# Patient Record
Sex: FEMALE | Race: WHITE | NOT HISPANIC OR LATINO | URBAN - METROPOLITAN AREA
[De-identification: names, ages, dates, MRNs, and addresses within clinical notes are randomized per-mention and may not be internally consistent; named-entity substitution may affect disease eponyms.]

---

## 2017-01-18 ENCOUNTER — APPOINTMENT (OUTPATIENT)
Dept: URBAN - METROPOLITAN AREA CLINIC 200 | Age: 57
Setting detail: DERMATOLOGY
End: 2017-01-19

## 2017-01-18 DIAGNOSIS — D17 BENIGN LIPOMATOUS NEOPLASM: ICD-10-CM

## 2017-01-18 DIAGNOSIS — D22 MELANOCYTIC NEVI: ICD-10-CM

## 2017-01-18 DIAGNOSIS — L57.8 OTHER SKIN CHANGES DUE TO CHRONIC EXPOSURE TO NONIONIZING RADIATION: ICD-10-CM

## 2017-01-18 PROBLEM — D22.5 MELANOCYTIC NEVI OF TRUNK: Status: ACTIVE | Noted: 2017-01-18

## 2017-01-18 PROBLEM — D17.21 BENIGN LIPOMATOUS NEOPLASM OF SKIN AND SUBCUTANEOUS TISSUE OF RIGHT ARM: Status: ACTIVE | Noted: 2017-01-18

## 2017-01-18 PROBLEM — L20.84 INTRINSIC (ALLERGIC) ECZEMA: Status: ACTIVE | Noted: 2017-01-18

## 2017-01-18 PROCEDURE — OTHER BIOPSY BY SHAVE METHOD: OTHER

## 2017-01-18 PROCEDURE — OTHER COUNSELING: OTHER

## 2017-01-18 PROCEDURE — 11100: CPT

## 2017-01-18 PROCEDURE — 99213 OFFICE O/P EST LOW 20 MIN: CPT | Mod: 25

## 2017-01-18 ASSESSMENT — LOCATION DETAILED DESCRIPTION DERM
LOCATION DETAILED: LEFT LATERAL SUPERIOR CHEST
LOCATION DETAILED: RIGHT PROXIMAL POSTERIOR UPPER ARM
LOCATION DETAILED: RIGHT MEDIAL UPPER BACK

## 2017-01-18 ASSESSMENT — LOCATION SIMPLE DESCRIPTION DERM
LOCATION SIMPLE: CHEST
LOCATION SIMPLE: RIGHT UPPER BACK
LOCATION SIMPLE: RIGHT POSTERIOR UPPER ARM

## 2017-01-18 ASSESSMENT — LOCATION ZONE DERM
LOCATION ZONE: TRUNK
LOCATION ZONE: ARM

## 2017-01-18 NOTE — PROCEDURE: BIOPSY BY SHAVE METHOD
Electrodesiccation Text: The wound bed was treated with electrodesiccation after the biopsy was performed.
Additional Anesthesia Volume In Cc (Will Not Render If 0): 0
Anesthesia Type: 1% lidocaine with epinephrine
consent was obtained and risks were reviewed including but not limited to scarring, infection, bleeding, scabbing, incomplete removal, nerve damage and allergy to anesthesia.
Biopsy Type: H and E
Electrodesiccation And Curettage Text: The wound bed was treated with electrodesiccation and curettage after the biopsy was performed.
Cryotherapy Text: The wound bed was treated with cryotherapy after the biopsy was performed.
Bill [id number] For Specimen Handling/Conveyance To Laboratory?: no
Size Of Lesion In Cm: 0.4
Detail Level: Detailed
Silver Nitrate Text: The wound bed was treated with silver nitrate after the biopsy was performed.
Billing Type: Third-Party Bill
Biopsy Method: Personna blade
Post-Care Instructions: I reviewed with the patient in detail post-care instructions. Patient is to keep the biopsy site dry overnight, and then apply bacitracin twice daily until healed. Patient may apply hydrogen peroxide soaks to remove any crusting.
Type Of Destruction Used: Curettage
Hemostasis: Drysol
Notification Instructions: Patient will be notified of biopsy results. However, patient instructed to call the office if not contacted within 2 weeks.
Wound Care: Vaseline
Anesthesia Volume In Cc (Will Not Render If 0): 0.1
Dressing: bandage

## 2017-07-17 ENCOUNTER — APPOINTMENT (OUTPATIENT)
Dept: URBAN - METROPOLITAN AREA CLINIC 200 | Age: 57
Setting detail: DERMATOLOGY
End: 2017-07-18

## 2017-07-17 DIAGNOSIS — L82.0 INFLAMED SEBORRHEIC KERATOSIS: ICD-10-CM

## 2017-07-17 DIAGNOSIS — L57.8 OTHER SKIN CHANGES DUE TO CHRONIC EXPOSURE TO NONIONIZING RADIATION: ICD-10-CM

## 2017-07-17 PROCEDURE — 99213 OFFICE O/P EST LOW 20 MIN: CPT

## 2017-07-17 PROCEDURE — OTHER COUNSELING: OTHER

## 2017-07-17 ASSESSMENT — LOCATION SIMPLE DESCRIPTION DERM
LOCATION SIMPLE: RIGHT UPPER BACK
LOCATION SIMPLE: RIGHT PRETIBIAL REGION

## 2017-07-17 ASSESSMENT — LOCATION ZONE DERM
LOCATION ZONE: LEG
LOCATION ZONE: TRUNK

## 2017-07-17 ASSESSMENT — LOCATION DETAILED DESCRIPTION DERM
LOCATION DETAILED: RIGHT DISTAL PRETIBIAL REGION
LOCATION DETAILED: RIGHT SUPERIOR UPPER BACK

## 2018-01-19 ENCOUNTER — APPOINTMENT (OUTPATIENT)
Dept: URBAN - METROPOLITAN AREA CLINIC 200 | Age: 58
Setting detail: DERMATOLOGY
End: 2018-02-01

## 2018-01-19 DIAGNOSIS — L57.8 OTHER SKIN CHANGES DUE TO CHRONIC EXPOSURE TO NONIONIZING RADIATION: ICD-10-CM

## 2018-01-19 DIAGNOSIS — D22 MELANOCYTIC NEVI: ICD-10-CM

## 2018-01-19 PROBLEM — D22.5 MELANOCYTIC NEVI OF TRUNK: Status: ACTIVE | Noted: 2018-01-19

## 2018-01-19 PROCEDURE — 99213 OFFICE O/P EST LOW 20 MIN: CPT

## 2018-01-19 PROCEDURE — OTHER COUNSELING: OTHER

## 2018-01-19 ASSESSMENT — LOCATION ZONE DERM: LOCATION ZONE: TRUNK

## 2018-01-19 ASSESSMENT — LOCATION SIMPLE DESCRIPTION DERM: LOCATION SIMPLE: CHEST

## 2018-01-19 ASSESSMENT — LOCATION DETAILED DESCRIPTION DERM: LOCATION DETAILED: UPPER STERNUM

## 2018-06-18 ENCOUNTER — APPOINTMENT (OUTPATIENT)
Dept: URBAN - METROPOLITAN AREA CLINIC 200 | Age: 58
Setting detail: DERMATOLOGY
End: 2018-06-19

## 2018-06-18 DIAGNOSIS — B07.8 OTHER VIRAL WARTS: ICD-10-CM

## 2018-06-18 PROCEDURE — 17110 DESTRUCT B9 LESION 1-14: CPT

## 2018-06-18 PROCEDURE — OTHER LIQUID NITROGEN: OTHER

## 2018-06-18 ASSESSMENT — LOCATION SIMPLE DESCRIPTION DERM: LOCATION SIMPLE: RIGHT THUMB

## 2018-06-18 ASSESSMENT — LOCATION DETAILED DESCRIPTION DERM: LOCATION DETAILED: RIGHT DISTAL VENTRAL THUMB

## 2018-06-18 ASSESSMENT — LOCATION ZONE DERM: LOCATION ZONE: FINGER

## 2018-07-16 ENCOUNTER — APPOINTMENT (OUTPATIENT)
Dept: URBAN - METROPOLITAN AREA CLINIC 200 | Age: 58
Setting detail: DERMATOLOGY
End: 2018-07-24

## 2018-07-16 DIAGNOSIS — B07.8 OTHER VIRAL WARTS: ICD-10-CM

## 2018-07-16 DIAGNOSIS — D17 BENIGN LIPOMATOUS NEOPLASM: ICD-10-CM

## 2018-07-16 DIAGNOSIS — L57.8 OTHER SKIN CHANGES DUE TO CHRONIC EXPOSURE TO NONIONIZING RADIATION: ICD-10-CM

## 2018-07-16 DIAGNOSIS — D485 NEOPLASM OF UNCERTAIN BEHAVIOR OF SKIN: ICD-10-CM

## 2018-07-16 PROBLEM — D17.21 BENIGN LIPOMATOUS NEOPLASM OF SKIN AND SUBCUTANEOUS TISSUE OF RIGHT ARM: Status: ACTIVE | Noted: 2018-07-16

## 2018-07-16 PROBLEM — D48.5 NEOPLASM OF UNCERTAIN BEHAVIOR OF SKIN: Status: ACTIVE | Noted: 2018-07-16

## 2018-07-16 PROBLEM — L55.1 SUNBURN OF SECOND DEGREE: Status: ACTIVE | Noted: 2018-07-16

## 2018-07-16 PROBLEM — J30.1 ALLERGIC RHINITIS DUE TO POLLEN: Status: ACTIVE | Noted: 2018-07-16

## 2018-07-16 PROBLEM — L20.84 INTRINSIC (ALLERGIC) ECZEMA: Status: ACTIVE | Noted: 2018-07-16

## 2018-07-16 PROCEDURE — 11100: CPT | Mod: 59

## 2018-07-16 PROCEDURE — OTHER BIOPSY BY SHAVE METHOD: OTHER

## 2018-07-16 PROCEDURE — OTHER REASSURANCE: OTHER

## 2018-07-16 PROCEDURE — OTHER COUNSELING: OTHER

## 2018-07-16 PROCEDURE — OTHER LIQUID NITROGEN: OTHER

## 2018-07-16 PROCEDURE — 17110 DESTRUCT B9 LESION 1-14: CPT

## 2018-07-16 PROCEDURE — 99213 OFFICE O/P EST LOW 20 MIN: CPT | Mod: 25

## 2018-07-16 ASSESSMENT — LOCATION SIMPLE DESCRIPTION DERM
LOCATION SIMPLE: RIGHT POSTERIOR UPPER ARM
LOCATION SIMPLE: RIGHT MIDDLE FINGER
LOCATION SIMPLE: LEFT 3RD TOE
LOCATION SIMPLE: CHEST

## 2018-07-16 ASSESSMENT — LOCATION ZONE DERM
LOCATION ZONE: ARM
LOCATION ZONE: FINGER
LOCATION ZONE: TRUNK
LOCATION ZONE: TOE

## 2018-07-16 ASSESSMENT — LOCATION DETAILED DESCRIPTION DERM
LOCATION DETAILED: LEFT MEDIAL SUPERIOR CHEST
LOCATION DETAILED: LEFT 3RD TOE
LOCATION DETAILED: RIGHT MIDDLE FINGER DISTAL INTERPHALANGEAL JOINT
LOCATION DETAILED: RIGHT DISTAL POSTERIOR UPPER ARM
LOCATION DETAILED: RIGHT DISTAL DORSAL MIDDLE FINGER

## 2018-07-16 NOTE — PROCEDURE: BIOPSY BY SHAVE METHOD
Destruction After The Procedure: No
Notification Instructions: Patient will be notified of biopsy results. However, patient instructed to call the office if not contacted within 2 weeks.
Hemostasis: Drysol
Electrodesiccation And Curettage Text: The wound bed was treated with electrodesiccation and curettage after the biopsy was performed.
Post-Care Instructions: I reviewed with the patient in detail post-care instructions. Patient is to keep the biopsy site dry overnight, and then apply bacitracin twice daily until healed. Patient may apply hydrogen peroxide soaks to remove any crusting.
Biopsy Type: H and E
Was A Bandage Applied: Yes
Size Of Lesion In Cm: 0
Type Of Destruction Used: Curettage
Detail Level: Detailed
Electrodesiccation Text: The wound bed was treated with electrodesiccation after the biopsy was performed.
Biopsy Method: Dermablade
Anesthesia Type: 1% lidocaine with epinephrine
Curettage Text: The wound bed was treated with curettage after the biopsy was performed.
Silver Nitrate Text: The wound bed was treated with silver nitrate after the biopsy was performed.
Depth Of Biopsy: dermis
Billing Type: Third-Party Bill
Cryotherapy Text: The wound bed was treated with cryotherapy after the biopsy was performed.
Anesthesia Volume In Cc (Will Not Render If 0): 0.5
Consent: Written consent was obtained and risks were reviewed including but not limited to scarring, infection, bleeding, scabbing, incomplete removal, nerve damage and allergy to anesthesia.
Dressing: bandage
Wound Care: Petrolatum

## 2018-07-16 NOTE — PROCEDURE: LIQUID NITROGEN
Medical Necessity Information: It is in your best interest to select a reason for this procedure from the list below. All of these items fulfill various CMS LCD requirements except the new and changing color options.
Post-Care Instructions: I reviewed with the patient in detail post-care instructions. Patient is to wear sunprotection, and avoid picking at any of the treated lesions. Pt may apply Vaseline to crusted or scabbing areas.
Medical Necessity Clause: This procedure was medically necessary because the lesions that were treated were: causing pain
Pared With?: Dermablade
Detail Level: Detailed
Number Of Freeze-Thaw Cycles: 2 freeze-thaw cycles
Include Z78.9 (Other Specified Conditions Influencing Health Status) As An Associated Diagnosis?: Yes
Add 52 Modifier (Optional): no
Consent: The patient's consent was obtained including but not limited to risks of crusting, scabbing, blistering, scarring, darker or lighter pigmentary change, recurrence, incomplete removal and infection.

## 2018-09-10 ENCOUNTER — APPOINTMENT (OUTPATIENT)
Dept: URBAN - METROPOLITAN AREA CLINIC 200 | Age: 58
Setting detail: DERMATOLOGY
End: 2018-09-21

## 2018-09-10 DIAGNOSIS — B07.8 OTHER VIRAL WARTS: ICD-10-CM

## 2018-09-10 DIAGNOSIS — L21.8 OTHER SEBORRHEIC DERMATITIS: ICD-10-CM

## 2018-09-10 PROBLEM — L30.9 DERMATITIS, UNSPECIFIED: Status: ACTIVE | Noted: 2018-09-10

## 2018-09-10 PROBLEM — J30.1 ALLERGIC RHINITIS DUE TO POLLEN: Status: ACTIVE | Noted: 2018-09-10

## 2018-09-10 PROCEDURE — OTHER PRESCRIPTION: OTHER

## 2018-09-10 PROCEDURE — OTHER LIQUID NITROGEN: OTHER

## 2018-09-10 PROCEDURE — 17110 DESTRUCT B9 LESION 1-14: CPT

## 2018-09-10 PROCEDURE — 99212 OFFICE O/P EST SF 10 MIN: CPT | Mod: 25

## 2018-09-10 RX ORDER — CLOBETASOL PROPIONATE 0.5 MG/G
AEROSOL, FOAM TOPICAL
Qty: 1 | Refills: 4 | Status: ERX

## 2018-09-10 ASSESSMENT — LOCATION DETAILED DESCRIPTION DERM
LOCATION DETAILED: RIGHT DISTAL DORSAL THUMB
LOCATION DETAILED: HAIR

## 2018-09-10 ASSESSMENT — LOCATION ZONE DERM
LOCATION ZONE: FINGER
LOCATION ZONE: SCALP

## 2018-09-10 ASSESSMENT — LOCATION SIMPLE DESCRIPTION DERM
LOCATION SIMPLE: HAIR
LOCATION SIMPLE: RIGHT THUMB

## 2018-09-10 ASSESSMENT — SEVERITY ASSESSMENT: HOW SEVERE IS THIS PATIENT'S CONDITION?: MODERATE

## 2018-09-10 NOTE — HPI: HAIR LOSS
Previous Labs: Yes
How Did The Hair Loss Occur?: sudden in onset
How Severe Is Your Hair Loss?: severe

## 2018-09-10 NOTE — PROCEDURE: LIQUID NITROGEN
Render Post-Care Instructions In Note?: no
Medical Necessity Information: It is in your best interest to select a reason for this procedure from the list below. All of these items fulfill various CMS LCD requirements except the new and changing color options.
Include Z78.9 (Other Specified Conditions Influencing Health Status) As An Associated Diagnosis?: Yes
Number Of Freeze-Thaw Cycles: 2 freeze-thaw cycles
Pared With?: Dermablade
Post-Care Instructions: I reviewed with the patient in detail post-care instructions. Patient is to wear sunprotection, and avoid picking at any of the treated lesions. Pt may apply Vaseline to crusted or scabbing areas.
Detail Level: Detailed
Consent: The patient's consent was obtained including but not limited to risks of crusting, scabbing, blistering, scarring, darker or lighter pigmentary change, recurrence, incomplete removal and infection.
Medical Necessity Clause: This procedure was medically necessary because the lesions that were treated were: causing pain

## 2018-09-24 ENCOUNTER — APPOINTMENT (OUTPATIENT)
Dept: URBAN - METROPOLITAN AREA CLINIC 200 | Age: 58
Setting detail: DERMATOLOGY
End: 2018-09-26

## 2018-09-24 DIAGNOSIS — L30.9 DERMATITIS, UNSPECIFIED: ICD-10-CM

## 2018-09-24 PROCEDURE — OTHER PRESCRIPTION: OTHER

## 2018-09-24 PROCEDURE — 11100: CPT

## 2018-09-24 PROCEDURE — OTHER BIOPSY BY PUNCH METHOD: OTHER

## 2018-09-24 RX ORDER — CICLOPIROX 1 %
SHAMPOO TOPICAL
Qty: 1 | Refills: 3 | Status: ERX

## 2018-09-24 RX ORDER — CLOBETASOL PROPIONATE 0.5 MG/G
AEROSOL, FOAM TOPICAL
Qty: 1 | Refills: 4 | Status: ERX

## 2018-09-24 ASSESSMENT — LOCATION SIMPLE DESCRIPTION DERM
LOCATION SIMPLE: POSTERIOR SCALP
LOCATION SIMPLE: POSTERIOR SCALP

## 2018-09-24 ASSESSMENT — LOCATION DETAILED DESCRIPTION DERM
LOCATION DETAILED: RIGHT OCCIPITAL SCALP
LOCATION DETAILED: RIGHT OCCIPITAL SCALP

## 2018-09-24 ASSESSMENT — LOCATION ZONE DERM
LOCATION ZONE: SCALP
LOCATION ZONE: SCALP

## 2018-09-24 NOTE — PROCEDURE: BIOPSY BY PUNCH METHOD
Anesthesia Type: 1% lidocaine without epinephrine
X Size Of Lesion In Cm (Optional): 0
Home Suture Removal Text: Patient was provided a home suture removal kit and will remove their sutures at home.  If they have any questions or difficulties they will call the office.
Wound Care: Vaseline
Punch Size In Mm: 6
Suture Removal: 14 days
Was A Bandage Applied: Yes
Post-Care Instructions: I reviewed with the patient in detail post-care instructions. Patient is to keep the biopsy site dry overnight, and then apply bacitracin twice daily until healed. Patient may apply hydrogen peroxide soaks to remove any crusting.
Epidermal Sutures: 4-0 Nylon
Notification Instructions: Patient will be notified of biopsy results. However, patient instructed to call the office if not contacted within 2 weeks.
Consent: Written consent was obtained and risks were reviewed including but not limited to scarring, infection, bleeding, scabbing, incomplete removal, nerve damage and allergy to anesthesia.
Render Post-Care Instructions In Note?: no
Dressing: bandage
Hemostasis: None
Detail Level: Detailed
Biopsy Type: DIF
Anesthesia Volume In Cc (Will Not Render If 0): 1
Billing Type: Third-Party Bill

## 2018-10-02 ENCOUNTER — APPOINTMENT (OUTPATIENT)
Dept: URBAN - METROPOLITAN AREA CLINIC 200 | Age: 58
Setting detail: DERMATOLOGY
End: 2018-10-05

## 2018-10-02 ENCOUNTER — RX ONLY (RX ONLY)
Age: 58
End: 2018-10-02

## 2018-10-02 DIAGNOSIS — Z48.02 ENCOUNTER FOR REMOVAL OF SUTURES: ICD-10-CM

## 2018-10-02 PROCEDURE — OTHER MIPS QUALITY: OTHER

## 2018-10-02 PROCEDURE — OTHER SUTURE REMOVAL (GLOBAL PERIOD): OTHER

## 2018-10-02 RX ORDER — HYDROCORTISONE ACETATE 20 MG/ML
LOTION TOPICAL
Qty: 1 | Refills: 6 | Status: ERX

## 2018-10-02 ASSESSMENT — LOCATION DETAILED DESCRIPTION DERM: LOCATION DETAILED: RIGHT SUPERIOR OCCIPITAL SCALP

## 2018-10-02 ASSESSMENT — LOCATION ZONE DERM: LOCATION ZONE: SCALP

## 2018-10-02 ASSESSMENT — LOCATION SIMPLE DESCRIPTION DERM: LOCATION SIMPLE: POSTERIOR SCALP

## 2018-10-02 NOTE — PROCEDURE: SUTURE REMOVAL (GLOBAL PERIOD)
Add 85963 Cpt? (Important Note: In 2017 The Use Of 82946 Is Being Tracked By Cms To Determine Future Global Period Reimbursement For Global Periods): no
Detail Level: Detailed

## 2018-11-06 ENCOUNTER — APPOINTMENT (OUTPATIENT)
Dept: URBAN - METROPOLITAN AREA CLINIC 200 | Age: 58
Setting detail: DERMATOLOGY
End: 2018-11-12

## 2018-11-06 DIAGNOSIS — L64.8 OTHER ANDROGENIC ALOPECIA: ICD-10-CM

## 2018-11-06 DIAGNOSIS — B07.8 OTHER VIRAL WARTS: ICD-10-CM

## 2018-11-06 PROCEDURE — 17110 DESTRUCT B9 LESION 1-14: CPT

## 2018-11-06 PROCEDURE — OTHER LIQUID NITROGEN: OTHER

## 2018-11-06 PROCEDURE — 99213 OFFICE O/P EST LOW 20 MIN: CPT | Mod: 25

## 2018-11-06 PROCEDURE — OTHER PRESCRIPTION MEDICATION MANAGEMENT: OTHER

## 2018-11-06 ASSESSMENT — LOCATION ZONE DERM
LOCATION ZONE: SCALP
LOCATION ZONE: FINGER

## 2018-11-06 ASSESSMENT — LOCATION DETAILED DESCRIPTION DERM
LOCATION DETAILED: RIGHT MEDIAL FRONTAL SCALP
LOCATION DETAILED: DORSAL INTERPHALANGEAL JOINT RIGHT THUMB
LOCATION DETAILED: RIGHT MIDDLE FINGER PROXIMAL INTERPHALANGEAL JOINT

## 2018-11-06 ASSESSMENT — LOCATION SIMPLE DESCRIPTION DERM
LOCATION SIMPLE: RIGHT THUMB
LOCATION SIMPLE: RIGHT SCALP
LOCATION SIMPLE: RIGHT MIDDLE FINGER

## 2018-11-06 NOTE — PROCEDURE: LIQUID NITROGEN
Detail Level: Detailed
Medical Necessity Clause: This procedure was medically necessary because the lesions that were treated were: causing pain
Number Of Freeze-Thaw Cycles: 2 freeze-thaw cycles
Render Post-Care Instructions In Note?: no
Post-Care Instructions: I reviewed with the patient in detail post-care instructions. Patient is to wear sunprotection, and avoid picking at any of the treated lesions. Pt may apply Vaseline to crusted or scabbing areas.
Pared With?: Dermablade
Consent: The patient's consent was obtained including but not limited to risks of crusting, scabbing, blistering, scarring, darker or lighter pigmentary change, recurrence, incomplete removal and infection.
Include Z78.9 (Other Specified Conditions Influencing Health Status) As An Associated Diagnosis?: Yes
Medical Necessity Information: It is in your best interest to select a reason for this procedure from the list below. All of these items fulfill various CMS LCD requirements except the new and changing color options.

## 2019-01-14 ENCOUNTER — RX ONLY (RX ONLY)
Age: 59
End: 2019-01-14

## 2019-01-14 ENCOUNTER — APPOINTMENT (OUTPATIENT)
Dept: URBAN - METROPOLITAN AREA CLINIC 200 | Age: 59
Setting detail: DERMATOLOGY
End: 2019-01-15

## 2019-01-14 DIAGNOSIS — L57.8 OTHER SKIN CHANGES DUE TO CHRONIC EXPOSURE TO NONIONIZING RADIATION: ICD-10-CM

## 2019-01-14 DIAGNOSIS — L20.84 INTRINSIC (ALLERGIC) ECZEMA: ICD-10-CM

## 2019-01-14 DIAGNOSIS — D17 BENIGN LIPOMATOUS NEOPLASM: ICD-10-CM

## 2019-01-14 PROBLEM — D17.21 BENIGN LIPOMATOUS NEOPLASM OF SKIN AND SUBCUTANEOUS TISSUE OF RIGHT ARM: Status: ACTIVE | Noted: 2019-01-14

## 2019-01-14 PROCEDURE — OTHER EXCISION: OTHER

## 2019-01-14 PROCEDURE — 11401 EXC TR-EXT B9+MARG 0.6-1 CM: CPT

## 2019-01-14 PROCEDURE — OTHER COUNSELING: OTHER

## 2019-01-14 PROCEDURE — OTHER PRESCRIPTION: OTHER

## 2019-01-14 PROCEDURE — OTHER MIPS QUALITY: OTHER

## 2019-01-14 PROCEDURE — 12031 INTMD RPR S/A/T/EXT 2.5 CM/<: CPT

## 2019-01-14 PROCEDURE — 99213 OFFICE O/P EST LOW 20 MIN: CPT | Mod: 25

## 2019-01-14 RX ORDER — TRIAMCINOLONE ACETONIDE 1 MG/G
CREAM TOPICAL BID
Qty: 1 | Refills: 6 | Status: ERX

## 2019-01-14 RX ORDER — HYDROCORTISONE ACETATE 20 MG/ML
LOTION TOPICAL
Qty: 1 | Refills: 6 | Status: ERX

## 2019-01-14 RX ORDER — CICLOPIROX 1 %
SHAMPOO TOPICAL
Qty: 1 | Refills: 3 | Status: ERX

## 2019-01-14 ASSESSMENT — LOCATION SIMPLE DESCRIPTION DERM
LOCATION SIMPLE: RIGHT PRETIBIAL REGION
LOCATION SIMPLE: RIGHT POSTERIOR UPPER ARM
LOCATION SIMPLE: CHEST

## 2019-01-14 ASSESSMENT — LOCATION DETAILED DESCRIPTION DERM
LOCATION DETAILED: LEFT MEDIAL SUPERIOR CHEST
LOCATION DETAILED: RIGHT DISTAL PRETIBIAL REGION
LOCATION DETAILED: RIGHT DISTAL POSTERIOR UPPER ARM

## 2019-01-14 ASSESSMENT — LOCATION ZONE DERM
LOCATION ZONE: TRUNK
LOCATION ZONE: ARM
LOCATION ZONE: LEG

## 2019-01-14 NOTE — PROCEDURE: EXCISION
Validate That Anesthesia Volume Is Not Zero (If You Leave At 0 It Will Not Render In Note): No
Star Wedge Flap Text: The defect edges were debeveled with a #15 scalpel blade.  Given the location of the defect, shape of the defect and the proximity to free margins a star wedge flap was deemed most appropriate.  Using a sterile surgical marker, an appropriate rotation flap was drawn incorporating the defect and placing the expected incisions within the relaxed skin tension lines where possible. The area thus outlined was incised deep to adipose tissue with a #15 scalpel blade.  The skin margins were undermined to an appropriate distance in all directions utilizing iris scissors.
Double M-Plasty Intermediate Repair Preamble Text (Leave Blank If You Do Not Want): Undermining was performed with blunt dissection.
Perilesional Excision Additional Text (Leave Blank If You Do Not Want): The margin was drawn around the clinically apparent lesion. Incisions were then made along these lines to the appropriate tissue plane and the lesion was extirpated.
Complex Repair And Transposition Flap Text: The defect edges were debeveled with a #15 scalpel blade.  The primary defect was closed partially with a complex linear closure.  Given the location of the remaining defect, shape of the defect and the proximity to free margins a transposition flap was deemed most appropriate for complete closure of the defect.  Using a sterile surgical marker, an appropriate advancement flap was drawn incorporating the defect and placing the expected incisions within the relaxed skin tension lines where possible.    The area thus outlined was incised deep to adipose tissue with a #15 scalpel blade.  The skin margins were undermined to an appropriate distance in all directions utilizing iris scissors.
Show Anatomic Location From Referring Provider Variable: Yes
Number Of Deep Sutures (Optional): 1
Complex Repair And O-L Flap Text: The defect edges were debeveled with a #15 scalpel blade.  The primary defect was closed partially with a complex linear closure.  Given the location of the remaining defect, shape of the defect and the proximity to free margins an O-L flap was deemed most appropriate for complete closure of the defect.  Using a sterile surgical marker, an appropriate flap was drawn incorporating the defect and placing the expected incisions within the relaxed skin tension lines where possible.    The area thus outlined was incised deep to adipose tissue with a #15 scalpel blade.  The skin margins were undermined to an appropriate distance in all directions utilizing iris scissors.
Purse String (Simple) Text: Given the location of the defect and the characteristics of the surrounding skin a purse string simple closure was deemed most appropriate.  Undermining was performed circumferentially around the surgical defect.  A purse string suture was then placed and tightened.
Complex Repair And Bilobe Flap Text: The defect edges were debeveled with a #15 scalpel blade.  The primary defect was closed partially with a complex linear closure.  Given the location of the remaining defect, shape of the defect and the proximity to free margins a bilobe flap was deemed most appropriate for complete closure of the defect.  Using a sterile surgical marker, an appropriate advancement flap was drawn incorporating the defect and placing the expected incisions within the relaxed skin tension lines where possible.    The area thus outlined was incised deep to adipose tissue with a #15 scalpel blade.  The skin margins were undermined to an appropriate distance in all directions utilizing iris scissors.
Medical Necessity Clause: This procedure was medically necessary because the lesion that was treated was:
Keystone Flap Text: The defect edges were debeveled with a #15 scalpel blade.  Given the location of the defect, shape of the defect a keystone flap was deemed most appropriate.  Using a sterile surgical marker, an appropriate keystone flap was drawn incorporating the defect, outlining the appropriate donor tissue and placing the expected incisions within the relaxed skin tension lines where possible. The area thus outlined was incised deep to adipose tissue with a #15 scalpel blade.  The skin margins were undermined to an appropriate distance in all directions around the primary defect and laterally outward around the flap utilizing iris scissors.
Cartilage Graft Text: The defect edges were debeveled with a #15 scalpel blade.  Given the location of the defect, shape of the defect, the fact the defect involved a full thickness cartilage defect a cartilage graft was deemed most appropriate.  An appropriate donor site was identified, cleansed, and anesthetized. The cartilage graft was then harvested and transferred to the recipient site, oriented appropriately and then sutured into place.  The secondary defect was then repaired using a primary closure.
Medical Necessity Information: It is in your best interest to select a reason for this procedure from the list below. All of these items fulfill various CMS LCD requirements except lesion extends to a margin.
Dorsal Nasal Flap Text: The defect edges were debeveled with a #15 scalpel blade.  Given the location of the defect and the proximity to free margins a dorsal nasal flap was deemed most appropriate.  Using a sterile surgical marker, an appropriate dorsal nasal flap was drawn around the defect.    The area thus outlined was incised deep to adipose tissue with a #15 scalpel blade.  The skin margins were undermined to an appropriate distance in all directions utilizing iris scissors.
Trilobed Flap Text: The defect edges were debeveled with a #15 scalpel blade.  Given the location of the defect and the proximity to free margins a trilobed flap was deemed most appropriate.  Using a sterile surgical marker, an appropriate trilobed flap drawn around the defect.    The area thus outlined was incised deep to adipose tissue with a #15 scalpel blade.  The skin margins were undermined to an appropriate distance in all directions utilizing iris scissors.
Ftsg Text: The defect edges were debeveled with a #15 scalpel blade.  Given the location of the defect, shape of the defect and the proximity to free margins a full thickness skin graft was deemed most appropriate.  Using a sterile surgical marker, the primary defect shape was transferred to the donor site. The area thus outlined was incised deep to adipose tissue with a #15 scalpel blade.  The harvested graft was then trimmed of adipose tissue until only dermis and epidermis was left.  The skin margins of the secondary defect were undermined to an appropriate distance in all directions utilizing iris scissors.  The secondary defect was closed with interrupted buried subcutaneous sutures.  The skin edges were then re-apposed with running  sutures.  The skin graft was then placed in the primary defect and oriented appropriately.
A-T Advancement Flap Text: The defect edges were debeveled with a #15 scalpel blade.  Given the location of the defect, shape of the defect and the proximity to free margins an A-T advancement flap was deemed most appropriate.  Using a sterile surgical marker, an appropriate advancement flap was drawn incorporating the defect and placing the expected incisions within the relaxed skin tension lines where possible.    The area thus outlined was incised deep to adipose tissue with a #15 scalpel blade.  The skin margins were undermined to an appropriate distance in all directions utilizing iris scissors.
Excisional Biopsy Additional Text (Leave Blank If You Do Not Want): The margin was drawn around the clinically apparent lesion. An elliptical shape was then drawn on the skin incorporating the lesion and margins.  Incisions were then made along these lines to the appropriate tissue plane and the lesion was extirpated.
Dermal Autograft Text: The defect edges were debeveled with a #15 scalpel blade.  Given the location of the defect, shape of the defect and the proximity to free margins a dermal autograft was deemed most appropriate.  Using a sterile surgical marker, the primary defect shape was transferred to the donor site. The area thus outlined was incised deep to adipose tissue with a #15 scalpel blade.  The harvested graft was then trimmed of adipose and epidermal tissue until only dermis was left.  The skin graft was then placed in the primary defect and oriented appropriately.
Detail Level: Detailed
Skin Substitute Text: The defect edges were debeveled with a #15 scalpel blade.  Given the location of the defect, shape of the defect and the proximity to free margins a skin substitute graft was deemed most appropriate.  The graft material was trimmed to fit the size of the defect. The graft was then placed in the primary defect and oriented appropriately.
Tissue Cultured Epidermal Autograft Text: The defect edges were debeveled with a #15 scalpel blade.  Given the location of the defect, shape of the defect and the proximity to free margins a tissue cultured epidermal autograft was deemed most appropriate.  The graft was then trimmed to fit the size of the defect.  The graft was then placed in the primary defect and oriented appropriately.
Primary Defect Length (In Cm): 0
Paramedian Forehead Flap Text: A decision was made to reconstruct the defect utilizing an interpolation axial flap and a staged reconstruction.  A telfa template was made of the defect.  This telfa template was then used to outline the paramedian forehead pedicle flap.  The donor area for the pedicle flap was then injected with anesthesia.  The flap was excised through the skin and subcutaneous tissue down to the layer of the underlying musculature.  The pedicle flap was carefully excised within this deep plane to maintain its blood supply.  The edges of the donor site were undermined.   The donor site was closed in a primary fashion.  The pedicle was then rotated into position and sutured.  Once the tube was sutured into place, adequate blood supply was confirmed with blanching and refill.  The pedicle was then wrapped with xeroform gauze and dressed appropriately with a telfa and gauze bandage to ensure continued blood supply and protect the attached pedicle.
Advancement Flap (Single) Text: The defect edges were debeveled with a #15 scalpel blade.  Given the location of the defect and the proximity to free margins a single advancement flap was deemed most appropriate.  Using a sterile surgical marker, an appropriate advancement flap was drawn incorporating the defect and placing the expected incisions within the relaxed skin tension lines where possible.    The area thus outlined was incised deep to adipose tissue with a #15 scalpel blade.  The skin margins were undermined to an appropriate distance in all directions utilizing iris scissors.
Complex Repair And Single Advancement Flap Text: The defect edges were debeveled with a #15 scalpel blade.  The primary defect was closed partially with a complex linear closure.  Given the location of the remaining defect, shape of the defect and the proximity to free margins a single advancement flap was deemed most appropriate for complete closure of the defect.  Using a sterile surgical marker, an appropriate advancement flap was drawn incorporating the defect and placing the expected incisions within the relaxed skin tension lines where possible.    The area thus outlined was incised deep to adipose tissue with a #15 scalpel blade.  The skin margins were undermined to an appropriate distance in all directions utilizing iris scissors.
Helical Rim Advancement Flap Text: The defect edges were debeveled with a #15 blade scalpel.  Given the location of the defect and the proximity to free margins (helical rim) a double helical rim advancement flap was deemed most appropriate.  Using a sterile surgical marker, the appropriate advancement flaps were drawn incorporating the defect and placing the expected incisions between the helical rim and antihelix where possible.  The area thus outlined was incised through and through with a #15 scalpel blade.  With a skin hook and iris scissors, the flaps were gently and sharply undermined and freed up.
Epidermal Autograft Text: The defect edges were debeveled with a #15 scalpel blade.  Given the location of the defect, shape of the defect and the proximity to free margins an epidermal autograft was deemed most appropriate.  Using a sterile surgical marker, the primary defect shape was transferred to the donor site. The epidermal graft was then harvested.  The skin graft was then placed in the primary defect and oriented appropriately.
Excision Method: Slit
Bilobed Flap Text: The defect edges were debeveled with a #15 scalpel blade.  Given the location of the defect and the proximity to free margins a bilobe flap was deemed most appropriate.  Using a sterile surgical marker, an appropriate bilobe flap drawn around the defect.    The area thus outlined was incised deep to adipose tissue with a #15 scalpel blade.  The skin margins were undermined to an appropriate distance in all directions utilizing iris scissors.
Epidermal Closure Graft Donor Site (Optional): simple interrupted
V-Y Flap Text: The defect edges were debeveled with a #15 scalpel blade.  Given the location of the defect, shape of the defect and the proximity to free margins a V-Y flap was deemed most appropriate.  Using a sterile surgical marker, an appropriate advancement flap was drawn incorporating the defect and placing the expected incisions within the relaxed skin tension lines where possible.    The area thus outlined was incised deep to adipose tissue with a #15 scalpel blade.  The skin margins were undermined to an appropriate distance in all directions utilizing iris scissors.
V-Y Plasty Text: The defect edges were debeveled with a #15 scalpel blade.  Given the location of the defect, shape of the defect and the proximity to free margins an V-Y advancement flap was deemed most appropriate.  Using a sterile surgical marker, an appropriate advancement flap was drawn incorporating the defect and placing the expected incisions within the relaxed skin tension lines where possible.    The area thus outlined was incised deep to adipose tissue with a #15 scalpel blade.  The skin margins were undermined to an appropriate distance in all directions utilizing iris scissors.
Repair Performed By Another Provider Text (Leave Blank If You Do Not Want): After the tissue was excised the defect was repaired by another provider.
Consent was obtained from the patient. The risks and benefits to therapy were discussed in detail. Specifically, the risks of infection, scarring, bleeding, prolonged wound healing, incomplete removal, allergy to anesthesia, nerve injury and recurrence were addressed. Prior to the procedure, the treatment site was clearly identified and confirmed by the patient. All components of Universal Protocol/PAUSE Rule completed.
Billing Type: Third-Party Bill
O-L Flap Text: The defect edges were debeveled with a #15 scalpel blade.  Given the location of the defect, shape of the defect and the proximity to free margins an O-L flap was deemed most appropriate.  Using a sterile surgical marker, an appropriate advancement flap was drawn incorporating the defect and placing the expected incisions within the relaxed skin tension lines where possible.    The area thus outlined was incised deep to adipose tissue with a #15 scalpel blade.  The skin margins were undermined to an appropriate distance in all directions utilizing iris scissors.
Path Notes (To The Dermatopathologist): Please check margins.
Double M-Plasty Complex Repair Preamble Text (Leave Blank If You Do Not Want): Extensive wide undermining was performed.
S Plasty Text: Given the location and shape of the defect, and the orientation of relaxed skin tension lines, an S-plasty was deemed most appropriate for repair.  Using a sterile surgical marker, the appropriate outline of the S-plasty was drawn, incorporating the defect and placing the expected incisions within the relaxed skin tension lines where possible.  The area thus outlined was incised deep to adipose tissue with a #15 scalpel blade.  The skin margins were undermined to an appropriate distance in all directions utilizing iris scissors. The skin flaps were advanced over the defect.  The opposing margins were then approximated with interrupted buried subcutaneous sutures.
Island Pedicle Flap With Canthal Suspension Text: The defect edges were debeveled with a #15 scalpel blade.  Given the location of the defect, shape of the defect and the proximity to free margins an island pedicle advancement flap was deemed most appropriate.  Using a sterile surgical marker, an appropriate advancement flap was drawn incorporating the defect, outlining the appropriate donor tissue and placing the expected incisions within the relaxed skin tension lines where possible. The area thus outlined was incised deep to adipose tissue with a #15 scalpel blade.  The skin margins were undermined to an appropriate distance in all directions around the primary defect and laterally outward around the island pedicle utilizing iris scissors.  There was minimal undermining beneath the pedicle flap. A suspension suture was placed in the canthal tendon to prevent tension and prevent ectropion.
O-T Advancement Flap Text: The defect edges were debeveled with a #15 scalpel blade.  Given the location of the defect, shape of the defect and the proximity to free margins an O-T advancement flap was deemed most appropriate.  Using a sterile surgical marker, an appropriate advancement flap was drawn incorporating the defect and placing the expected incisions within the relaxed skin tension lines where possible.    The area thus outlined was incised deep to adipose tissue with a #15 scalpel blade.  The skin margins were undermined to an appropriate distance in all directions utilizing iris scissors.
Saucerization Excision Additional Text (Leave Blank If You Do Not Want): The margin was drawn around the clinically apparent lesion.  Incisions were then made along these lines, in a tangential fashion, to the appropriate tissue plane and the lesion was extirpated.
Graft Donor Site Bandage (Optional-Leave Blank If You Don't Want In Note): Steri-strips and a pressure bandage were applied to the donor site.
Complex Repair And O-T Advancement Flap Text: The defect edges were debeveled with a #15 scalpel blade.  The primary defect was closed partially with a complex linear closure.  Given the location of the remaining defect, shape of the defect and the proximity to free margins an O-T advancement flap was deemed most appropriate for complete closure of the defect.  Using a sterile surgical marker, an appropriate advancement flap was drawn incorporating the defect and placing the expected incisions within the relaxed skin tension lines where possible.    The area thus outlined was incised deep to adipose tissue with a #15 scalpel blade.  The skin margins were undermined to an appropriate distance in all directions utilizing iris scissors.
Excision Depth: adipose tissue
Post-Care Instructions: I reviewed with the patient in detail post-care instructions. Patient is not to engage in any heavy lifting, exercise, or swimming for the next 14 days. Should the patient develop any fevers, chills, bleeding, severe pain patient will contact the office immediately.
Intermediate / Complex Repair - Final Wound Length In Cm: 1.5
Purse String (Intermediate) Text: Given the location of the defect and the characteristics of the surrounding skin a pursestring intermediate closure was deemed most appropriate.  Undermining was performed circumfirentially around the surgical defect.  A purstring suture was then placed and tightened.
Mucosal Advancement Flap Text: Given the location of the defect, shape of the defect and the proximity to free margins a mucosal advancement flap was deemed most appropriate. Incisions were made with a 15 blade scalpel in the appropriate fashion along the cutaneous vermillion border and the mucosal lip. The remaining actinically damaged mucosal tissue was excised.  The mucosal advancement flap was then elevated to the gingival sulcus with care taken to preserve the neurovascular structures and advanced into the primary defect. Care was taken to ensure that precise realignment of the vermillion border was achieved.
Complex Repair And Tissue Cultured Epidermal Autograft Text: The defect edges were debeveled with a #15 scalpel blade.  The primary defect was closed partially with a complex linear closure.  Given the location of the defect, shape of the defect and the proximity to free margins an tissue cultured epidermal autograft was deemed most appropriate to repair the remaining defect.  The graft was trimmed to fit the size of the remaining defect.  The graft was then placed in the primary defect, oriented appropriately, and sutured into place.
Scalpel Size: 15 blade
Epidermal Sutures: 4-0 Nylon
Melolabial Interpolation Flap Text: A decision was made to reconstruct the defect utilizing an interpolation axial flap and a staged reconstruction.  A telfa template was made of the defect.  This telfa template was then used to outline the melolabial interpolation flap.  The donor area for the pedicle flap was then injected with anesthesia.  The flap was excised through the skin and subcutaneous tissue down to the layer of the underlying musculature.  The pedicle flap was carefully excised within this deep plane to maintain its blood supply.  The edges of the donor site were undermined.   The donor site was closed in a primary fashion.  The pedicle was then rotated into position and sutured.  Once the tube was sutured into place, adequate blood supply was confirmed with blanching and refill.  The pedicle was then wrapped with xeroform gauze and dressed appropriately with a telfa and gauze bandage to ensure continued blood supply and protect the attached pedicle.
Rhomboid Transposition Flap Text: The defect edges were debeveled with a #15 scalpel blade.  Given the location of the defect and the proximity to free margins a rhomboid transposition flap was deemed most appropriate.  Using a sterile surgical marker, an appropriate rhomboid flap was drawn incorporating the defect.    The area thus outlined was incised deep to adipose tissue with a #15 scalpel blade.  The skin margins were undermined to an appropriate distance in all directions utilizing iris scissors.
Slit Excision Additional Text (Leave Blank If You Do Not Want): A linear line was drawn on the skin overlying the lesion. An incision was made slowly until the lesion was visualized.  Once visualized, the lesion was removed with blunt dissection.
Dressing: pressure dressing
Complex Repair And Melolabial Flap Text: The defect edges were debeveled with a #15 scalpel blade.  The primary defect was closed partially with a complex linear closure.  Given the location of the remaining defect, shape of the defect and the proximity to free margins a melolabial flap was deemed most appropriate for complete closure of the defect.  Using a sterile surgical marker, an appropriate advancement flap was drawn incorporating the defect and placing the expected incisions within the relaxed skin tension lines where possible.    The area thus outlined was incised deep to adipose tissue with a #15 scalpel blade.  The skin margins were undermined to an appropriate distance in all directions utilizing iris scissors.
Bilateral Helical Rim Advancement Flap Text: The defect edges were debeveled with a #15 blade scalpel.  Given the location of the defect and the proximity to free margins (helical rim) a bilateral helical rim advancement flap was deemed most appropriate.  Using a sterile surgical marker, the appropriate advancement flaps were drawn incorporating the defect and placing the expected incisions between the helical rim and antihelix where possible.  The area thus outlined was incised through and through with a #15 scalpel blade.  With a skin hook and iris scissors, the flaps were gently and sharply undermined and freed up.
Complex Repair And V-Y Plasty Text: The defect edges were debeveled with a #15 scalpel blade.  The primary defect was closed partially with a complex linear closure.  Given the location of the remaining defect, shape of the defect and the proximity to free margins a V-Y plasty was deemed most appropriate for complete closure of the defect.  Using a sterile surgical marker, an appropriate advancement flap was drawn incorporating the defect and placing the expected incisions within the relaxed skin tension lines where possible.    The area thus outlined was incised deep to adipose tissue with a #15 scalpel blade.  The skin margins were undermined to an appropriate distance in all directions utilizing iris scissors.
Hemostasis: Electrocautery
Repair Type: Intermediate
Anesthesia Volume In Cc: 5
O-Z Plasty Text: The defect edges were debeveled with a #15 scalpel blade.  Given the location of the defect, shape of the defect and the proximity to free margins an O-Z plasty (double transposition flap) was deemed most appropriate.  Using a sterile surgical marker, the appropriate transposition flaps were drawn incorporating the defect and placing the expected incisions within the relaxed skin tension lines where possible.    The area thus outlined was incised deep to adipose tissue with a #15 scalpel blade.  The skin margins were undermined to an appropriate distance in all directions utilizing iris scissors.  Hemostasis was achieved with electrocautery.  The flaps were then transposed into place, one clockwise and the other counterclockwise, and anchored with interrupted buried subcutaneous sutures.
H Plasty Text: Given the location of the defect, shape of the defect and the proximity to free margins a H-plasty was deemed most appropriate for repair.  Using a sterile surgical marker, the appropriate advancement arms of the H-plasty were drawn incorporating the defect and placing the expected incisions within the relaxed skin tension lines where possible. The area thus outlined was incised deep to adipose tissue with a #15 scalpel blade. The skin margins were undermined to an appropriate distance in all directions utilizing iris scissors.  The opposing advancement arms were then advanced into place in opposite direction and anchored with interrupted buried subcutaneous sutures.
Spiral Flap Text: The defect edges were debeveled with a #15 scalpel blade.  Given the location of the defect, shape of the defect and the proximity to free margins a spiral flap was deemed most appropriate.  Using a sterile surgical marker, an appropriate rotation flap was drawn incorporating the defect and placing the expected incisions within the relaxed skin tension lines where possible. The area thus outlined was incised deep to adipose tissue with a #15 scalpel blade.  The skin margins were undermined to an appropriate distance in all directions utilizing iris scissors.
Biopsy Photograph Reviewed: No (no photograph available)
Mercedes Flap Text: The defect edges were debeveled with a #15 scalpel blade.  Given the location of the defect, shape of the defect and the proximity to free margins a Mercedes flap was deemed most appropriate.  Using a sterile surgical marker, an appropriate advancement flap was drawn incorporating the defect and placing the expected incisions within the relaxed skin tension lines where possible. The area thus outlined was incised deep to adipose tissue with a #15 scalpel blade.  The skin margins were undermined to an appropriate distance in all directions utilizing iris scissors.
Transposition Flap Text: The defect edges were debeveled with a #15 scalpel blade.  Given the location of the defect and the proximity to free margins a transposition flap was deemed most appropriate.  Using a sterile surgical marker, an appropriate transposition flap was drawn incorporating the defect.    The area thus outlined was incised deep to adipose tissue with a #15 scalpel blade.  The skin margins were undermined to an appropriate distance in all directions utilizing iris scissors.
Epidermal Closure: running
Xenograft Text: The defect edges were debeveled with a #15 scalpel blade.  Given the location of the defect, shape of the defect and the proximity to free margins a xenograft was deemed most appropriate.  The graft was then trimmed to fit the size of the defect.  The graft was then placed in the primary defect and oriented appropriately.
Suture Removal: 14 days
Complex Repair And Double Advancement Flap Text: The defect edges were debeveled with a #15 scalpel blade.  The primary defect was closed partially with a complex linear closure.  Given the location of the remaining defect, shape of the defect and the proximity to free margins a double advancement flap was deemed most appropriate for complete closure of the defect.  Using a sterile surgical marker, an appropriate advancement flap was drawn incorporating the defect and placing the expected incisions within the relaxed skin tension lines where possible.    The area thus outlined was incised deep to adipose tissue with a #15 scalpel blade.  The skin margins were undermined to an appropriate distance in all directions utilizing iris scissors.
Composite Graft Text: The defect edges were debeveled with a #15 scalpel blade.  Given the location of the defect, shape of the defect, the proximity to free margins and the fact the defect was full thickness a composite graft was deemed most appropriate.  The defect was outline and then transferred to the donor site.  A full thickness graft was then excised from the donor site. The graft was then placed in the primary defect, oriented appropriately and then sutured into place.  The secondary defect was then repaired using a primary closure.
Anesthesia Type: 1% lidocaine with epinephrine
Estimated Blood Loss (Cc): minimal
Alar Island Pedicle Flap Text: The defect edges were debeveled with a #15 scalpel blade.  Given the location of the defect, shape of the defect and the proximity to the alar rim an island pedicle advancement flap was deemed most appropriate.  Using a sterile surgical marker, an appropriate advancement flap was drawn incorporating the defect, outlining the appropriate donor tissue and placing the expected incisions within the nasal ala running parallel to the alar rim. The area thus outlined was incised with a #15 scalpel blade.  The skin margins were undermined minimally to an appropriate distance in all directions around the primary defect and laterally outward around the island pedicle utilizing iris scissors.  There was minimal undermining beneath the pedicle flap.
Burow's Advancement Flap Text: The defect edges were debeveled with a #15 scalpel blade.  Given the location of the defect and the proximity to free margins a Burow's advancement flap was deemed most appropriate.  Using a sterile surgical marker, the appropriate advancement flap was drawn incorporating the defect and placing the expected incisions within the relaxed skin tension lines where possible.    The area thus outlined was incised deep to adipose tissue with a #15 scalpel blade.  The skin margins were undermined to an appropriate distance in all directions utilizing iris scissors.
Complex Repair And A-T Advancement Flap Text: The defect edges were debeveled with a #15 scalpel blade.  The primary defect was closed partially with a complex linear closure.  Given the location of the remaining defect, shape of the defect and the proximity to free margins an A-T advancement flap was deemed most appropriate for complete closure of the defect.  Using a sterile surgical marker, an appropriate advancement flap was drawn incorporating the defect and placing the expected incisions within the relaxed skin tension lines where possible.    The area thus outlined was incised deep to adipose tissue with a #15 scalpel blade.  The skin margins were undermined to an appropriate distance in all directions utilizing iris scissors.
Complex Repair And Epidermal Autograft Text: The defect edges were debeveled with a #15 scalpel blade.  The primary defect was closed partially with a complex linear closure.  Given the location of the defect, shape of the defect and the proximity to free margins an epidermal autograft was deemed most appropriate to repair the remaining defect.  The graft was trimmed to fit the size of the remaining defect.  The graft was then placed in the primary defect, oriented appropriately, and sutured into place.
Cheek-To-Nose Interpolation Flap Text: A decision was made to reconstruct the defect utilizing an interpolation axial flap and a staged reconstruction.  A telfa template was made of the defect.  This telfa template was then used to outline the Cheek-To-Nose Interpolation flap.  The donor area for the pedicle flap was then injected with anesthesia.  The flap was excised through the skin and subcutaneous tissue down to the layer of the underlying musculature.  The interpolation flap was carefully excised within this deep plane to maintain its blood supply.  The edges of the donor site were undermined.   The donor site was closed in a primary fashion.  The pedicle was then rotated into position and sutured.  Once the tube was sutured into place, adequate blood supply was confirmed with blanching and refill.  The pedicle was then wrapped with xeroform gauze and dressed appropriately with a telfa and gauze bandage to ensure continued blood supply and protect the attached pedicle.
Island Pedicle Flap Text: The defect edges were debeveled with a #15 scalpel blade.  Given the location of the defect, shape of the defect and the proximity to free margins an island pedicle advancement flap was deemed most appropriate.  Using a sterile surgical marker, an appropriate advancement flap was drawn incorporating the defect, outlining the appropriate donor tissue and placing the expected incisions within the relaxed skin tension lines where possible.    The area thus outlined was incised deep to adipose tissue with a #15 scalpel blade.  The skin margins were undermined to an appropriate distance in all directions around the primary defect and laterally outward around the island pedicle utilizing iris scissors.  There was minimal undermining beneath the pedicle flap.
Modified Advancement Flap Text: The defect edges were debeveled with a #15 scalpel blade.  Given the location of the defect, shape of the defect and the proximity to free margins a modified advancement flap was deemed most appropriate.  Using a sterile surgical marker, an appropriate advancement flap was drawn incorporating the defect and placing the expected incisions within the relaxed skin tension lines where possible.    The area thus outlined was incised deep to adipose tissue with a #15 scalpel blade.  The skin margins were undermined to an appropriate distance in all directions utilizing iris scissors.
No Repair - Repaired With Adjacent Surgical Defect Text (Leave Blank If You Do Not Want): After the excision the defect was repaired concurrently with another surgical defect which was in close approximation.
Information: Selecting Yes will display possible errors in your note based on the variables you have selected. This validation is only offered as a suggestion for you. PLEASE NOTE THAT THE VALIDATION TEXT WILL BE REMOVED WHEN YOU FINALIZE YOUR NOTE. IF YOU WANT TO FAX A PRELIMINARY NOTE YOU WILL NEED TO TOGGLE THIS TO 'NO' IF YOU DO NOT WANT IT IN YOUR FAXED NOTE.
Split-Thickness Skin Graft Text: The defect edges were debeveled with a #15 scalpel blade.  Given the location of the defect, shape of the defect and the proximity to free margins a split thickness skin graft was deemed most appropriate.  Using a sterile surgical marker, the primary defect shape was transferred to the donor site. The split thickness graft was then harvested.  The skin graft was then placed in the primary defect and oriented appropriately.
Complex Repair And Split-Thickness Skin Graft Text: The defect edges were debeveled with a #15 scalpel blade.  The primary defect was closed partially with a complex linear closure.  Given the location of the defect, shape of the defect and the proximity to free margins a split thickness skin graft was deemed most appropriate to repair the remaining defect.  The graft was trimmed to fit the size of the remaining defect.  The graft was then placed in the primary defect, oriented appropriately, and sutured into place.
Complex Repair And Modified Advancement Flap Text: The defect edges were debeveled with a #15 scalpel blade.  The primary defect was closed partially with a complex linear closure.  Given the location of the remaining defect, shape of the defect and the proximity to free margins a modified advancement flap was deemed most appropriate for complete closure of the defect.  Using a sterile surgical marker, an appropriate advancement flap was drawn incorporating the defect and placing the expected incisions within the relaxed skin tension lines where possible.    The area thus outlined was incised deep to adipose tissue with a #15 scalpel blade.  The skin margins were undermined to an appropriate distance in all directions utilizing iris scissors.
O-T Plasty Text: The defect edges were debeveled with a #15 scalpel blade.  Given the location of the defect, shape of the defect and the proximity to free margins an O-T plasty was deemed most appropriate.  Using a sterile surgical marker, an appropriate O-T plasty was drawn incorporating the defect and placing the expected incisions within the relaxed skin tension lines where possible.    The area thus outlined was incised deep to adipose tissue with a #15 scalpel blade.  The skin margins were undermined to an appropriate distance in all directions utilizing iris scissors.
Complex Repair And M Plasty Text: The defect edges were debeveled with a #15 scalpel blade.  The primary defect was closed partially with a complex linear closure.  Given the location of the remaining defect, shape of the defect and the proximity to free margins an M plasty was deemed most appropriate for complete closure of the defect.  Using a sterile surgical marker, an appropriate advancement flap was drawn incorporating the defect and placing the expected incisions within the relaxed skin tension lines where possible.    The area thus outlined was incised deep to adipose tissue with a #15 scalpel blade.  The skin margins were undermined to an appropriate distance in all directions utilizing iris scissors.
Bi-Rhombic Flap Text: The defect edges were debeveled with a #15 scalpel blade.  Given the location of the defect and the proximity to free margins a bi-rhombic flap was deemed most appropriate.  Using a sterile surgical marker, an appropriate rhombic flap was drawn incorporating the defect. The area thus outlined was incised deep to adipose tissue with a #15 scalpel blade.  The skin margins were undermined to an appropriate distance in all directions utilizing iris scissors.
Banner Transposition Flap Text: The defect edges were debeveled with a #15 scalpel blade.  Given the location of the defect and the proximity to free margins a Banner transposition flap was deemed most appropriate.  Using a sterile surgical marker, an appropriate flap drawn around the defect. The area thus outlined was incised deep to adipose tissue with a #15 scalpel blade.  The skin margins were undermined to an appropriate distance in all directions utilizing iris scissors.
Interpolation Flap Text: A decision was made to reconstruct the defect utilizing an interpolation axial flap and a staged reconstruction.  A telfa template was made of the defect.  This telfa template was then used to outline the interpolation flap.  The donor area for the pedicle flap was then injected with anesthesia.  The flap was excised through the skin and subcutaneous tissue down to the layer of the underlying musculature.  The interpolation flap was carefully excised within this deep plane to maintain its blood supply.  The edges of the donor site were undermined.   The donor site was closed in a primary fashion.  The pedicle was then rotated into position and sutured.  Once the tube was sutured into place, adequate blood supply was confirmed with blanching and refill.  The pedicle was then wrapped with xeroform gauze and dressed appropriately with a telfa and gauze bandage to ensure continued blood supply and protect the attached pedicle.
Posterior Auricular Interpolation Flap Text: A decision was made to reconstruct the defect utilizing an interpolation axial flap and a staged reconstruction.  A telfa template was made of the defect.  This telfa template was then used to outline the posterior auricular interpolation flap.  The donor area for the pedicle flap was then injected with anesthesia.  The flap was excised through the skin and subcutaneous tissue down to the layer of the underlying musculature.  The pedicle flap was carefully excised within this deep plane to maintain its blood supply.  The edges of the donor site were undermined.   The donor site was closed in a primary fashion.  The pedicle was then rotated into position and sutured.  Once the tube was sutured into place, adequate blood supply was confirmed with blanching and refill.  The pedicle was then wrapped with xeroform gauze and dressed appropriately with a telfa and gauze bandage to ensure continued blood supply and protect the attached pedicle.
Rhombic Flap Text: The defect edges were debeveled with a #15 scalpel blade.  Given the location of the defect and the proximity to free margins a rhombic flap was deemed most appropriate.  Using a sterile surgical marker, an appropriate rhombic flap was drawn incorporating the defect.    The area thus outlined was incised deep to adipose tissue with a #15 scalpel blade.  The skin margins were undermined to an appropriate distance in all directions utilizing iris scissors.
Complex Repair And Rhombic Flap Text: The defect edges were debeveled with a #15 scalpel blade.  The primary defect was closed partially with a complex linear closure.  Given the location of the remaining defect, shape of the defect and the proximity to free margins a rhombic flap was deemed most appropriate for complete closure of the defect.  Using a sterile surgical marker, an appropriate advancement flap was drawn incorporating the defect and placing the expected incisions within the relaxed skin tension lines where possible.    The area thus outlined was incised deep to adipose tissue with a #15 scalpel blade.  The skin margins were undermined to an appropriate distance in all directions utilizing iris scissors.
Home Suture Removal Text: Patient was provided a home suture removal kit and will remove their sutures at home.  If they have any questions or difficulties they will call the office.
Complex Repair And Dorsal Nasal Flap Text: The defect edges were debeveled with a #15 scalpel blade.  The primary defect was closed partially with a complex linear closure.  Given the location of the remaining defect, shape of the defect and the proximity to free margins a dorsal nasal flap was deemed most appropriate for complete closure of the defect.  Using a sterile surgical marker, an appropriate flap was drawn incorporating the defect and placing the expected incisions within the relaxed skin tension lines where possible.    The area thus outlined was incised deep to adipose tissue with a #15 scalpel blade.  The skin margins were undermined to an appropriate distance in all directions utilizing iris scissors.
Complex Repair And Z Plasty Text: The defect edges were debeveled with a #15 scalpel blade.  The primary defect was closed partially with a complex linear closure.  Given the location of the remaining defect, shape of the defect and the proximity to free margins a Z plasty was deemed most appropriate for complete closure of the defect.  Using a sterile surgical marker, an appropriate advancement flap was drawn incorporating the defect and placing the expected incisions within the relaxed skin tension lines where possible.    The area thus outlined was incised deep to adipose tissue with a #15 scalpel blade.  The skin margins were undermined to an appropriate distance in all directions utilizing iris scissors.
Double Island Pedicle Flap Text: The defect edges were debeveled with a #15 scalpel blade.  Given the location of the defect, shape of the defect and the proximity to free margins a double island pedicle advancement flap was deemed most appropriate.  Using a sterile surgical marker, an appropriate advancement flap was drawn incorporating the defect, outlining the appropriate donor tissue and placing the expected incisions within the relaxed skin tension lines where possible.    The area thus outlined was incised deep to adipose tissue with a #15 scalpel blade.  The skin margins were undermined to an appropriate distance in all directions around the primary defect and laterally outward around the island pedicle utilizing iris scissors.  There was minimal undermining beneath the pedicle flap.
Complex Repair And Dermal Autograft Text: The defect edges were debeveled with a #15 scalpel blade.  The primary defect was closed partially with a complex linear closure.  Given the location of the defect, shape of the defect and the proximity to free margins an dermal autograft was deemed most appropriate to repair the remaining defect.  The graft was trimmed to fit the size of the remaining defect.  The graft was then placed in the primary defect, oriented appropriately, and sutured into place.
Complex Repair And Double M Plasty Text: The defect edges were debeveled with a #15 scalpel blade.  The primary defect was closed partially with a complex linear closure.  Given the location of the remaining defect, shape of the defect and the proximity to free margins a double M plasty was deemed most appropriate for complete closure of the defect.  Using a sterile surgical marker, an appropriate advancement flap was drawn incorporating the defect and placing the expected incisions within the relaxed skin tension lines where possible.    The area thus outlined was incised deep to adipose tissue with a #15 scalpel blade.  The skin margins were undermined to an appropriate distance in all directions utilizing iris scissors.
Complex Repair And Rotation Flap Text: The defect edges were debeveled with a #15 scalpel blade.  The primary defect was closed partially with a complex linear closure.  Given the location of the remaining defect, shape of the defect and the proximity to free margins a rotation flap was deemed most appropriate for complete closure of the defect.  Using a sterile surgical marker, an appropriate advancement flap was drawn incorporating the defect and placing the expected incisions within the relaxed skin tension lines where possible.    The area thus outlined was incised deep to adipose tissue with a #15 scalpel blade.  The skin margins were undermined to an appropriate distance in all directions utilizing iris scissors.
Eliptical Excision Additional Text (Leave Blank If You Do Not Want): The margin was drawn around the clinically apparent lesion.  An elliptical shape was then drawn on the skin incorporating the lesion and margins.  Incisions were then made along these lines to the appropriate tissue plane and the lesion was extirpated.
Muscle Hinge Flap Text: The defect edges were debeveled with a #15 scalpel blade.  Given the size, depth and location of the defect and the proximity to free margins a muscle hinge flap was deemed most appropriate.  Using a sterile surgical marker, an appropriate hinge flap was drawn incorporating the defect. The area thus outlined was incised with a #15 scalpel blade.  The skin margins were undermined to an appropriate distance in all directions utilizing iris scissors.
W Plasty Text: The lesion was extirpated to the level of the fat with a #15 scalpel blade.  Given the location of the defect, shape of the defect and the proximity to free margins a W-plasty was deemed most appropriate for repair.  Using a sterile surgical marker, the appropriate transposition arms of the W-plasty were drawn incorporating the defect and placing the expected incisions within the relaxed skin tension lines where possible.    The area thus outlined was incised deep to adipose tissue with a #15 scalpel blade.  The skin margins were undermined to an appropriate distance in all directions utilizing iris scissors.  The opposing transposition arms were then transposed into place in opposite direction and anchored with interrupted buried subcutaneous sutures.
Lip Wedge Excision Repair Text: Given the location of the defect and the proximity to free margins a full thickness wedge repair was deemed most appropriate.  Using a sterile surgical marker, the appropriate repair was drawn incorporating the defect and placing the expected incisions perpendicular to the vermillion border.  The vermillion border was also meticulously outlined to ensure appropriate reapproximation during the repair.  The area thus outlined was incised through and through with a #15 scalpel blade.  The muscularis and dermis were reaproximated with deep sutures following hemostasis. Care was taken to realign the vermillion border before proceeding with the superficial closure.  Once the vermillion was realigned the superfical and mucosal closure was finished.
Melolabial Transposition Flap Text: The defect edges were debeveled with a #15 scalpel blade.  Given the location of the defect and the proximity to free margins a melolabial flap was deemed most appropriate.  Using a sterile surgical marker, an appropriate melolabial transposition flap was drawn incorporating the defect.    The area thus outlined was incised deep to adipose tissue with a #15 scalpel blade.  The skin margins were undermined to an appropriate distance in all directions utilizing iris scissors.
Island Pedicle Flap-Requiring Vessel Identification Text: The defect edges were debeveled with a #15 scalpel blade.  Given the location of the defect, shape of the defect and the proximity to free margins an island pedicle advancement flap was deemed most appropriate.  Using a sterile surgical marker, an appropriate advancement flap was drawn, based on the axial vessel mentioned above, incorporating the defect, outlining the appropriate donor tissue and placing the expected incisions within the relaxed skin tension lines where possible.    The area thus outlined was incised deep to adipose tissue with a #15 scalpel blade.  The skin margins were undermined to an appropriate distance in all directions around the primary defect and laterally outward around the island pedicle utilizing iris scissors.  There was minimal undermining beneath the pedicle flap.
Complex Repair And Ftsg Text: The defect edges were debeveled with a #15 scalpel blade.  The primary defect was closed partially with a complex linear closure.  Given the location of the defect, shape of the defect and the proximity to free margins a full thickness skin graft was deemed most appropriate to repair the remaining defect.  The graft was trimmed to fit the size of the remaining defect.  The graft was then placed in the primary defect, oriented appropriately, and sutured into place.
Ear Star Wedge Flap Text: The defect edges were debeveled with a #15 blade scalpel.  Given the location of the defect and the proximity to free margins (helical rim) an ear star wedge flap was deemed most appropriate.  Using a sterile surgical marker, the appropriate flap was drawn incorporating the defect and placing the expected incisions between the helical rim and antihelix where possible.  The area thus outlined was incised through and through with a #15 scalpel blade.
Double O-Z Plasty Text: The defect edges were debeveled with a #15 scalpel blade.  Given the location of the defect, shape of the defect and the proximity to free margins a Double O-Z plasty (double transposition flap) was deemed most appropriate.  Using a sterile surgical marker, the appropriate transposition flaps were drawn incorporating the defect and placing the expected incisions within the relaxed skin tension lines where possible. The area thus outlined was incised deep to adipose tissue with a #15 scalpel blade.  The skin margins were undermined to an appropriate distance in all directions utilizing iris scissors.  Hemostasis was achieved with electrocautery.  The flaps were then transposed into place, one clockwise and the other counterclockwise, and anchored with interrupted buried subcutaneous sutures.
Complex Repair And Skin Substitute Graft Text: The defect edges were debeveled with a #15 scalpel blade.  The primary defect was closed partially with a complex linear closure.  Given the location of the remaining defect, shape of the defect and the proximity to free margins a skin substitute graft was deemed most appropriate to repair the remaining defect.  The graft was trimmed to fit the size of the remaining defect.  The graft was then placed in the primary defect, oriented appropriately, and sutured into place.
Deep Sutures: 4-0 Biosyn
Wound Care: Vaseline
Advancement Flap (Double) Text: The defect edges were debeveled with a #15 scalpel blade.  Given the location of the defect and the proximity to free margins a double advancement flap was deemed most appropriate.  Using a sterile surgical marker, the appropriate advancement flaps were drawn incorporating the defect and placing the expected incisions within the relaxed skin tension lines where possible.    The area thus outlined was incised deep to adipose tissue with a #15 scalpel blade.  The skin margins were undermined to an appropriate distance in all directions utilizing iris scissors.
Mastoid Interpolation Flap Text: A decision was made to reconstruct the defect utilizing an interpolation axial flap and a staged reconstruction.  A telfa template was made of the defect.  This telfa template was then used to outline the mastoid interpolation flap.  The donor area for the pedicle flap was then injected with anesthesia.  The flap was excised through the skin and subcutaneous tissue down to the layer of the underlying musculature.  The pedicle flap was carefully excised within this deep plane to maintain its blood supply.  The edges of the donor site were undermined.   The donor site was closed in a primary fashion.  The pedicle was then rotated into position and sutured.  Once the tube was sutured into place, adequate blood supply was confirmed with blanching and refill.  The pedicle was then wrapped with xeroform gauze and dressed appropriately with a telfa and gauze bandage to ensure continued blood supply and protect the attached pedicle.
Cheek Interpolation Flap Text: A decision was made to reconstruct the defect utilizing an interpolation axial flap and a staged reconstruction.  A telfa template was made of the defect.  This telfa template was then used to outline the Cheek Interpolation flap.  The donor area for the pedicle flap was then injected with anesthesia.  The flap was excised through the skin and subcutaneous tissue down to the layer of the underlying musculature.  The interpolation flap was carefully excised within this deep plane to maintain its blood supply.  The edges of the donor site were undermined.   The donor site was closed in a primary fashion.  The pedicle was then rotated into position and sutured.  Once the tube was sutured into place, adequate blood supply was confirmed with blanching and refill.  The pedicle was then wrapped with xeroform gauze and dressed appropriately with a telfa and gauze bandage to ensure continued blood supply and protect the attached pedicle.
Rotation Flap Text: The defect edges were debeveled with a #15 scalpel blade.  Given the location of the defect, shape of the defect and the proximity to free margins a rotation flap was deemed most appropriate.  Using a sterile surgical marker, an appropriate rotation flap was drawn incorporating the defect and placing the expected incisions within the relaxed skin tension lines where possible.    The area thus outlined was incised deep to adipose tissue with a #15 scalpel blade.  The skin margins were undermined to an appropriate distance in all directions utilizing iris scissors.
Number Of Epidermal Sutures (Optional): 4
Complex Repair And W Plasty Text: The defect edges were debeveled with a #15 scalpel blade.  The primary defect was closed partially with a complex linear closure.  Given the location of the remaining defect, shape of the defect and the proximity to free margins a W plasty was deemed most appropriate for complete closure of the defect.  Using a sterile surgical marker, an appropriate advancement flap was drawn incorporating the defect and placing the expected incisions within the relaxed skin tension lines where possible.    The area thus outlined was incised deep to adipose tissue with a #15 scalpel blade.  The skin margins were undermined to an appropriate distance in all directions utilizing iris scissors.
Crescentic Advancement Flap Text: The defect edges were debeveled with a #15 scalpel blade.  Given the location of the defect and the proximity to free margins a crescentic advancement flap was deemed most appropriate.  Using a sterile surgical marker, the appropriate advancement flap was drawn incorporating the defect and placing the expected incisions within the relaxed skin tension lines where possible.    The area thus outlined was incised deep to adipose tissue with a #15 scalpel blade.  The skin margins were undermined to an appropriate distance in all directions utilizing iris scissors.
Fusiform Excision Additional Text (Leave Blank If You Do Not Want): The margin was drawn around the clinically apparent lesion.  A fusiform shape was then drawn on the skin incorporating the lesion and margins.  Incisions were then made along these lines to the appropriate tissue plane and the lesion was extirpated.
Hatchet Flap Text: The defect edges were debeveled with a #15 scalpel blade.  Given the location of the defect, shape of the defect and the proximity to free margins a hatchet flap was deemed most appropriate.  Using a sterile surgical marker, an appropriate hatchet flap was drawn incorporating the defect and placing the expected incisions within the relaxed skin tension lines where possible.    The area thus outlined was incised deep to adipose tissue with a #15 scalpel blade.  The skin margins were undermined to an appropriate distance in all directions utilizing iris scissors.
Bilobed Transposition Flap Text: The defect edges were debeveled with a #15 scalpel blade.  Given the location of the defect and the proximity to free margins a bilobed transposition flap was deemed most appropriate.  Using a sterile surgical marker, an appropriate bilobe flap drawn around the defect.    The area thus outlined was incised deep to adipose tissue with a #15 scalpel blade.  The skin margins were undermined to an appropriate distance in all directions utilizing iris scissors.
Z Plasty Text: The lesion was extirpated to the level of the fat with a #15 scalpel blade.  Given the location of the defect, shape of the defect and the proximity to free margins a Z-plasty was deemed most appropriate for repair.  Using a sterile surgical marker, the appropriate transposition arms of the Z-plasty were drawn incorporating the defect and placing the expected incisions within the relaxed skin tension lines where possible.    The area thus outlined was incised deep to adipose tissue with a #15 scalpel blade.  The skin margins were undermined to an appropriate distance in all directions utilizing iris scissors.  The opposing transposition arms were then transposed into place in opposite direction and anchored with interrupted buried subcutaneous sutures.

## 2019-01-28 ENCOUNTER — APPOINTMENT (OUTPATIENT)
Dept: URBAN - METROPOLITAN AREA CLINIC 200 | Age: 59
Setting detail: DERMATOLOGY
End: 2019-01-28

## 2019-01-28 DIAGNOSIS — B07.8 OTHER VIRAL WARTS: ICD-10-CM

## 2019-01-28 PROCEDURE — 17110 DESTRUCT B9 LESION 1-14: CPT

## 2019-01-28 PROCEDURE — OTHER LIQUID NITROGEN: OTHER

## 2019-01-28 ASSESSMENT — LOCATION DETAILED DESCRIPTION DERM
LOCATION DETAILED: DORSAL INTERPHALANGEAL JOINT RIGHT THUMB
LOCATION DETAILED: RIGHT MIDDLE FINGER PROXIMAL INTERPHALANGEAL JOINT

## 2019-01-28 ASSESSMENT — LOCATION ZONE DERM: LOCATION ZONE: FINGER

## 2019-01-28 ASSESSMENT — LOCATION SIMPLE DESCRIPTION DERM
LOCATION SIMPLE: RIGHT MIDDLE FINGER
LOCATION SIMPLE: RIGHT THUMB

## 2019-01-28 NOTE — PROCEDURE: LIQUID NITROGEN
Medical Necessity Clause: This procedure was medically necessary because the lesions that were treated were: causing pain
Consent: The patient's consent was obtained including but not limited to risks of crusting, scabbing, blistering, scarring, darker or lighter pigmentary change, recurrence, incomplete removal and infection.
Add 52 Modifier (Optional): no
Medical Necessity Information: It is in your best interest to select a reason for this procedure from the list below. All of these items fulfill various CMS LCD requirements except the new and changing color options.
Pared With?: Dermablade
Include Z78.9 (Other Specified Conditions Influencing Health Status) As An Associated Diagnosis?: Yes
Post-Care Instructions: I reviewed with the patient in detail post-care instructions. Patient is to wear sunprotection, and avoid picking at any of the treated lesions. Pt may apply Vaseline to crusted or scabbing areas.
Detail Level: Detailed
Number Of Freeze-Thaw Cycles: 2 freeze-thaw cycles

## 2019-02-07 ENCOUNTER — TELEPHONE (OUTPATIENT)
Dept: NEUROSURGERY | Facility: CLINIC | Age: 59
End: 2019-02-07

## 2019-02-07 NOTE — TELEPHONE ENCOUNTER
Consult    Referring Doctor:  Dr. Lea Morales    PCP:  Dr. Loco Moore    Most Recent Studies:  [facility]  MRI C-Spine (1/29/19)    No EMG, Dexa scan or recent xrays        Onset of Symptoms & Reason for Visit:  Patient states for the last year she has been complaining of neck and head pain.  The pain is mostly on her R side.  It starts at the base of her neck and goes up into her head.  She complains of severe headaches that have become more frequent and painful.  She does have numbness and tingling sensations in her R arm that goes into her fingers.        Physical Therapy:  Nothing recent    Injections:  Nothing recent     Previous Surgeries:  No         Insurance Info:  BC/BS         See Patient's  for outside report.

## 2019-02-11 DIAGNOSIS — M47.22 CERVICAL SPONDYLOSIS WITH RADICULOPATHY: Primary | ICD-10-CM

## 2019-02-11 NOTE — TELEPHONE ENCOUNTER
I've entered a response to the patient's message below.    Response to the message:       Cervical Xrays (complete w/ flexion-extension).

## 2019-02-14 NOTE — TELEPHONE ENCOUNTER
LM TO SCHEDULE APPT-HOME PHONE, CELL PHONE # WAS INCORRECT      2/15/2019- left 2nd message on home phone 11:05am

## 2019-02-19 NOTE — TELEPHONE ENCOUNTER
Spoke with patient yesterday (2/18/19).  She is unable to do the appt on 3/8/19 b/c she will be away.    L/M @ (2/19/19) to give her 2 new dates/times)    (3/12 at 3 or 3/15 at 10am 11am.)

## 2019-02-20 NOTE — TELEPHONE ENCOUNTER
Spoke to patient, made appt for Tuesday 3/12/2019 at 3 pm in [place] office with Dr Allen. Advised patient to have xray done prior to appt and to bring all radiology discs with her to appt. Mailed xray script and np paperwork to home address.

## 2019-03-12 ENCOUNTER — CONSULT (OUTPATIENT)
Dept: NEUROSURGERY | Facility: CLINIC | Age: 59
End: 2019-03-12
Payer: COMMERCIAL

## 2019-03-12 VITALS
WEIGHT: 176.6 LBS | RESPIRATION RATE: 17 BRPM | OXYGEN SATURATION: 96 % | HEART RATE: 71 BPM | HEIGHT: 71 IN | BODY MASS INDEX: 24.72 KG/M2 | SYSTOLIC BLOOD PRESSURE: 112 MMHG | DIASTOLIC BLOOD PRESSURE: 69 MMHG

## 2019-03-12 DIAGNOSIS — H91.91 HEARING LOSS OF RIGHT EAR, UNSPECIFIED HEARING LOSS TYPE: ICD-10-CM

## 2019-03-12 DIAGNOSIS — G44.86 HEADACHE, CERVICOGENIC: Primary | ICD-10-CM

## 2019-03-12 DIAGNOSIS — M54.81 OCCIPITAL NEURALGIA OF RIGHT SIDE: ICD-10-CM

## 2019-03-12 PROCEDURE — 99204 OFFICE O/P NEW MOD 45 MIN: CPT | Performed by: NEUROLOGICAL SURGERY

## 2019-03-12 RX ORDER — TIZANIDINE 2 MG/1
2 TABLET ORAL EVERY 6 HOURS PRN
Qty: 120 TABLET | Refills: 3 | Status: SHIPPED | OUTPATIENT
Start: 2019-03-12 | End: 2019-07-11 | Stop reason: ALTCHOICE

## 2019-03-12 RX ORDER — CYCLOBENZAPRINE HCL 5 MG
TABLET ORAL
COMMUNITY
Start: 2019-02-26 | End: 2019-07-11 | Stop reason: SDUPTHER

## 2019-03-12 NOTE — LETTER
March 12, 2019     Lea Morales MD  [address]  [address]  [facility] [address]    Patient: Sowmya Clark   YOB: 1960   Date of Visit: 3/12/2019       Dear Dr. Morales:    Thank you for referring Sowmya Clark to me for evaluation. Below are my notes for this consultation.    If you have questions, please do not hesitate to call me. I look forward to following your patient along with you.         Sincerely,        Yoshi Allen MD        CC: DO Alejandro Junior Robert E, MD  3/12/2019  4:10 PM  Signed  Dear Dr. Morales,    I had the pleasure of meeting a patient of yours in the office today.  Thank you for your kind referral.  As you may recall, Ms. Clark is a very pleasant 58-year-old woman with a long history of cervical issues.  She has a history of essential tremor and she is also been diagnosed with what sounds like idiopathic dystonia with spasticity in the sternocleidomastoid muscles and posterior upper neck musculature.  She had Botox injections in the past which she did not believe provided significant relief.  She reports difficulty rotating her head to the left more so than right.  She has pain in the right side of the upper cervical area but it is most severe in the occipital/posterior auricular area.  She does not have classic mechanical neck pain in the lower cervical region.  She occasionally will have radiation in the arm but the pain tends to radiate down the posterior aspect of the forearm into digits 3 4 and 5, not digits 1 and 2 as would be expected from her C5-6 disc degeneration and stenosis.  The patient denies any other symptoms of myelopathy such as arm, hand numbness or weakness, difficulties with fine motor movements, buttoning buttons, changes in and writing, dropping things, heaviness in the legs with ambulation, gait difficulty or difficulty controlling bowel or bladder function.    She reports that she undergoes frequent manipulations by  herself and they tend to provide temporary relief.  However, she reports that she does not maintain the adjustment/realignment and her symptoms lately have been recurring much more quickly.  Her pain is become quite debilitating and limits her quality of life.  She has not tried nerve pain medications or injections.  In the fall 2018, she had a fall and her symptoms have worsened over the past 6 months.  She was referred for a neurosurgical opinion.    She also reports right-sided hearing and headaches.  She also have blurry vision at times.  She has not had a brain MRI.    Imaging: I have personally reviewed her cervical MRI and I ordered her dynamic x-rays.  Overall she has loss of cervical lordosis and a slight cervical kyphosis.  She has degenerative disc disease at C4-5, C5-6 and C6-7 where she has approximately 30% loss of height at C4-5 and C6-7 and 80-90% loss of height at C5-6.  She has anterior and posterior osteophyte formation at C5-6, which is the apex of her kyphosis.  The uncovertebral joint and facet hypertrophy do cause some foraminal stenosis compressing the exiting C6 nerve roots.  There is some indentation of the cord but there is no signal change and she still has some preserved spinal fluid in the thecal sac around the spinal cord.  There is no instability on dynamic views.  Her C1-2 joints and upper cervical levels appear normal.  I do not see any obvious C1-2 instability in the C2 nerve roots are well seen on the sagittal T2 MRI and there is no evidence of compression..    Review of Systems: 14 point review of systems are negative except for the following pertinent positives: Concussion, fatigue, weight gain, right-sided hearing loss, heart palpitations, right-sided abdominal pain,: Heat intolerance and anxiety.    Medical History:   Past Medical History:   Diagnosis Date   • Breast cancer (CMS/HCC) ([facility]) 2007   • Crohn's disease (CMS/HCC) ([facility])    • Dystonia    • Osteomyelitis (CMS/HCC)  ([facility])    • Thyroid nodule    • Tremor        Surgical History:   Past Surgical History:   Procedure Laterality Date   • BREAST LUMPECTOMY     • BREAST SURGERY     • CATARACT EXTRACTION     • CYST REMOVAL      throat   • KNEE SURGERY     • TONSILLECTOMY         Social History:   Social History     Social History   • Marital status:      Spouse name: N/A   • Number of children: N/A   • Years of education: N/A     Social History Main Topics   • Smoking status: Never Smoker   • Smokeless tobacco: Never Used   • Alcohol use Yes   • Drug use: Unknown   • Sexual activity: Not Asked     Other Topics Concern   • None     Social History Narrative   • None       Family History:   Family History   Problem Relation Age of Onset   • COPD Mother    • Alzheimer's disease Father    • Spina bifida Brother        Allergies: Prednisone    Home Medications:  Not in a hospital admission.    Current Medications:  •  cyclobenzaprine  •  tiZANidine      Physicial Exam    Vital Signs   Vitals:    03/12/19 1458   BP: 112/69   Pulse: 71   Resp: 17   SpO2: 96%   BMI:  24.6    Awake, alert, oriented to person, place, time and situation; speech and fund of knowledge normal.  Neck shows decreased range of motion particularly in head rotation and head tilt to the left more so than the right. Chest CTA without rales. Heart has a regular rate and rhythm without murmur. Abdomen soft, NT, ND, + BS.     Neurological examination:    PERRL, extra-ocular movements intact, face symmetric, tongue protrudes to midline, no nystagmus or diplopia.  She does have a tremor that is worsened with movement consistent with essential tremor.  Motor:     RUE:  D  5/5, B  5/5, T 5/5, WE 5/5, HG 5/5, IH 5/5   LUE:  D  5/5, B  5/5, T 5/5, WE 5/5, HG 5/5, IH 5/5   RLE:  IP  5/5, Q  5/5, DF 5/5, EHL 5/5, PF 5/5   LLE:  IP  5/5, Q  5/5, DF 5/5, EHL 5/5, PF 5/5  Reflexes:  Normoreflexive, no Thomas's or Babinski signs, no clonus  Sensory exam:  Intact to light touch,  pain, temperature and JPS testing  Gait and posture normal.  No dysmetria.      Assessment/Plan     This is a 58-year-old woman who appears to have greater occipital/C2 neuralgia of unknown.  I do not see any significant C1 to osteoarthritis on imaging, instability on dynamic views or compression of the C2 nerve roots.  She does have idiopathic dystonia and this could be contributing to some of her symptoms.  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  She does have some stenosis at that level but she is not having classic radicular or myelopathic symptoms.  She does have some pain and numbness in the right arm and hand but does not involve the C6 distribution, where she has her stenosis.  I do not recommend any surgical intervention at this time.  She does get some relief with Flexeril but finds it sedating.  I will give her a prescription for tizanidine I recommend she see a pain management doctor to consider cervical or C2 or greater occipital nerve injections.  I have given her the name of Dr. Олег Saravia and Dr. Juan Rivera of pain management.  We also discussed trying gabapentin.  Given her headaches, hearing loss and occasional blurry vision, I will order her a brain MRI without contrast.  Thank you very much for the opportunity to be involved in the care of your patient.  Please call the office should any questions or problems arise.    Sincerely,       Yoshi Allen MD

## 2019-03-12 NOTE — PROGRESS NOTES
Dear Dr. Morales,    I had the pleasure of meeting a patient of yours in the office today.  Thank you for your kind referral.  As you may recall, Ms. Clark is a very pleasant 58-year-old woman with a long history of cervical issues.  She has a history of essential tremor and she is also been diagnosed with what sounds like idiopathic dystonia with spasticity in the sternocleidomastoid muscles and posterior upper neck musculature.  She had Botox injections in the past which she did not believe provided significant relief.  She reports difficulty rotating her head to the left more so than right.  She has pain in the right side of the upper cervical area but it is most severe in the occipital/posterior auricular area.  She does not have classic mechanical neck pain in the lower cervical region.  She occasionally will have radiation in the arm but the pain tends to radiate down the posterior aspect of the forearm into digits 3 4 and 5, not digits 1 and 2 as would be expected from her C5-6 disc degeneration and stenosis.  The patient denies any other symptoms of myelopathy such as arm, hand numbness or weakness, difficulties with fine motor movements, buttoning buttons, changes in and writing, dropping things, heaviness in the legs with ambulation, gait difficulty or difficulty controlling bowel or bladder function.    She reports that she undergoes frequent manipulations by herself and they tend to provide temporary relief.  However, she reports that she does not maintain the adjustment/realignment and her symptoms lately have been recurring much more quickly.  Her pain is become quite debilitating and limits her quality of life.  She has not tried nerve pain medications or injections.  In the fall 2018, she had a fall and her symptoms have worsened over the past 6 months.  She was referred for a neurosurgical opinion.    She also reports right-sided hearing and headaches.  She also have blurry vision at times.  She has  not had a brain MRI.    Imaging: I have personally reviewed her cervical MRI and I ordered her dynamic x-rays.  Overall she has loss of cervical lordosis and a slight cervical kyphosis.  She has degenerative disc disease at C4-5, C5-6 and C6-7 where she has approximately 30% loss of height at C4-5 and C6-7 and 80-90% loss of height at C5-6.  She has anterior and posterior osteophyte formation at C5-6, which is the apex of her kyphosis.  The uncovertebral joint and facet hypertrophy do cause some foraminal stenosis compressing the exiting C6 nerve roots.  There is some indentation of the cord but there is no signal change and she still has some preserved spinal fluid in the thecal sac around the spinal cord.  There is no instability on dynamic views.  Her C1-2 joints and upper cervical levels appear normal.  I do not see any obvious C1-2 instability in the C2 nerve roots are well seen on the sagittal T2 MRI and there is no evidence of compression..    Review of Systems: 14 point review of systems are negative except for the following pertinent positives: Concussion, fatigue, weight gain, right-sided hearing loss, heart palpitations, right-sided abdominal pain,: Heat intolerance and anxiety.    Medical History:   Past Medical History:   Diagnosis Date   • Breast cancer (CMS/HCC) (HCC) 2007   • Crohn's disease (CMS/HCC) (HCC)    • Dystonia    • Osteomyelitis (CMS/HCC) (HCC)    • Thyroid nodule    • Tremor        Surgical History:   Past Surgical History:   Procedure Laterality Date   • BREAST LUMPECTOMY     • BREAST SURGERY     • CATARACT EXTRACTION     • CYST REMOVAL      throat   • KNEE SURGERY     • TONSILLECTOMY         Social History:   Social History     Social History   • Marital status:      Spouse name: N/A   • Number of children: N/A   • Years of education: N/A     Social History Main Topics   • Smoking status: Never Smoker   • Smokeless tobacco: Never Used   • Alcohol use Yes   • Drug use: Unknown   •  Sexual activity: Not Asked     Other Topics Concern   • None     Social History Narrative   • None       Family History:   Family History   Problem Relation Age of Onset   • COPD Mother    • Alzheimer's disease Father    • Spina bifida Brother        Allergies: Prednisone    Home Medications:  Not in a hospital admission.    Current Medications:  •  cyclobenzaprine  •  tiZANidine      Physicial Exam    Vital Signs   Vitals:    03/12/19 1458   BP: 112/69   Pulse: 71   Resp: 17   SpO2: 96%   BMI:  24.6    Awake, alert, oriented to person, place, time and situation; speech and fund of knowledge normal.  Neck shows decreased range of motion particularly in head rotation and head tilt to the left more so than the right. Chest CTA without rales. Heart has a regular rate and rhythm without murmur. Abdomen soft, NT, ND, + BS.     Neurological examination:    PERRL, extra-ocular movements intact, face symmetric, tongue protrudes to midline, no nystagmus or diplopia.  She does have a tremor that is worsened with movement consistent with essential tremor.  Motor:     RUE:  D  5/5, B  5/5, T 5/5, WE 5/5, HG 5/5, IH 5/5   LUE:  D  5/5, B  5/5, T 5/5, WE 5/5, HG 5/5, IH 5/5   RLE:  IP  5/5, Q  5/5, DF 5/5, EHL 5/5, PF 5/5   LLE:  IP  5/5, Q  5/5, DF 5/5, EHL 5/5, PF 5/5  Reflexes:  Normoreflexive, no Thomas's or Babinski signs, no clonus  Sensory exam:  Intact to light touch, pain, temperature and JPS testing  Gait and posture normal.  No dysmetria.      Assessment/Plan     This is a 58-year-old woman who appears to have greater occipital/C2 neuralgia of unknown.  I do not see any significant C1 to osteoarthritis on imaging, instability on dynamic views or compression of the C2 nerve roots.  She does have idiopathic dystonia and this could be contributing to some of her symptoms.  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  She does have some stenosis at that level but she is not having  classic radicular or myelopathic symptoms.  She does have some pain and numbness in the right arm and hand but does not involve the C6 distribution, where she has her stenosis.  I do not recommend any surgical intervention at this time.  She does get some relief with Flexeril but finds it sedating.  I will give her a prescription for tizanidine I recommend she see a pain management doctor to consider cervical or C2 or greater occipital nerve injections.  I have given her the name of Dr. Олег Saravia and Dr. Juan Rivera of pain management.  We also discussed trying gabapentin.  Given her headaches, hearing loss and occasional blurry vision, I will order her a brain MRI without contrast.  Thank you very much for the opportunity to be involved in the care of your patient.  Please call the office should any questions or problems arise.    Sincerely,       Yoshi Allen MD

## 2019-05-14 ENCOUNTER — RX ONLY (RX ONLY)
Age: 59
End: 2019-05-14

## 2019-05-14 RX ORDER — CLOBETASOL PROPIONATE 0.5 MG/G
CREAM TOPICAL
Qty: 1 | Refills: 0 | Status: ERX

## 2019-06-21 ENCOUNTER — TELEPHONE (OUTPATIENT)
Dept: OPERATING ROOM | Facility: HOSPITAL | Age: 59
End: 2019-06-21

## 2019-06-21 NOTE — TELEPHONE ENCOUNTER
Called the patient to advise her of the pre-cert number for her MRI scan.  It was precertified through [employer] at number: [phone].    The pre-cert for the brain MRI is:  [id number] and the facility is [facility].      The effective dates are:  6/21/2019 and 7/20/2019.    The patient will have this scan done and advise our office when it is completed.

## 2019-06-25 ENCOUNTER — HOSPITAL ENCOUNTER (OUTPATIENT)
Dept: RADIOLOGY | Facility: HOSPITAL | Age: 59
Discharge: HOME | End: 2019-06-25
Attending: NEUROLOGICAL SURGERY
Payer: COMMERCIAL

## 2019-06-25 VITALS — WEIGHT: 175 LBS | BODY MASS INDEX: 24.41 KG/M2

## 2019-06-25 DIAGNOSIS — G44.86 HEADACHE, CERVICOGENIC: ICD-10-CM

## 2019-06-25 DIAGNOSIS — H91.91 HEARING LOSS OF RIGHT EAR, UNSPECIFIED HEARING LOSS TYPE: ICD-10-CM

## 2019-07-11 ENCOUNTER — TELEPHONE (OUTPATIENT)
Dept: NEUROSURGERY | Facility: CLINIC | Age: 59
End: 2019-07-11

## 2019-07-11 DIAGNOSIS — M47.812 SPONDYLOSIS OF CERVICAL REGION WITHOUT MYELOPATHY OR RADICULOPATHY: Primary | ICD-10-CM

## 2019-07-11 RX ORDER — CYCLOBENZAPRINE HCL 5 MG
5 TABLET ORAL 3 TIMES DAILY PRN
Qty: 90 TABLET | Refills: 3 | Status: SHIPPED | OUTPATIENT
Start: 2019-07-11 | End: 2022-09-09

## 2019-07-11 NOTE — TELEPHONE ENCOUNTER
----- Message from Nichole Hernandez sent at 7/10/2019  4:18 PM EDT -----  Regarding: change in prescription  Contact: [phone]  Patient left 2 messages. She is requesting that we change the muscle relaxers that Dr Allen prescribed. She said that he changed them from cyclobenzaprine to tizanadine and wants to change it back to cyclobenzaprine. Patient states that the cyclobenzaprine worked better for her neck discomfort and that the tizanadine does not seem to agree with her. She said it's still the [facility] on [address].

## 2019-08-29 ENCOUNTER — APPOINTMENT (OUTPATIENT)
Dept: URBAN - METROPOLITAN AREA CLINIC 200 | Age: 59
Setting detail: DERMATOLOGY
End: 2019-09-10

## 2019-08-29 DIAGNOSIS — D485 NEOPLASM OF UNCERTAIN BEHAVIOR OF SKIN: ICD-10-CM

## 2019-08-29 DIAGNOSIS — D18.0 HEMANGIOMA: ICD-10-CM

## 2019-08-29 PROBLEM — D48.5 NEOPLASM OF UNCERTAIN BEHAVIOR OF SKIN: Status: ACTIVE | Noted: 2019-08-29

## 2019-08-29 PROBLEM — D18.01 HEMANGIOMA OF SKIN AND SUBCUTANEOUS TISSUE: Status: ACTIVE | Noted: 2019-08-29

## 2019-08-29 PROCEDURE — OTHER REASSURANCE: OTHER

## 2019-08-29 PROCEDURE — OTHER BIOPSY BY SHAVE METHOD: OTHER

## 2019-08-29 PROCEDURE — 11102 TANGNTL BX SKIN SINGLE LES: CPT

## 2019-08-29 PROCEDURE — OTHER MIPS QUALITY: OTHER

## 2019-08-29 PROCEDURE — 99213 OFFICE O/P EST LOW 20 MIN: CPT | Mod: 25

## 2019-08-29 ASSESSMENT — LOCATION DETAILED DESCRIPTION DERM
LOCATION DETAILED: RIGHT SUPERIOR ANTERIOR NECK
LOCATION DETAILED: RIGHT LATERAL ABDOMEN

## 2019-08-29 ASSESSMENT — LOCATION ZONE DERM
LOCATION ZONE: NECK
LOCATION ZONE: TRUNK

## 2019-08-29 ASSESSMENT — LOCATION SIMPLE DESCRIPTION DERM
LOCATION SIMPLE: RIGHT ANTERIOR NECK
LOCATION SIMPLE: ABDOMEN

## 2019-08-29 NOTE — PROCEDURE: BIOPSY BY SHAVE METHOD
Render Path Notes In Note?: No
X Size Of Lesion In Cm: 0
Notification Instructions: Patient will be notified of biopsy results. However, patient instructed to call the office if not contacted within 2 weeks.
Post-Care Instructions: I reviewed with the patient in detail post-care instructions. Patient is to keep the biopsy site dry overnight, and then apply bacitracin twice daily until healed. Patient may apply hydrogen peroxide soaks to remove any crusting.
Billing Type: Third-Party Bill
Biopsy Method: Dermablade
Curettage Text: The wound bed was treated with curettage after the biopsy was performed.
Depth Of Biopsy: dermis
Anesthesia Volume In Cc (Will Not Render If 0): 0.5
Biopsy Type: H and E
Wound Care: Aquaphor
Was A Bandage Applied: Yes
Hemostasis: Drysol
Silver Nitrate Text: The wound bed was treated with silver nitrate after the biopsy was performed.
Cryotherapy Text: The wound bed was treated with cryotherapy after the biopsy was performed.
Dressing: bandage
Type Of Destruction Used: Curettage
Electrodesiccation Text: The wound bed was treated with electrodesiccation after the biopsy was performed.
Consent: Written consent was obtained and risks were reviewed including but not limited to scarring, infection, bleeding, scabbing, incomplete removal, nerve damage and allergy to anesthesia.
Detail Level: Detailed
Electrodesiccation And Curettage Text: The wound bed was treated with electrodesiccation and curettage after the biopsy was performed.

## 2019-09-30 ENCOUNTER — APPOINTMENT (OUTPATIENT)
Dept: URBAN - METROPOLITAN AREA CLINIC 200 | Age: 59
Setting detail: DERMATOLOGY
End: 2019-09-30

## 2019-09-30 DIAGNOSIS — D22 MELANOCYTIC NEVI: ICD-10-CM

## 2019-09-30 DIAGNOSIS — B07.8 OTHER VIRAL WARTS: ICD-10-CM

## 2019-09-30 PROBLEM — D22.4 MELANOCYTIC NEVI OF SCALP AND NECK: Status: ACTIVE | Noted: 2019-09-30

## 2019-09-30 PROCEDURE — OTHER BIOPSY BY SHAVE METHOD: OTHER

## 2019-09-30 PROCEDURE — 11102 TANGNTL BX SKIN SINGLE LES: CPT | Mod: 59

## 2019-09-30 PROCEDURE — 17110 DESTRUCT B9 LESION 1-14: CPT

## 2019-09-30 PROCEDURE — OTHER LIQUID NITROGEN: OTHER

## 2019-09-30 ASSESSMENT — LOCATION SIMPLE DESCRIPTION DERM
LOCATION SIMPLE: RIGHT ANTERIOR NECK
LOCATION SIMPLE: RIGHT MIDDLE FINGER
LOCATION SIMPLE: RIGHT THUMB

## 2019-09-30 ASSESSMENT — LOCATION ZONE DERM
LOCATION ZONE: NECK
LOCATION ZONE: FINGER

## 2019-09-30 ASSESSMENT — LOCATION DETAILED DESCRIPTION DERM
LOCATION DETAILED: RIGHT SUPERIOR ANTERIOR NECK
LOCATION DETAILED: DORSAL INTERPHALANGEAL JOINT RIGHT THUMB
LOCATION DETAILED: RIGHT MIDDLE FINGER PROXIMAL INTERPHALANGEAL JOINT

## 2019-09-30 NOTE — PROCEDURE: LIQUID NITROGEN
Include Z78.9 (Other Specified Conditions Influencing Health Status) As An Associated Diagnosis?: Yes
Number Of Freeze-Thaw Cycles: 2 freeze-thaw cycles
Pared With?: Dermablade
Post-Care Instructions: I reviewed with the patient in detail post-care instructions. Patient is to wear sunprotection, and avoid picking at any of the treated lesions. Pt may apply Vaseline to crusted or scabbing areas.
Render Post-Care Instructions In Note?: no
Detail Level: Detailed
Consent: The patient's consent was obtained including but not limited to risks of crusting, scabbing, blistering, scarring, darker or lighter pigmentary change, recurrence, incomplete removal and infection.
Medical Necessity Information: It is in your best interest to select a reason for this procedure from the list below. All of these items fulfill various CMS LCD requirements except the new and changing color options.
Medical Necessity Clause: This procedure was medically necessary because the lesions that were treated were: causing pain

## 2019-12-20 ENCOUNTER — APPOINTMENT (OUTPATIENT)
Dept: URBAN - METROPOLITAN AREA CLINIC 200 | Age: 59
Setting detail: DERMATOLOGY
End: 2019-12-29

## 2019-12-20 DIAGNOSIS — B07.8 OTHER VIRAL WARTS: ICD-10-CM

## 2019-12-20 PROCEDURE — 17110 DESTRUCT B9 LESION 1-14: CPT

## 2019-12-20 PROCEDURE — OTHER LIQUID NITROGEN: OTHER

## 2019-12-20 ASSESSMENT — LOCATION SIMPLE DESCRIPTION DERM
LOCATION SIMPLE: RIGHT MIDDLE FINGER
LOCATION SIMPLE: RIGHT THUMB

## 2019-12-20 ASSESSMENT — LOCATION ZONE DERM: LOCATION ZONE: FINGER

## 2019-12-20 NOTE — PROCEDURE: LIQUID NITROGEN
Render Post-Care Instructions In Note?: no
Medical Necessity Clause: This procedure was medically necessary because the lesions that were treated were: causing pain
Medical Necessity Information: It is in your best interest to select a reason for this procedure from the list below. All of these items fulfill various CMS LCD requirements except the new and changing color options.
Detail Level: Detailed
Number Of Freeze-Thaw Cycles: 2 freeze-thaw cycles
Post-Care Instructions: I reviewed with the patient in detail post-care instructions. Patient is to wear sunprotection, and avoid picking at any of the treated lesions. Pt may apply Vaseline to crusted or scabbing areas.
Include Z78.9 (Other Specified Conditions Influencing Health Status) As An Associated Diagnosis?: Yes
Pared With?: Dermablade
Consent: The patient's consent was obtained including but not limited to risks of crusting, scabbing, blistering, scarring, darker or lighter pigmentary change, recurrence, incomplete removal and infection.

## 2020-02-25 ENCOUNTER — APPOINTMENT (OUTPATIENT)
Dept: URBAN - METROPOLITAN AREA CLINIC 200 | Age: 60
Setting detail: DERMATOLOGY
End: 2020-02-26

## 2020-02-25 DIAGNOSIS — D485 NEOPLASM OF UNCERTAIN BEHAVIOR OF SKIN: ICD-10-CM

## 2020-02-25 DIAGNOSIS — L57.8 OTHER SKIN CHANGES DUE TO CHRONIC EXPOSURE TO NONIONIZING RADIATION: ICD-10-CM

## 2020-02-25 DIAGNOSIS — L20.84 INTRINSIC (ALLERGIC) ECZEMA: ICD-10-CM

## 2020-02-25 DIAGNOSIS — B07.8 OTHER VIRAL WARTS: ICD-10-CM

## 2020-02-25 PROBLEM — D48.5 NEOPLASM OF UNCERTAIN BEHAVIOR OF SKIN: Status: ACTIVE | Noted: 2020-02-25

## 2020-02-25 PROCEDURE — OTHER MIPS QUALITY: OTHER

## 2020-02-25 PROCEDURE — OTHER LIQUID NITROGEN: OTHER

## 2020-02-25 PROCEDURE — 99213 OFFICE O/P EST LOW 20 MIN: CPT | Mod: 25

## 2020-02-25 PROCEDURE — OTHER OBSERVATION: OTHER

## 2020-02-25 PROCEDURE — OTHER OBSERVATION AND MEASURE: OTHER

## 2020-02-25 PROCEDURE — OTHER PRESCRIPTION: OTHER

## 2020-02-25 PROCEDURE — OTHER PRESCRIPTION MEDICATION MANAGEMENT: OTHER

## 2020-02-25 PROCEDURE — 17110 DESTRUCT B9 LESION 1-14: CPT

## 2020-02-25 PROCEDURE — OTHER COUNSELING: OTHER

## 2020-02-25 PROCEDURE — OTHER PHOTO-DOCUMENTATION: OTHER

## 2020-02-25 RX ORDER — TRIAMCINOLONE ACETONIDE 1 MG/G
CREAM TOPICAL
Qty: 1 | Refills: 3 | Status: ERX | COMMUNITY
Start: 2020-02-25

## 2020-02-25 ASSESSMENT — LOCATION DETAILED DESCRIPTION DERM
LOCATION DETAILED: LEFT MEDIAL SUPERIOR CHEST
LOCATION DETAILED: RIGHT POSTERIOR ANKLE
LOCATION DETAILED: RIGHT DISTAL PRETIBIAL REGION
LOCATION DETAILED: RIGHT LATERAL MALLEOLUS
LOCATION DETAILED: SUBMENTAL CHIN
LOCATION DETAILED: DORSAL INTERPHALANGEAL JOINT RIGHT THUMB

## 2020-02-25 ASSESSMENT — LOCATION ZONE DERM
LOCATION ZONE: FEET
LOCATION ZONE: FACE
LOCATION ZONE: FINGER
LOCATION ZONE: LEG
LOCATION ZONE: TRUNK

## 2020-02-25 ASSESSMENT — LOCATION SIMPLE DESCRIPTION DERM
LOCATION SIMPLE: RIGHT PRETIBIAL REGION
LOCATION SIMPLE: SUBMENTAL CHIN
LOCATION SIMPLE: CHEST
LOCATION SIMPLE: RIGHT FOOT
LOCATION SIMPLE: RIGHT ANKLE
LOCATION SIMPLE: RIGHT THUMB

## 2020-02-25 NOTE — PROCEDURE: PRESCRIPTION MEDICATION MANAGEMENT
Initiate Treatment: triamcinolone acetonide 0.1 % topical cream
Render In Strict Bullet Format?: No
Detail Level: Detailed
Samples Given: Eucerin roughness relief moisturizer

## 2020-02-25 NOTE — PROCEDURE: LIQUID NITROGEN
Render Post-Care Instructions In Note?: no
Medical Necessity Clause: This procedure was medically necessary because the lesions that were treated were: causing pain
Include Z78.9 (Other Specified Conditions Influencing Health Status) As An Associated Diagnosis?: Yes
Consent: The patient's consent was obtained including but not limited to risks of crusting, scabbing, blistering, scarring, darker or lighter pigmentary change, recurrence, incomplete removal and infection.
Detail Level: Detailed
Post-Care Instructions: I reviewed with the patient in detail post-care instructions. Patient is to wear sunprotection, and avoid picking at any of the treated lesions. Pt may apply Vaseline to crusted or scabbing areas.
Number Of Freeze-Thaw Cycles: 2 freeze-thaw cycles
Medical Necessity Information: It is in your best interest to select a reason for this procedure from the list below. All of these items fulfill various CMS LCD requirements except the new and changing color options.
Pared With?: Dermablade

## 2020-09-02 ENCOUNTER — APPOINTMENT (OUTPATIENT)
Dept: URBAN - METROPOLITAN AREA CLINIC 200 | Age: 60
Setting detail: DERMATOLOGY
End: 2020-09-17

## 2020-09-02 DIAGNOSIS — L20.84 INTRINSIC (ALLERGIC) ECZEMA: ICD-10-CM

## 2020-09-02 DIAGNOSIS — L82.1 OTHER SEBORRHEIC KERATOSIS: ICD-10-CM

## 2020-09-02 DIAGNOSIS — L57.8 OTHER SKIN CHANGES DUE TO CHRONIC EXPOSURE TO NONIONIZING RADIATION: ICD-10-CM

## 2020-09-02 PROCEDURE — 99213 OFFICE O/P EST LOW 20 MIN: CPT

## 2020-09-02 PROCEDURE — OTHER PRESCRIPTION: OTHER

## 2020-09-02 PROCEDURE — OTHER REASSURANCE: OTHER

## 2020-09-02 PROCEDURE — OTHER COUNSELING: OTHER

## 2020-09-02 PROCEDURE — OTHER PRESCRIPTION MEDICATION MANAGEMENT: OTHER

## 2020-09-02 RX ORDER — PIMECROLIMUS 10 MG/G
CREAM TOPICAL
Qty: 1 | Refills: 3 | Status: ERX | COMMUNITY
Start: 2020-09-02

## 2020-09-02 ASSESSMENT — LOCATION DETAILED DESCRIPTION DERM
LOCATION DETAILED: LEFT CENTRAL BUCCAL CHEEK
LOCATION DETAILED: RIGHT LATERAL ABDOMEN
LOCATION DETAILED: LEFT ANTECUBITAL SKIN
LOCATION DETAILED: RIGHT RIB CAGE
LOCATION DETAILED: RIGHT VENTRAL DISTAL FOREARM
LOCATION DETAILED: RIGHT ANTERIOR DISTAL UPPER ARM
LOCATION DETAILED: LEFT MEDIAL SUPERIOR CHEST
LOCATION DETAILED: LEFT CHIN
LOCATION DETAILED: LEFT ANTERIOR DISTAL UPPER ARM
LOCATION DETAILED: LEFT INFERIOR ANTERIOR NECK

## 2020-09-02 ASSESSMENT — LOCATION SIMPLE DESCRIPTION DERM
LOCATION SIMPLE: RIGHT FOREARM
LOCATION SIMPLE: CHIN
LOCATION SIMPLE: LEFT ANTERIOR NECK
LOCATION SIMPLE: ABDOMEN
LOCATION SIMPLE: LEFT CHEEK
LOCATION SIMPLE: CHEST
LOCATION SIMPLE: LEFT UPPER ARM
LOCATION SIMPLE: RIGHT UPPER ARM

## 2020-09-02 ASSESSMENT — LOCATION ZONE DERM
LOCATION ZONE: ARM
LOCATION ZONE: TRUNK
LOCATION ZONE: NECK
LOCATION ZONE: FACE

## 2020-09-02 NOTE — PROCEDURE: PRESCRIPTION MEDICATION MANAGEMENT
Render In Strict Bullet Format?: No
Initiate Treatment: pimecrolimus 1 % topical cream
Detail Level: Detailed

## 2021-03-03 ENCOUNTER — APPOINTMENT (OUTPATIENT)
Dept: URBAN - METROPOLITAN AREA CLINIC 200 | Age: 61
Setting detail: DERMATOLOGY
End: 2021-03-05

## 2021-03-03 ENCOUNTER — RX ONLY (RX ONLY)
Age: 61
End: 2021-03-03

## 2021-03-03 DIAGNOSIS — B07.8 OTHER VIRAL WARTS: ICD-10-CM

## 2021-03-03 DIAGNOSIS — L57.8 OTHER SKIN CHANGES DUE TO CHRONIC EXPOSURE TO NONIONIZING RADIATION: ICD-10-CM

## 2021-03-03 DIAGNOSIS — L21.8 OTHER SEBORRHEIC DERMATITIS: ICD-10-CM

## 2021-03-03 PROBLEM — S00.00XA UNSPECIFIED SUPERFICIAL INJURY OF SCALP, INITIAL ENCOUNTER: Status: ACTIVE | Noted: 2021-03-03

## 2021-03-03 PROCEDURE — OTHER REASSURANCE: OTHER

## 2021-03-03 PROCEDURE — OTHER PRESCRIPTION MEDICATION MANAGEMENT: OTHER

## 2021-03-03 PROCEDURE — OTHER MIPS QUALITY: OTHER

## 2021-03-03 PROCEDURE — 99213 OFFICE O/P EST LOW 20 MIN: CPT | Mod: 25

## 2021-03-03 PROCEDURE — OTHER LIQUID NITROGEN: OTHER

## 2021-03-03 PROCEDURE — 17110 DESTRUCT B9 LESION 1-14: CPT

## 2021-03-03 PROCEDURE — OTHER COUNSELING: OTHER

## 2021-03-03 RX ORDER — CICLOPIROX 1 %
SHAMPOO TOPICAL
Qty: 1 | Refills: 6 | Status: ERX | COMMUNITY
Start: 2021-03-03

## 2021-03-03 ASSESSMENT — LOCATION SIMPLE DESCRIPTION DERM
LOCATION SIMPLE: RIGHT HAND
LOCATION SIMPLE: POSTERIOR SCALP
LOCATION SIMPLE: RIGHT THUMB
LOCATION SIMPLE: CHEST
LOCATION SIMPLE: RIGHT MIDDLE FINGER

## 2021-03-03 ASSESSMENT — LOCATION ZONE DERM
LOCATION ZONE: TRUNK
LOCATION ZONE: SCALP
LOCATION ZONE: FINGER
LOCATION ZONE: HAND

## 2021-03-03 ASSESSMENT — LOCATION DETAILED DESCRIPTION DERM
LOCATION DETAILED: RIGHT PROXIMAL PALMAR MIDDLE FINGER
LOCATION DETAILED: LEFT MEDIAL SUPERIOR CHEST
LOCATION DETAILED: RIGHT ULNAR DORSAL HAND
LOCATION DETAILED: DORSAL INTERPHALANGEAL JOINT RIGHT THUMB
LOCATION DETAILED: POSTERIOR MID-PARIETAL SCALP

## 2021-03-03 NOTE — PROCEDURE: LIQUID NITROGEN
Medical Necessity Information: It is in your best interest to select a reason for this procedure from the list below. All of these items fulfill various CMS LCD requirements except the new and changing color options.
Render Post-Care Instructions In Note?: no
Include Z78.9 (Other Specified Conditions Influencing Health Status) As An Associated Diagnosis?: Yes
Medical Necessity Clause: This procedure was medically necessary because the lesions that were treated were: causing pain
Post-Care Instructions: I reviewed with the patient in detail post-care instructions. Patient is to wear sunprotection, and avoid picking at any of the treated lesions. Pt may apply Vaseline to crusted or scabbing areas.
Detail Level: Detailed
Number Of Freeze-Thaw Cycles: 2 freeze-thaw cycles
Consent: The patient's consent was obtained including but not limited to risks of crusting, scabbing, blistering, scarring, darker or lighter pigmentary change, recurrence, incomplete removal and infection.

## 2021-09-08 ENCOUNTER — APPOINTMENT (OUTPATIENT)
Dept: URBAN - METROPOLITAN AREA CLINIC 200 | Age: 61
Setting detail: DERMATOLOGY
End: 2021-09-09

## 2021-09-08 DIAGNOSIS — Z85.828 PERSONAL HISTORY OF OTHER MALIGNANT NEOPLASM OF SKIN: ICD-10-CM

## 2021-09-08 DIAGNOSIS — L65.9 NONSCARRING HAIR LOSS, UNSPECIFIED: ICD-10-CM

## 2021-09-08 DIAGNOSIS — Z11.52 ENCOUNTER FOR SCREENING FOR COVID-19: ICD-10-CM

## 2021-09-08 DIAGNOSIS — L57.8 OTHER SKIN CHANGES DUE TO CHRONIC EXPOSURE TO NONIONIZING RADIATION: ICD-10-CM

## 2021-09-08 PROBLEM — D48.5 NEOPLASM OF UNCERTAIN BEHAVIOR OF SKIN: Status: ACTIVE | Noted: 2021-09-08

## 2021-09-08 PROCEDURE — OTHER COUNSELING: OTHER

## 2021-09-08 PROCEDURE — OTHER ORDER TESTS: OTHER

## 2021-09-08 PROCEDURE — OTHER PRESCRIPTION MEDICATION MANAGEMENT: OTHER

## 2021-09-08 PROCEDURE — OTHER SUNSCREEN RECOMMENDATIONS: OTHER

## 2021-09-08 PROCEDURE — 99214 OFFICE O/P EST MOD 30 MIN: CPT

## 2021-09-08 PROCEDURE — OTHER SCREENING FOR COVID-19: OTHER

## 2021-09-08 ASSESSMENT — LOCATION SIMPLE DESCRIPTION DERM
LOCATION SIMPLE: LEFT SCALP
LOCATION SIMPLE: ABDOMEN
LOCATION SIMPLE: RIGHT SCALP
LOCATION SIMPLE: LEFT ANTERIOR NECK

## 2021-09-08 ASSESSMENT — LOCATION DETAILED DESCRIPTION DERM
LOCATION DETAILED: LEFT MEDIAL FRONTAL SCALP
LOCATION DETAILED: RIGHT MEDIAL FRONTAL SCALP
LOCATION DETAILED: LEFT INFERIOR ANTERIOR NECK
LOCATION DETAILED: PERIUMBILICAL SKIN

## 2021-09-08 ASSESSMENT — LOCATION ZONE DERM
LOCATION ZONE: NECK
LOCATION ZONE: TRUNK
LOCATION ZONE: SCALP

## 2022-03-09 ENCOUNTER — APPOINTMENT (OUTPATIENT)
Dept: URBAN - METROPOLITAN AREA CLINIC 200 | Age: 62
Setting detail: DERMATOLOGY
End: 2022-03-11

## 2022-03-09 DIAGNOSIS — L57.8 OTHER SKIN CHANGES DUE TO CHRONIC EXPOSURE TO NONIONIZING RADIATION: ICD-10-CM

## 2022-03-09 DIAGNOSIS — Z11.52 ENCOUNTER FOR SCREENING FOR COVID-19: ICD-10-CM

## 2022-03-09 DIAGNOSIS — L71.0 PERIORAL DERMATITIS: ICD-10-CM

## 2022-03-09 DIAGNOSIS — D18.0 HEMANGIOMA: ICD-10-CM

## 2022-03-09 PROBLEM — L55.1 SUNBURN OF SECOND DEGREE: Status: ACTIVE | Noted: 2022-03-09

## 2022-03-09 PROBLEM — D18.01 HEMANGIOMA OF SKIN AND SUBCUTANEOUS TISSUE: Status: ACTIVE | Noted: 2022-03-09

## 2022-03-09 PROCEDURE — 99213 OFFICE O/P EST LOW 20 MIN: CPT

## 2022-03-09 PROCEDURE — OTHER SCREENING FOR COVID-19: OTHER

## 2022-03-09 PROCEDURE — OTHER PRESCRIPTION MEDICATION MANAGEMENT: OTHER

## 2022-03-09 PROCEDURE — OTHER COUNSELING: OTHER

## 2022-03-09 PROCEDURE — OTHER MIPS QUALITY: OTHER

## 2022-03-09 PROCEDURE — OTHER SUNSCREEN RECOMMENDATIONS: OTHER

## 2022-03-09 PROCEDURE — OTHER REASSURANCE: OTHER

## 2022-03-09 PROCEDURE — OTHER PRESCRIPTION: OTHER

## 2022-03-09 RX ORDER — METRONIDAZOLE 10 MG/G
GEL TOPICAL
Qty: 60 | Refills: 3 | Status: ERX | COMMUNITY
Start: 2022-03-09

## 2022-03-09 ASSESSMENT — LOCATION ZONE DERM
LOCATION ZONE: FACE
LOCATION ZONE: TRUNK

## 2022-03-09 ASSESSMENT — LOCATION SIMPLE DESCRIPTION DERM
LOCATION SIMPLE: RIGHT EYEBROW
LOCATION SIMPLE: RIGHT CHEEK
LOCATION SIMPLE: ABDOMEN

## 2022-03-09 ASSESSMENT — LOCATION DETAILED DESCRIPTION DERM
LOCATION DETAILED: RIGHT CENTRAL EYEBROW
LOCATION DETAILED: RIGHT CENTRAL MALAR CHEEK
LOCATION DETAILED: PERIUMBILICAL SKIN
LOCATION DETAILED: RIGHT RIB CAGE

## 2022-08-08 ENCOUNTER — TELEPHONE (OUTPATIENT)
Dept: GASTROENTEROLOGY | Facility: CLINIC | Age: 62
End: 2022-08-08
Payer: COMMERCIAL

## 2022-08-08 NOTE — TELEPHONE ENCOUNTER
Direct Access Pre-procedural Note     Patient: Sowmya Clark  : 1960    Referring provider: Dr. Riojas  Date of last visit: 22 office; 22 MRCP    Diagnosis: Abnormal abdominal imaging   22 MRI ([facility])  BILE DUCTS: No intrahepatic bile duct dilatation. There is dilation of the common bile duct which measures up to 13 mm in maximal diameter. The common bile duct measured 10 mm on the most recent CT examination. There is abrupt tapering at the level of the ampulla. No filling defect is identified to suggest choledocholithiasis.       Indication for procedure: intermittent RUQ pain, fluctuating LFTs with abnormal MRCP    Procedure: EUS, ERCP    Is the patient on anticoagulation?  no    Does the patient require cardiac clearance?  no    Is the patient diabetic? no    Timin-2 months

## 2022-09-06 ENCOUNTER — APPOINTMENT (OUTPATIENT)
Dept: LAB | Age: 62
End: 2022-09-06
Attending: PHYSICIAN ASSISTANT
Payer: COMMERCIAL

## 2022-09-06 DIAGNOSIS — Z01.812 ENCOUNTER FOR PREPROCEDURE SCREENING LABORATORY TESTING FOR COVID-19: ICD-10-CM

## 2022-09-06 DIAGNOSIS — Z11.52 ENCOUNTER FOR PREPROCEDURE SCREENING LABORATORY TESTING FOR COVID-19: ICD-10-CM

## 2022-09-06 LAB — SARS-COV-2 RNA RESP QL NAA+PROBE: NEGATIVE

## 2022-09-06 PROCEDURE — C9803 HOPD COVID-19 SPEC COLLECT: HCPCS

## 2022-09-06 PROCEDURE — U0003 INFECTIOUS AGENT DETECTION BY NUCLEIC ACID (DNA OR RNA); SEVERE ACUTE RESPIRATORY SYNDROME CORONAVIRUS 2 (SARS-COV-2) (CORONAVIRUS DISEASE [COVID-19]), AMPLIFIED PROBE TECHNIQUE, MAKING USE OF HIGH THROUGHPUT TECHNOLOGIES AS DESCRIBED BY CMS-2020-01-R: HCPCS

## 2022-09-09 ENCOUNTER — APPOINTMENT (EMERGENCY)
Dept: RADIOLOGY | Facility: HOSPITAL | Age: 62
DRG: 439 | End: 2022-09-09
Payer: COMMERCIAL

## 2022-09-09 ENCOUNTER — ANESTHESIA EVENT (OUTPATIENT)
Dept: ENDOSCOPY | Facility: HOSPITAL | Age: 62
End: 2022-09-09
Payer: COMMERCIAL

## 2022-09-09 ENCOUNTER — HOSPITAL ENCOUNTER (OUTPATIENT)
Facility: HOSPITAL | Age: 62
Discharge: HOME | End: 2022-09-09
Attending: INTERNAL MEDICINE | Admitting: INTERNAL MEDICINE
Payer: COMMERCIAL

## 2022-09-09 ENCOUNTER — HOSPITAL ENCOUNTER (INPATIENT)
Facility: HOSPITAL | Age: 62
LOS: 6 days | Discharge: HOME | DRG: 439 | End: 2022-09-16
Attending: EMERGENCY MEDICINE | Admitting: INTERNAL MEDICINE
Payer: COMMERCIAL

## 2022-09-09 ENCOUNTER — ANESTHESIA (OUTPATIENT)
Dept: ENDOSCOPY | Facility: HOSPITAL | Age: 62
End: 2022-09-09
Payer: COMMERCIAL

## 2022-09-09 VITALS
DIASTOLIC BLOOD PRESSURE: 75 MMHG | RESPIRATION RATE: 18 BRPM | SYSTOLIC BLOOD PRESSURE: 125 MMHG | OXYGEN SATURATION: 96 % | TEMPERATURE: 97.5 F | HEART RATE: 61 BPM

## 2022-09-09 DIAGNOSIS — R74.8 ELEVATED LIPASE: ICD-10-CM

## 2022-09-09 DIAGNOSIS — R10.13 ABDOMINAL PAIN, EPIGASTRIC: Primary | ICD-10-CM

## 2022-09-09 DIAGNOSIS — R52 INTRACTABLE PAIN: ICD-10-CM

## 2022-09-09 PROBLEM — K85.90 POST-ERCP ACUTE PANCREATITIS: Status: ACTIVE | Noted: 2022-09-09

## 2022-09-09 PROBLEM — K91.89 POST-ERCP ACUTE PANCREATITIS: Status: ACTIVE | Noted: 2022-09-09

## 2022-09-09 LAB
ALBUMIN SERPL-MCNC: 3.8 G/DL (ref 3.4–5)
ALP SERPL-CCNC: 149 IU/L (ref 35–126)
ALT SERPL-CCNC: 79 IU/L (ref 11–54)
ANION GAP SERPL CALC-SCNC: 6 MEQ/L (ref 3–15)
AST SERPL-CCNC: 167 IU/L (ref 15–41)
BASOPHILS # BLD: 0.04 K/UL (ref 0.01–0.1)
BASOPHILS NFR BLD: 0.5 %
BILIRUB SERPL-MCNC: 2.1 MG/DL (ref 0.3–1.2)
BUN SERPL-MCNC: 12 MG/DL (ref 8–20)
CALCIUM SERPL-MCNC: 8.8 MG/DL (ref 8.9–10.3)
CHLORIDE SERPL-SCNC: 105 MEQ/L (ref 98–109)
CO2 SERPL-SCNC: 23 MEQ/L (ref 22–32)
CREAT SERPL-MCNC: 0.6 MG/DL (ref 0.6–1.1)
DIFFERENTIAL METHOD BLD: ABNORMAL
EOSINOPHIL # BLD: 0 K/UL (ref 0.04–0.36)
EOSINOPHIL NFR BLD: 0 %
ERYTHROCYTE [DISTWIDTH] IN BLOOD BY AUTOMATED COUNT: 13.8 % (ref 11.7–14.4)
GFR SERPL CREATININE-BSD FRML MDRD: >60 ML/MIN/1.73M*2
GLUCOSE SERPL-MCNC: 152 MG/DL (ref 70–99)
HCT VFR BLDCO AUTO: 46.1 % (ref 35–45)
HGB BLD-MCNC: 15.7 G/DL (ref 11.8–15.7)
IMM GRANULOCYTES # BLD AUTO: 0.02 K/UL (ref 0–0.08)
IMM GRANULOCYTES NFR BLD AUTO: 0.2 %
LIPASE SERPL-CCNC: 106 U/L (ref 20–51)
LYMPHOCYTES # BLD: 0.72 K/UL (ref 1.2–3.5)
LYMPHOCYTES NFR BLD: 8.9 %
MCH RBC QN AUTO: 31.7 PG (ref 28–33.2)
MCHC RBC AUTO-ENTMCNC: 34.1 G/DL (ref 32.2–35.5)
MCV RBC AUTO: 93.1 FL (ref 83–98)
MONOCYTES # BLD: 0.06 K/UL (ref 0.28–0.8)
MONOCYTES NFR BLD: 0.7 %
NEUTROPHILS # BLD: 7.29 K/UL (ref 1.7–7)
NEUTS SEG NFR BLD: 89.7 %
NRBC BLD-RTO: 0 %
PDW BLD AUTO: 10.3 FL (ref 9.4–12.3)
PLATELET # BLD AUTO: 258 K/UL (ref 150–369)
POTASSIUM SERPL-SCNC: 3.6 MEQ/L (ref 3.6–5.1)
PROT SERPL-MCNC: 6.4 G/DL (ref 6–8.2)
RBC # BLD AUTO: 4.95 M/UL (ref 3.93–5.22)
SODIUM SERPL-SCNC: 134 MEQ/L (ref 136–144)
WBC # BLD AUTO: 8.13 K/UL (ref 3.8–10.5)

## 2022-09-09 PROCEDURE — 83690 ASSAY OF LIPASE: CPT | Performed by: PHYSICIAN ASSISTANT

## 2022-09-09 PROCEDURE — 43262 ENDO CHOLANGIOPANCREATOGRAPH: CPT | Performed by: INTERNAL MEDICINE

## 2022-09-09 PROCEDURE — 63600105 HC IODINE BASED CONTRAST: Performed by: PHYSICIAN ASSISTANT

## 2022-09-09 PROCEDURE — 96375 TX/PRO/DX INJ NEW DRUG ADDON: CPT | Mod: 59

## 2022-09-09 PROCEDURE — 96374 THER/PROPH/DIAG INJ IV PUSH: CPT | Mod: 59

## 2022-09-09 PROCEDURE — 25800000 HC PHARMACY IV SOLUTIONS: Performed by: INTERNAL MEDICINE

## 2022-09-09 PROCEDURE — C1769 GUIDE WIRE: HCPCS | Performed by: INTERNAL MEDICINE

## 2022-09-09 PROCEDURE — G1004 CDSM NDSC: HCPCS

## 2022-09-09 PROCEDURE — U0003 INFECTIOUS AGENT DETECTION BY NUCLEIC ACID (DNA OR RNA); SEVERE ACUTE RESPIRATORY SYNDROME CORONAVIRUS 2 (SARS-COV-2) (CORONAVIRUS DISEASE [COVID-19]), AMPLIFIED PROBE TECHNIQUE, MAKING USE OF HIGH THROUGHPUT TECHNOLOGIES AS DESCRIBED BY CMS-2020-01-R: HCPCS | Performed by: PHYSICIAN ASSISTANT

## 2022-09-09 PROCEDURE — 75000046 HC EG FLEX ENDO US: Performed by: INTERNAL MEDICINE

## 2022-09-09 PROCEDURE — 75000094 HC ERCP SPHINCTOMY PAPILTOMY: Performed by: INTERNAL MEDICINE

## 2022-09-09 PROCEDURE — 99285 EMERGENCY DEPT VISIT HI MDM: CPT | Mod: 25

## 2022-09-09 PROCEDURE — 27200000 HC STERILE SUPPLY: Performed by: INTERNAL MEDICINE

## 2022-09-09 PROCEDURE — 37000001 HC ANESTHESIA GENERAL: Performed by: INTERNAL MEDICINE

## 2022-09-09 PROCEDURE — 96361 HYDRATE IV INFUSION ADD-ON: CPT

## 2022-09-09 PROCEDURE — 36415 COLL VENOUS BLD VENIPUNCTURE: CPT | Performed by: PHYSICIAN ASSISTANT

## 2022-09-09 PROCEDURE — 25000000 HC PHARMACY GENERAL: Performed by: NURSE ANESTHETIST, CERTIFIED REGISTERED

## 2022-09-09 PROCEDURE — 85025 COMPLETE CBC W/AUTO DIFF WBC: CPT | Performed by: PHYSICIAN ASSISTANT

## 2022-09-09 PROCEDURE — 25800000 HC PHARMACY IV SOLUTIONS: Performed by: PHYSICIAN ASSISTANT

## 2022-09-09 PROCEDURE — 63600000 HC DRUGS/DETAIL CODE: Performed by: PHYSICIAN ASSISTANT

## 2022-09-09 PROCEDURE — 0DJ08ZZ INSPECTION OF UPPER INTESTINAL TRACT, VIA NATURAL OR ARTIFICIAL OPENING ENDOSCOPIC: ICD-10-PCS | Performed by: INTERNAL MEDICINE

## 2022-09-09 PROCEDURE — 200200 PR NO CHARGE: Performed by: INTERNAL MEDICINE

## 2022-09-09 PROCEDURE — 0F798ZZ DILATION OF COMMON BILE DUCT, VIA NATURAL OR ARTIFICIAL OPENING ENDOSCOPIC: ICD-10-PCS | Performed by: INTERNAL MEDICINE

## 2022-09-09 PROCEDURE — 71000011 HC PACU PHASE 1 EA ADDL MIN: Performed by: INTERNAL MEDICINE

## 2022-09-09 PROCEDURE — 96376 TX/PRO/DX INJ SAME DRUG ADON: CPT

## 2022-09-09 PROCEDURE — 63700000 HC SELF-ADMINISTRABLE DRUG: Performed by: INTERNAL MEDICINE

## 2022-09-09 PROCEDURE — 63600000 HC DRUGS/DETAIL CODE: Performed by: NURSE ANESTHETIST, CERTIFIED REGISTERED

## 2022-09-09 PROCEDURE — 71000001 HC PACU PHASE 1 INITIAL 30MIN: Performed by: INTERNAL MEDICINE

## 2022-09-09 PROCEDURE — C1727 CATH, BAL TIS DIS, NON-VAS: HCPCS | Performed by: INTERNAL MEDICINE

## 2022-09-09 PROCEDURE — 43259 EGD US EXAM DUODENUM/JEJUNUM: CPT | Mod: XU | Performed by: INTERNAL MEDICINE

## 2022-09-09 PROCEDURE — 80053 COMPREHEN METABOLIC PANEL: CPT | Performed by: PHYSICIAN ASSISTANT

## 2022-09-09 RX ORDER — MORPHINE SULFATE 2 MG/ML
2 INJECTION, SOLUTION INTRAMUSCULAR; INTRAVENOUS ONCE
Status: COMPLETED | OUTPATIENT
Start: 2022-09-09 | End: 2022-09-09

## 2022-09-09 RX ORDER — SODIUM CHLORIDE 9 MG/ML
INJECTION, SOLUTION INTRAVENOUS CONTINUOUS
Status: DISCONTINUED | OUTPATIENT
Start: 2022-09-09 | End: 2022-09-09 | Stop reason: HOSPADM

## 2022-09-09 RX ORDER — FENTANYL CITRATE 50 UG/ML
INJECTION, SOLUTION INTRAMUSCULAR; INTRAVENOUS AS NEEDED
Status: DISCONTINUED | OUTPATIENT
Start: 2022-09-09 | End: 2022-09-09 | Stop reason: SURG

## 2022-09-09 RX ORDER — INDOMETHACIN 50 MG/1
100 SUPPOSITORY RECTAL ONCE
Status: COMPLETED | OUTPATIENT
Start: 2022-09-09 | End: 2022-09-09

## 2022-09-09 RX ORDER — MORPHINE SULFATE 4 MG/ML
4 INJECTION, SOLUTION INTRAMUSCULAR; INTRAVENOUS ONCE
Status: COMPLETED | OUTPATIENT
Start: 2022-09-09 | End: 2022-09-09

## 2022-09-09 RX ORDER — PROPOFOL 10 MG/ML
INJECTION, EMULSION INTRAVENOUS AS NEEDED
Status: DISCONTINUED | OUTPATIENT
Start: 2022-09-09 | End: 2022-09-09 | Stop reason: SURG

## 2022-09-09 RX ORDER — LIDOCAINE HYDROCHLORIDE 10 MG/ML
INJECTION, SOLUTION INFILTRATION; PERINEURAL AS NEEDED
Status: DISCONTINUED | OUTPATIENT
Start: 2022-09-09 | End: 2022-09-09 | Stop reason: SURG

## 2022-09-09 RX ORDER — ONDANSETRON HYDROCHLORIDE 2 MG/ML
4 INJECTION, SOLUTION INTRAVENOUS ONCE
Status: COMPLETED | OUTPATIENT
Start: 2022-09-09 | End: 2022-09-09

## 2022-09-09 RX ORDER — ONDANSETRON HYDROCHLORIDE 2 MG/ML
INJECTION, SOLUTION INTRAVENOUS AS NEEDED
Status: DISCONTINUED | OUTPATIENT
Start: 2022-09-09 | End: 2022-09-09 | Stop reason: SURG

## 2022-09-09 RX ORDER — DEXAMETHASONE SODIUM PHOSPHATE 4 MG/ML
INJECTION, SOLUTION INTRA-ARTICULAR; INTRALESIONAL; INTRAMUSCULAR; INTRAVENOUS; SOFT TISSUE AS NEEDED
Status: DISCONTINUED | OUTPATIENT
Start: 2022-09-09 | End: 2022-09-09 | Stop reason: SURG

## 2022-09-09 RX ADMIN — MORPHINE SULFATE 4 MG: 4 INJECTION, SOLUTION INTRAMUSCULAR; INTRAVENOUS at 19:54

## 2022-09-09 RX ADMIN — PROPOFOL 150 MG: 10 INJECTION, EMULSION INTRAVENOUS at 14:40

## 2022-09-09 RX ADMIN — IOHEXOL 100 ML: 350 INJECTION, SOLUTION INTRAVENOUS at 21:48

## 2022-09-09 RX ADMIN — DEXAMETHASONE SODIUM PHOSPHATE 4 MG: 4 INJECTION, SOLUTION INTRAMUSCULAR; INTRAVENOUS at 14:40

## 2022-09-09 RX ADMIN — ONDANSETRON HYDROCHLORIDE 4 MG: 2 SOLUTION INTRAMUSCULAR; INTRAVENOUS at 15:10

## 2022-09-09 RX ADMIN — SODIUM CHLORIDE: 9 INJECTION, SOLUTION INTRAVENOUS at 14:37

## 2022-09-09 RX ADMIN — FENTANYL CITRATE 100 MCG: 50 INJECTION, SOLUTION INTRAMUSCULAR; INTRAVENOUS at 14:40

## 2022-09-09 RX ADMIN — MORPHINE SULFATE 2 MG: 2 INJECTION, SOLUTION INTRAMUSCULAR; INTRAVENOUS at 21:20

## 2022-09-09 RX ADMIN — SODIUM CHLORIDE 1000 ML: 9 INJECTION, SOLUTION INTRAVENOUS at 19:52

## 2022-09-09 RX ADMIN — INDOMETHACIN 100 MG: 50 SUPPOSITORY RECTAL at 14:55

## 2022-09-09 RX ADMIN — SUGAMMADEX 200 MG: 100 INJECTION, SOLUTION INTRAVENOUS at 15:24

## 2022-09-09 RX ADMIN — LIDOCAINE HYDROCHLORIDE 5 ML: 10 INJECTION, SOLUTION INFILTRATION; PERINEURAL at 14:40

## 2022-09-09 RX ADMIN — MORPHINE SULFATE 2 MG: 2 INJECTION, SOLUTION INTRAMUSCULAR; INTRAVENOUS at 22:51

## 2022-09-09 RX ADMIN — ONDANSETRON 4 MG: 2 INJECTION INTRAMUSCULAR; INTRAVENOUS at 19:52

## 2022-09-09 ASSESSMENT — PAIN SCALES - GENERAL: PAIN_LEVEL: 0

## 2022-09-09 NOTE — OP NOTE
_______________________________________________________________________________  Patient Name: Sowmya Clark         Procedure Date: 9/9/2022 2:11 PM  MRN: [id number]                     Account Number: [id number]  YOB: 1960              Age: 62  Gender: Female                        Note Status: Finalized  Attending MD: NATANAEL VILLA MD  _______________________________________________________________________________  Procedure:             ERCP  Indications:           Abdominal pain in the right upper quadrant  Providers:             NATANAEL VILLA MD (Doctor)  Referring MD:          KASSY CARTER MD~ANGELA CARTER DO~URBAS  Requesting Provider:  Medicines:             See the Anesthesia note for documentation of the  administered medications  Complications:         No immediate complications.  _______________________________________________________________________________  Procedure:             After obtaining informed consent, the scope was passed  under direct vision. Throughout the procedure, the  patient's blood pressure, pulse, and oxygen  saturations were monitored continuously. The was  introduced through the mouth, and advanced to the  duodenum and used to inject contrast into the bile  duct.  Findings:  A  film is normal. The scope was advanced to the second portion of  the duodenum without a detailed evaluation of the upper gastrointestinal  tract. The duodenum is normal. The papilla is normal. Using a traction  cannulatome and a wire the bile duct is then cannulated. Contrast is  injected. The bile duct is prominent to approximately 11 mm in the  region of the hilum. Patient is status post previous cholecystectomy.  The cystic duct stump is normal. The bile duct is otherwise normal in a  fusiform fashion without evidence for filling defect, stricture,  irregularity. The bifurcation is normal. The intrahepatics are very  mildly prominent without evidence for sclerosing  cholangitis or other  strictures or filling defects. A 10 mm biliary sphincterotomy is  performed. There is oozing. This is treated with bipolar cautery. There  is spontaneous drainage of all contrast from the common duct. There is  no evidence for debris in the bile duct.  Impression:            Prominent common bile duct in a patient with right  upper quadrant discomfort, intermittently elevated  abnormal liver chemistries, right upper quadrant  discomfort. The presumptive diagnosis is either  papillary stenosis or sphincter of Oddi dysfunction.  This was treated with biliary sphincterotomy.  Recommendation:        - Observe patient's clinical course.  Procedure Code(s):     --- Professional ---  35343, Endoscopic retrograde cholangiopancreatography  (ERCP); with sphincterotomy/papillotomy  Diagnosis Code(s):     --- Professional ---  R10.11, Right upper quadrant pain  CPT copyright 2020 American Medical Association. All rights reserved.  The codes documented in this report are preliminary and upon  review may  be revised to meet current compliance requirements.  Brennen Villa MD  ________________  NATANAEL VILLA MD  9/9/2022 4:25:56 PM  This report has been signed electronically.  Number of Addenda: 0  Note Initiated On: 9/9/2022 2:11 PM

## 2022-09-09 NOTE — ANESTHESIA PROCEDURE NOTES
Airway  Urgency: elective    Start Time: 9/9/2022 2:41 PM    General Information and Staff    Patient location during procedure: OR  Anesthesiologist: Ayush Barraza MD  Resident/CRNA: Neema Caraballo CRNA    Indications and Patient Condition  Indications for airway management: anesthesia  Sedation level: deep  Preoxygenated: yes  Patient position: sniffing  Mask difficulty assessment: 1 - vent by mask    Final Airway Details  Final airway type: endotracheal airway      Successful airway: ETT  Cuffed: yes   Successful intubation technique: video laryngoscopy  Facilitating devices/methods: intubating stylet  Endotracheal tube insertion site: oral  Blade: Primitivo  Blade size: #3  ETT size (mm): 7.0  Cormack-Lehane Classification: grade I - full view of glottis  Placement verified by: chest auscultation and capnometry   Measured from: lips  ETT to lips (cm): 24  Number of attempts at approach: 1  Number of other approaches attempted: 0  Atraumatic airway insertion

## 2022-09-09 NOTE — OP NOTE
Immediate Brief Procedure Note  12/7/2021    Patient:  John Dominguez    Preoperative Diagnosis:   Abscess R forearm  Non-healing wound with tissue necrosis    Postoperative Diagnosis:   Abscess R forearm  Non-healing wound with tissue necrosis    Procedure:    Irrigation/debridement (excisonal) R forearm abscess (3.5cm x 2.7cm)  Wound vac application R forearm    Surgeon:  Talib Martinez MD    Assistants: ST Rick    Anesthesia Staff: Anesthesiologist: Quan Morales MD    Anesthesia Type: General    Findings: Deep tissue abscess R forearm, down to fascia/muscle. Open wound L hand with full thickness skin necrosis down to fascia and ext tendons    Estimated Blood Loss: None    Complications: None    Specimens Removed: None     _______________________________________________________________________________  Patient Name: Sowmya Clark         Procedure Date: 9/9/2022 2:09 PM  MRN: [id number]                     Account Number: [id number]  YOB: 1960              Age: 62  Gender: Female                        Note Status: Finalized  Attending MD: NATANAEL VILLA MD  _______________________________________________________________________________  Procedure:             Upper EUS  Indications:           RUQ pain, dilated bile duct on MRI.  Providers:             NATANAEL VILLA MD (Doctor)  Referring MD:          KASSY CARTER MD~ANGELA CARTER DO~RAIMUNDO  Requesting Provider:  Medicines:             See the Anesthesia note for documentation of the  administered medications  Complications:         No immediate complications.  _______________________________________________________________________________  Procedure:             After obtaining informed consent, the endoscope was  passed under direct vision. Throughout the procedure,  the patient's blood pressure, pulse, and oxygen  saturations were monitored continuously. The Linear  Endosonoscope was introduced through the mouth, and  advanced to the second part of duodenum.  Findings:  The celiac is identified and followed to the pancreatic body and tail.  The celiac takeoff is normal without evidence for narrowing at the  takeoff. The pancreas is then surveyed. The pancreas is completely  unremarkable. The main pancreatic duct is normal without evidence for  hyperechoic strands, hyperechoic foci, lobularity, calcification, cyst,  or mass. There is no peripancreatic lymphadenopathy. The portal vein is  normal. The head of the pancreas then surveyed. The common bile duct can  be followed from the ampulla to the hilum with a maximum diameter of  approximately 12 mm. There is no evidence for filling defect,  thickening, perihilar lymph nodes or periportal lymph nodes. The  main  pancreatic duct terminates into the ampulla in a normal fashion without  evidence for anomalous union. The ampulla itself is normal. The duodenal  wall is normal. The gastric wall is normal.  Impression:            Dilated bile duct patient status post cholecystectomy.  No evidence for choledocholithiasis, ampullary mass,  pancreatic mass, or abnormal lymph nodes.  Recommendation:        - ERCP today  Procedure Code(s):     --- Professional ---  52425, Esophagogastroduodenoscopy, flexible,  transoral; with endoscopic ultrasound examination,  including the esophagus, stomach, and either the  duodenum or a surgically altered stomach where the  jejunum is examined distal to the anastomosis  Diagnosis Code(s):     --- Professional ---  R93.3, Abnormal findings on diagnostic imaging of  other parts of digestive tract  CPT copyright 2020 American Medical Association. All rights reserved.  The codes documented in this report are preliminary and upon  review may  be revised to meet current compliance requirements.  Brennen Villa MD  ________________  NATANAEL VILLA MD  9/9/2022 4:41:23 PM  This report has been signed electronically.  Number of Addenda: 0  Note Initiated On: 9/9/2022 2:09 PM

## 2022-09-09 NOTE — ED PROVIDER NOTES
HPI    Chief Complaint   Patient presents with    Post-op Problem    Abdominal Pain        HPI   62-year-old female with past medical history significant for Crohn's disease presents for evaluation of epigastric abdominal pain after ERCP which was performed today at [facility].  Patient states that she has been having sharp epigastric pain since the procedure but increased in severity at approximately 630 this evening.  She states it is severe and presents screaming and crying.  Very difficult to get information from.  She admits to feeling nauseous, she denies any radiation of the pain to the back or shoulders.  She has not had any chest pain, dizziness or syncope.  Denying any bowel or bladder changes or complaints since this began.  She denies any significant abdominal distention.  Denying hematemesis or coffee-ground emesis.  Denying known fever or chills.    Past Medical History:   Diagnosis Date    Breast cancer (CMS/HCC) 2007    Crohn's disease (CMS/HCC)     Dystonia     Osteomyelitis (CMS/HCC)     Thyroid nodule     Tremor          Past Surgical History:   Procedure Laterality Date    BREAST LUMPECTOMY      BREAST SURGERY      CATARACT EXTRACTION      CYST REMOVAL      throat    KNEE SURGERY      TONSILLECTOMY         Family History   Problem Relation Age of Onset    COPD Biological Mother     Alzheimer's disease Biological Father     Spina bifida Biological Brother        Social History     Tobacco Use    Smoking status: Never Smoker    Smokeless tobacco: Never Used   Substance Use Topics    Alcohol use: Yes    Drug use: Never       Systems Reviewed from Nursing Triage:                 Review of Systems   Constitutional: Negative.  Negative for chills, diaphoresis, fatigue and fever.   HENT: Negative.  Negative for facial swelling and trouble swallowing.    Eyes: Negative.  Negative for visual disturbance.   Respiratory: Negative.  Negative for chest tightness and shortness of breath.     Cardiovascular: Negative.  Negative for chest pain and palpitations.   Gastrointestinal: Positive for abdominal pain and nausea. Negative for anal bleeding, blood in stool, diarrhea and vomiting.   Genitourinary: Negative.  Negative for flank pain.   Musculoskeletal: Negative.  Negative for back pain, neck pain and neck stiffness.   Skin: Negative.  Negative for rash and wound.   Neurological: Negative.  Negative for dizziness, syncope, weakness, light-headedness, numbness and headaches.   Psychiatric/Behavioral: Negative.  Negative for suicidal ideas.            ED Triage Vitals   Temp Heart Rate Resp BP SpO2   09/09/22 1922 09/09/22 1922 09/09/22 1922 09/09/22 1922 09/09/22 1922   36.4 °C (97.6 °F) 92 18 115/81 100 %      Temp Source Heart Rate Source Patient Position BP Location FiO2 (%) (Set)   09/09/22 1922 09/09/22 2252 09/09/22 1922 09/09/22 1922 --   Temporal Monitor Sitting Right upper arm        Vitals:    09/15/22 1540 09/15/22 1946 09/16/22 0457 09/16/22 0800   BP: 116/68 (!) 115/55 (!) 114/57 138/82   BP Location: Left upper arm Left upper arm Right upper arm Right upper arm   Patient Position: Sitting Lying Lying Sitting   Pulse: 75 68 69 69   Resp: 16 16 18 18   Temp: 36.8 °C (98.2 °F) 36.6 °C (97.9 °F) 36.8 °C (98.3 °F) 36.4 °C (97.5 °F)   TempSrc: Oral Temporal Oral Oral   SpO2: 96% 95% 95% 96%   Weight:       Height:                             Physical Exam  Vitals and nursing note reviewed.   Constitutional:       General: She is in acute distress.      Appearance: Normal appearance. She is not diaphoretic.      Comments: Screaming   HENT:      Head: Normocephalic.      Mouth/Throat:      Mouth: Mucous membranes are moist.      Pharynx: Oropharynx is clear.   Eyes:      Conjunctiva/sclera: Conjunctivae normal.      Pupils: Pupils are equal, round, and reactive to light.   Cardiovascular:      Rate and Rhythm: Normal rate.      Pulses: Normal pulses.   Pulmonary:      Effort: Pulmonary effort is  normal. No respiratory distress.      Breath sounds: Normal breath sounds.   Abdominal:      Tenderness: There is abdominal tenderness in the epigastric area. There is guarding.      Comments: Mild upper abdominal distention but abdomen is not taut   Musculoskeletal:         General: No swelling or deformity.      Cervical back: Neck supple. No rigidity or tenderness.   Skin:     General: Skin is warm.      Capillary Refill: Capillary refill takes less than 2 seconds.   Neurological:      General: No focal deficit present.      Mental Status: She is oriented to person, place, and time.   Psychiatric:         Behavior: Behavior normal.              CT ABDOMEN PELVIS WITH IV CONTRAST   Final Result   IMPRESSION:   1. Inflammatory changes around the pancreatic body and tail in keeping with   pancreatitis.   2. Redemonstrated pneumobilia, slightly decreased from 9/9/2022.         X-RAY OBSTRUCTION SERIES (ABDOMEN 2 VIEWS WITH CHEST 1 VIEW)   Final Result   IMPRESSION:   1. No radiographic evidence of bowel obstruction.      2. Small bilateral pleural effusions.      CT ABDOMEN PELVIS WITH IV CONTRAST   Final Result   IMPRESSION:      1. Pneumobilia, likely related to patient's recent surgery/procedure.      2. Left adnexal cyst measuring 2.3 cm.  Further evaluation with nonemergent   pelvic ultrasound is recommended, if recently not performed.                Labs Reviewed   CBC AND DIFF - Abnormal       Result Value    WBC 8.13      RBC 4.95      Hemoglobin 15.7      Hematocrit 46.1 (*)     MCV 93.1      MCH 31.7      MCHC 34.1      RDW 13.8      Platelets 258      MPV 10.3      Differential Type Auto      nRBC 0.0      Immature Granulocytes 0.2      Neutrophils 89.7      Lymphocytes 8.9      Monocytes 0.7      Eosinophils 0.0      Basophils 0.5      Immature Granulocytes, Absolute 0.02      Neutrophils, Absolute 7.29 (*)     Lymphocytes, Absolute 0.72 (*)     Monocytes, Absolute 0.06 (*)     Eosinophils, Absolute 0.00  (*)     Basophils, Absolute 0.04     COMPREHENSIVE METABOLIC PANEL - Abnormal    Sodium 134 (*)     Potassium 3.6      Chloride 105      CO2 23      BUN 12      Creatinine 0.6      Glucose 152 (*)     Calcium 8.8 (*)     AST (SGOT) 167 (*)     ALT (SGPT) 79 (*)     Alkaline Phosphatase 149 (*)     Total Protein 6.4      Albumin 3.8      Bilirubin, Total 2.1 (*)     eGFR >60.0      Anion Gap 6     LIPASE - Abnormal    Lipase 106 (*)    COMPREHENSIVE METABOLIC PANEL - Abnormal    Sodium 135 (*)     Potassium 4.0      Chloride 106      CO2 21 (*)     BUN 11      Creatinine 0.5 (*)     Glucose 111 (*)     Calcium 8.7 (*)     AST (SGOT) 337 (*)     ALT (SGPT) 229 (*)     Alkaline Phosphatase 176 (*)     Total Protein 5.5 (*)     Albumin 3.4      Bilirubin, Total 2.4 (*)     eGFR >60.0      Anion Gap 8     LIPASE - Abnormal    Lipase 2,883 (*)    LIPASE - Abnormal    Lipase 1,952 (*)    COMPREHENSIVE METABOLIC PANEL - Abnormal    Sodium 135 (*)     Potassium 3.8      Chloride 104      CO2 25      BUN 7 (*)     Creatinine 0.4 (*)     Glucose 90      Calcium 8.6 (*)     AST (SGOT) 84 (*)     ALT (SGPT) 132 (*)     Alkaline Phosphatase 132 (*)     Total Protein 5.3 (*)     Albumin 3.2 (*)     Bilirubin, Total 1.9 (*)     eGFR >60.0      Anion Gap 6     CBC - Abnormal    WBC 12.49 (*)     RBC 4.01      Hemoglobin 12.5      Hematocrit 37.9      MCV 94.5      MCH 31.2      MCHC 33.0      RDW 13.8      Platelets 192      MPV 10.0     CBC AND DIFF - Abnormal    WBC 14.34 (*)     RBC 4.24      Hemoglobin 13.3      Hematocrit 40.2      MCV 94.8      MCH 31.4      MCHC 33.1      RDW 13.6      Platelets 193      MPV 10.1      Differential Type Auto      nRBC 0.0      Immature Granulocytes 0.5      Neutrophils 86.1      Lymphocytes 7.3      Monocytes 5.5      Eosinophils 0.3      Basophils 0.3      Immature Granulocytes, Absolute 0.07      Neutrophils, Absolute 12.36 (*)     Lymphocytes, Absolute 1.04 (*)     Monocytes, Absolute 0.79       Eosinophils, Absolute 0.04      Basophils, Absolute 0.04     LIPASE - Abnormal    Lipase 488 (*)    COMPREHENSIVE METABOLIC PANEL - Abnormal    Sodium 137      Potassium 3.6      Chloride 102      CO2 27      BUN 6 (*)     Creatinine 0.5 (*)     Glucose 78      Calcium 8.7 (*)     AST (SGOT) 57 (*)     ALT (SGPT) 96 (*)     Alkaline Phosphatase 137 (*)     Total Protein 5.4 (*)     Albumin 3.2 (*)     Bilirubin, Total 2.6 (*)     eGFR >60.0      Anion Gap 8     LIPASE - Abnormal    Lipase 64 (*)    COMPREHENSIVE METABOLIC PANEL - Abnormal    Sodium 134 (*)     Potassium 3.5 (*)     Chloride 101      CO2 25      BUN 5 (*)     Creatinine 0.4 (*)     Glucose 76      Calcium 8.4 (*)     AST (SGOT) 32      ALT (SGPT) 64 (*)     Alkaline Phosphatase 141 (*)     Total Protein 5.2 (*)     Albumin 2.8 (*)     Bilirubin, Total 2.5 (*)     eGFR >60.0      Anion Gap 8     CBC AND DIFF - Abnormal    WBC 14.50 (*)     RBC 3.94      Hemoglobin 12.3      Hematocrit 37.2      MCV 94.4      MCH 31.2      MCHC 33.1      RDW 13.2      Platelets 189      MPV 10.3      Differential Type Auto      nRBC 0.0      Immature Granulocytes 0.5      Neutrophils 82.3      Lymphocytes 8.5      Monocytes 7.0      Eosinophils 1.2      Basophils 0.5      Immature Granulocytes, Absolute 0.07      Neutrophils, Absolute 11.93 (*)     Lymphocytes, Absolute 1.23      Monocytes, Absolute 1.02 (*)     Eosinophils, Absolute 0.18      Basophils, Absolute 0.07     UA MACROSCOPIC(URIN) - Abnormal    Color, Urine Yellow      Clarity, Urine Clear      Specific Gravity, Urine 1.016      pH, Urine 6.5      Leukocyte Esterase +1 (*)     Nitrite, Urine Negative      Protein, Urine +1 (*)     Glucose, Urine Negative      Ketones, Urine +4 (*)     Urobilinogen, Urine 0.2      Bilirubin, Urine Negative      Blood, Urine +1 (*)    UA HOLD FOR UC (MACRO) - Abnormal    Color, Urine Yellow      Clarity, Urine Clear      Specific Gravity, Urine 1.016      pH, Urine 6.5       Leukocyte Esterase +1 (*)     Nitrite, Urine Negative      Protein, Urine +1 (*)     Glucose, Urine Negative      Ketones, Urine +4 (*)     Urobilinogen, Urine 0.2      Bilirubin, Urine Negative      Blood, Urine +1 (*)    UA MICROSCOPIC - Abnormal    RBC, Urine 5 TO 9 (*)     WBC, Urine 4 TO 10 (*)     Squamous Epithelial Rare (*)     Hyaline Cast None Seen      Bacteria, Urine Rare (*)     MUCUSUA Rare (*)    CBC AND DIFF - Abnormal    WBC 11.14 (*)     RBC 3.65 (*)     Hemoglobin 11.3 (*)     Hematocrit 33.8 (*)     MCV 92.6      MCH 31.0      MCHC 33.4      RDW 12.9      Platelets 209      MPV 10.2      Differential Type Auto      nRBC 0.0      Immature Granulocytes 0.4      Neutrophils 79.5      Lymphocytes 10.1      Monocytes 8.3      Eosinophils 1.3      Basophils 0.4      Immature Granulocytes, Absolute 0.05      Neutrophils, Absolute 8.85 (*)     Lymphocytes, Absolute 1.13 (*)     Monocytes, Absolute 0.92 (*)     Eosinophils, Absolute 0.15      Basophils, Absolute 0.04     COMPREHENSIVE METABOLIC PANEL - Abnormal    Sodium 135 (*)     Potassium 3.3 (*)     Chloride 103      CO2 25      BUN <5 (*)     Creatinine 0.4 (*)     Glucose 94      Calcium 8.4 (*)     AST (SGOT) 32      ALT (SGPT) 51      Alkaline Phosphatase 168 (*)     Total Protein 5.0 (*)     Albumin 2.6 (*)     Bilirubin, Total 1.5 (*)     eGFR >60.0      Anion Gap 7     CBC AND DIFF - Abnormal    WBC 6.77      RBC 2.40 (*)     Hemoglobin 7.5 (*)     Hematocrit 22.8 (*)     MCV 95.0      MCH 31.3      MCHC 32.9      RDW 13.1      Platelets 145 (*)     MPV 10.4      Differential Type Auto      nRBC 0.0      Immature Granulocytes 0.4      Neutrophils 73.8      Lymphocytes 14.2      Monocytes 9.9      Eosinophils 1.6      Basophils 0.1      Immature Granulocytes, Absolute 0.03      Neutrophils, Absolute 4.99      Lymphocytes, Absolute 0.96 (*)     Monocytes, Absolute 0.67      Eosinophils, Absolute 0.11      Basophils, Absolute 0.01     BASIC  METABOLIC PANEL - Abnormal    Sodium 137      Potassium 3.5 (*)     Chloride 105      CO2 22      BUN <5 (*)     Creatinine 0.4 (*)     Glucose 101 (*)     Calcium 8.5 (*)     eGFR >60.0      Anion Gap 10     HEPATIC FUNCTION PANEL - Abnormal    Albumin 2.5 (*)     Bilirubin, Total 0.9      Bilirubin, Direct 0.3      Alkaline Phosphatase 167 (*)     AST (SGOT) 40      ALT (SGPT) 46      Total Protein 5.0 (*)    HGB & HCT - Abnormal    Hemoglobin 11.0 (*)     Hematocrit 32.4 (*)    SARS-COV-2 (COVID 19), PCR (PERF) - Normal    SARS-CoV-2 (COVID-19) Negative      Narrative:     Testing performed using real-time PCR for detection of COVID-19. EUA approved validation studies performed on site.    CBC - Normal    WBC 9.71      RBC 4.12      Hemoglobin 12.7      Hematocrit 39.2      MCV 95.1      MCH 30.8      MCHC 32.4      RDW 13.2      Platelets 210      MPV 10.1     MAGNESIUM - Normal    Magnesium 1.9     PHOSPHORUS - Normal    Phosphorus 4.2     LIPASE - Normal    Lipase 34     SARS-COV-2 (COVID 19), PCR    Narrative:     The following orders were created for panel order SARS-CoV-2 (COVID-19), PCR Nasopharynx.  Procedure                               Abnormality         Status                     ---------                               -----------         ------                     SARS-CoV-2 (COVID-19), P...[[id number]]  Normal              Final result                 Please view results for these tests on the individual orders.   URINE CULTURE    Urine Culture Probable contaminants, suggest recollection      Urine Culture 2 x 10^4 CFU/mL Mixed Gram Positive Organisms     RAINBOW DRAW PANEL    Narrative:     The following orders were created for panel order [place] Draw Panel.  Procedure                               Abnormality         Status                     ---------                               -----------         ------                     RAINBOW RED[[id number]]                                      Final  result               RAINBOW LT BLUE[[id number]]                                  Final result               RAINBOW GOLD[[id number]]                                     Final result                 Please view results for these tests on the individual orders.   URINALYSIS WITH MICROSCOPIC    Narrative:     The following orders were created for panel order Urinalysis with microscopic.  Procedure                               Abnormality         Status                     ---------                               -----------         ------                     UA Macroscopic[[id number]]               Abnormal            Final result               UA Microscopic[[id number]]                                                                Please view results for these tests on the individual orders.   URINALYSIS HOLD FOR CULTURE (INPATIENT ONLY)    Narrative:     The following orders were created for panel order Urinalysis hold for Urine Culture.  Procedure                               Abnormality         Status                     ---------                               -----------         ------                     UA Hold for UC (Macro)[[id number]]       Abnormal            Final result               UA Microscopic[[id number]]               Abnormal            Final result                 Please view results for these tests on the individual orders.   POCT GLUCOSE (BEAKER)    POCT Bedside Glucose 84      POC Test POC     RAINBOW RED   RAINBOW LT BLUE   RAINBOW GOLD       CT ABDOMEN PELVIS WITH IV CONTRAST   Final Result   IMPRESSION:   1. Inflammatory changes around the pancreatic body and tail in keeping with   pancreatitis.   2. Redemonstrated pneumobilia, slightly decreased from 9/9/2022.         X-RAY OBSTRUCTION SERIES (ABDOMEN 2 VIEWS WITH CHEST 1 VIEW)   Final Result   IMPRESSION:   1. No radiographic evidence of bowel obstruction.      2. Small bilateral pleural effusions.      CT ABDOMEN PELVIS WITH IV CONTRAST    Final Result   IMPRESSION:      1. Pneumobilia, likely related to patient's recent surgery/procedure.      2. Left adnexal cyst measuring 2.3 cm.  Further evaluation with nonemergent   pelvic ultrasound is recommended, if recently not performed.                  Procedures    Final diagnoses:   [R10.13] Abdominal pain, epigastric   [R74.8] Elevated lipase   [R52] Intractable pain       ED Course & MDM              MDM    9:26 PM    Impression: Epigastric abdominal pain status post ERCP today    Plan: Labs, medicate, CT abdomen    Vital Signs Review: Vital signs have been reviewed. The oxygen saturation is  SpO2: 100 % which is within normal limits.    No evidence of perforation on CT, patient requiring significant amount of IV pain medication, will admit to [facility] [place] [facility] for treatment of pancreatitis and pain control.    MATTY Montes  9/16/2022          This document was created using dragon dictation software.  There might be some typographical errors due to this technology.     Tiffany Sellers PA C  09/16/22 2126

## 2022-09-09 NOTE — ANESTHESIA POSTPROCEDURE EVALUATION
Patient: Sowmya Clark    Procedure Summary     Date: 09/09/22 Room / Location: [facility] (E) / [facility]    Anesthesia Start: 1437 Anesthesia Stop: 1542    Procedures:       EUS (N/A Esophagus)      IN ERCP W/SPHINCTEROTOMY/PAPILLOTOMY [16913 (CPT®)] (N/A Abdomen) Diagnosis:       Abnormal findings on diagnostic imaging of abdomen      Right upper quadrant pain      (Abnormal Abdomen Imaging / Right Upper Quadrant Pain)    Providers: Perry Tenorio MD Responsible Provider: Ayush Barraza MD    Anesthesia Type: general ASA Status: 3          Anesthesia Type: general  PACU Vitals  9/9/2022 1532 - 9/9/2022 1632      9/9/2022  1537 9/9/2022  1545 9/9/2022  1600 9/9/2022  1615    BP: 120/62 123/60 119/60 122/58    Temp: 36.4 °C (97.5 °F) -- -- --    Pulse: 73 64 61 59    Resp: 18 -- -- --    SpO2: 95 % 95 % 99 % 100 %            Anesthesia Post Evaluation    Pain score: 0  Pain management: satisfactory to patient  Mode of pain management: IV medication  Patient location during evaluation: PACU  Patient participation: complete - patient participated  Level of consciousness: awake and alert  Cardiovascular status: acceptable  Airway Patency: adequate  Respiratory status: acceptable  Hydration status: stable  Anesthetic complications: no

## 2022-09-09 NOTE — H&P
Interventional Gastroenterology  MD Marina Saxena, PA-C   [facility] [address], [id number] [facility]  MATTY Gutierrez [facility]  [phone]                                                       Pre Procedural H&P    Date: 2022  Patient: Sowmya Clark  : 1960    Procedure:  EUS/ERCP    Allergies:   Allergies   Allergen Reactions    Prednisone      Had osteonecrosis?/mylelitis of hip bone felt to be related to prednisone        Current Medications:   No current facility-administered medications on file prior to encounter.     Current Outpatient Medications on File Prior to Encounter   Medication Sig Dispense Refill    cyclobenzaprine (FLEXERIL) 5 mg tablet Take 1 tablet (5 mg total) by mouth 3 (three) times a day as needed for muscle spasms. 90 tablet 3      History:   Past Medical History:   Diagnosis Date    Breast cancer (CMS/[facility]) 2007    Crohn's disease (CMS/HCC)     Dystonia     Osteomyelitis (CMS/[facility])     Thyroid nodule     Tremor       Past Surgical History:   Procedure Laterality Date    BREAST LUMPECTOMY      BREAST SURGERY      CATARACT EXTRACTION      CYST REMOVAL      throat    KNEE SURGERY      TONSILLECTOMY        Family History   Problem Relation Age of Onset    COPD Biological Mother     Alzheimer's disease Biological Father     Spina bifida Biological Brother       Social History     Socioeconomic History    Marital status:      Spouse name: Not on file    Number of children: Not on file    Years of education: Not on file    Highest education level: Not on file   Occupational History    Not on file   Tobacco Use    Smoking status: Never Smoker    Smokeless tobacco: Never Used   Substance and Sexual Activity    Alcohol use: Yes    Drug use: Not on file    Sexual activity: Not on file   Other Topics Concern    Not on file   Social History Narrative    Not on file     Social Determinants of Health     Financial  Resource Strain: Not on file   Food Insecurity: Not on file   Transportation Needs: Not on file   Physical Activity: Not on file   Stress: Not on file   Social Connections: Not on file   Intimate Partner Violence: Not on file   Housing Stability: Not on file      Physical Exam:   VITAL SIGNS: There were no vitals taken for this visit.   CONSTITUTIONAL: well developed, nourished, no distress   PSYCH: Alert and oriented x 3, appropriate mood and affect   SKIN: Warm and dry, good turgor, no rashes   HENT: Lips without lesions, good dentition, oropharynx clear   EYES: conjunctiva normal and sclera clear, without icterus   MUSCULOSKELETAL: moves all 4 extremities with normal ROM in bed   PULMONARY: effort normal   CARDIOVASCULAR:normal rate and regular rhythm   ABDOMINAL: soft, non-distended, non-tender, no hepatosplenomegaly, no masses   RECTAL: Deferred     Impression:   Patient is an appropriate candidate to undergo above stated procedure.    Plan:   1. The risks, benefits, complications and alternatives of above stated procedure were discussed with the patient, including but not limited to risks of bleeding, infection and/or damage to surrounding anatomy and I have answered all of the patient's questions. The patient wishes to proceed.   2. Consent has been obtained.     Perry Tenorio MD

## 2022-09-09 NOTE — ANESTHESIA PREPROCEDURE EVALUATION
Relevant Problems   No relevant active problems     /1/22 MRI ([place])  BILE DUCTS: No intrahepatic bile duct dilatation. There is dilation of the common bile duct which measures up to 13 mm in maximal diameter. The common bile duct measured 10 mm on the most recent CT examination. There is abrupt tapering at the level of the ampulla. No filling defect is identified to suggest choledocholithiasis.     Past Medical History:   Diagnosis Date    Breast cancer (CMS/HCC) 2007    Crohn's disease (CMS/HCC)     Dystonia     Osteomyelitis (CMS/HCC)     Thyroid nodule     Tremor           ROS/Med Hx     Past Surgical History:   Procedure Laterality Date    BREAST LUMPECTOMY      BREAST SURGERY      CATARACT EXTRACTION      CYST REMOVAL      throat    KNEE SURGERY      TONSILLECTOMY         Physical Exam    Airway   Mallampati: III   TM distance: >3 FB   Neck ROM: full  Cardiovascular    Rhythm: regular   Rate: normalPulmonary    Decreased breath sounds        Anesthesia Plan    Plan: general    Technique: general endotracheal     Airway: direct visual laryngoscopy, oral intubation and video laryngoscope   ASA 3  Anesthetic plan and risks discussed with: patient  Induction:    intravenous   Postop Plan:   Patient Disposition: phase II then home   Pain Management: IV analgesics

## 2022-09-09 NOTE — ANESTHESIOLOGIST PRE-PROCEDURE ATTESTATION
Pre-Procedure Patient Identification:  I am the Primary Anesthesiologist and have identified the patient on 09/09/22 at 1:43 PM.   I have confirmed the procedure(s) will be performed by the following surgeon/proceduralist Perry Tenorio MD.

## 2022-09-10 PROBLEM — Z90.49 HISTORY OF CHOLECYSTECTOMY: Status: ACTIVE | Noted: 2022-09-10

## 2022-09-10 PROBLEM — R10.13 ABDOMINAL PAIN, EPIGASTRIC: Status: ACTIVE | Noted: 2022-09-10

## 2022-09-10 PROBLEM — Z98.890 HISTORY OF BILIARY STENT INSERTION: Status: ACTIVE | Noted: 2022-09-10

## 2022-09-10 LAB
ALBUMIN SERPL-MCNC: 3.4 G/DL (ref 3.4–5)
ALP SERPL-CCNC: 176 IU/L (ref 35–126)
ALT SERPL-CCNC: 229 IU/L (ref 11–54)
ANION GAP SERPL CALC-SCNC: 8 MEQ/L (ref 3–15)
AST SERPL-CCNC: 337 IU/L (ref 15–41)
BILIRUB SERPL-MCNC: 2.4 MG/DL (ref 0.3–1.2)
BUN SERPL-MCNC: 11 MG/DL (ref 8–20)
CALCIUM SERPL-MCNC: 8.7 MG/DL (ref 8.9–10.3)
CHLORIDE SERPL-SCNC: 106 MEQ/L (ref 98–109)
CO2 SERPL-SCNC: 21 MEQ/L (ref 22–32)
CREAT SERPL-MCNC: 0.5 MG/DL (ref 0.6–1.1)
ERYTHROCYTE [DISTWIDTH] IN BLOOD BY AUTOMATED COUNT: 13.2 % (ref 11.7–14.4)
GFR SERPL CREATININE-BSD FRML MDRD: >60 ML/MIN/1.73M*2
GLUCOSE SERPL-MCNC: 111 MG/DL (ref 70–99)
HCT VFR BLDCO AUTO: 39.2 % (ref 35–45)
HGB BLD-MCNC: 12.7 G/DL (ref 11.8–15.7)
LIPASE SERPL-CCNC: 2883 U/L (ref 20–51)
MAGNESIUM SERPL-MCNC: 1.9 MG/DL (ref 1.8–2.5)
MCH RBC QN AUTO: 30.8 PG (ref 28–33.2)
MCHC RBC AUTO-ENTMCNC: 32.4 G/DL (ref 32.2–35.5)
MCV RBC AUTO: 95.1 FL (ref 83–98)
PDW BLD AUTO: 10.1 FL (ref 9.4–12.3)
PHOSPHATE SERPL-MCNC: 4.2 MG/DL (ref 2.4–4.7)
PLATELET # BLD AUTO: 210 K/UL (ref 150–369)
POTASSIUM SERPL-SCNC: 4 MEQ/L (ref 3.6–5.1)
PROT SERPL-MCNC: 5.5 G/DL (ref 6–8.2)
RBC # BLD AUTO: 4.12 M/UL (ref 3.93–5.22)
SARS-COV-2 RNA RESP QL NAA+PROBE: NEGATIVE
SODIUM SERPL-SCNC: 135 MEQ/L (ref 136–144)
WBC # BLD AUTO: 9.71 K/UL (ref 3.8–10.5)

## 2022-09-10 PROCEDURE — 25800000 HC PHARMACY IV SOLUTIONS: Performed by: STUDENT IN AN ORGANIZED HEALTH CARE EDUCATION/TRAINING PROGRAM

## 2022-09-10 PROCEDURE — 63600000 HC DRUGS/DETAIL CODE: Performed by: STUDENT IN AN ORGANIZED HEALTH CARE EDUCATION/TRAINING PROGRAM

## 2022-09-10 PROCEDURE — 36415 COLL VENOUS BLD VENIPUNCTURE: CPT | Performed by: STUDENT IN AN ORGANIZED HEALTH CARE EDUCATION/TRAINING PROGRAM

## 2022-09-10 PROCEDURE — 99222 1ST HOSP IP/OBS MODERATE 55: CPT | Performed by: STUDENT IN AN ORGANIZED HEALTH CARE EDUCATION/TRAINING PROGRAM

## 2022-09-10 PROCEDURE — 21400000 HC ROOM AND CARE CCU/INTERMEDIATE

## 2022-09-10 PROCEDURE — 83735 ASSAY OF MAGNESIUM: CPT | Performed by: STUDENT IN AN ORGANIZED HEALTH CARE EDUCATION/TRAINING PROGRAM

## 2022-09-10 PROCEDURE — 63600000 HC DRUGS/DETAIL CODE: Performed by: INTERNAL MEDICINE

## 2022-09-10 PROCEDURE — G0378 HOSPITAL OBSERVATION PER HR: HCPCS

## 2022-09-10 PROCEDURE — 83690 ASSAY OF LIPASE: CPT | Performed by: INTERNAL MEDICINE

## 2022-09-10 PROCEDURE — 200200 PR NO CHARGE: Performed by: INTERNAL MEDICINE

## 2022-09-10 PROCEDURE — 63600000 HC DRUGS/DETAIL CODE

## 2022-09-10 PROCEDURE — 85027 COMPLETE CBC AUTOMATED: CPT | Performed by: STUDENT IN AN ORGANIZED HEALTH CARE EDUCATION/TRAINING PROGRAM

## 2022-09-10 PROCEDURE — 12000000 HC ROOM AND CARE MED/SURG

## 2022-09-10 PROCEDURE — 80053 COMPREHEN METABOLIC PANEL: CPT | Performed by: STUDENT IN AN ORGANIZED HEALTH CARE EDUCATION/TRAINING PROGRAM

## 2022-09-10 PROCEDURE — 63700000 HC SELF-ADMINISTRABLE DRUG: Performed by: STUDENT IN AN ORGANIZED HEALTH CARE EDUCATION/TRAINING PROGRAM

## 2022-09-10 PROCEDURE — 84100 ASSAY OF PHOSPHORUS: CPT | Performed by: STUDENT IN AN ORGANIZED HEALTH CARE EDUCATION/TRAINING PROGRAM

## 2022-09-10 RX ORDER — MORPHINE SULFATE 2 MG/ML
2 INJECTION, SOLUTION INTRAMUSCULAR; INTRAVENOUS EVERY 4 HOURS PRN
Status: DISCONTINUED | OUTPATIENT
Start: 2022-09-10 | End: 2022-09-10

## 2022-09-10 RX ORDER — SODIUM CHLORIDE, SODIUM LACTATE, POTASSIUM CHLORIDE, CALCIUM CHLORIDE 600; 310; 30; 20 MG/100ML; MG/100ML; MG/100ML; MG/100ML
INJECTION, SOLUTION INTRAVENOUS CONTINUOUS
Status: DISCONTINUED | OUTPATIENT
Start: 2022-09-10 | End: 2022-09-13

## 2022-09-10 RX ORDER — OXYCODONE HYDROCHLORIDE 5 MG/1
5 TABLET ORAL EVERY 4 HOURS PRN
Status: DISCONTINUED | OUTPATIENT
Start: 2022-09-10 | End: 2022-09-10

## 2022-09-10 RX ORDER — IBUPROFEN 200 MG
16-32 TABLET ORAL AS NEEDED
Status: DISCONTINUED | OUTPATIENT
Start: 2022-09-10 | End: 2022-09-16 | Stop reason: HOSPADM

## 2022-09-10 RX ORDER — DEXTROSE 40 %
15-30 GEL (GRAM) ORAL AS NEEDED
Status: DISCONTINUED | OUTPATIENT
Start: 2022-09-10 | End: 2022-09-16 | Stop reason: HOSPADM

## 2022-09-10 RX ORDER — ONDANSETRON HYDROCHLORIDE 2 MG/ML
INJECTION, SOLUTION INTRAVENOUS
Status: COMPLETED
Start: 2022-09-10 | End: 2022-09-10

## 2022-09-10 RX ORDER — SODIUM CHLORIDE 9 MG/ML
INJECTION, SOLUTION INTRAVENOUS CONTINUOUS
Status: DISCONTINUED | OUTPATIENT
Start: 2022-09-10 | End: 2022-09-10

## 2022-09-10 RX ORDER — ONDANSETRON HYDROCHLORIDE 2 MG/ML
4 INJECTION, SOLUTION INTRAVENOUS EVERY 8 HOURS PRN
Status: DISCONTINUED | OUTPATIENT
Start: 2022-09-10 | End: 2022-09-16 | Stop reason: HOSPADM

## 2022-09-10 RX ORDER — PROCHLORPERAZINE EDISYLATE 5 MG/ML
5 INJECTION INTRAMUSCULAR; INTRAVENOUS ONCE
Status: COMPLETED | OUTPATIENT
Start: 2022-09-10 | End: 2022-09-10

## 2022-09-10 RX ORDER — ONDANSETRON 4 MG/1
4 TABLET, ORALLY DISINTEGRATING ORAL EVERY 8 HOURS PRN
Status: DISCONTINUED | OUTPATIENT
Start: 2022-09-10 | End: 2022-09-16 | Stop reason: HOSPADM

## 2022-09-10 RX ORDER — MORPHINE SULFATE 2 MG/ML
2 INJECTION, SOLUTION INTRAMUSCULAR; INTRAVENOUS
Status: DISCONTINUED | OUTPATIENT
Start: 2022-09-10 | End: 2022-09-16 | Stop reason: HOSPADM

## 2022-09-10 RX ORDER — DEXTROSE 50 % IN WATER (D50W) INTRAVENOUS SYRINGE
25 AS NEEDED
Status: DISCONTINUED | OUTPATIENT
Start: 2022-09-10 | End: 2022-09-16 | Stop reason: HOSPADM

## 2022-09-10 RX ADMIN — SODIUM CHLORIDE: 9 INJECTION, SOLUTION INTRAVENOUS at 01:16

## 2022-09-10 RX ADMIN — MORPHINE SULFATE 2 MG: 2 INJECTION, SOLUTION INTRAMUSCULAR; INTRAVENOUS at 04:25

## 2022-09-10 RX ADMIN — ONDANSETRON 4 MG: 2 INJECTION INTRAMUSCULAR; INTRAVENOUS at 16:19

## 2022-09-10 RX ADMIN — PROCHLORPERAZINE EDISYLATE 5 MG: 5 INJECTION INTRAMUSCULAR; INTRAVENOUS at 03:03

## 2022-09-10 RX ADMIN — ONDANSETRON 4 MG: 2 INJECTION INTRAMUSCULAR; INTRAVENOUS at 01:14

## 2022-09-10 RX ADMIN — SODIUM CHLORIDE, POTASSIUM CHLORIDE, SODIUM LACTATE AND CALCIUM CHLORIDE: 600; 310; 30; 20 INJECTION, SOLUTION INTRAVENOUS at 20:05

## 2022-09-10 RX ADMIN — MORPHINE SULFATE 2 MG: 2 INJECTION, SOLUTION INTRAMUSCULAR; INTRAVENOUS at 16:19

## 2022-09-10 RX ADMIN — SODIUM CHLORIDE, POTASSIUM CHLORIDE, SODIUM LACTATE AND CALCIUM CHLORIDE: 600; 310; 30; 20 INJECTION, SOLUTION INTRAVENOUS at 15:20

## 2022-09-10 RX ADMIN — ONDANSETRON 4 MG: 2 INJECTION INTRAMUSCULAR; INTRAVENOUS at 08:06

## 2022-09-10 RX ADMIN — MORPHINE SULFATE 2 MG: 2 INJECTION, SOLUTION INTRAMUSCULAR; INTRAVENOUS at 03:05

## 2022-09-10 RX ADMIN — MORPHINE SULFATE 2 MG: 2 INJECTION, SOLUTION INTRAMUSCULAR; INTRAVENOUS at 19:54

## 2022-09-10 RX ADMIN — MORPHINE SULFATE 2 MG: 2 INJECTION, SOLUTION INTRAMUSCULAR; INTRAVENOUS at 08:43

## 2022-09-10 RX ADMIN — SODIUM CHLORIDE: 9 INJECTION, SOLUTION INTRAVENOUS at 08:09

## 2022-09-10 RX ADMIN — MORPHINE SULFATE 2 MG: 2 INJECTION, SOLUTION INTRAMUSCULAR; INTRAVENOUS at 22:57

## 2022-09-10 RX ADMIN — OXYCODONE HYDROCHLORIDE 5 MG: 5 TABLET ORAL at 08:06

## 2022-09-10 RX ADMIN — MORPHINE SULFATE 2 MG: 2 INJECTION, SOLUTION INTRAMUSCULAR; INTRAVENOUS at 12:20

## 2022-09-10 ASSESSMENT — PATIENT HEALTH QUESTIONNAIRE - PHQ9: SUM OF ALL RESPONSES TO PHQ9 QUESTIONS 1 & 2: 0

## 2022-09-10 NOTE — PROGRESS NOTES
Hospital Medicine Service -  Daily Progress Note       SUBJECTIVE   Interval History: pt seen and examined, no chest pain, shortness of breath. Feels her abd pain is not any better and perhaps a bit worse today. Also feels bloated too. Still nauseous. Not passing gas. No BM. Oral pain med did not help. IV morphine did help a bit.      OBJECTIVE      Vital signs in last 24 hours:  Temp:  [36.4 °C (97.5 °F)-37.1 °C (98.7 °F)] 36.6 °C (97.9 °F)  Heart Rate:  [59-92] 65  Resp:  [18-25] 20  BP: (110-137)/(58-81) 115/66    Intake/Output Summary (Last 24 hours) at 9/10/2022 1508  Last data filed at 9/10/2022 0238  Gross per 24 hour   Intake 1000 ml   Output 400 ml   Net 600 ml       PHYSICAL EXAMINATION      Physical Exam    Physical Exam   Constitutional:  appears well-developed and well-nourished. No distress.   HENT:   Head: Normocephalic and atraumatic.   Right Ear: External ear normal.   Left Ear: External ear normal.   Mouth/Throat: Oropharynx is clear and moist.   Eyes: Conjunctivae and EOM are normal. Right eye exhibits no discharge. Left eye exhibits no discharge.   Neck: Normal range of motion. Neck supple. No tracheal deviation present.   Cardiovascular: Normal rate, regular rhythm and normal heart sounds.  Exam reveals no gallop and no friction rub.    No murmur heard.  Pulmonary/Chest: Effort normal and breath sounds normal. No stridor. No respiratory distress.  has no wheezes.  has no rales.   Abdominal: Soft. Bowel sounds are normal. exhibits no distension. TTP most severe in epigastric. There is no rebound and no guarding.   Musculoskeletal:  exhibits no edema or deformity.   Neurological:  is alert, awake, oriented x3   Skin: Skin is warm and dry, not diaphoretic.      LINES, CATHETERS, DRAINS, AIRWAYS, AND WOUNDS   Lines, Drains, and Airways:  Wounds (agree with documentation and present on admission):  Peripheral IV (Adult) 09/09/22 Right Antecubital (Active)   Number of days: 1         Comments:       LABS / IMAGING / TELE      Labs  CBC Results       09/10/22 09/09/22 01/29/15     0549 1952 0117    WBC 9.71 8.13 7.04    RBC 4.12 4.95 4.85    HGB 12.7 15.7 14.8    HCT 39.2 46.1 42.2    MCV 95.1 93.1 87.0    MCH 30.8 31.7 30.5    MCHC 32.4 34.1 35.1     258 229        CMP Results       09/10/22 09/09/22 07/15/15     0549 2100 1147     134 --    K 4.0 3.6 --    Cl 106 105 --    CO2 21 23 --    Glucose 111 152 --    BUN 11 12 8    Creatinine 0.5 0.6 0.7    Calcium 8.7 8.8 --    Anion Gap 8 6 --     167 --     79 --    Albumin 3.4 3.8 --    EGFR >60.0 >60.0 --         Comment for K at 2100 on 09/09/22: Results obtained on plasma. Plasma Potassium values may be up to 0.4 mEQ/L less than serum values. The differences may be greater for patients with high platelet or white cell counts.            SARS-CoV-2 (COVID-19) (no units)   Date/Time Value   09/09/2022 2256 Negative     ASSESSMENT AND PLAN      * Post-ERCP acute pancreatitis  Assessment & Plan  > Hx of bile duct dilatation s/p bile duct sphincterotomy 9/9  > Mild elevation of lipase, nausea, vomiting, abdominal pain, suspect due to mild post-ERCP pancreatitis. Lipase worse today  · IVF  · Pain control, will change to IV pain med.   · NPO, advance as tolerated  · GI consult       VTE Assessment: Padua VTE Score: 0  VTE Prophylaxis:  Current anticoagulants:    None      Code Status: Full Code  Palliative Care Screening Score: 0   Estimated Discharge Date: 9/13/2022     Disposition Planning: need 1-2 more days     Ester Gonzalez DO  9/10/2022

## 2022-09-10 NOTE — ED ATTESTATION NOTE
Procedures  Physical Exam  Review of Systems    9/9/20228:03 PM  I have personally seen and examined the patient. I have reviewed and agree with the PA/NP/Resident's assessment and ED plan of care. My examination, assessment and plan of care of Sowmya Clark is as follows:    Patient is a 62-year-old female who presents with abdominal discomfort, patient had an ERCP today to evaluate her biliary tract, patient reports that the procedure went well and there were no complications that were reported to her, when she returned home she began having epigastric abdominal discomfort which has worsened, no fevers in the emergency department    Exam:   Vital signs have been reviewed, pulse ox is 100% on room air, normal  Heart: RRR, normal S1/S2  Lungs: CTA bilaterally  Abdo: soft, non-tender    Plan: Patient is a 62-year-old female who presents with abdominal discomfort after an ERCP today, checking labs and will medicate for discomfort, reevaluate afterwards          Godshall, Duane K, MD  09/09/22 2006

## 2022-09-10 NOTE — H&P
Hospital Medicine Service -  History & Physical        CHIEF COMPLAINT   Chief Complaint   Patient presents with    Post-op Problem    Abdominal Pain      HISTORY OF PRESENT ILLNESS      Sowmya Clark is a 62 y.o. female with a past medical history of cholecystectomy, ERCP s/p biliary sphincterotomy who presents with abdominal pain, admitted for post-ERCP pancreatitis    Patient presents with abdominal pain.  Patient has history of abdominal discomfort, underwent MRCP found to have biliary duct dilatation, and had a scheduled ERCP with sphincterotomy done on day of presentation (9/9/2022).  Patient did well postprocedure without immediate complications, however after having some tea and pop cake, she began having severe epigastric abdominal pain.  Pain was sharp in quality severe and radiating to the entire abdomen and to the back.  Did not try any medications for pain relief.  Given this pain she spoke with her GI doctor, who recommended that she present to the ED for further evaluation.    ED course:  Patient in severe pain in the ED.  Sodium 134, glucose 152, , ALT 79, , bilirubin 2.1, lipase 106.  CT abdomen showing pneumobilia and left adnexal cyst, otherwise no evidence of pancreatitis.  Pain improved after receiving morphine IV x3.  Given Zofran, NS bolus 1 L.        PAST MEDICAL AND SURGICAL HISTORY      Past Medical History:   Diagnosis Date    Breast cancer (CMS/HCC) 2007    Crohn's disease (CMS/HCC)     Dystonia     Osteomyelitis (CMS/HCC)     Thyroid nodule     Tremor        Past Surgical History:   Procedure Laterality Date    BREAST LUMPECTOMY      BREAST SURGERY      CATARACT EXTRACTION      CYST REMOVAL      throat    KNEE SURGERY      TONSILLECTOMY         PCP: Ankita Ho MD    MEDICATIONS      Prior to Admission medications    Not on File       ALLERGIES      Prednisone    FAMILY HISTORY      Family History   Problem Relation Age of Onset    COPD Biological  Mother     Alzheimer's disease Biological Father     Spina bifida Biological Brother        SOCIAL HISTORY      Social History     Socioeconomic History    Marital status:      Spouse name: None    Number of children: None    Years of education: None    Highest education level: None   Tobacco Use    Smoking status: Never Smoker    Smokeless tobacco: Never Used   Substance and Sexual Activity    Alcohol use: Yes    Drug use: Never    Sexual activity: Defer     Social Determinants of Health     Food Insecurity: No Food Insecurity    Worried About Running Out of Food in the Last Year: Never true    Ran Out of Food in the Last Year: Never true       REVIEW OF SYSTEMS        Review of Systems   (Positive findings marked bold)    General ROS: chills, fatigue or fever  Eyes ROS: vision changes  ENT ROS: sore throat, dysphagia, rhinorrhea  Respiratory ROS: shortness of breath, cough, increased sputum  Cardiovascular ROS:  chest pain, edema, orthopnea palpitations  Gastrointestinal ROS: abdominal pain, gas/bloating, hematemesis or melena, nausea, vomiting  Genito-Urinary ROS: dysuria, trouble voiding, hematuria  Musculoskeletal ROS:  Lower extremity edema  Neurological ROS: confusion, headaches  Dermatological ROS: skin changes    PHYSICAL EXAMINATION      Temp:  [36.4 °C (97.5 °F)-36.4 °C (97.6 °F)] 36.4 °C (97.6 °F)  Heart Rate:  [59-92] 76  Resp:  [18] 18  BP: (115-137)/(58-81) 121/62  Body mass index is 24.41 kg/m².    Physical Exam  Constitutional:       Appearance: Normal appearance.   HENT:      Head: Atraumatic.      Right Ear: External ear normal.      Left Ear: External ear normal.      Nose: No rhinorrhea.   Eyes:      General:         Right eye: No discharge.         Left eye: No discharge.      Extraocular Movements: Extraocular movements intact.   Cardiovascular:      Rate and Rhythm: Normal rate and regular rhythm.      Pulses: Normal pulses.      Heart sounds: Normal heart sounds.    Pulmonary:      Effort: Pulmonary effort is normal.      Breath sounds: Normal breath sounds.   Abdominal:      General: Abdomen is flat. There is no distension.      Palpations: Abdomen is soft.      Tenderness: There is abdominal tenderness (Mildly tender in RUQ, epigastric region). There is no guarding.   Musculoskeletal:      Cervical back: Normal range of motion and neck supple.      Right lower leg: No edema.      Left lower leg: No edema.   Skin:     General: Skin is warm and dry.   Neurological:      General: No focal deficit present.      Mental Status: She is alert and oriented to person, place, and time.   Psychiatric:         Mood and Affect: Mood normal.         Behavior: Behavior normal.         LABS / IMAGING / EKG        Labs    Lab Results   Component Value Date     (L) 09/09/2022    K 3.6 09/09/2022     09/09/2022    CO2 23 09/09/2022    BUN 12 09/09/2022    CREATININE 0.6 09/09/2022    ALBUMIN 3.8 09/09/2022    ALT 79 (H) 09/09/2022     (H) 09/09/2022    CALCIUM 8.8 (L) 09/09/2022    WBC 8.13 09/09/2022    HGB 15.7 09/09/2022    HCT 46.1 (H) 09/09/2022     09/09/2022       CBC Results       09/09/22 01/29/15     1952 0117    WBC 8.13 7.04    RBC 4.95 4.85    HGB 15.7 14.8    HCT 46.1 42.2    MCV 93.1 87.0    MCH 31.7 30.5    MCHC 34.1 35.1     229        CMP Results       09/09/22 07/15/15 01/29/15     2100 1147 0117     -- 137    K 3.6 -- 3.2    Cl 105 -- 105    CO2 23 -- 20    Glucose 152 -- 103    BUN 12 8 15    Creatinine 0.6 0.7 0.7    Calcium 8.8 -- 9.7    Anion Gap 6 -- 12     -- 25    ALT 79 -- 21    Albumin 3.8 -- 4.1    EGFR >60.0 -- --         Comment for K at 2100 on 09/09/22: Results obtained on plasma. Plasma Potassium values may be up to 0.4 mEQ/L less than serum values. The differences may be greater for patients with high platelet or white cell counts.    Comment for K at 0117 on 01/29/15: RESULTS OBTAINED ON PLASMA. PLASMA POTASSIUM  VALUES MAY BE UP TO 0.4 MEQ/L LESS THAN SERUM VALUES. THE DIFFERENCES MAY BE GREATER FOR PATIENTS WITH HIGH PLATELET OR WHITE CELL COUNTS.          Troponin I Results       01/29/15     0117    Troponin I <0.02  A rise or fall of troponin with at least one abnormal value is consistent with myocardial injury or necrosis in the presence of appropriate clinical, ECG, and/or imaging abnormalities.            Imaging  I have independently reviewed the pertinent imaging from the last 24 hrs.                              SARS-CoV-2 (COVID-19) (no units)   Date/Time Value   09/06/2022 1107 Negative       ECG/Telemetry  n/a    ASSESSMENT AND PLAN           * Post-ERCP acute pancreatitis  Assessment & Plan  > Hx of bile duct dilatation s/p bile duct sphincterotomy 9/9  > Mild elevation of lipase, nausea, vomiting, abdominal pain, suspect due to mild post-ERCP pancreatitis  · IVF  · Pain control  · NPO, advance as tolerated  · GI consult       VTE Assessment: Padua VTE Score: 0  VTE Prophylaxis: Current anticoagulants:    None      Code Status: Full Code      Discussed advanced care planning.  Estimated Discharge Date: 9/10/2022  Disposition Planning: TBD       Author:    This note is signed by the night admitting physician. Please contact this author for any issues or questions between 7PM-7AM (Secure chat preferred).  For any issues/questions after 7AM, please contact day time [facility] physician.     Casa Ray MD  Hospitalist/Nocturnist  Department of Internal Medicine   Pager: [phone] (Secure chat preferred)  09/10/22

## 2022-09-10 NOTE — CONSULTS
GASTROENTEROLOGY CONSULTATION   [facility]     PATIENT NAME:  Sowmya Clark        YOB: 1960   AGE:  62 y.o.         MRN:  [id number]              HISTORY   CC:Abdominal pain/pancreatitis  62-year-old woman known to our practice (Dr. Keane/Dr. Riojas), who underwent EUS/ERCP and sphincterotomy for dilated bile ducts yesterday at [facility] by Dr. Tenorio. She presented last evening with severe abdominal pain.  Her pancreas does appear normal on CT scan, but lipase is markedly elevated today confirming post ERCP pancreatitis.  This morning she states her pain may be a little bit better, but still severe.    9/9/2022.  EUS.  Dilated bile duct patent status postcholecystectomy.  No evidence for choledocholithiasis, ampullary mass, pancreatic mass or abnormal lymph nodes.      9/9/2022.  ERCP.  Prominent common bile duct.  The presumptive diagnosis is either papillary stenosis or sphincter of Oddi dysfunction.  Treated with biliary sphincterotomy.  PAST MEDICAL HISTORY     Past Medical History:   Diagnosis Date    Breast cancer (CMS/HCC) 2007    Crohn's disease (CMS/HCC)     Dystonia     Osteomyelitis (CMS/HCC)     Thyroid nodule     Tremor        PAST SURGICAL HISTORY     Past Surgical History:   Procedure Laterality Date    BREAST LUMPECTOMY      BREAST SURGERY      CATARACT EXTRACTION      CYST REMOVAL      throat    KNEE SURGERY      TONSILLECTOMY          FAMILY HISTORY     Family History   Problem Relation Age of Onset    COPD Biological Mother     Alzheimer's disease Biological Father     Spina bifida Biological Brother        SOCIAL HISTORY     Social History     Tobacco Use    Smoking status: Never Smoker    Smokeless tobacco: Never Used   Substance Use Topics    Alcohol use: Yes       MEDICATIONS   Home Medication:      MEDICATIONS:  Infusions:     lactated ringer's              Scheduled:     ALLERGIES     Allergies   Allergen Reactions    Prednisone      Had  osteonecrosis?/mylelitis of hip bone felt to be related to prednisone       REVIEW OF SYSTEMS   Systems reviewed and otherwise negative except as documented above.    PHYSICAL EXAMINATION   Temp:  [36.4 °C (97.5 °F)-37.1 °C (98.7 °F)] 36.6 °C (97.9 °F)  Heart Rate:  [59-92] 65  Resp:  [18-25] 20  BP: (110-137)/(58-81) 115/66      Intake/Output Summary (Last 24 hours) at 9/10/2022 1446  Last data filed at 9/10/2022 0238  Gross per 24 hour   Intake 1000 ml   Output 400 ml   Net 600 ml       General appearance: well developed, well nourished, Appears uncomfortable, appears stated age  Head: normocephalic, atraumatic  Eyes: EOMI  ENT: oral mucosa moist  Lungs: clear to auscultation anteriorly, non-labored respirations  Heart: regular rate and rhythm, S1/S2 present, no murmur  Abdomen: soft, Epigastric/periumbilical tenderness with voluntary guarding and no rebound; bowel sounds normal; no masses, no organomegaly  Rectal:   Extremities: no edema  Skin:  Warm, dry, no rashes, no lesions, no jaundice  Neurologic: awake, alert & oriented x 3  Psych: cooperative with exam, appropriate mood and affect    Diagnostic Data:     Labs reviewed  Results from last 7 days   Lab Units 09/10/22  0549 09/09/22 1952   WBC K/uL 9.71 8.13   HEMOGLOBIN g/dL 12.7 15.7   HEMATOCRIT % 39.2 46.1*   PLATELETS K/uL 210 258   ]  Results from last 7 days   Lab Units 09/10/22  0549 09/09/22  2100   SODIUM mEQ/L 135* 134*   POTASSIUM mEQ/L 4.0 3.6   CHLORIDE mEQ/L 106 105   CO2 mEQ/L 21* 23   BUN mg/dL 11 12   CREATININE mg/dL 0.5* 0.6   CALCIUM mg/dL 8.7* 8.8*   ALBUMIN g/dL 3.4 3.8   BILIRUBIN TOTAL mg/dL 2.4* 2.1*   ALK PHOS IU/L 176* 149*   ALT IU/L 229* 79*   AST IU/L 337* 167*   GLUCOSE mg/dL 111* 152*   ]  Results from last 7 days   Lab Units 09/10/22  0549 09/09/22  2100   LIPASE U/L 2,883* 106*   ]    ]    Imaging reviewed  CT ABDOMEN PELVIS WITH IV CONTRAST    Result Date: 9/9/2022  IMPRESSION: 1. Pneumobilia, likely related to patient's  recent surgery/procedure. 2. Left adnexal cyst measuring 2.3 cm.  Further evaluation with nonemergent pelvic ultrasound is recommended, if recently not performed.   The pancreas appears normal.  Common bile duct is dilated measuring 1 cm.  The gallbladder surgically absent.  8/1/2022.  MRI/MRCP.  [place].  There is extrahepatic ductal dilatation which is increased in diameter since previous study.  There is abrupt tapering at the level of the ampulla.  The causes not otherwise identified on the study.  There is no evidence of choledocholithiasis.    ASSESSMENT/PLAN     Impression and plan . post ERCP pancreatitis. The pancreas appears normal on initial CT scan.  Lipase is elevated today.  Recommend pain control, n.p.o. except for ice chips, aggressive IV hydration (changed to lactated Ringer's to 250 cc/hour). Hopefully, if this was secondary to sphincter of Oddi dysfunction or papillary stenosis, her abdominal pain resolves after she recovers. Discussed with [facility].      AUTHOR:  Casa Wells MD  9/10/2022

## 2022-09-10 NOTE — ASSESSMENT & PLAN NOTE
> Hx of bile duct dilatation s/p bile duct sphincterotomy 9/9  > Mild elevation of lipase, nausea, vomiting, abdominal pain, suspect due to mild post-ERCP pancreatitis   · IVF  · Pain control, will change to IV pain med.   · NPO, advance as tolerated  · GI consult  · small improvement of pain, tolerating slightly more liquid. GI following. Starting protonix.

## 2022-09-10 NOTE — PLAN OF CARE
Problem: Adult Inpatient Plan of Care  Goal: Plan of Care Review  Outcome: Progressing  Flowsheets (Taken 9/10/2022 4324)  Progress: no change  Plan of Care Reviewed With: patient  Outcome Summary: Patient still complaining of abdominal pain 6-7/10 relieved with PRN Morphine.  No complaints of SOB noted.  Per Dr. Wells patient okay to have ice chips, patient tolerating okay.  Some nausea noted, PRN Zofran given.  Patient ambulated bowling with .  Will continue to monitor closely.  Goal: Patient-Specific Goal (Individualized)  Outcome: Progressing  Goal: Absence of Hospital-Acquired Illness or Injury  Outcome: Progressing  Goal: Optimal Comfort and Wellbeing  Outcome: Progressing  Goal: Readiness for Transition of Care  Outcome: Progressing   Plan of Care Review  Plan of Care Reviewed With: patient  Progress: no change  Outcome Summary: Patient still complaining of abdominal pain 6-7/10 relieved with PRN Morphine.  No complaints of SOB noted.  Per Dr. Wells patient okay to have ice chips, patient tolerating okay.  Some nausea noted, PRN Zofran given.  Patient ambulated bowling with .  Will continue to monitor closely.

## 2022-09-11 ENCOUNTER — APPOINTMENT (INPATIENT)
Dept: RADIOLOGY | Facility: HOSPITAL | Age: 62
DRG: 439 | End: 2022-09-11
Attending: INTERNAL MEDICINE
Payer: COMMERCIAL

## 2022-09-11 LAB
ALBUMIN SERPL-MCNC: 3.2 G/DL (ref 3.4–5)
ALP SERPL-CCNC: 132 IU/L (ref 35–126)
ALT SERPL-CCNC: 132 IU/L (ref 11–54)
ANION GAP SERPL CALC-SCNC: 6 MEQ/L (ref 3–15)
AST SERPL-CCNC: 84 IU/L (ref 15–41)
BILIRUB SERPL-MCNC: 1.9 MG/DL (ref 0.3–1.2)
BUN SERPL-MCNC: 7 MG/DL (ref 8–20)
CALCIUM SERPL-MCNC: 8.6 MG/DL (ref 8.9–10.3)
CHLORIDE SERPL-SCNC: 104 MEQ/L (ref 98–109)
CO2 SERPL-SCNC: 25 MEQ/L (ref 22–32)
CREAT SERPL-MCNC: 0.4 MG/DL (ref 0.6–1.1)
ERYTHROCYTE [DISTWIDTH] IN BLOOD BY AUTOMATED COUNT: 13.8 % (ref 11.7–14.4)
GFR SERPL CREATININE-BSD FRML MDRD: >60 ML/MIN/1.73M*2
GLUCOSE BLD-MCNC: 84 MG/DL (ref 70–99)
GLUCOSE SERPL-MCNC: 90 MG/DL (ref 70–99)
HCT VFR BLDCO AUTO: 37.9 % (ref 35–45)
HGB BLD-MCNC: 12.5 G/DL (ref 11.8–15.7)
LIPASE SERPL-CCNC: 1952 U/L (ref 20–51)
MCH RBC QN AUTO: 31.2 PG (ref 28–33.2)
MCHC RBC AUTO-ENTMCNC: 33 G/DL (ref 32.2–35.5)
MCV RBC AUTO: 94.5 FL (ref 83–98)
PDW BLD AUTO: 10 FL (ref 9.4–12.3)
PLATELET # BLD AUTO: 192 K/UL (ref 150–369)
POCT TEST: NORMAL
POTASSIUM SERPL-SCNC: 3.8 MEQ/L (ref 3.6–5.1)
PROT SERPL-MCNC: 5.3 G/DL (ref 6–8.2)
RBC # BLD AUTO: 4.01 M/UL (ref 3.93–5.22)
SODIUM SERPL-SCNC: 135 MEQ/L (ref 136–144)
WBC # BLD AUTO: 12.49 K/UL (ref 3.8–10.5)

## 2022-09-11 PROCEDURE — 36415 COLL VENOUS BLD VENIPUNCTURE: CPT | Performed by: INTERNAL MEDICINE

## 2022-09-11 PROCEDURE — 63700000 HC SELF-ADMINISTRABLE DRUG: Performed by: INTERNAL MEDICINE

## 2022-09-11 PROCEDURE — 63600000 HC DRUGS/DETAIL CODE: Performed by: INTERNAL MEDICINE

## 2022-09-11 PROCEDURE — 80053 COMPREHEN METABOLIC PANEL: CPT | Performed by: INTERNAL MEDICINE

## 2022-09-11 PROCEDURE — 99232 SBSQ HOSP IP/OBS MODERATE 35: CPT | Performed by: INTERNAL MEDICINE

## 2022-09-11 PROCEDURE — 83690 ASSAY OF LIPASE: CPT | Performed by: INTERNAL MEDICINE

## 2022-09-11 PROCEDURE — 12000000 HC ROOM AND CARE MED/SURG

## 2022-09-11 PROCEDURE — 85027 COMPLETE CBC AUTOMATED: CPT | Performed by: INTERNAL MEDICINE

## 2022-09-11 PROCEDURE — 74022 RADEX COMPL AQT ABD SERIES: CPT

## 2022-09-11 PROCEDURE — 63600000 HC DRUGS/DETAIL CODE: Performed by: STUDENT IN AN ORGANIZED HEALTH CARE EDUCATION/TRAINING PROGRAM

## 2022-09-11 RX ORDER — BUTALBITAL, ACETAMINOPHEN AND CAFFEINE 50; 325; 40 MG/1; MG/1; MG/1
1 TABLET ORAL EVERY 6 HOURS PRN
Status: DISCONTINUED | OUTPATIENT
Start: 2022-09-11 | End: 2022-09-14

## 2022-09-11 RX ORDER — IBUPROFEN/PSEUDOEPHEDRINE HCL 200MG-30MG
3 TABLET ORAL NIGHTLY PRN
Status: DISCONTINUED | OUTPATIENT
Start: 2022-09-11 | End: 2022-09-15

## 2022-09-11 RX ADMIN — SODIUM CHLORIDE, POTASSIUM CHLORIDE, SODIUM LACTATE AND CALCIUM CHLORIDE: 600; 310; 30; 20 INJECTION, SOLUTION INTRAVENOUS at 18:33

## 2022-09-11 RX ADMIN — MORPHINE SULFATE 2 MG: 2 INJECTION, SOLUTION INTRAMUSCULAR; INTRAVENOUS at 20:23

## 2022-09-11 RX ADMIN — MORPHINE SULFATE 2 MG: 2 INJECTION, SOLUTION INTRAMUSCULAR; INTRAVENOUS at 16:56

## 2022-09-11 RX ADMIN — BUTALBITAL, ACETAMINOPHEN AND CAFFEINE 1 TABLET: 50; 325; 40 TABLET ORAL at 09:06

## 2022-09-11 RX ADMIN — MORPHINE SULFATE 2 MG: 2 INJECTION, SOLUTION INTRAMUSCULAR; INTRAVENOUS at 02:24

## 2022-09-11 RX ADMIN — Medication 3 MG: at 20:22

## 2022-09-11 RX ADMIN — MORPHINE SULFATE 2 MG: 2 INJECTION, SOLUTION INTRAMUSCULAR; INTRAVENOUS at 13:18

## 2022-09-11 RX ADMIN — SODIUM CHLORIDE, POTASSIUM CHLORIDE, SODIUM LACTATE AND CALCIUM CHLORIDE: 600; 310; 30; 20 INJECTION, SOLUTION INTRAVENOUS at 07:45

## 2022-09-11 RX ADMIN — MORPHINE SULFATE 2 MG: 2 INJECTION, SOLUTION INTRAMUSCULAR; INTRAVENOUS at 06:32

## 2022-09-11 RX ADMIN — SODIUM CHLORIDE, POTASSIUM CHLORIDE, SODIUM LACTATE AND CALCIUM CHLORIDE: 600; 310; 30; 20 INJECTION, SOLUTION INTRAVENOUS at 13:16

## 2022-09-11 RX ADMIN — ONDANSETRON 4 MG: 2 INJECTION INTRAMUSCULAR; INTRAVENOUS at 02:24

## 2022-09-11 NOTE — PROGRESS NOTES
GASTROENTEROLOGY DAILY PROGRESS NOTE  [facility]     PATIENT NAME:  Sowmya Clark          YOB: 1960  AGE:  62 y.o.   MRN: [id number]      SUBJECTIVE   CC:Pancreatitis  Still with pain and nausea, but improving.  Feels less bloated today.  REVIEW OF SYSTEMS   Systems reviewed and otherwise negative except as documented above.    VITAL SIGNS   Temp:  [36.5 °C (97.7 °F)-37.1 °C (98.8 °F)] 36.5 °C (97.7 °F)  Heart Rate:  [75-91] 81  Resp:  [18] 18  BP: (119-129)/(58-67) 119/61    No intake or output data in the 24 hours ending 09/11/22 1346  PHYSICAL EXAM   Physical Exam  General appearance: alert, appears stated age and cooperative  Head: normocephalic  Heart: regular rate and rhythm  Lungs: clear to auscultation anteriorly, non-labored respirations  Abdomen: soft, Diffuse tenderness without guarding rebound,; bowel sounds normal; no masses, no organomegaly  Extremities: no LE edema  Neurologic: awake and alert  Skin: no jaundice      IMAGING AND LABS REVIEWED   Labs reviewed  Results from last 7 days   Lab Units 09/11/22  0424 09/10/22  0549 09/09/22 1952   WBC K/uL 12.49* 9.71 8.13   HEMOGLOBIN g/dL 12.5 12.7 15.7   HEMATOCRIT % 37.9 39.2 46.1*   PLATELETS K/uL 192 210 258   ]  Results from last 7 days   Lab Units 09/11/22  0424 09/10/22  0549 09/09/22  2100   SODIUM mEQ/L 135* 135* 134*   POTASSIUM mEQ/L 3.8 4.0 3.6   CHLORIDE mEQ/L 104 106 105   CO2 mEQ/L 25 21* 23   BUN mg/dL 7* 11 12   CREATININE mg/dL 0.4* 0.5* 0.6   CALCIUM mg/dL 8.6* 8.7* 8.8*   ALBUMIN g/dL 3.2* 3.4 3.8   BILIRUBIN TOTAL mg/dL 1.9* 2.4* 2.1*   ALK PHOS IU/L 132* 176* 149*   ALT IU/L 132* 229* 79*   AST IU/L 84* 337* 167*   GLUCOSE mg/dL 90 111* 152*   ]  Results from last 7 days   Lab Units 09/11/22  0424 09/10/22  0549 09/09/22  2100   LIPASE U/L 1,952* 2,883* 106*   ]    ]    Imaging reviewed  X-RAY OBSTRUCTION SERIES (ABDOMEN 2 VIEWS WITH CHEST 1 VIEW)    Result Date: 9/11/2022  IMPRESSION: 1. No radiographic evidence of  bowel obstruction. 2. Small bilateral pleural effusions.    CT ABDOMEN PELVIS WITH IV CONTRAST    Result Date: 9/9/2022  IMPRESSION: 1. Pneumobilia, likely related to patient's recent surgery/procedure. 2. Left adnexal cyst measuring 2.3 cm.  Further evaluation with nonemergent pelvic ultrasound is recommended, if recently not performed.       ASSESSMENT/PLAN   Post ERCP pancreatitis. The pancreas appears normal on initial CT scan.  Lipase is elevated today.  Recommend pain control, n.p.o. except for ice chips, aggressive IV hydration (changed to lactated Ringer's to 250 cc/hour). Hopefully, if this was secondary to sphincter of Oddi dysfunction or papillary stenosis, her abdominal pain resolves after she recovers. Discussed with [facility].    9/11/2022.  GI update.  Some improvement.  Continue present management with aggressive IV hydration and pain control.  Clear liquids can be started (Suggested to patient she not have the chicken broth, and start slowly)        AUTHOR:  Casa Wells MD  9/11/2022

## 2022-09-11 NOTE — PROGRESS NOTES
Hospital Medicine Service -  Daily Progress Note       SUBJECTIVE   Interval History: pt seen and examined, no chest pain, shortness of breath. abd pain seems a bit better but still there. No vomiting. Still not passing gas but burping a lot.      OBJECTIVE      Vital signs in last 24 hours:  Temp:  [36.5 °C (97.7 °F)-37.1 °C (98.8 °F)] 37.1 °C (98.7 °F)  Heart Rate:  [75-91] 84  Resp:  [18] 18  BP: (123-129)/(58-67) 127/67  No intake or output data in the 24 hours ending 09/11/22 1257    PHYSICAL EXAMINATION      Physical Exam    Physical Exam   Constitutional:  appears well-developed and well-nourished. No distress.   HENT:   Head: Normocephalic and atraumatic.   Right Ear: External ear normal.   Left Ear: External ear normal.   Mouth/Throat: Oropharynx is clear and moist.   Eyes: Conjunctivae and EOM are normal. Right eye exhibits no discharge. Left eye exhibits no discharge.   Neck: Normal range of motion. Neck supple. No tracheal deviation present.   Cardiovascular: Normal rate, regular rhythm and normal heart sounds.  Exam reveals no gallop and no friction rub.    No murmur heard.  Pulmonary/Chest: Effort normal and breath sounds normal. No stridor. No respiratory distress.  has no wheezes.  has no rales.   Abdominal: Soft. Bowel sounds are normal. exhibits no distension. TTP most severe in epigastric. There is no rebound and no guarding.   Musculoskeletal:  exhibits no edema or deformity.   Neurological:  is alert, awake, oriented x3   Skin: Skin is warm and dry, not diaphoretic.      LINES, CATHETERS, DRAINS, AIRWAYS, AND WOUNDS   Lines, Drains, and Airways:  Wounds (agree with documentation and present on admission):  Peripheral IV (Adult) 09/09/22 Right Antecubital (Active)   Number of days: 1         Comments:      LABS / IMAGING / TELE      Labs  CBC Results       09/11/22 09/10/22 09/09/22     0424 0549 1952    WBC 12.49 9.71 8.13    RBC 4.01 4.12 4.95    HGB 12.5 12.7 15.7    HCT 37.9 39.2 46.1     MCV 94.5 95.1 93.1    MCH 31.2 30.8 31.7    MCHC 33.0 32.4 34.1     210 258        CMP Results       09/11/22 09/10/22 09/09/22     0424 0549 2100     135 134    K 3.8 4.0 3.6    Cl 104 106 105    CO2 25 21 23    Glucose 90 111 152    BUN 7 11 12    Creatinine 0.4 0.5 0.6    Calcium 8.6 8.7 8.8    Anion Gap 6 8 6    AST 84 337 167     229 79    Albumin 3.2 3.4 3.8    EGFR >60.0 >60.0 >60.0         Comment for K at 2100 on 09/09/22: Results obtained on plasma. Plasma Potassium values may be up to 0.4 mEQ/L less than serum values. The differences may be greater for patients with high platelet or white cell counts.            SARS-CoV-2 (COVID-19) (no units)   Date/Time Value   09/09/2022 2256 Negative     ASSESSMENT AND PLAN      * Post-ERCP acute pancreatitis  Assessment & Plan  > Hx of bile duct dilatation s/p bile duct sphincterotomy 9/9  > Mild elevation of lipase, nausea, vomiting, abdominal pain, suspect due to mild post-ERCP pancreatitis. Lipase worse today  · IVF  · Pain control, will change to IV pain med.   · NPO, advance as tolerated  · GI consult       VTE Assessment: Padua VTE Score: 0  VTE Prophylaxis:  Current anticoagulants:    None      Code Status: Full Code  Palliative Care Screening Score: 0   Estimated Discharge Date: 9/12/2022     Disposition Planning: need 1-2 more days     Ester Gonzalez DO  9/11/2022

## 2022-09-11 NOTE — PLAN OF CARE
Problem: Adult Inpatient Plan of Care  Goal: Plan of Care Review  Outcome: Progressing  Flowsheets (Taken 9/11/2022 2449)  Progress: no change  Plan of Care Reviewed With: patient  Outcome Summary: Patient still with complaints of abdominal pain.  PRN Morphine given as ordered.  Obstruction series completed this am.  Patient independent in room and ambulates around the floor with .  VSS.  Fall precautions in place.  Will continue to monitor closely.  Goal: Patient-Specific Goal (Individualized)  Outcome: Progressing  Goal: Absence of Hospital-Acquired Illness or Injury  Outcome: Progressing  Goal: Optimal Comfort and Wellbeing  Outcome: Progressing  Goal: Readiness for Transition of Care  Outcome: Progressing   Plan of Care Review  Plan of Care Reviewed With: patient  Progress: no change  Outcome Summary: Patient still with complaints of abdominal pain.  PRN Morphine given as ordered.  Obstruction series completed this am.  Patient independent in room and ambulates around the floor with .  VSS.  Fall precautions in place.  Will continue to monitor closely.

## 2022-09-11 NOTE — PLAN OF CARE
Plan of Care Review  Plan of Care Reviewed With: patient  Progress: no change  Outcome Summary: Pt c/o of abdominal pain and nausea throughout the shift. VsS. Pt refusing bed alarm. WCTM

## 2022-09-12 LAB
ALBUMIN SERPL-MCNC: 3.2 G/DL (ref 3.4–5)
ALP SERPL-CCNC: 137 IU/L (ref 35–126)
ALT SERPL-CCNC: 96 IU/L (ref 11–54)
ANION GAP SERPL CALC-SCNC: 8 MEQ/L (ref 3–15)
AST SERPL-CCNC: 57 IU/L (ref 15–41)
BASOPHILS # BLD: 0.04 K/UL (ref 0.01–0.1)
BASOPHILS NFR BLD: 0.3 %
BILIRUB SERPL-MCNC: 2.6 MG/DL (ref 0.3–1.2)
BUN SERPL-MCNC: 6 MG/DL (ref 8–20)
CALCIUM SERPL-MCNC: 8.7 MG/DL (ref 8.9–10.3)
CHLORIDE SERPL-SCNC: 102 MEQ/L (ref 98–109)
CO2 SERPL-SCNC: 27 MEQ/L (ref 22–32)
CREAT SERPL-MCNC: 0.5 MG/DL (ref 0.6–1.1)
DIFFERENTIAL METHOD BLD: ABNORMAL
EOSINOPHIL # BLD: 0.04 K/UL (ref 0.04–0.36)
EOSINOPHIL NFR BLD: 0.3 %
ERYTHROCYTE [DISTWIDTH] IN BLOOD BY AUTOMATED COUNT: 13.6 % (ref 11.7–14.4)
GFR SERPL CREATININE-BSD FRML MDRD: >60 ML/MIN/1.73M*2
GLUCOSE SERPL-MCNC: 78 MG/DL (ref 70–99)
HCT VFR BLDCO AUTO: 40.2 % (ref 35–45)
HGB BLD-MCNC: 13.3 G/DL (ref 11.8–15.7)
IMM GRANULOCYTES # BLD AUTO: 0.07 K/UL (ref 0–0.08)
IMM GRANULOCYTES NFR BLD AUTO: 0.5 %
LIPASE SERPL-CCNC: 488 U/L (ref 20–51)
LYMPHOCYTES # BLD: 1.04 K/UL (ref 1.2–3.5)
LYMPHOCYTES NFR BLD: 7.3 %
MCH RBC QN AUTO: 31.4 PG (ref 28–33.2)
MCHC RBC AUTO-ENTMCNC: 33.1 G/DL (ref 32.2–35.5)
MCV RBC AUTO: 94.8 FL (ref 83–98)
MONOCYTES # BLD: 0.79 K/UL (ref 0.28–0.8)
MONOCYTES NFR BLD: 5.5 %
NEUTROPHILS # BLD: 12.36 K/UL (ref 1.7–7)
NEUTS SEG NFR BLD: 86.1 %
NRBC BLD-RTO: 0 %
PDW BLD AUTO: 10.1 FL (ref 9.4–12.3)
PLATELET # BLD AUTO: 193 K/UL (ref 150–369)
POTASSIUM SERPL-SCNC: 3.6 MEQ/L (ref 3.6–5.1)
PROT SERPL-MCNC: 5.4 G/DL (ref 6–8.2)
RBC # BLD AUTO: 4.24 M/UL (ref 3.93–5.22)
SODIUM SERPL-SCNC: 137 MEQ/L (ref 136–144)
WBC # BLD AUTO: 14.34 K/UL (ref 3.8–10.5)

## 2022-09-12 PROCEDURE — 63600000 HC DRUGS/DETAIL CODE: Performed by: INTERNAL MEDICINE

## 2022-09-12 PROCEDURE — 85025 COMPLETE CBC W/AUTO DIFF WBC: CPT | Performed by: INTERNAL MEDICINE

## 2022-09-12 PROCEDURE — 36415 COLL VENOUS BLD VENIPUNCTURE: CPT | Performed by: INTERNAL MEDICINE

## 2022-09-12 PROCEDURE — 63700000 HC SELF-ADMINISTRABLE DRUG: Performed by: INTERNAL MEDICINE

## 2022-09-12 PROCEDURE — 12000000 HC ROOM AND CARE MED/SURG

## 2022-09-12 PROCEDURE — 99232 SBSQ HOSP IP/OBS MODERATE 35: CPT | Performed by: INTERNAL MEDICINE

## 2022-09-12 PROCEDURE — 80053 COMPREHEN METABOLIC PANEL: CPT | Performed by: INTERNAL MEDICINE

## 2022-09-12 PROCEDURE — 83690 ASSAY OF LIPASE: CPT | Performed by: INTERNAL MEDICINE

## 2022-09-12 RX ADMIN — BUTALBITAL, ACETAMINOPHEN AND CAFFEINE 1 TABLET: 50; 325; 40 TABLET ORAL at 04:13

## 2022-09-12 RX ADMIN — SODIUM CHLORIDE, POTASSIUM CHLORIDE, SODIUM LACTATE AND CALCIUM CHLORIDE: 600; 310; 30; 20 INJECTION, SOLUTION INTRAVENOUS at 13:58

## 2022-09-12 RX ADMIN — SODIUM CHLORIDE, POTASSIUM CHLORIDE, SODIUM LACTATE AND CALCIUM CHLORIDE: 600; 310; 30; 20 INJECTION, SOLUTION INTRAVENOUS at 04:12

## 2022-09-12 RX ADMIN — SODIUM CHLORIDE, POTASSIUM CHLORIDE, SODIUM LACTATE AND CALCIUM CHLORIDE: 600; 310; 30; 20 INJECTION, SOLUTION INTRAVENOUS at 21:30

## 2022-09-12 RX ADMIN — SODIUM CHLORIDE, POTASSIUM CHLORIDE, SODIUM LACTATE AND CALCIUM CHLORIDE: 600; 310; 30; 20 INJECTION, SOLUTION INTRAVENOUS at 08:49

## 2022-09-12 RX ADMIN — MORPHINE SULFATE 2 MG: 2 INJECTION, SOLUTION INTRAMUSCULAR; INTRAVENOUS at 22:32

## 2022-09-12 RX ADMIN — MORPHINE SULFATE 2 MG: 2 INJECTION, SOLUTION INTRAMUSCULAR; INTRAVENOUS at 19:07

## 2022-09-12 NOTE — PLAN OF CARE
Problem: Adult Inpatient Plan of Care  Goal: Readiness for Transition of Care  Intervention: Mutually Develop Transition Plan  Flowsheets (Taken 9/12/2022 3758)  Equipment Needed After Discharge: none  Discharge Coordination/Progress: discharge disposition is home self care  Assistive Device/Animal Currently Used at Home: none  Anticipated Changes Related to Illness: inability to care for self  Transportation Concerns: car, none  Readmission Within the Last 30 Days: no previous admission in last 30 days  Patient/Family Anticipated Services at Transition: none  Patient/Family Anticipates Transition to: home  Transportation Anticipated: ( will transport pt home when medically stable)   car, drives self   family or friend will provide  Concerns to be Addressed: discharge planning  Patient's Choice of Community Agency(s): pt has no needs  Offered/Gave Vendor List: no   At bedside of pt to discuss discharge planning- pt awake and alert, sitting up in bed.   Pt resides at home with her  Hilario, she is independent with all her ADL's, does not use any assisted devices for mobility, and does not use home care to follow. Pt is able to perform all her ADL's on her own.   Verified PCP and pharmacy in system. Pt has no further needs at discharge. Is aware how to contact CM/SW for any questions or concerns.

## 2022-09-12 NOTE — PLAN OF CARE
Plan of Care Review  Plan of Care Reviewed With: patient  Progress: no change  Outcome Summary: VsS. Some complaints of abdominal pain. Refusing bed alarm. WCTM

## 2022-09-12 NOTE — PROGRESS NOTES
GASTROENTEROLOGY DAILY PROGRESS NOTE  [facility]     PATIENT NAME:  Sowmya Clark          YOB: 1960  AGE:  62 y.o.   MRN: [id number]      SUBJECTIVE   CC:Post ERCP pancreatitis  Still with pain, but needing less morphine.  Tolerating clears.  Passing flatus.  REVIEW OF SYSTEMS   Systems reviewed and otherwise negative except as documented above.    VITAL SIGNS   Temp:  [36.6 °C (97.8 °F)-36.8 °C (98.2 °F)] 36.7 °C (98.1 °F)  Heart Rate:  [84-88] 84  Resp:  [18] 18  BP: (116-126)/(55-66) 116/55      Intake/Output Summary (Last 24 hours) at 9/12/2022 1152  Last data filed at 9/12/2022 1122  Gross per 24 hour   Intake 720 ml   Output --   Net 720 ml     PHYSICAL EXAM   Physical Exam  General appearance: alert, appears stated age and cooperative  Head: normocephalic  Heart: regular rate and rhythm  Lungs: clear to auscultation anteriorly, non-labored respirations  Abdomen: soft, Moderate diffuse tenderness without guarding rebound,; bowel sounds normal; no masses, no organomegaly  Extremities: no LE edema  Neurologic: awake and alert  Skin: no jaundice      IMAGING AND LABS REVIEWED   Labs reviewed  Results from last 7 days   Lab Units 09/12/22  0423 09/11/22  0424 09/10/22  0549   WBC K/uL 14.34* 12.49* 9.71   HEMOGLOBIN g/dL 13.3 12.5 12.7   HEMATOCRIT % 40.2 37.9 39.2   PLATELETS K/uL 193 192 210   ]  Results from last 7 days   Lab Units 09/12/22 0423 09/11/22  0424 09/10/22  0549   SODIUM mEQ/L 137 135* 135*   POTASSIUM mEQ/L 3.6 3.8 4.0   CHLORIDE mEQ/L 102 104 106   CO2 mEQ/L 27 25 21*   BUN mg/dL 6* 7* 11   CREATININE mg/dL 0.5* 0.4* 0.5*   CALCIUM mg/dL 8.7* 8.6* 8.7*   ALBUMIN g/dL 3.2* 3.2* 3.4   BILIRUBIN TOTAL mg/dL 2.6* 1.9* 2.4*   ALK PHOS IU/L 137* 132* 176*   ALT IU/L 96* 132* 229*   AST IU/L 57* 84* 337*   GLUCOSE mg/dL 78 90 111*   ]  Results from last 7 days   Lab Units 09/12/22  0423 09/11/22  0424 09/10/22  0549   LIPASE U/L 488* 1,952* 2,883*   ]    ]    Imaging reviewed  X-RAY  OBSTRUCTION SERIES (ABDOMEN 2 VIEWS WITH CHEST 1 VIEW)    Result Date: 9/11/2022  IMPRESSION: 1. No radiographic evidence of bowel obstruction. 2. Small bilateral pleural effusions.    CT ABDOMEN PELVIS WITH IV CONTRAST    Result Date: 9/9/2022  IMPRESSION: 1. Pneumobilia, likely related to patient's recent surgery/procedure. 2. Left adnexal cyst measuring 2.3 cm.  Further evaluation with nonemergent pelvic ultrasound is recommended, if recently not performed.       ASSESSMENT/PLAN     Post ERCP pancreatitis. The pancreas appears normal on initial CT scan.  Lipase is elevated today.  Recommend pain control, n.p.o. except for ice chips, aggressive IV hydration (changed to lactated Ringer's to 250 cc/hour). Hopefully, if this was secondary to sphincter of Oddi dysfunction or papillary stenosis, her abdominal pain resolves after she recovers. Discussed with [facility].     9/11/2022.  GI update.  Some improvement.  Continue present management with aggressive IV hydration and pain control.  Clear liquids can be started (Suggested to patient she not have the chicken broth, and start slowly)    9/12/2022.  GI update.  Continued improvement.  Mild leukocytosis noted.  Should she not continue to improve, recommend repeat CT. She may continue with clear liquids and eat the chicken broth.    AUTHOR:  Casa Wells MD  9/12/2022

## 2022-09-13 ENCOUNTER — APPOINTMENT (INPATIENT)
Dept: RADIOLOGY | Facility: HOSPITAL | Age: 62
DRG: 439 | End: 2022-09-13
Attending: INTERNAL MEDICINE
Payer: COMMERCIAL

## 2022-09-13 PROBLEM — E87.1 HYPONATREMIA: Status: ACTIVE | Noted: 2022-09-13

## 2022-09-13 PROBLEM — R35.0 URINARY FREQUENCY: Status: ACTIVE | Noted: 2022-09-13

## 2022-09-13 PROBLEM — D72.829 LEUKOCYTOSIS: Status: ACTIVE | Noted: 2022-09-13

## 2022-09-13 PROBLEM — E87.6 HYPOKALEMIA: Status: ACTIVE | Noted: 2022-09-13

## 2022-09-13 LAB
ALBUMIN SERPL-MCNC: 2.8 G/DL (ref 3.4–5)
ALP SERPL-CCNC: 141 IU/L (ref 35–126)
ALT SERPL-CCNC: 64 IU/L (ref 11–54)
ANION GAP SERPL CALC-SCNC: 8 MEQ/L (ref 3–15)
AST SERPL-CCNC: 32 IU/L (ref 15–41)
BACTERIA URNS QL MICRO: ABNORMAL /HPF
BASOPHILS # BLD: 0.07 K/UL (ref 0.01–0.1)
BASOPHILS NFR BLD: 0.5 %
BILIRUB SERPL-MCNC: 2.5 MG/DL (ref 0.3–1.2)
BILIRUB UR QL STRIP.AUTO: NEGATIVE MG/DL
BILIRUB UR QL STRIP.AUTO: NEGATIVE MG/DL
BUN SERPL-MCNC: 5 MG/DL (ref 8–20)
CALCIUM SERPL-MCNC: 8.4 MG/DL (ref 8.9–10.3)
CHLORIDE SERPL-SCNC: 101 MEQ/L (ref 98–109)
CLARITY UR REFRACT.AUTO: CLEAR
CLARITY UR REFRACT.AUTO: CLEAR
CO2 SERPL-SCNC: 25 MEQ/L (ref 22–32)
COLOR UR AUTO: YELLOW
COLOR UR AUTO: YELLOW
CREAT SERPL-MCNC: 0.4 MG/DL (ref 0.6–1.1)
DIFFERENTIAL METHOD BLD: ABNORMAL
EOSINOPHIL # BLD: 0.18 K/UL (ref 0.04–0.36)
EOSINOPHIL NFR BLD: 1.2 %
ERYTHROCYTE [DISTWIDTH] IN BLOOD BY AUTOMATED COUNT: 13.2 % (ref 11.7–14.4)
GFR SERPL CREATININE-BSD FRML MDRD: >60 ML/MIN/1.73M*2
GLUCOSE SERPL-MCNC: 76 MG/DL (ref 70–99)
GLUCOSE UR STRIP.AUTO-MCNC: NEGATIVE MG/DL
GLUCOSE UR STRIP.AUTO-MCNC: NEGATIVE MG/DL
HCT VFR BLDCO AUTO: 37.2 % (ref 35–45)
HGB BLD-MCNC: 12.3 G/DL (ref 11.8–15.7)
HGB UR QL STRIP.AUTO: 1
HGB UR QL STRIP.AUTO: 1
HYALINE CASTS #/AREA URNS LPF: ABNORMAL /LPF
IMM GRANULOCYTES # BLD AUTO: 0.07 K/UL (ref 0–0.08)
IMM GRANULOCYTES NFR BLD AUTO: 0.5 %
KETONES UR STRIP.AUTO-MCNC: 4 MG/DL
KETONES UR STRIP.AUTO-MCNC: 4 MG/DL
LEUKOCYTE ESTERASE UR QL STRIP.AUTO: 1
LEUKOCYTE ESTERASE UR QL STRIP.AUTO: 1
LIPASE SERPL-CCNC: 64 U/L (ref 20–51)
LYMPHOCYTES # BLD: 1.23 K/UL (ref 1.2–3.5)
LYMPHOCYTES NFR BLD: 8.5 %
MCH RBC QN AUTO: 31.2 PG (ref 28–33.2)
MCHC RBC AUTO-ENTMCNC: 33.1 G/DL (ref 32.2–35.5)
MCV RBC AUTO: 94.4 FL (ref 83–98)
MONOCYTES # BLD: 1.02 K/UL (ref 0.28–0.8)
MONOCYTES NFR BLD: 7 %
MUCOUS THREADS URNS QL MICRO: ABNORMAL /LPF
NEUTROPHILS # BLD: 11.93 K/UL (ref 1.7–7)
NEUTS SEG NFR BLD: 82.3 %
NITRITE UR QL STRIP.AUTO: NEGATIVE
NITRITE UR QL STRIP.AUTO: NEGATIVE
NRBC BLD-RTO: 0 %
PDW BLD AUTO: 10.3 FL (ref 9.4–12.3)
PH UR STRIP.AUTO: 6.5 [PH]
PH UR STRIP.AUTO: 6.5 [PH]
PLATELET # BLD AUTO: 189 K/UL (ref 150–369)
POTASSIUM SERPL-SCNC: 3.5 MEQ/L (ref 3.6–5.1)
PROT SERPL-MCNC: 5.2 G/DL (ref 6–8.2)
PROT UR QL STRIP.AUTO: 1
PROT UR QL STRIP.AUTO: 1
RBC # BLD AUTO: 3.94 M/UL (ref 3.93–5.22)
RBC #/AREA URNS HPF: ABNORMAL /HPF
SODIUM SERPL-SCNC: 134 MEQ/L (ref 136–144)
SP GR UR REFRACT.AUTO: 1.02
SP GR UR REFRACT.AUTO: 1.02
SQUAMOUS URNS QL MICRO: ABNORMAL /HPF
UROBILINOGEN UR STRIP-ACNC: 0.2 EU/DL
UROBILINOGEN UR STRIP-ACNC: 0.2 EU/DL
WBC # BLD AUTO: 14.5 K/UL (ref 3.8–10.5)
WBC #/AREA URNS HPF: ABNORMAL /HPF

## 2022-09-13 PROCEDURE — 81003 URINALYSIS AUTO W/O SCOPE: CPT | Performed by: INTERNAL MEDICINE

## 2022-09-13 PROCEDURE — 63600105 HC IODINE BASED CONTRAST: Performed by: INTERNAL MEDICINE

## 2022-09-13 PROCEDURE — 63600000 HC DRUGS/DETAIL CODE: Performed by: INTERNAL MEDICINE

## 2022-09-13 PROCEDURE — 99233 SBSQ HOSP IP/OBS HIGH 50: CPT | Performed by: INTERNAL MEDICINE

## 2022-09-13 PROCEDURE — 87086 URINE CULTURE/COLONY COUNT: CPT | Performed by: INTERNAL MEDICINE

## 2022-09-13 PROCEDURE — 63700000 HC SELF-ADMINISTRABLE DRUG: Performed by: INTERNAL MEDICINE

## 2022-09-13 PROCEDURE — 83690 ASSAY OF LIPASE: CPT | Performed by: INTERNAL MEDICINE

## 2022-09-13 PROCEDURE — 12000000 HC ROOM AND CARE MED/SURG

## 2022-09-13 PROCEDURE — 81001 URINALYSIS AUTO W/SCOPE: CPT | Performed by: INTERNAL MEDICINE

## 2022-09-13 PROCEDURE — 36415 COLL VENOUS BLD VENIPUNCTURE: CPT | Performed by: INTERNAL MEDICINE

## 2022-09-13 PROCEDURE — 25800000 HC PHARMACY IV SOLUTIONS: Performed by: INTERNAL MEDICINE

## 2022-09-13 PROCEDURE — 80053 COMPREHEN METABOLIC PANEL: CPT | Performed by: INTERNAL MEDICINE

## 2022-09-13 PROCEDURE — 85025 COMPLETE CBC W/AUTO DIFF WBC: CPT | Performed by: INTERNAL MEDICINE

## 2022-09-13 PROCEDURE — 74177 CT ABD & PELVIS W/CONTRAST: CPT

## 2022-09-13 RX ORDER — POTASSIUM CHLORIDE 14.9 MG/ML
20 INJECTION INTRAVENOUS ONCE
Status: COMPLETED | OUTPATIENT
Start: 2022-09-13 | End: 2022-09-13

## 2022-09-13 RX ORDER — SODIUM CHLORIDE 9 MG/ML
INJECTION, SOLUTION INTRAVENOUS CONTINUOUS
Status: DISCONTINUED | OUTPATIENT
Start: 2022-09-13 | End: 2022-09-15

## 2022-09-13 RX ADMIN — MORPHINE SULFATE 2 MG: 2 INJECTION, SOLUTION INTRAMUSCULAR; INTRAVENOUS at 09:54

## 2022-09-13 RX ADMIN — MORPHINE SULFATE 2 MG: 2 INJECTION, SOLUTION INTRAMUSCULAR; INTRAVENOUS at 21:09

## 2022-09-13 RX ADMIN — POTASSIUM CHLORIDE 20 MEQ: 14.9 INJECTION, SOLUTION INTRAVENOUS at 12:28

## 2022-09-13 RX ADMIN — IOHEXOL 100 ML: 350 INJECTION, SOLUTION INTRAVENOUS at 11:29

## 2022-09-13 RX ADMIN — SODIUM CHLORIDE: 9 INJECTION, SOLUTION INTRAVENOUS at 09:43

## 2022-09-13 RX ADMIN — MORPHINE SULFATE 2 MG: 2 INJECTION, SOLUTION INTRAMUSCULAR; INTRAVENOUS at 05:34

## 2022-09-13 RX ADMIN — SODIUM CHLORIDE: 9 INJECTION, SOLUTION INTRAVENOUS at 18:31

## 2022-09-13 RX ADMIN — MORPHINE SULFATE 2 MG: 2 INJECTION, SOLUTION INTRAMUSCULAR; INTRAVENOUS at 01:29

## 2022-09-13 RX ADMIN — SODIUM CHLORIDE, POTASSIUM CHLORIDE, SODIUM LACTATE AND CALCIUM CHLORIDE: 600; 310; 30; 20 INJECTION, SOLUTION INTRAVENOUS at 05:31

## 2022-09-13 RX ADMIN — Medication 3 MG: at 21:09

## 2022-09-13 NOTE — ASSESSMENT & PLAN NOTE
- Persistent leukocytosis  - Now resolved  - ? Reactive from pancreatitis  - CT abdomen and pelvis (9/13/22): Inflammatory changes around the pancreatic body and tail in keeping with pancreatitis. Redemonstrated pneumobilia, slightly decreased from 9/9/2022.   - UA suggests mild UTI, pending urine cx.

## 2022-09-13 NOTE — CONSULTS
Nutrition Note    Clinical Course: Patient is a 62 y.o. female who was admitted on 9/9/2022 with a diagnosis of Abdominal pain, epigastric [R10.13]  Intractable pain [R52]  Elevated lipase [R74.8]  Post-ERCP acute pancreatitis [K91.89, K85.90].   Past Medical History:   Diagnosis Date    Breast cancer (CMS/HCC) 2007    Crohn's disease (CMS/HCC)     Dystonia     Osteomyelitis (CMS/HCC)     Thyroid nodule     Tremor      Past Surgical History:   Procedure Laterality Date    BREAST LUMPECTOMY      BREAST SURGERY      CATARACT EXTRACTION      CYST REMOVAL      throat    KNEE SURGERY      TONSILLECTOMY       Nutrition Interventions/ Recommendations:   1. Advance diet as tolerated    - goal: low fat/low cholesterol   2. Boost Breeze (250 kcals, 9 g protein, 54 g CHO each) TID while on clear liquids - peach  3. Monitor length of inadequate nutrition   4. Labs/lytes - monitor and treat prn    - replete K to WNL   - trend LFT's, hyponatremia   5. Monitor GI function    - LBM 9/12    Nutrition Status Classification: Moderate nutritional compromise       Dietary Orders   (From admission, onward)             Start     Ordered    09/11/22 1341  Adult Diet Clear Liquids; RD/LDN may adjust order  Diet effective now        References:    IDDSI Diet reference   Question Answer Comment   Diet Texture Clear Liquids    Delegation of Authority. Diet orders written by PA/Radha may not be adjusted by RD/LDNs. RD/LDN may adjust order        09/11/22 1340              Reason for Assessment  Reason For Assessment: per organizational policy, identified at risk by screening criteria (NPO/Clear liquids >/= 4 days)  Diagnosis: gastrointestinal disease (Post-ERCP acute pancreatitis)    MST Nutrition Screen Tool  Has patient lost weight without trying?: 0-->No  If yes,how much weight has been lost?: 0-->Patient has not lost weight  Has patient been eating poorly due to decreased appetite?: 0-->No  MST Nutrition Screen Score:  "0    Nutrition/Diet History  Typical Food/Fluid Intake: From home  Diet Prior to Admission: Regular  Appetite Prior to Admission:  (Good prior to ERCP)  Intake (%): 25%  Functional Status: ambulatory  Food Allergies: no known food allergies  Factors Affecting Nutritional Intake: abdominal pain, altered gastrointestinal function    Physical Findings  Overall Physical Appearance:  (appropriate/nourished)  Gastrointestinal:  (abdominal pain/bloating)  Last Bowel Movement: 09/12/22  Skin: intact    Last Bowel Movement: 09/12/22    Nutrition Order  Nutrition Order: does not meet nutritional requirements  Nutrition Order Comments: clear liquids  Current TF/TPN Regimen: none    Anthropometrics  Height: 180.3 cm (5' 11\")    Weights (last 7 days)     Date/Time Weight    09/10/22 0300 79.4 kg (175 lb)    09/09/22 1922 79.4 kg (175 lb)     6/25/2019 175 lb 79.379 kg -   3/12/2019 176 lb 9.6 oz 80.105 kg    Weight stable    Admit Weight  Admit Weight Method: measured  Admit Weight: 79.4 kg (175 lb 0.7 oz)    Current Weight  Weight Method: Bed scale  Weight: 79.4 kg (175 lb)    Ideal Body Weight (IBW)  Ideal Body Weight (IBW) (kg): 71.01  % Ideal Body Weight: 111.79    Usual Body Weight (UBW)  Usual Body Weight: 78.5 kg (173 lb) (per pt)  Weight Loss: no weight change    Body Mass Index (BMI)  BMI (Calculated): 24.4  BMI Assessment: BMI 18.5-24.9: normal         Labs/Procedures/Meds  Lab Results Reviewed: reviewed, pertinent  Lab Results Comments: Na 134 K 3.5 BUN 5 Cr 0.4 ALT 64 Alk Phos 141 Lipase 64      Results from last 7 days   Lab Units 09/13/22 0515 09/12/22  0423 09/11/22  0424   SODIUM mEQ/L 134* 137 135*   POTASSIUM mEQ/L 3.5* 3.6 3.8   CHLORIDE mEQ/L 101 102 104   CO2 mEQ/L 25 27 25   BUN mg/dL 5* 6* 7*   CREATININE mg/dL 0.4* 0.5* 0.4*   GLUCOSE mg/dL 76 78 90   CALCIUM mg/dL 8.4* 8.7* 8.6*      Results from last 7 days   Lab Units 09/13/22 0515 09/12/22  0423 09/11/22  0424   ALK PHOS IU/L 141* 137* 132* "   BILIRUBIN TOTAL mg/dL 2.5* 2.6* 1.9*   ALBUMIN g/dL 2.8* 3.2* 3.2*   ALT IU/L 64* 96* 132*   AST IU/L 32 57* 84*          No results found for: HGBA1C  No results found for: [id number]  Lab Results   Component Value Date    CALCIUM 8.4 (L) 09/13/2022    PHOS 4.2 09/10/2022     Results from last 7 days   Lab Units 09/13/22  0515 09/12/22  0423 09/11/22  0424   WBC K/uL 14.50* 14.34* 12.49*   HEMOGLOBIN g/dL 12.3 13.3 12.5   HEMATOCRIT % 37.2 40.2 37.9   PLATELETS K/uL 189 193 192      Results from last 7 days   Lab Units 09/10/22  0549   MAGNESIUM mg/dL 1.9        Diagnostic Tests/Procedures  Diagnostic Test/Procedure Reviewed: reviewed, pertinent    Medications  Pertinent Medications Reviewed: reviewed, pertinent  Pertinent Medications Comments: NS at 125   potassium chloride  20 mEq intravenous Once      sodium chloride 0.9 %   125 mL/hr at 09/13/22 0943     Estimated/Assessed Needs  Additional Documentation: Calorie Requirements (Group), Protein Requirements (Group), Fluid Requirements (Group)    Calorie Requirements  Estimated kCal Needs: Actual Body Weight (79.4 kg)  Estimated Calorie Need Method: kcal/kg  Calorie/kg Recommended: 22-25  Calorie Recommendations: 9000-9094 kcals    Protein Requirements  Recommended Dosing Weight (Estimated Protein Needs): Actual Body Weight (79.4 kg)  Est Protein Requirement Amount (gms/kg):  (1.0-1.2 g/kg)  Protein Recommendations: 79-95 g    Fluid Requirements  Fluid Recommendation (mL): 25-30ml  Recommended Fluid Needs Dosing Weight: Actual Body Weight  Fluid Requirements (mL/day): 0939-3961 mL  Jaime-Tray Method (over 20 kg): 3087.58    PES  Statement: PES Statement  Nutrition Diagnosis: Altered GI Function  Related To:: post-ERCP pancreatitis  As Evidenced By:: abdominal pain, elevated lipase, NPO/clear liquids x 4 days  Nutritional Needs Met?: No                Clinical Comments:     61 y/o female admitted for post-ERCP pancreatitis, screened for NPO/clear liquids x  4 days. PMH includes cholecystectomy, ERCP s/p biliary sphincterotomy.  Underwent ERCP 9/9 and had abdominal pain afterwards.  Diet now advanced to clear liquids.  Noted to have ongoing pain and continued leukocytosis, although lipase trending down.  Chart reviewed and patient seen at bedside.  Patient reports ongoing abdominal pain, bloating.  States she has tolerated sips of clear liquids, but pain is exacerbated more with larger volume of po intake.  Endorses nausea as well.  Agreeable to ONS for protein provision on clear liquids.  Patient reports normal appetite prior to procedure.  Patient reports 10 lb weight loss to 173 lb a few months ago while having COVID; otherwise, weight stable.  Weight hx stable as above.  Reviewed low fat diet recommendations for pancreatitis and provided educational handout with RD contact info.  Labs/meds reviewed.  NS at 125 mL/hr.  Will continue to follow per policy.     Goals: Tolerate diet advancement with oral adequacy > 75% by next assessment.     Monitor and Evaluation:   1. Diet advancement / tolerance  2. Trends in weights/labs/lytes  3. Medications  4. GI function  5. I/O  6.   Medical course    Discussed with: patient                              Date: 09/13/22  Signature: MATEO Shah

## 2022-09-13 NOTE — PATIENT CARE CONFERENCE
Care Progression Rounds Note  Date: 9/13/2022  Time: 10:24 AM     Patient Name: Sowmya Clark     Medical Record Number: [id number]   YOB: 1960  Sex: Female      Room/Bed: 0323D    Admitting Diagnosis: Abdominal pain, epigastric [R10.13]  Intractable pain [R52]  Elevated lipase [R74.8]  Post-ERCP acute pancreatitis [K91.89, K85.90]   Admit Date/Time: 9/9/2022  7:21 PM    Primary Diagnosis: Post-ERCP acute pancreatitis  Principal Problem: Post-ERCP acute pancreatitis    GMLOS: 3.1  Anticipated Discharge Date: 9/14/2022    AM-PAC:  Mobility Score:      Discharge Planning:  Discharge disposition home self care  Barriers to Discharge:  Medical issues not resolved    Comments:       Participants:  nursing,

## 2022-09-13 NOTE — PROGRESS NOTES
Hospital Medicine Service -  Daily Progress Note       SUBJECTIVE   Interval History: pt seen and examined, no chest pain, shortness of breath. Still have abdominal pain, pain is the same, no improvement. Now having new pain over RUQ and right flank area. Still only able to tolerate 1-2 sip of clears at a time. Intermittent esophageal spasm as well.      OBJECTIVE      Vital signs in last 24 hours:  Temp:  [36.9 °C (98.4 °F)-37.4 °C (99.4 °F)] 37.3 °C (99.2 °F)  Heart Rate:  [85-88] 85  Resp:  [16-18] 18  BP: (121-144)/(59-67) 121/67    Intake/Output Summary (Last 24 hours) at 9/13/2022 1011  Last data filed at 9/13/2022 0500  Gross per 24 hour   Intake 720 ml   Output 1000 ml   Net -280 ml       PHYSICAL EXAMINATION      Physical Exam    Physical Exam   Constitutional:  appears well-developed and well-nourished. No distress.   HENT:   Head: Normocephalic and atraumatic.   Right Ear: External ear normal.   Left Ear: External ear normal.   Mouth/Throat: Oropharynx is clear and moist.   Eyes: Conjunctivae and EOM are normal. Right eye exhibits no discharge. Left eye exhibits no discharge.   Neck: Normal range of motion. Neck supple. No tracheal deviation present.   Cardiovascular: Normal rate, regular rhythm and normal heart sounds.  Exam reveals no gallop and no friction rub.    No murmur heard.  Pulmonary/Chest: Effort normal and breath sounds normal. No stridor. No respiratory distress.  has no wheezes.  has no rales.   Abdominal: Soft. Bowel sounds are normal. exhibits no distension. TTP most severe in epigastric. There is no rebound and no guarding.   Musculoskeletal:  exhibits no edema or deformity.   Neurological:  is alert, awake, oriented x3   Skin: Skin is warm and dry, not diaphoretic.      LINES, CATHETERS, DRAINS, AIRWAYS, AND WOUNDS   Lines, Drains, and Airways:  Wounds (agree with documentation and present on admission):  Peripheral IV (Adult) 09/09/22 Right Antecubital (Active)   Number of days: 1          Comments:      LABS / IMAGING / TELE      Labs  CBC Results       09/13/22 09/12/22 09/11/22     0515 0423 0424    WBC 14.50 14.34 12.49    RBC 3.94 4.24 4.01    HGB 12.3 13.3 12.5    HCT 37.2 40.2 37.9    MCV 94.4 94.8 94.5    MCH 31.2 31.4 31.2    MCHC 33.1 33.1 33.0     193 192        CMP Results       09/13/22 09/12/22 09/11/22     0515 0423 0424     137 135    K 3.5 3.6 3.8    Cl 101 102 104    CO2 25 27 25    Glucose 76 78 90    BUN 5 6 7    Creatinine 0.4 0.5 0.4    Calcium 8.4 8.7 8.6    Anion Gap 8 8 6    AST 32 57 84    ALT 64 96 132    Albumin 2.8 3.2 3.2    EGFR >60.0 >60.0 >60.0            SARS-CoV-2 (COVID-19) (no units)   Date/Time Value   09/09/2022 2256 Negative     ASSESSMENT AND PLAN      Urinary frequency  Assessment & Plan  Urinary frequency started yesterday evening.   -check UA and cx    Leukocytosis  Assessment & Plan  Persistent leukocytosis  ? Reactive from pancreatitis  However overall sx has not had much improvement  -check CT a/p to r/o infeciton    Hyponatremia  Assessment & Plan  Due to lack of PO intake?   -change to NS    Hypokalemia  Assessment & Plan  Monitor and replete    * Post-ERCP acute pancreatitis  Assessment & Plan  > Hx of bile duct dilatation s/p bile duct sphincterotomy 9/9  > Mild elevation of lipase, nausea, vomiting, abdominal pain, suspect due to mild post-ERCP pancreatitis. Lipase worse today  · IVF  · Pain control, will change to IV pain med.   · NPO, advance as tolerated  · GI consult  · Still no improvement of pain and now new RUQ pain, barely able to tolerate minimal amount of clears. Will get repeat CT a/p today.        VTE Assessment: Padua VTE Score: 0  VTE Prophylaxis:  Current anticoagulants:    None      Code Status: Full Code  Palliative Care Screening Score: 0   Estimated Discharge Date: 9/14/2022     Disposition Planning: need 1-2 more days     Ester Gonzalez,   9/13/2022

## 2022-09-13 NOTE — PATIENT CARE CONFERENCE
Care Progression Rounds Note  Date: 9/13/2022  Time: 10:22 AM     Patient Name: Sowmya Clark     Medical Record Number: [id number]   YOB: 1960  Sex: Female      Room/Bed: 0323D    Admitting Diagnosis: Abdominal pain, epigastric [R10.13]  Intractable pain [R52]  Elevated lipase [R74.8]  Post-ERCP acute pancreatitis [K91.89, K85.90]   Admit Date/Time: 9/9/2022  7:21 PM    Primary Diagnosis: Post-ERCP acute pancreatitis  Principal Problem: Post-ERCP acute pancreatitis    GMLOS: 3.1  Anticipated Discharge Date: 9/14/2022    AM-PAC:  Mobility Score:      Discharge Planning:  Concerns to be Addressed: discharge planning  Discharge disposition SNF vs. Home w/ homecare  Barriers to Discharge:   medical    Comments:       Participants:

## 2022-09-13 NOTE — PROGRESS NOTES
GASTROENTEROLOGY DAILY PROGRESS NOTE  [facility]     PATIENT NAME:  Sowyma Clark          YOB: 1960  AGE:  62 y.o.   MRN: [id number]      SUBJECTIVE   CC: Post ERCP pancreatitis.  Continued pain, exacerbated by clear liquids.   REVIEW OF SYSTEMS   Systems reviewed and otherwise negative except as documented above.    VITAL SIGNS   Temp:  [36.9 °C (98.4 °F)-37.4 °C (99.4 °F)] 37.3 °C (99.2 °F)  Heart Rate:  [85-88] 85  Resp:  [16-18] 18  BP: (121-144)/(59-67) 121/67      Intake/Output Summary (Last 24 hours) at 9/13/2022 1135  Last data filed at 9/13/2022 0500  Gross per 24 hour   Intake --   Output 1000 ml   Net -1000 ml     PHYSICAL EXAM   Physical Exam  General appearance: alert, appears stated age and cooperative  Head: normocephalic  Heart: regular rate and rhythm  Lungs: clear to auscultation anteriorly, non-labored respirations  Abdomen: soft, moderate diffuse tenderness without guarding or rebound,; bowel sounds normal; no masses, no organomegaly  Extremities: no LE edema  Neurologic: awake and alert  Skin: no jaundice      IMAGING AND LABS REVIEWED   Labs reviewed  Results from last 7 days   Lab Units 09/13/22 0515 09/12/22 0423 09/11/22 0424   WBC K/uL 14.50* 14.34* 12.49*   HEMOGLOBIN g/dL 12.3 13.3 12.5   HEMATOCRIT % 37.2 40.2 37.9   PLATELETS K/uL 189 193 192   ]  Results from last 7 days   Lab Units 09/13/22 0515 09/12/22 0423 09/11/22  0424   SODIUM mEQ/L 134* 137 135*   POTASSIUM mEQ/L 3.5* 3.6 3.8   CHLORIDE mEQ/L 101 102 104   CO2 mEQ/L 25 27 25   BUN mg/dL 5* 6* 7*   CREATININE mg/dL 0.4* 0.5* 0.4*   CALCIUM mg/dL 8.4* 8.7* 8.6*   ALBUMIN g/dL 2.8* 3.2* 3.2*   BILIRUBIN TOTAL mg/dL 2.5* 2.6* 1.9*   ALK PHOS IU/L 141* 137* 132*   ALT IU/L 64* 96* 132*   AST IU/L 32 57* 84*   GLUCOSE mg/dL 76 78 90   ]  Results from last 7 days   Lab Units 09/13/22  0515 09/12/22  0423 09/11/22  0424   LIPASE U/L 64* 488* 1,952*   ]    ]    Imaging reviewed  X-RAY OBSTRUCTION SERIES (ABDOMEN 2  VIEWS WITH CHEST 1 VIEW)    Result Date: 9/11/2022  IMPRESSION: 1. No radiographic evidence of bowel obstruction. 2. Small bilateral pleural effusions.    CT ABDOMEN PELVIS WITH IV CONTRAST    Result Date: 9/9/2022  IMPRESSION: 1. Pneumobilia, likely related to patient's recent surgery/procedure. 2. Left adnexal cyst measuring 2.3 cm.  Further evaluation with nonemergent pelvic ultrasound is recommended, if recently not performed.       ASSESSMENT/PLAN   scan.  Lipase is elevated today.  Recommend pain control, n.p.o. except for ice chips, aggressive IV hydration (changed to lactated Ringer's to 250 cc/hour). Hopefully, if this was secondary to sphincter of Oddi dysfunction or papillary stenosis, her abdominal pain resolves after she recovers. [place] [facility] [place]     9/11/2022.  GI update.  Some improvement.  Continue present management with aggressive IV hydration and pain control.  Clear liquids can be started (Suggested to patient she not have the chicken broth, and start slowly)     9/12/2022.  GI update.  Continued improvement.  Mild leukocytosis noted.  Should she not continue to improve, recommend repeat CT. She may continue with clear liquids and eat the chicken broth.    9/13/2022.  GI update.  No improvement in pain and continued leukocytosis, although lipase has trended down.  CT scan with IV contrast has been ordered..      AUTHOR:  Casa Wells MD  9/13/2022

## 2022-09-13 NOTE — PLAN OF CARE
Plan of Care Review  Plan of Care Reviewed With: patient  Progress: improving  Outcome Summary: VSS on RA.Fluids switched from LR to NSS per pt request. CT completed. Replaced K. Pt. has less pain today, passing gas, amb halls ind.

## 2022-09-13 NOTE — PLAN OF CARE
Problem: Adult Inpatient Plan of Care  Goal: Plan of Care Review  Outcome: Progressing  Flowsheets (Taken 9/13/2022 7863)  Plan of Care Reviewed With: patient     1. Advance diet as tolerated    - goal: low fat/low cholesterol   2. Boost Breeze (250 kcals, 9 g protein, 54 g CHO each) TID while on clear liquids - peach  3. Monitor length of inadequate nutrition   4. Labs/lytes - monitor and treat prn    - replete K to WNL   - trend LFT's, hyponatremia   5. Monitor GI function    - LBM 9/12

## 2022-09-14 LAB
ALBUMIN SERPL-MCNC: 2.6 G/DL (ref 3.4–5)
ALP SERPL-CCNC: 168 IU/L (ref 35–126)
ALT SERPL-CCNC: 51 IU/L (ref 11–54)
ANION GAP SERPL CALC-SCNC: 7 MEQ/L (ref 3–15)
AST SERPL-CCNC: 32 IU/L (ref 15–41)
BASOPHILS # BLD: 0.04 K/UL (ref 0.01–0.1)
BASOPHILS NFR BLD: 0.4 %
BILIRUB SERPL-MCNC: 1.5 MG/DL (ref 0.3–1.2)
BUN SERPL-MCNC: <5 MG/DL (ref 8–20)
CALCIUM SERPL-MCNC: 8.4 MG/DL (ref 8.9–10.3)
CHLORIDE SERPL-SCNC: 103 MEQ/L (ref 98–109)
CO2 SERPL-SCNC: 25 MEQ/L (ref 22–32)
CREAT SERPL-MCNC: 0.4 MG/DL (ref 0.6–1.1)
DIFFERENTIAL METHOD BLD: ABNORMAL
EOSINOPHIL # BLD: 0.15 K/UL (ref 0.04–0.36)
EOSINOPHIL NFR BLD: 1.3 %
ERYTHROCYTE [DISTWIDTH] IN BLOOD BY AUTOMATED COUNT: 12.9 % (ref 11.7–14.4)
GFR SERPL CREATININE-BSD FRML MDRD: >60 ML/MIN/1.73M*2
GLUCOSE SERPL-MCNC: 94 MG/DL (ref 70–99)
HCT VFR BLDCO AUTO: 33.8 % (ref 35–45)
HGB BLD-MCNC: 11.3 G/DL (ref 11.8–15.7)
IMM GRANULOCYTES # BLD AUTO: 0.05 K/UL (ref 0–0.08)
IMM GRANULOCYTES NFR BLD AUTO: 0.4 %
LYMPHOCYTES # BLD: 1.13 K/UL (ref 1.2–3.5)
LYMPHOCYTES NFR BLD: 10.1 %
MCH RBC QN AUTO: 31 PG (ref 28–33.2)
MCHC RBC AUTO-ENTMCNC: 33.4 G/DL (ref 32.2–35.5)
MCV RBC AUTO: 92.6 FL (ref 83–98)
MONOCYTES # BLD: 0.92 K/UL (ref 0.28–0.8)
MONOCYTES NFR BLD: 8.3 %
NEUTROPHILS # BLD: 8.85 K/UL (ref 1.7–7)
NEUTS SEG NFR BLD: 79.5 %
NRBC BLD-RTO: 0 %
PDW BLD AUTO: 10.2 FL (ref 9.4–12.3)
PLATELET # BLD AUTO: 209 K/UL (ref 150–369)
POTASSIUM SERPL-SCNC: 3.3 MEQ/L (ref 3.6–5.1)
PROT SERPL-MCNC: 5 G/DL (ref 6–8.2)
RBC # BLD AUTO: 3.65 M/UL (ref 3.93–5.22)
SODIUM SERPL-SCNC: 135 MEQ/L (ref 136–144)
WBC # BLD AUTO: 11.14 K/UL (ref 3.8–10.5)

## 2022-09-14 PROCEDURE — 63600000 HC DRUGS/DETAIL CODE: Performed by: STUDENT IN AN ORGANIZED HEALTH CARE EDUCATION/TRAINING PROGRAM

## 2022-09-14 PROCEDURE — 63600000 HC DRUGS/DETAIL CODE: Performed by: INTERNAL MEDICINE

## 2022-09-14 PROCEDURE — 12000000 HC ROOM AND CARE MED/SURG

## 2022-09-14 PROCEDURE — 99233 SBSQ HOSP IP/OBS HIGH 50: CPT | Performed by: INTERNAL MEDICINE

## 2022-09-14 PROCEDURE — 36415 COLL VENOUS BLD VENIPUNCTURE: CPT | Performed by: INTERNAL MEDICINE

## 2022-09-14 PROCEDURE — 25800000 HC PHARMACY IV SOLUTIONS: Performed by: INTERNAL MEDICINE

## 2022-09-14 PROCEDURE — 80053 COMPREHEN METABOLIC PANEL: CPT | Performed by: INTERNAL MEDICINE

## 2022-09-14 PROCEDURE — 85025 COMPLETE CBC W/AUTO DIFF WBC: CPT | Performed by: INTERNAL MEDICINE

## 2022-09-14 RX ORDER — BISACODYL 10 MG/1
10 SUPPOSITORY RECTAL DAILY PRN
Status: DISCONTINUED | OUTPATIENT
Start: 2022-09-14 | End: 2022-09-16 | Stop reason: HOSPADM

## 2022-09-14 RX ORDER — ACETAMINOPHEN 325 MG/1
650 TABLET ORAL EVERY 4 HOURS PRN
Status: DISCONTINUED | OUTPATIENT
Start: 2022-09-14 | End: 2022-09-15

## 2022-09-14 RX ORDER — PANTOPRAZOLE SODIUM 40 MG/1
40 TABLET, DELAYED RELEASE ORAL
Status: DISCONTINUED | OUTPATIENT
Start: 2022-09-15 | End: 2022-09-16 | Stop reason: HOSPADM

## 2022-09-14 RX ADMIN — MORPHINE SULFATE 2 MG: 2 INJECTION, SOLUTION INTRAMUSCULAR; INTRAVENOUS at 09:05

## 2022-09-14 RX ADMIN — ONDANSETRON 4 MG: 2 INJECTION INTRAMUSCULAR; INTRAVENOUS at 09:05

## 2022-09-14 RX ADMIN — SODIUM CHLORIDE: 9 INJECTION, SOLUTION INTRAVENOUS at 02:33

## 2022-09-14 RX ADMIN — MORPHINE SULFATE 2 MG: 2 INJECTION, SOLUTION INTRAMUSCULAR; INTRAVENOUS at 20:28

## 2022-09-14 RX ADMIN — SODIUM CHLORIDE 1 G: 900 INJECTION INTRAVENOUS at 15:40

## 2022-09-14 RX ADMIN — MORPHINE SULFATE 2 MG: 2 INJECTION, SOLUTION INTRAMUSCULAR; INTRAVENOUS at 01:36

## 2022-09-14 RX ADMIN — MORPHINE SULFATE 2 MG: 2 INJECTION, SOLUTION INTRAMUSCULAR; INTRAVENOUS at 05:40

## 2022-09-14 RX ADMIN — ONDANSETRON 4 MG: 2 INJECTION INTRAMUSCULAR; INTRAVENOUS at 20:28

## 2022-09-14 RX ADMIN — SODIUM CHLORIDE 125 ML/HR: 9 INJECTION, SOLUTION INTRAVENOUS at 10:53

## 2022-09-14 RX ADMIN — ONDANSETRON 4 MG: 2 INJECTION INTRAMUSCULAR; INTRAVENOUS at 00:55

## 2022-09-14 NOTE — PROGRESS NOTES
Hospital Medicine Service -  Daily Progress Note       SUBJECTIVE   Interval History: pt seen and examined, no chest pain, shortness of breath. Very little improvement. Still having abdominal pain. Was able to tolerate slightly more clears today.      OBJECTIVE      Vital signs in last 24 hours:  Temp:  [36.4 °C (97.5 °F)-37.3 °C (99.1 °F)] 37.3 °C (99.1 °F)  Heart Rate:  [79-85] 79  Resp:  [18] 18  BP: (105-134)/(53-68) 105/53    Intake/Output Summary (Last 24 hours) at 9/14/2022 1408  Last data filed at 9/14/2022 0900  Gross per 24 hour   Intake 500 ml   Output 2200 ml   Net -1700 ml       PHYSICAL EXAMINATION      Physical Exam    Physical Exam   Constitutional:  appears well-developed and well-nourished. No distress.   HENT:   Head: Normocephalic and atraumatic.   Right Ear: External ear normal.   Left Ear: External ear normal.   Mouth/Throat: Oropharynx is clear and moist.   Eyes: Conjunctivae and EOM are normal. Right eye exhibits no discharge. Left eye exhibits no discharge.   Neck: Normal range of motion. Neck supple. No tracheal deviation present.   Cardiovascular: Normal rate, regular rhythm and normal heart sounds.  Exam reveals no gallop and no friction rub.    No murmur heard.  Pulmonary/Chest: Effort normal and breath sounds normal. No stridor. No respiratory distress.  has no wheezes.  has no rales.   Abdominal: Soft. Bowel sounds are normal. exhibits no distension. TTP most severe in epigastric. There is no rebound and no guarding.   Musculoskeletal:  exhibits no edema or deformity.   Neurological:  is alert, awake, oriented x3   Skin: Skin is warm and dry, not diaphoretic.      LINES, CATHETERS, DRAINS, AIRWAYS, AND WOUNDS   Lines, Drains, and Airways:  Wounds (agree with documentation and present on admission):  Peripheral IV (Adult) 09/09/22 Right Antecubital (Active)   Number of days: 1         Comments:      LABS / IMAGING / TELE      Labs  CBC Results       09/14/22 09/13/22 09/12/22      0919 0515 0423    WBC 11.14 14.50 14.34    RBC 3.65 3.94 4.24    HGB 11.3 12.3 13.3    HCT 33.8 37.2 40.2    MCV 92.6 94.4 94.8    MCH 31.0 31.2 31.4    MCHC 33.4 33.1 33.1     189 193        CMP Results       09/14/22 09/13/22 09/12/22 0919 0515 0423     134 137    K 3.3 3.5 3.6    Cl 103 101 102    CO2 25 25 27    Glucose 94 76 78    BUN <5 5 6    Creatinine 0.4 0.4 0.5    Calcium 8.4 8.4 8.7    Anion Gap 7 8 8    AST 32 32 57    ALT 51 64 96    Albumin 2.6 2.8 3.2    EGFR >60.0 >60.0 >60.0            SARS-CoV-2 (COVID-19) (no units)   Date/Time Value   09/09/2022 2256 Negative     ASSESSMENT AND PLAN      Urinary frequency  Assessment & Plan  UA does suggest potential UTI  -pending urine cx  -ceftriaxone x 3 days    Leukocytosis  Assessment & Plan  Persistent leukocytosis  ? Reactive from pancreatitis  However overall sx has not had much improvement  CT a/p without infection  UA suggests mild UTI, pending urine cx.     Hyponatremia  Assessment & Plan  Due to lack of PO intake?   Continue NS    Hypokalemia  Assessment & Plan  Monitor and replete    * Post-ERCP acute pancreatitis  Assessment & Plan  > Hx of bile duct dilatation s/p bile duct sphincterotomy 9/9  > Mild elevation of lipase, nausea, vomiting, abdominal pain, suspect due to mild post-ERCP pancreatitis. Lipase worse today  · IVF  · Pain control, will change to IV pain med.   · NPO, advance as tolerated  · GI consult  · small improvement of pain, tolerating slightly more liquid. GI following. Starting protonix.        VTE Assessment: Padua VTE Score: 0  VTE Prophylaxis:  Current anticoagulants:    None      Code Status: Full Code  Palliative Care Screening Score: 0   Estimated Discharge Date: 9/15/2022     Disposition Planning: need 1-2 more days     Ester Gonzalez DO  9/14/2022

## 2022-09-14 NOTE — PLAN OF CARE
Plan of Care Review  Plan of Care Reviewed With: patient  Progress: no change  Outcome Summary: PT continue on IV fluid,  vss, given morphine x 2 for abdomen pain and zofran for nausea.

## 2022-09-14 NOTE — PLAN OF CARE
Problem: Adult Inpatient Plan of Care  Goal: Plan of Care Review  Outcome: Progressing  Goal: Patient-Specific Goal (Individualized)  Outcome: Progressing  Goal: Absence of Hospital-Acquired Illness or Injury  Outcome: Progressing  Goal: Optimal Comfort and Wellbeing  Outcome: Progressing  Goal: Readiness for Transition of Care  Outcome: Progressing     Problem: Fall Injury Risk  Goal: Absence of Fall and Fall-Related Injury  Outcome: Progressing

## 2022-09-14 NOTE — PROGRESS NOTES
GASTROENTEROLOGY DAILY PROGRESS NOTE  [facility]     PATIENT NAME:  Sowmya Clark          YOB: 1960  AGE:  62 y.o.   MRN: [id number]      SUBJECTIVE   CC: Post ERCP pancreatitis.    Some improvement in pain, but clear liquids still exacerbate her pain.  Complains of sour taste in her mouth    REVIEW OF SYSTEMS   Systems reviewed and otherwise negative except as documented above.    VITAL SIGNS   Temp:  [36.4 °C (97.5 °F)-37.2 °C (98.9 °F)] 36.7 °C (98 °F)  Heart Rate:  [80-85] 85  Resp:  [18] 18  BP: (124-134)/(63-68) 130/68      Intake/Output Summary (Last 24 hours) at 9/14/2022 1104  Last data filed at 9/14/2022 0900  Gross per 24 hour   Intake 885.42 ml   Output 2700 ml   Net -1814.58 ml     PHYSICAL EXAM   Physical Exam  General appearance: alert, appears stated age and cooperative  Head: normocephalic  Heart: regular rate and rhythm  Lungs: clear to auscultation anteriorly, non-labored respirations  Abdomen: soft, moderate diffuse tenderness without guarding or rebound,; bowel sounds normal; no masses, no organomegaly  Extremities: no LE edema  Neurologic: awake and alert  Skin: no jaundice      IMAGING AND LABS REVIEWED   Labs reviewed  Results from last 7 days   Lab Units 09/14/22 0919 09/13/22  0515 09/12/22  0423   WBC K/uL 11.14* 14.50* 14.34*   HEMOGLOBIN g/dL 11.3* 12.3 13.3   HEMATOCRIT % 33.8* 37.2 40.2   PLATELETS K/uL 209 189 193   ]  Results from last 7 days   Lab Units 09/14/22 0919 09/13/22  0515 09/12/22  0423   SODIUM mEQ/L 135* 134* 137   POTASSIUM mEQ/L 3.3* 3.5* 3.6   CHLORIDE mEQ/L 103 101 102   CO2 mEQ/L 25 25 27   BUN mg/dL <5* 5* 6*   CREATININE mg/dL 0.4* 0.4* 0.5*   CALCIUM mg/dL 8.4* 8.4* 8.7*   ALBUMIN g/dL 2.6* 2.8* 3.2*   BILIRUBIN TOTAL mg/dL 1.5* 2.5* 2.6*   ALK PHOS IU/L 168* 141* 137*   ALT IU/L 51 64* 96*   AST IU/L 32 32 57*   GLUCOSE mg/dL 94 76 78   ]  Results from last 7 days   Lab Units 09/13/22  0515 09/12/22  0423 09/11/22  0424   LIPASE U/L 64* 488*  Notified patient of recommendations below. Patient said he is feeling \"much better\" but will try the over the counter recommendations in the future.  Closing encounter   1,952*   ]    ]    Imaging reviewed  X-RAY OBSTRUCTION SERIES (ABDOMEN 2 VIEWS WITH CHEST 1 VIEW)    Result Date: 9/11/2022  IMPRESSION: 1. No radiographic evidence of bowel obstruction. 2. Small bilateral pleural effusions.    CT ABDOMEN PELVIS WITH IV CONTRAST    Result Date: 9/13/2022  IMPRESSION: 1. Inflammatory changes around the pancreatic body and tail in keeping with pancreatitis. 2. Redemonstrated pneumobilia, slightly decreased from 9/9/2022.     CT ABDOMEN PELVIS WITH IV CONTRAST    Result Date: 9/9/2022  IMPRESSION: 1. Pneumobilia, likely related to patient's recent surgery/procedure. 2. Left adnexal cyst measuring 2.3 cm.  Further evaluation with nonemergent pelvic ultrasound is recommended, if recently not performed.       ASSESSMENT/PLAN     Post ERCP pancreatitis. The pancreas appears normal on initial CT scan.  Lipase is elevated today.  Recommend pain control, n.p.o. except for ice chips, aggressive IV hydration (changed to lactated Ringer's to 250 cc/hour). Hopefully, if this was secondary to sphincter of Oddi dysfunction or papillary stenosis, her abdominal pain resolves after she recovers. Discussed with [facility] [place].     9/11/2022.  GI update.  Some improvement.  Continue present management with aggressive IV hydration and pain control.  Clear liquids can be started (Suggested to patient she not have the chicken broth, and start slowly)     9/12/2022.  GI update.  Continued improvement.  Mild leukocytosis noted.  Should she not continue to improve, recommend repeat CT. She may continue with clear liquids and eat the chicken broth    9/13/2022.  GI update.  No improvement in pain and continued leukocytosis, although lipase has trended down.  CT scan with IV contrast has been ordered..    9/14/2022.  GI update.  Liver function test and lipase continue to improve.  Leukocytosis has improved.  CT scan without evidence of necrosis.  She continues to have significant pain.  We will continue clear liquids for  now with hope that we can advance her to a low-fat diet tomorrow.  Given the sour taste in her mouth, she may be experiencing GERD, start pantoprazole.    AUTHOR:  Casa Wells MD  9/14/2022

## 2022-09-14 NOTE — PATIENT CARE CONFERENCE
Care Progression Rounds Note  Date: 9/14/2022  Time: 12:12 PM     Patient Name: Sowmya Clark     Medical Record Number: [id number]   YOB: 1960  Sex: Female      Room/Bed: 0323D    Admitting Diagnosis: Abdominal pain, epigastric [R10.13]  Intractable pain [R52]  Elevated lipase [R74.8]  Post-ERCP acute pancreatitis [K91.89, K85.90]   Admit Date/Time: 9/9/2022  7:21 PM    Primary Diagnosis: Post-ERCP acute pancreatitis  Principal Problem: Post-ERCP acute pancreatitis    GMLOS: 3.1  Anticipated Discharge Date: 9/14/2022    AM-PAC:  Mobility Score:      Discharge Planning:  Concerns to be Addressed: discharge planning  Anticipated Discharge Disposition: home without assistance or services  Disposition home  Barriers to Discharge:     medical  Comments:       Participants:

## 2022-09-14 NOTE — STUDENT
Hospital Medicine Service -  Daily Progress Note       SUBJECTIVE   Interval History: Feels improved from yesterday. Last BM today. No blood in stools.  Able to keep liquids down after drinking hot water. Some irritation - requests Vaseline. Some headache, no dizziness, no lightheadedness. Denies fever/chills, CP, SOB, cough, dysuria, N/V/D. Abdominal pain in RUQ and back. Pain is controlled with morphine - now 2-3/10. Having painful abdominal spasms. Did not get much sleep last night - received caffeine containing medication. Sensitive to caffeine.       Past Medical History:   Diagnosis Date    Breast cancer (CMS/[facility]) 2007    Crohn's disease (CMS/[facility])     Dystonia     Osteomyelitis (CMS/[facility])     Thyroid nodule     Tremor           OBJECTIVE      Vital signs in last 24 hours:  Temp:  [36.4 °C (97.5 °F)-37.3 °C (99.1 °F)] 37.3 °C (99.1 °F)  Heart Rate:  [79-85] 79  Resp:  [18] 18  BP: (105-134)/(53-68) 105/53   SpO2: 94% RA    Intake/Output Summary (Last 24 hours) at 9/14/2022 1235  Last data filed at 9/14/2022 0900  Gross per 24 hour   Intake 500 ml   Output 2200 ml   Net -1700 ml       PHYSICAL EXAMINATION      Physical Exam  Vitals and nursing note reviewed.   Constitutional:       Appearance: Normal appearance.   HENT:      Head: Normocephalic and atraumatic.   Cardiovascular:      Rate and Rhythm: Normal rate and regular rhythm.   Pulmonary:      Effort: Pulmonary effort is normal.      Breath sounds: Normal breath sounds.   Abdominal:      General: There is distension.      Tenderness: There is abdominal tenderness.   Musculoskeletal:      Right lower leg: No edema.      Left lower leg: No edema.   Skin:     General: Skin is warm and dry.   Neurological:      General: No focal deficit present.      Mental Status: She is alert and oriented to person, place, and time. Mental status is at baseline.   Psychiatric:         Mood and Affect: Mood normal.         Behavior: Behavior normal.            LINES,  CATHETERS, DRAINS, AIRWAYS, AND WOUNDS   Lines, Drains, and Airways:  Wounds (agree with documentation and present on admission):  Peripheral IV (Adult) 09/10/22 Posterior;Right Forearm (Active)   Number of days: 4         Comments:      LABS / IMAGING / TELE      Labs  Results from last 7 days   Lab Units 09/14/22  0919 09/13/22  0515 09/12/22  0423   WBC K/uL 11.14* 14.50* 14.34*   HEMOGLOBIN g/dL 11.3* 12.3 13.3   HEMATOCRIT % 33.8* 37.2 40.2   PLATELETS K/uL 209 189 193     Results from last 7 days   Lab Units 09/14/22  0919 09/13/22  0515 09/12/22  0423   SODIUM mEQ/L 135* 134* 137   POTASSIUM mEQ/L 3.3* 3.5* 3.6   CHLORIDE mEQ/L 103 101 102   CO2 mEQ/L 25 25 27   BUN mg/dL <5* 5* 6*   CREATININE mg/dL 0.4* 0.4* 0.5*   CALCIUM mg/dL 8.4* 8.4* 8.7*   ALBUMIN g/dL 2.6* 2.8* 3.2*   BILIRUBIN TOTAL mg/dL 1.5* 2.5* 2.6*   ALK PHOS IU/L 168* 141* 137*   ALT IU/L 51 64* 96*   AST IU/L 32 32 57*   GLUCOSE mg/dL 94 76 78     UA Results       09/13/22     1005    Color Yellow     Yellow    Clarity Clear     Clear    Glucose Negative     Negative    Bilirubin Negative     Negative    Ketones +4     +4    Sp Grav 1.016     1.016    Blood +1     +1    Ph 6.5     6.5    Protein +1     +1    Urobilinogen 0.2     0.2    Nitrite Negative     Negative    Leuk Est +1     +1    WBC 4 TO 10    RBC 5 TO 9    Bacteria Rare         Comment for Ketones at 1005 on 09/13/22: Free sulfhydryl drugs such as Mesna, Capoten, and Acetylcysteine (Mucomyst) may cause false positive ketonuria.    Comment for Ketones at 1005 on 09/13/22: Free sulfhydryl drugs such as Mesna, Capoten, and Acetylcysteine (Mucomyst) may cause false positive ketonuria.    Comment for Blood at 1005 on 09/13/22: The sensitivity of the occult blood test is equivalent to approximately 4 intact RBC/HPF.    Comment for Blood at 1005 on 09/13/22: The sensitivity of the occult blood test is equivalent to approximately 4 intact RBC/HPF.          SARS-CoV-2 (COVID-19) (no units)    Date/Time Value   09/09/2022 2256 Negative       Imaging  CT abdomen pelvis with contrast (9/13/22):   IMPRESSION:  1. Inflammatory changes around the pancreatic body and tail in keeping with  pancreatitis.  2. Redemonstrated pneumobilia, slightly decreased from 9/9/2022.    CXR (9/11/22): IMPRESSION:  1. No radiographic evidence of bowel obstruction.  2. Small bilateral pleural effusions.    CT abdomen pelvis with contrast (9/9/22): IMPRESSION:  1. Pneumobilia, likely related to patient's recent surgery/procedure.  2. Left adnexal cyst measuring 2.3 cm.  Further evaluation with nonemergent  pelvic ultrasound is recommended, if recently not performed.    ECG/Telemetry  No EKG available.     ASSESSMENT AND PLAN      Post-ERCP acute pancreatitis  Underwent bile duct sphincterectomy on 9/9 then developed sharp abdominal pain and went to ED.   Lipase improving. Most recently 64 on 9/13 from 488 on 9/12.   Continue IVF, zofran, PPI  Attempt solid foods.  RUQ pain improving after morphine.   CT A/P with inflammatory changes around pancreatic body and tail in keeping with pancreatitis     Headache  Most likely due to morphine.   No focal deficits.   Butalbital-acetaminophen-caffeine ordered   Pt is sensitive to caffeine  Change to Tylenol prn      Urinary frequency  UA with 5-9 RBCs, 4-10 WBCs, rare bacteria, rare squamous epithelial cells, no casts.   Urine culture     Leukocytosis  WBCs trending down. Most recently on 9/14 11.14 from 14.50 on 9/13.   Monitor, trend CBC.   Reactive from pancreatitis - CT A/P with inflammatory changes.      Hyponatremia  Improving.   Sodium mostly recently 135 on 9/14 from 134 on 9/13.  Continue IVF NS.      Hypokalemia  Potassium mostly recently 3.3 on 9/14 from 3.5 on 9/13.  Continue to replete to goal of 4 with potassium chloride IV       VTE Assessment: Padua VTE Score: 0  VTE Prophylaxis:  Current anticoagulants:    None      Code Status: Full Code  Palliative Care Screening Score:  0   Estimated Discharge Date: 9/15/2022     Disposition Planning: home with home health     Lila Touch  9/14/2022

## 2022-09-15 LAB
ALBUMIN SERPL-MCNC: 2.5 G/DL (ref 3.4–5)
ALP SERPL-CCNC: 167 IU/L (ref 35–126)
ALT SERPL-CCNC: 46 IU/L (ref 11–54)
ANION GAP SERPL CALC-SCNC: 10 MEQ/L (ref 3–15)
AST SERPL-CCNC: 40 IU/L (ref 15–41)
BASOPHILS # BLD: 0.01 K/UL (ref 0.01–0.1)
BASOPHILS NFR BLD: 0.1 %
BILIRUB DIRECT SERPL-MCNC: 0.3 MG/DL
BILIRUB SERPL-MCNC: 0.9 MG/DL (ref 0.3–1.2)
BUN SERPL-MCNC: <5 MG/DL (ref 8–20)
CALCIUM SERPL-MCNC: 8.5 MG/DL (ref 8.9–10.3)
CHLORIDE SERPL-SCNC: 105 MEQ/L (ref 98–109)
CO2 SERPL-SCNC: 22 MEQ/L (ref 22–32)
CREAT SERPL-MCNC: 0.4 MG/DL (ref 0.6–1.1)
DIFFERENTIAL METHOD BLD: ABNORMAL
EOSINOPHIL # BLD: 0.11 K/UL (ref 0.04–0.36)
EOSINOPHIL NFR BLD: 1.6 %
ERYTHROCYTE [DISTWIDTH] IN BLOOD BY AUTOMATED COUNT: 13.1 % (ref 11.7–14.4)
GFR SERPL CREATININE-BSD FRML MDRD: >60 ML/MIN/1.73M*2
GLUCOSE SERPL-MCNC: 101 MG/DL (ref 70–99)
HCT VFR BLDCO AUTO: 22.8 % (ref 35–45)
HCT VFR BLDCO AUTO: 32.4 % (ref 35–45)
HGB BLD-MCNC: 11 G/DL (ref 11.8–15.7)
HGB BLD-MCNC: 7.5 G/DL (ref 11.8–15.7)
IMM GRANULOCYTES # BLD AUTO: 0.03 K/UL (ref 0–0.08)
IMM GRANULOCYTES NFR BLD AUTO: 0.4 %
LIPASE SERPL-CCNC: 34 U/L (ref 20–51)
LYMPHOCYTES # BLD: 0.96 K/UL (ref 1.2–3.5)
LYMPHOCYTES NFR BLD: 14.2 %
MCH RBC QN AUTO: 31.3 PG (ref 28–33.2)
MCHC RBC AUTO-ENTMCNC: 32.9 G/DL (ref 32.2–35.5)
MCV RBC AUTO: 95 FL (ref 83–98)
MONOCYTES # BLD: 0.67 K/UL (ref 0.28–0.8)
MONOCYTES NFR BLD: 9.9 %
NEUTROPHILS # BLD: 4.99 K/UL (ref 1.7–7)
NEUTS SEG NFR BLD: 73.8 %
NRBC BLD-RTO: 0 %
PDW BLD AUTO: 10.4 FL (ref 9.4–12.3)
PLATELET # BLD AUTO: 145 K/UL (ref 150–369)
POTASSIUM SERPL-SCNC: 3.5 MEQ/L (ref 3.6–5.1)
PROT SERPL-MCNC: 5 G/DL (ref 6–8.2)
RBC # BLD AUTO: 2.4 M/UL (ref 3.93–5.22)
SODIUM SERPL-SCNC: 137 MEQ/L (ref 136–144)
WBC # BLD AUTO: 6.77 K/UL (ref 3.8–10.5)

## 2022-09-15 PROCEDURE — 63600000 HC DRUGS/DETAIL CODE: Performed by: STUDENT IN AN ORGANIZED HEALTH CARE EDUCATION/TRAINING PROGRAM

## 2022-09-15 PROCEDURE — 36415 COLL VENOUS BLD VENIPUNCTURE: CPT | Performed by: INTERNAL MEDICINE

## 2022-09-15 PROCEDURE — 63700000 HC SELF-ADMINISTRABLE DRUG: Performed by: INTERNAL MEDICINE

## 2022-09-15 PROCEDURE — 83690 ASSAY OF LIPASE: CPT | Performed by: INTERNAL MEDICINE

## 2022-09-15 PROCEDURE — 25800000 HC PHARMACY IV SOLUTIONS: Performed by: INTERNAL MEDICINE

## 2022-09-15 PROCEDURE — 80076 HEPATIC FUNCTION PANEL: CPT | Performed by: INTERNAL MEDICINE

## 2022-09-15 PROCEDURE — 85018 HEMOGLOBIN: CPT | Performed by: FAMILY MEDICINE

## 2022-09-15 PROCEDURE — 12000000 HC ROOM AND CARE MED/SURG

## 2022-09-15 PROCEDURE — 85025 COMPLETE CBC W/AUTO DIFF WBC: CPT | Performed by: INTERNAL MEDICINE

## 2022-09-15 PROCEDURE — 99233 SBSQ HOSP IP/OBS HIGH 50: CPT | Performed by: FAMILY MEDICINE

## 2022-09-15 PROCEDURE — 63600000 HC DRUGS/DETAIL CODE: Performed by: INTERNAL MEDICINE

## 2022-09-15 PROCEDURE — 63700000 HC SELF-ADMINISTRABLE DRUG: Performed by: FAMILY MEDICINE

## 2022-09-15 PROCEDURE — 80048 BASIC METABOLIC PNL TOTAL CA: CPT | Performed by: INTERNAL MEDICINE

## 2022-09-15 RX ORDER — DIPHENHYDRAMINE HCL 25 MG
25 CAPSULE ORAL NIGHTLY PRN
Status: DISCONTINUED | OUTPATIENT
Start: 2022-09-15 | End: 2022-09-16 | Stop reason: HOSPADM

## 2022-09-15 RX ORDER — ACETAMINOPHEN 325 MG/1
650 TABLET ORAL EVERY 6 HOURS PRN
Status: DISCONTINUED | OUTPATIENT
Start: 2022-09-15 | End: 2022-09-16 | Stop reason: HOSPADM

## 2022-09-15 RX ORDER — ACETAMINOPHEN 500 MG
10 TABLET ORAL NIGHTLY PRN
Status: DISCONTINUED | OUTPATIENT
Start: 2022-09-15 | End: 2022-09-16 | Stop reason: HOSPADM

## 2022-09-15 RX ORDER — BUTALBITAL, ACETAMINOPHEN AND CAFFEINE 50; 325; 40 MG/1; MG/1; MG/1
1 TABLET ORAL EVERY 6 HOURS PRN
Status: DISCONTINUED | OUTPATIENT
Start: 2022-09-15 | End: 2022-09-15

## 2022-09-15 RX ORDER — ALUMINUM HYDROXIDE, MAGNESIUM HYDROXIDE, AND SIMETHICONE 1200; 120; 1200 MG/30ML; MG/30ML; MG/30ML
15 SUSPENSION ORAL EVERY 6 HOURS PRN
Status: DISCONTINUED | OUTPATIENT
Start: 2022-09-15 | End: 2022-09-16 | Stop reason: HOSPADM

## 2022-09-15 RX ADMIN — Medication 10 MG: at 20:57

## 2022-09-15 RX ADMIN — MORPHINE SULFATE 2 MG: 2 INJECTION, SOLUTION INTRAMUSCULAR; INTRAVENOUS at 22:13

## 2022-09-15 RX ADMIN — ONDANSETRON 4 MG: 2 INJECTION INTRAMUSCULAR; INTRAVENOUS at 22:16

## 2022-09-15 RX ADMIN — MORPHINE SULFATE 2 MG: 2 INJECTION, SOLUTION INTRAMUSCULAR; INTRAVENOUS at 05:48

## 2022-09-15 RX ADMIN — ALUMINA, MAGNESIA, AND SIMETHICONE ORAL SUSPENSION REGULAR STRENGTH 15 ML: 1200; 1200; 120 SUSPENSION ORAL at 16:39

## 2022-09-15 RX ADMIN — PANTOPRAZOLE SODIUM 40 MG: 40 TABLET, DELAYED RELEASE ORAL at 08:21

## 2022-09-15 RX ADMIN — MORPHINE SULFATE 2 MG: 2 INJECTION, SOLUTION INTRAMUSCULAR; INTRAVENOUS at 00:05

## 2022-09-15 RX ADMIN — ONDANSETRON 4 MG: 2 INJECTION INTRAMUSCULAR; INTRAVENOUS at 05:47

## 2022-09-15 RX ADMIN — SODIUM CHLORIDE 1 G: 900 INJECTION INTRAVENOUS at 14:58

## 2022-09-15 RX ADMIN — ACETAMINOPHEN 650 MG: 325 TABLET ORAL at 16:39

## 2022-09-15 NOTE — PROGRESS NOTES
Hospital Medicine Service -  Daily Progress Note       SUBJECTIVE   Interval History:    No acute overnight events. Ms. Clark was seen and examined at bedside. She continues to have abdominal pain which she thinks is improving.      OBJECTIVE      Vital signs in last 24 hours:  Temp:  [36.7 °C (98.1 °F)-37.3 °C (99.1 °F)] 36.7 °C (98.1 °F)  Heart Rate:  [73-80] 73  Resp:  [18-19] 18  BP: (105-137)/(53-70) 119/58    Intake/Output Summary (Last 24 hours) at 9/15/2022 1144  Last data filed at 9/15/2022 0800  Gross per 24 hour   Intake 4756.25 ml   Output 2445 ml   Net 2311.25 ml       PHYSICAL EXAMINATION      Physical Exam    General: No acute distress. Non toxic appearing.   HEENT: NC/AT MMM  Respiratory: Clear breath sounds bilaterally. No wheezing, rhonchi, rales. Non labored breathing. No accessory muscle use  Cardiovascular: RRR. Normal S1 and S2.    Abdomen: Soft, non distended, non tender. Bowel sounds present.   Psychiatric: Calm and cooperative.    Extremities: No clubbing, cyanosis, or edema.      LINES, CATHETERS, DRAINS, AIRWAYS, AND WOUNDS   Lines, Drains, and Airways:  Wounds (agree with documentation and present on admission):  Peripheral IV (Adult) 09/10/22 Posterior;Right Forearm (Active)   Number of days: 5         LABS / IMAGING / TELE      Labs    CBC Results       09/15/22 09/15/22 09/14/22     1019 0442 0919    WBC -- 6.77 11.14    RBC -- 2.40 3.65    HGB 11.0 7.5 11.3    HCT 32.4 22.8 33.8    MCV -- 95.0 92.6    MCH -- 31.3 31.0    MCHC -- 32.9 33.4    PLT -- 145 209         Comment for HGB at 1019 on 09/15/22: RESULT CHECKED    Comment for HGB at 0442 on 09/15/22: ALL RESULTS HAVE BEEN CHECKED        CMP Results       09/15/22 09/14/22 09/13/22     0431 0919 0515     135 134    K 3.5 3.3 3.5    Cl 105 103 101    CO2 22 25 25    Glucose 101 94 76    BUN <5 <5 5    Creatinine 0.4 0.4 0.4    Calcium 8.5 8.4 8.4    Anion Gap 10 7 8    AST 40 32 32    ALT 46 51 64    Albumin 2.5 2.6 2.8     EGFR >60.0 >60.0 >60.0         Comment for K at 0431 on 09/15/22: SLIGHT HEMOLYSIS, RESULT MAY BE INCREASED.    Comment for AST at 0431 on 09/15/22: SLIGHT HEMOLYSIS, RESULT MAY BE INCREASED.    Comment for ALT at 0431 on 09/15/22: SLIGHT HEMOLYSIS, RESULT MAY BE INCREASED.            SARS-CoV-2 (COVID-19) (no units)   Date/Time Value   09/09/2022 2256 Negative          ASSESSMENT AND PLAN      * Post-ERCP acute pancreatitis  Assessment & Plan  > Hx of bile duct dilatation s/p bile duct sphincterotomy 9/9  > Mild elevation of lipase, nausea, vomiting, abdominal pain, suspect due to mild post-ERCP pancreatitis   · IVF  · Pain control, will change to IV pain med.   · NPO, advance as tolerated  · GI consult  · small improvement of pain, tolerating slightly more liquid. GI following. Starting protonix.     Urinary frequency  Assessment & Plan  - UA does suggest potential UTI  - pending urine cx  - ceftriaxone x 3 days    Abdominal pain, epigastric  Assessment & Plan  - See pancreatitis     Leukocytosis  Assessment & Plan  - Persistent leukocytosis  - Now resolved  - ? Reactive from pancreatitis  - CT abdomen and pelvis (9/13/22): Inflammatory changes around the pancreatic body and tail in keeping with pancreatitis. Redemonstrated pneumobilia, slightly decreased from 9/9/2022.   - UA suggests mild UTI, pending urine cx.     Hyponatremia  Assessment & Plan  - Resolved with IVF   - Will continue to monitor     Hypokalemia  Assessment & Plan  - Monitor and replete         VTE Prophylaxis:  Current anticoagulants:    None      Code Status: Full Code  Palliative Care Screening Score: 0   Estimated Discharge Date: 9/16/2022     Disposition Planning: Home     Rodri Ayers MD  9/15/2022

## 2022-09-15 NOTE — PLAN OF CARE
Problem: Adult Inpatient Plan of Care  Goal: Plan of Care Review  Outcome: Progressing  Flowsheets (Taken 9/14/2022 2128)  Progress: improving  Plan of Care Reviewed With: patient  Outcome Summary: pt given morphine for pain and zofran for nausea, pt ate solid food today with some discomfort will try again in the morning. resting comfortably in bed  Goal: Patient-Specific Goal (Individualized)  Outcome: Progressing  Goal: Absence of Hospital-Acquired Illness or Injury  Outcome: Progressing  Goal: Optimal Comfort and Wellbeing  Outcome: Progressing  Goal: Readiness for Transition of Care  Outcome: Progressing   Plan of Care Review  Plan of Care Reviewed With: patient  Progress: improving  Outcome Summary: pt given morphine for pain and zofran for nausea, pt ate solid food today with some discomfort will try again in the morning. resting comfortably in bed

## 2022-09-15 NOTE — PLAN OF CARE
Patient A&O x4, on RA. Tolerating diet with some reports of indigestion, Maalox ordered. Patient requesting pain regimen for discharge provider notified. Fall prevention bundle in place. Patient refusing bed alarm, independent in the room. Call bell in reach.

## 2022-09-15 NOTE — PROGRESS NOTES
GASTROENTEROLOGY DAILY PROGRESS NOTE  [facility]     PATIENT NAME:  Sowmya Clark          YOB: 1960  AGE:  62 y.o.   MRN: [id number]      SUBJECTIVE   CC:Post ERCP pancreatitis.   Pain has improved, but still exacerbated by eating. Pain is worse at night    REVIEW OF SYSTEMS   Systems reviewed and otherwise negative except as documented above.    VITAL SIGNS   Temp:  [36.7 °C (98.1 °F)-37 °C (98.6 °F)] 36.7 °C (98.1 °F)  Heart Rate:  [73-80] 73  Resp:  [18-19] 18  BP: (106-137)/(57-70) 119/58      Intake/Output Summary (Last 24 hours) at 9/15/2022 1213  Last data filed at 9/15/2022 0800  Gross per 24 hour   Intake 4756.25 ml   Output 2445 ml   Net 2311.25 ml     PHYSICAL EXAM   Physical Exam  General appearance: alert, appears stated age and cooperative  Head: normocephalic  Heart: regular rate and rhythm  Lungs: clear to auscultation anteriorly, non-labored respirations  Abdomen: soft, Moderate diffuse tenderness without guarding rebound; bowel sounds normal; no masses, no organomegaly  Extremities: no LE edema  Neurologic: awake and alert  Skin: no jaundice      IMAGING AND LABS REVIEWED   Labs reviewed  Results from last 7 days   Lab Units 09/15/22  1019 09/15/22  0442 09/14/22  0919 09/13/22  0515   WBC K/uL  --  6.77 11.14* 14.50*   HEMOGLOBIN g/dL 11.0* 7.5* 11.3* 12.3   HEMATOCRIT % 32.4* 22.8* 33.8* 37.2   PLATELETS K/uL  --  145* 209 189   ]  Results from last 7 days   Lab Units 09/15/22  0431 09/14/22  0919 09/13/22  0515   SODIUM mEQ/L 137 135* 134*   POTASSIUM mEQ/L 3.5* 3.3* 3.5*   CHLORIDE mEQ/L 105 103 101   CO2 mEQ/L 22 25 25   BUN mg/dL <5* <5* 5*   CREATININE mg/dL 0.4* 0.4* 0.4*   CALCIUM mg/dL 8.5* 8.4* 8.4*   ALBUMIN g/dL 2.5* 2.6* 2.8*   BILIRUBIN TOTAL mg/dL 0.9 1.5* 2.5*   ALK PHOS IU/L 167* 168* 141*   ALT IU/L 46 51 64*   AST IU/L 40 32 32   GLUCOSE mg/dL 101* 94 76   ]  Results from last 7 days   Lab Units 09/15/22  0431 09/13/22  0515 09/12/22  0423   LIPASE U/L 34 64*  488*   ]    ]    Imaging reviewed  X-RAY OBSTRUCTION SERIES (ABDOMEN 2 VIEWS WITH CHEST 1 VIEW)    Result Date: 9/11/2022  IMPRESSION: 1. No radiographic evidence of bowel obstruction. 2. Small bilateral pleural effusions.    CT ABDOMEN PELVIS WITH IV CONTRAST    Result Date: 9/13/2022  IMPRESSION: 1. Inflammatory changes around the pancreatic body and tail in keeping with pancreatitis. 2. Redemonstrated pneumobilia, slightly decreased from 9/9/2022.     CT ABDOMEN PELVIS WITH IV CONTRAST    Result Date: 9/9/2022  IMPRESSION: 1. Pneumobilia, likely related to patient's recent surgery/procedure. 2. Left adnexal cyst measuring 2.3 cm.  Further evaluation with nonemergent pelvic ultrasound is recommended, if recently not performed.       ASSESSMENT/PLAN     Post ERCP pancreatitis.Continued improvement.  Offered patient discharge today, but she is concerned about her nocturnal abdominal pain. We will keep another day with plans for discharge tomorrow as long as she continues to improve.DC IV fluids    AUTHOR:  Casa Wells MD  9/15/2022

## 2022-09-15 NOTE — STUDENT
Hospital Medicine Service -  Daily Progress Note       SUBJECTIVE   Interval History: Pain about the same as yesterday. Required morphine and zofran last night. Tolerating hot Cream of Wheat this AM. No N/V/D. Still with bloating and spasms. GI will keep 1 more night. Denies fever/chills, CP, SOB, dysuria, hematuria, headache, dizziness, lightheadedness.        Past Medical History:   Diagnosis Date    Breast cancer (CMS/[facility]) 2007    Crohn's disease (CMS/[facility])     Dystonia     Osteomyelitis (CMS/[facility])     Thyroid nodule     Tremor         OBJECTIVE      Vital signs in last 24 hours:  Temp:  [36.7 °C (98.1 °F)-37.3 °C (99.1 °F)] 36.7 °C (98.1 °F)  Heart Rate:  [73-80] 73  Resp:  [18-19] 18  BP: (105-137)/(53-70) 119/58   SpO2: 96% RA    Intake/Output Summary (Last 24 hours) at 9/15/2022 1108  Last data filed at 9/15/2022 0800  Gross per 24 hour   Intake 4756.25 ml   Output 2445 ml   Net 2311.25 ml       PHYSICAL EXAMINATION      Physical Exam  Vitals and nursing note reviewed.   Constitutional:       Appearance: Normal appearance.   HENT:      Head: Normocephalic and atraumatic.   Cardiovascular:      Rate and Rhythm: Normal rate and regular rhythm.      Pulses: Normal pulses.   Pulmonary:      Effort: Pulmonary effort is normal.      Breath sounds: Normal breath sounds.   Abdominal:      General: Abdomen is flat. There is distension.      Palpations: Abdomen is soft.      Tenderness: There is abdominal tenderness.   Musculoskeletal:      Right lower leg: No edema.      Left lower leg: No edema.   Skin:     General: Skin is warm and dry.   Neurological:      General: No focal deficit present.      Mental Status: She is alert and oriented to person, place, and time. Mental status is at baseline.   Psychiatric:         Mood and Affect: Mood normal.         Behavior: Behavior normal.            LINES, CATHETERS, DRAINS, AIRWAYS, AND WOUNDS   Lines, Drains, and Airways:  Wounds (agree with documentation and  present on admission):  Peripheral IV (Adult) 09/10/22 Posterior;Right Forearm (Active)   Number of days: 4         Comments:      LABS / IMAGING / TELE      Labs  Results from last 7 days   Lab Units 09/15/22  1019 09/15/22  0442 09/14/22  0919 09/13/22  0515   WBC K/uL  --  6.77 11.14* 14.50*   HEMOGLOBIN g/dL 11.0* 7.5* 11.3* 12.3   HEMATOCRIT % 32.4* 22.8* 33.8* 37.2   PLATELETS K/uL  --  145* 209 189     Results from last 7 days   Lab Units 09/15/22  0431 09/14/22  0919 09/13/22  0515   SODIUM mEQ/L 137 135* 134*   POTASSIUM mEQ/L 3.5* 3.3* 3.5*   CHLORIDE mEQ/L 105 103 101   CO2 mEQ/L 22 25 25   BUN mg/dL <5* <5* 5*   CREATININE mg/dL 0.4* 0.4* 0.4*   CALCIUM mg/dL 8.5* 8.4* 8.4*   ALBUMIN g/dL 2.5* 2.6* 2.8*   BILIRUBIN TOTAL mg/dL 0.9 1.5* 2.5*   ALK PHOS IU/L 167* 168* 141*   ALT IU/L 46 51 64*   AST IU/L 40 32 32   GLUCOSE mg/dL 101* 94 76     UA Results       09/13/22     1005    Color Yellow     Yellow    Clarity Clear     Clear    Glucose Negative     Negative    Bilirubin Negative     Negative    Ketones +4     +4    Sp Grav 1.016     1.016    Blood +1     +1    Ph 6.5     6.5    Protein +1     +1    Urobilinogen 0.2     0.2    Nitrite Negative     Negative    Leuk Est +1     +1    WBC 4 TO 10    RBC 5 TO 9    Bacteria Rare         Comment for Ketones at 1005 on 09/13/22: Free sulfhydryl drugs such as Mesna, Capoten, and Acetylcysteine (Mucomyst) may cause false positive ketonuria.    Comment for Ketones at 1005 on 09/13/22: Free sulfhydryl drugs such as Mesna, Capoten, and Acetylcysteine (Mucomyst) may cause false positive ketonuria.    Comment for Blood at 1005 on 09/13/22: The sensitivity of the occult blood test is equivalent to approximately 4 intact RBC/HPF.    Comment for Blood at 1005 on 09/13/22: The sensitivity of the occult blood test is equivalent to approximately 4 intact RBC/HPF.          SARS-CoV-2 (COVID-19) (no units)   Date/Time Value   09/09/2022 2257 Negative       Imaging  CT  abdomen pelvis with contrast (9/13/22):   IMPRESSION:  1. Inflammatory changes around the pancreatic body and tail in keeping with  pancreatitis.  2. Redemonstrated pneumobilia, slightly decreased from 9/9/2022.    CXR (9/11/22): IMPRESSION:  1. No radiographic evidence of bowel obstruction.  2. Small bilateral pleural effusions.    CT abdomen pelvis with contrast (9/9/22): IMPRESSION:  1. Pneumobilia, likely related to patient's recent surgery/procedure.  2. Left adnexal cyst measuring 2.3 cm.  Further evaluation with nonemergent  pelvic ultrasound is recommended, if recently not performed.    ECG/Telemetry  No EKG available.     ASSESSMENT AND PLAN      Post-ERCP acute pancreatitis  Underwent bile duct sphincterectomy on 9/9 then developed sharp abdominal pain and went to ED.   Lipase improving. Most recently 34 <- 64 on 9/13 <- 488 on 9/12.   LFTs improving. Alk phos 167 from 168. AST, ALT, tbili WNL  Discontinue IIVF,   Continue zofran, PPI, morphine for pain.   Advance diet as tolerate. Attempt solid foods.  RUQ pain improving after morphine.   CT A/P 9/13/22 with inflammatory changes around pancreatic body and tail in keeping with pancreatitis   GI will keep one more night. Discharge with oral pain medication.     Urinary frequency  UA with 5-9 RBCs, 4-10 WBCs, rare bacteria, rare squamous epithelial cells, no casts.   Urine culture pending  On ceftriaxone     Leukocytosis  WBCs trending down. Most recently 6.77 today, 11.14 on 9/14  Monitor, trend CBC.   Reactive from pancreatitis - CT A/P with inflammatory changes.   Afebrile     Hyponatremia  Improving.   Sodium mostly recently 137 today from 135 on 9/14  Continue IVF NS.      Hypokalemia  Potassium mostly recently 3.5 from 3.3 on 9/14  Continue to replete to goal of 4 with potassium chloride IV     VTE Assessment: Padua VTE Score: 0  VTE Prophylaxis:  Current anticoagulants:    None      Code Status: Full Code  Palliative Care Screening Score: 0    Estimated Discharge Date: 9/15/2022       Disposition Planning: home with home health     Lila Touch  9/15/2022

## 2022-09-16 VITALS
WEIGHT: 175 LBS | DIASTOLIC BLOOD PRESSURE: 82 MMHG | TEMPERATURE: 97.5 F | BODY MASS INDEX: 24.5 KG/M2 | HEART RATE: 69 BPM | SYSTOLIC BLOOD PRESSURE: 138 MMHG | HEIGHT: 71 IN | OXYGEN SATURATION: 96 % | RESPIRATION RATE: 18 BRPM

## 2022-09-16 LAB
BACTERIA UR CULT: NORMAL
BACTERIA UR CULT: NORMAL

## 2022-09-16 PROCEDURE — 63700000 HC SELF-ADMINISTRABLE DRUG: Performed by: HOSPITALIST

## 2022-09-16 PROCEDURE — 63700000 HC SELF-ADMINISTRABLE DRUG: Performed by: INTERNAL MEDICINE

## 2022-09-16 PROCEDURE — 63700000 HC SELF-ADMINISTRABLE DRUG: Performed by: FAMILY MEDICINE

## 2022-09-16 PROCEDURE — 99239 HOSP IP/OBS DSCHRG MGMT >30: CPT | Performed by: FAMILY MEDICINE

## 2022-09-16 RX ORDER — ACETAMINOPHEN 325 MG/1
650 TABLET ORAL ONCE
Status: COMPLETED | OUTPATIENT
Start: 2022-09-16 | End: 2022-09-16

## 2022-09-16 RX ORDER — ONDANSETRON 4 MG/1
4 TABLET, FILM COATED ORAL EVERY 8 HOURS PRN
Qty: 8 TABLET | Refills: 0 | Status: SHIPPED | OUTPATIENT
Start: 2022-09-16 | End: 2022-09-22

## 2022-09-16 RX ORDER — PANTOPRAZOLE SODIUM 40 MG/1
40 TABLET, DELAYED RELEASE ORAL
Qty: 88 TABLET | Refills: 0 | Status: SHIPPED | OUTPATIENT
Start: 2022-09-17 | End: 2022-10-19 | Stop reason: ALTCHOICE

## 2022-09-16 RX ORDER — CEPHALEXIN 500 MG/1
500 CAPSULE ORAL 3 TIMES DAILY
Qty: 3 CAPSULE | Refills: 0 | Status: SHIPPED | OUTPATIENT
Start: 2022-09-16 | End: 2022-09-17

## 2022-09-16 RX ORDER — OXYCODONE HYDROCHLORIDE 5 MG/1
5 TABLET ORAL EVERY 8 HOURS PRN
Qty: 5 TABLET | Refills: 0 | Status: SHIPPED | OUTPATIENT
Start: 2022-09-16 | End: 2022-09-22

## 2022-09-16 RX ADMIN — DIPHENHYDRAMINE HYDROCHLORIDE 25 MG: 25 CAPSULE ORAL at 02:35

## 2022-09-16 RX ADMIN — PANTOPRAZOLE SODIUM 40 MG: 40 TABLET, DELAYED RELEASE ORAL at 06:32

## 2022-09-16 RX ADMIN — ACETAMINOPHEN 650 MG: 325 TABLET ORAL at 06:32

## 2022-09-16 RX ADMIN — ACETAMINOPHEN 650 MG: 325 TABLET ORAL at 02:35

## 2022-09-16 ASSESSMENT — ENCOUNTER SYMPTOMS
NEUROLOGICAL NEGATIVE: 1
ABDOMINAL PAIN: 1
DIAPHORESIS: 0
PSYCHIATRIC NEGATIVE: 1
SHORTNESS OF BREATH: 0
CONSTITUTIONAL NEGATIVE: 1
DIARRHEA: 0
CARDIOVASCULAR NEGATIVE: 1
LIGHT-HEADEDNESS: 0
EYES NEGATIVE: 1
PALPITATIONS: 0
NECK STIFFNESS: 0
FACIAL SWELLING: 0
WOUND: 0
MUSCULOSKELETAL NEGATIVE: 1
NAUSEA: 1
WEAKNESS: 0
CHEST TIGHTNESS: 0
RESPIRATORY NEGATIVE: 1
FATIGUE: 0
HEADACHES: 0
TROUBLE SWALLOWING: 0
NUMBNESS: 0
CHILLS: 0
FEVER: 0
ANAL BLEEDING: 0
NECK PAIN: 0
BLOOD IN STOOL: 0
DIZZINESS: 0
BACK PAIN: 0
VOMITING: 0
FLANK PAIN: 0

## 2022-09-16 NOTE — PLAN OF CARE
Plan of Care Review  Plan of Care Reviewed With: patient  Progress: no change  Outcome Summary: Pt resting in bed. c/o abd pain, worse at night. prn morphine given last evening. tylenol provided this AM. independent in room. vss. no needs at this time

## 2022-09-16 NOTE — DISCHARGE SUMMARY
Hospital Medicine Service -  Inpatient Discharge Summary        BRIEF OVERVIEW   Admitting Provider: Ester Gonzalez DO  Attending Provider: Rodri Ayers MD Attending phys phone: [phone]    PCP: Ankita Ho [phone]    Admission Date: 9/9/2022  Discharge Date: 9/16/2022     DISCHARGE DIAGNOSES      Primary Discharge Diagnosis  Post-ERCP acute pancreatitis    Secondary Discharge Diagnoses  Active Hospital Problems    Diagnosis Date Noted    Post-ERCP acute pancreatitis 09/09/2022     Priority: High    Urinary frequency 09/13/2022     Priority: Medium    Abdominal pain, epigastric 09/10/2022     Priority: Medium    Hypokalemia 09/13/2022     Priority: Low    Hyponatremia 09/13/2022     Priority: Low    Leukocytosis 09/13/2022     Priority: Low      Resolved Hospital Problems   No resolved problems to display.       Problem List on Day of Discharge  * Post-ERCP acute pancreatitis  Assessment & Plan  > Hx of bile duct dilatation s/p bile duct sphincterotomy 9/9  > Mild elevation of lipase, nausea, vomiting, abdominal pain, suspect due to mild post-ERCP pancreatitis   · IVF  · Pain control, will change to IV pain med.   · NPO, advance as tolerated  · GI consult  · small improvement of pain, tolerating slightly more liquid. GI following. Starting protonix.     Urinary frequency  Assessment & Plan  - UA does suggest potential UTI  - pending urine cx  - ceftriaxone x 3 days    Abdominal pain, epigastric  Assessment & Plan  - See pancreatitis     Leukocytosis  Assessment & Plan  - Persistent leukocytosis  - Now resolved  - ? Reactive from pancreatitis  - CT abdomen and pelvis (9/13/22): Inflammatory changes around the pancreatic body and tail in keeping with pancreatitis. Redemonstrated pneumobilia, slightly decreased from 9/9/2022.   - UA suggests mild UTI, pending urine cx.     Hyponatremia  Assessment & Plan  - Resolved with IVF   - Will continue to monitor     Hypokalemia  Assessment & Plan  -  Monitor and replete      SUMMARY OF HOSPITALIZATION      Presenting Problem/History of Present Illness  This is a 62 y.o. year-old female admitted on 9/9/2022 with Abdominal pain, epigastric [R10.13]  Intractable pain [R52]  Elevated lipase [R74.8]  Post-ERCP acute pancreatitis [K91.89, K85.90].       Hospital Course    Sowmya Clark is a 62 year old woman with a PMHx significant for cholecystectomy, ERCP s/p biliary sphincterotomy, breast cancer, Crohns disease, thyroid nodule who presented to [facility] on 9/10/22 with abdominal pain after an ERCP on 9/9/22.  She was found to have post ERCP pancreatitis. SHe was treated with IVF and pain control. She was treated for a UTI while inpatient. She will be discharged home to follow up with gastroenterology and her PCP.     Exam on Day of Discharge  Physical Exam   General: No acute distress. Non toxic appearing.   HEENT: NC/AT MMM  Respiratory: Clear breath sounds bilaterally. No wheezing, rhonchi, rales. Non labored breathing. No accessory muscle use  Cardiovascular: RRR. Normal S1 and S2.    Abdomen: Soft, non distended, non tender. Bowel sounds present.   Psychiatric: Calm and cooperative.    Extremities: No clubbing, cyanosis, or edema.        Consults During Admission  IP CONSULT TO GASTROENTEROLOGY    DISCHARGE MEDICATIONS               Medication List      START taking these medications    cephalexin 500 mg capsule  Commonly known as: KEFLEX  Take 1 capsule (500 mg total) by mouth 3 (three) times a day for 1 day.  Dose: 500 mg     ondansetron 4 mg tablet  Commonly known as: ZOFRAN  Take 1 tablet (4 mg total) by mouth every 8 (eight) hours as needed for nausea or vomiting for up to 7 days.  Dose: 4 mg     oxyCODONE 5 mg immediate release tablet  Commonly known as: ROXICODONE  Take 1 tablet (5 mg total) by mouth every 8 (eight) hours as needed for severe pain.  Dose: 5 mg     pantoprazole 40 mg EC tablet  Commonly known as: PROTONIX  Start taking on: September  17, 2022  Take 1 tablet (40 mg total) by mouth daily before breakfast for 88 doses Indications: gastroesophageal reflux disease.  Dose: 40 mg                   PROCEDURES / LABS / IMAGING           Pertinent Labs    CBC Results       09/15/22 09/15/22 09/14/22     1019 0442 0919    WBC -- 6.77 11.14    RBC -- 2.40 3.65    HGB 11.0 7.5 11.3    HCT 32.4 22.8 33.8    MCV -- 95.0 92.6    MCH -- 31.3 31.0    MCHC -- 32.9 33.4    PLT -- 145 209         Comment for HGB at 1019 on 09/15/22: RESULT CHECKED    Comment for HGB at 0442 on 09/15/22: ALL RESULTS HAVE BEEN CHECKED        CMP Results       09/15/22 09/14/22 09/13/22     0431 0919 0515     135 134    K 3.5 3.3 3.5    Cl 105 103 101    CO2 22 25 25    Glucose 101 94 76    BUN <5 <5 5    Creatinine 0.4 0.4 0.4    Calcium 8.5 8.4 8.4    Anion Gap 10 7 8    AST 40 32 32    ALT 46 51 64    Albumin 2.5 2.6 2.8    EGFR >60.0 >60.0 >60.0         Comment for K at 0431 on 09/15/22: SLIGHT HEMOLYSIS, RESULT MAY BE INCREASED.    Comment for AST at 0431 on 09/15/22: SLIGHT HEMOLYSIS, RESULT MAY BE INCREASED.    Comment for ALT at 0431 on 09/15/22: SLIGHT HEMOLYSIS, RESULT MAY BE INCREASED.              SARS-CoV-2 (COVID-19) (no units)   Date/Time Value   09/09/2022 2256 Negative       Pertinent Imaging  X-RAY OBSTRUCTION SERIES (ABDOMEN 2 VIEWS WITH CHEST 1 VIEW)    Result Date: 9/11/2022  IMPRESSION: 1. No radiographic evidence of bowel obstruction. 2. Small bilateral pleural effusions.    CT ABDOMEN PELVIS WITH IV CONTRAST    Result Date: 9/13/2022  IMPRESSION: 1. Inflammatory changes around the pancreatic body and tail in keeping with pancreatitis. 2. Redemonstrated pneumobilia, slightly decreased from 9/9/2022.     CT ABDOMEN PELVIS WITH IV CONTRAST    Result Date: 9/9/2022  IMPRESSION: 1. Pneumobilia, likely related to patient's recent surgery/procedure. 2. Left adnexal cyst measuring 2.3 cm.  Further evaluation with nonemergent pelvic ultrasound is recommended, if  recently not performed.       OUTPATIENT  FOLLOW-UP / REFERRALS / PENDING TESTS        Outpatient Follow-Up Appointments  Encounter Information    This patient does not currently have any appointments scheduled.         Referrals  No orders of the defined types were placed in this encounter.      Test Results Pending at Discharge  Unresulted Labs (From admission, onward)            None          Important Issues to Address in Follow-Up         Follow up with gastroenterology      Follow up with PCP          DISCHARGE DISPOSITION AND DESTINATION      Disposition: Home   Destination:                              Code Status At Discharge: Full Code    Physician Order for Life-Sustaining Treatment Document Status      No documents found

## 2022-09-16 NOTE — PROGRESS NOTES
GASTROENTEROLOGY DAILY PROGRESS NOTE  [facility]     PATIENT NAME:  Sowmya Clark          YOB: 1960  AGE:  62 y.o.   MRN: [id number]      SUBJECTIVE   CC: Post ERCP pancreatitis.    Patient is dressed, and ready to go home.  She still has pain at night, but doing much better    REVIEW OF SYSTEMS   Systems reviewed and otherwise negative except as documented above.    VITAL SIGNS   Temp:  [36.4 °C (97.5 °F)-37.2 °C (98.9 °F)] 36.4 °C (97.5 °F)  Heart Rate:  [68-75] 69  Resp:  [16-18] 18  BP: (107-138)/(55-82) 138/82      Intake/Output Summary (Last 24 hours) at 9/16/2022 1055  Last data filed at 9/15/2022 2059  Gross per 24 hour   Intake --   Output 2100 ml   Net -2100 ml     PHYSICAL EXAM   Physical Exam  General appearance: alert, appears stated age and cooperative  Head: normocephalic  Heart: regular rate and rhythm  Lungs: clear to auscultation anteriorly, non-labored respirations  Abdomen: soft, mild epigastric tenderness without guarding rebound,; bowel sounds normal; no masses, no organomegaly  Extremities: no LE edema  Neurologic: awake and alert  Skin: no jaundice      IMAGING AND LABS REVIEWED   Labs reviewed  Results from last 7 days   Lab Units 09/15/22  1019 09/15/22  0442 09/14/22  0919 09/13/22  0515   WBC K/uL  --  6.77 11.14* 14.50*   HEMOGLOBIN g/dL 11.0* 7.5* 11.3* 12.3   HEMATOCRIT % 32.4* 22.8* 33.8* 37.2   PLATELETS K/uL  --  145* 209 189   ]  Results from last 7 days   Lab Units 09/15/22  0431 09/14/22  0919 09/13/22  0515   SODIUM mEQ/L 137 135* 134*   POTASSIUM mEQ/L 3.5* 3.3* 3.5*   CHLORIDE mEQ/L 105 103 101   CO2 mEQ/L 22 25 25   BUN mg/dL <5* <5* 5*   CREATININE mg/dL 0.4* 0.4* 0.4*   CALCIUM mg/dL 8.5* 8.4* 8.4*   ALBUMIN g/dL 2.5* 2.6* 2.8*   BILIRUBIN TOTAL mg/dL 0.9 1.5* 2.5*   ALK PHOS IU/L 167* 168* 141*   ALT IU/L 46 51 64*   AST IU/L 40 32 32   GLUCOSE mg/dL 101* 94 76   ]  Results from last 7 days   Lab Units 09/15/22  0431 09/13/22  0515 09/12/22  0423   LIPASE  U/L 34 64* 488*   ]    ]    Imaging reviewed  X-RAY OBSTRUCTION SERIES (ABDOMEN 2 VIEWS WITH CHEST 1 VIEW)    Result Date: 9/11/2022  IMPRESSION: 1. No radiographic evidence of bowel obstruction. 2. Small bilateral pleural effusions.    CT ABDOMEN PELVIS WITH IV CONTRAST    Result Date: 9/13/2022  IMPRESSION: 1. Inflammatory changes around the pancreatic body and tail in keeping with pancreatitis. 2. Redemonstrated pneumobilia, slightly decreased from 9/9/2022.     CT ABDOMEN PELVIS WITH IV CONTRAST    Result Date: 9/9/2022  IMPRESSION: 1. Pneumobilia, likely related to patient's recent surgery/procedure. 2. Left adnexal cyst measuring 2.3 cm.  Further evaluation with nonemergent pelvic ultrasound is recommended, if recently not performed.       ASSESSMENT/PLAN     Post ERCP pancreatitis.  Continued improvement.  Stable for discharge.  She should be discharged home on some oral pain medication, and encouraged p.o. hydration and follow-up with Dr. Tenorio and     AUTHOR:  Casa Wells MD  9/16/2022

## 2022-09-16 NOTE — NURSING NOTE
Discharge instructions reviewed with patient and she verbalized understanding. Peripheral IV removed.

## 2022-09-16 NOTE — STUDENT
Hospital Medicine Service -  Daily Progress Note       SUBJECTIVE   Interval History: No acute events overnight. Given Maalox for heartburn symptoms.     Denies fever/chills, CP, SOB, dysuria, hematuria, headache, dizziness, lightheadedness.        Past Medical History:   Diagnosis Date    Breast cancer (CMS/HCC) 2007    Crohn's disease (CMS/HCC)     Dystonia     Osteomyelitis (CMS/[facility])     Thyroid nodule     Tremor         OBJECTIVE      Vital signs in last 24 hours:  Temp:  [36.4 °C (97.5 °F)-37.2 °C (98.9 °F)] 36.4 °C (97.5 °F)  Heart Rate:  [68-75] 69  Resp:  [16-18] 18  BP: (107-138)/(55-82) 138/82   SpO2: 96% RA    Intake/Output Summary (Last 24 hours) at 9/16/2022 0944  Last data filed at 9/15/2022 2059  Gross per 24 hour   Intake --   Output 2100 ml   Net -2100 ml       PHYSICAL EXAMINATION      Physical Exam  Vitals and nursing note reviewed.   Constitutional:       Appearance: Normal appearance.   HENT:      Head: Normocephalic and atraumatic.   Cardiovascular:      Rate and Rhythm: Normal rate and regular rhythm.   Pulmonary:      Effort: Pulmonary effort is normal. No respiratory distress.      Breath sounds: Normal breath sounds. No wheezing.   Abdominal:      General: Abdomen is flat. There is distension.      Palpations: Abdomen is soft.      Tenderness: There is no abdominal tenderness.   Musculoskeletal:      Right lower leg: No edema.      Left lower leg: No edema.   Skin:     General: Skin is warm and dry.   Neurological:      General: No focal deficit present.      Mental Status: She is alert and oriented to person, place, and time.   Psychiatric:         Mood and Affect: Mood normal.         Behavior: Behavior normal.            LINES, CATHETERS, DRAINS, AIRWAYS, AND WOUNDS   Lines, Drains, and Airways:  Wounds (agree with documentation and present on admission):  Peripheral IV (Adult) 09/10/22 Posterior;Right Forearm (Active)   Number of days: 4         Comments:      LABS / IMAGING /  TELE      Labs  Results from last 7 days   Lab Units 09/15/22  1019 09/15/22  0442 09/14/22  0919 09/13/22  0515   WBC K/uL  --  6.77 11.14* 14.50*   HEMOGLOBIN g/dL 11.0* 7.5* 11.3* 12.3   HEMATOCRIT % 32.4* 22.8* 33.8* 37.2   PLATELETS K/uL  --  145* 209 189     Results from last 7 days   Lab Units 09/15/22  0431 09/14/22  0919 09/13/22  0515   SODIUM mEQ/L 137 135* 134*   POTASSIUM mEQ/L 3.5* 3.3* 3.5*   CHLORIDE mEQ/L 105 103 101   CO2 mEQ/L 22 25 25   BUN mg/dL <5* <5* 5*   CREATININE mg/dL 0.4* 0.4* 0.4*   CALCIUM mg/dL 8.5* 8.4* 8.4*   ALBUMIN g/dL 2.5* 2.6* 2.8*   BILIRUBIN TOTAL mg/dL 0.9 1.5* 2.5*   ALK PHOS IU/L 167* 168* 141*   ALT IU/L 46 51 64*   AST IU/L 40 32 32   GLUCOSE mg/dL 101* 94 76     UA Results       09/13/22     1005    Color Yellow     Yellow    Clarity Clear     Clear    Glucose Negative     Negative    Bilirubin Negative     Negative    Ketones +4     +4    Sp Grav 1.016     1.016    Blood +1     +1    Ph 6.5     6.5    Protein +1     +1    Urobilinogen 0.2     0.2    Nitrite Negative     Negative    Leuk Est +1     +1    WBC 4 TO 10    RBC 5 TO 9    Bacteria Rare         Comment for Ketones at 1005 on 09/13/22: Free sulfhydryl drugs such as Mesna, Capoten, and Acetylcysteine (Mucomyst) may cause false positive ketonuria.    Comment for Ketones at 1005 on 09/13/22: Free sulfhydryl drugs such as Mesna, Capoten, and Acetylcysteine (Mucomyst) may cause false positive ketonuria.    Comment for Blood at 1005 on 09/13/22: The sensitivity of the occult blood test is equivalent to approximately 4 intact RBC/HPF.    Comment for Blood at 1005 on 09/13/22: The sensitivity of the occult blood test is equivalent to approximately 4 intact RBC/HPF.          SARS-CoV-2 (COVID-19) (no units)   Date/Time Value   09/09/2022 2291 Negative       Imaging  CT abdomen pelvis with contrast (9/13/22):   IMPRESSION:  1. Inflammatory changes around the pancreatic body and tail in keeping with  pancreatitis.  2.  Redemonstrated pneumobilia, slightly decreased from 9/9/2022.    CXR (9/11/22): IMPRESSION:  1. No radiographic evidence of bowel obstruction.  2. Small bilateral pleural effusions.    CT abdomen pelvis with contrast (9/9/22): IMPRESSION:  1. Pneumobilia, likely related to patient's recent surgery/procedure.  2. Left adnexal cyst measuring 2.3 cm.  Further evaluation with nonemergent  pelvic ultrasound is recommended, if recently not performed.    ECG/Telemetry  No EKG available.     ASSESSMENT AND PLAN      Post-ERCP acute pancreatitis  Underwent bile duct sphincterectomy on 9/9 then developed sharp abdominal pain and went to ED.   Lipase improving. Most recently 34  LFTs improving.   Continue zofran, PPI, morphine for pain.   Advance diet as tolerate. Attempt solid foods.  RUQ pain improving after morphine.   CT A/P 9/13/22 with inflammatory changes around pancreatic body and tail in keeping with pancreatitis      Urinary frequency  UA with 5-9 RBCs, 4-10 WBCs, rare bacteria, rare squamous epithelial cells, no casts.   Urine culture with young growth  On ceftriaxone - last day 9/17     Leukocytosis  WBCs trending down. Most recently 6.77 today, 11.14 on 9/14  Reactive from pancreatitis - CT A/P with inflammatory changes.   Afebrile     Hyponatremia  Improving.   Sodium mostly recently 137    Hypokalemia  Potassium mostly recently 3.5  Continue to replete to goal of 4 with potassium chloride IV     VTE Assessment: [place] VTE Score: 0  VTE Prophylaxis:  Current anticoagulants:    None      Code Status: Full Code  Palliative Care Screening Score: 0   Estimated Discharge Date: 9/16/2022       Disposition Planning: home with home health     Lila Touch  9/16/2022

## 2022-09-18 ENCOUNTER — HOSPITAL ENCOUNTER (OUTPATIENT)
Dept: RADIOLOGY | Age: 62
Discharge: HOME | End: 2022-09-18
Attending: NURSE PRACTITIONER
Payer: COMMERCIAL

## 2022-09-18 ENCOUNTER — TRANSCRIBE ORDERS (OUTPATIENT)
Dept: RADIOLOGY | Age: 62
End: 2022-09-18

## 2022-09-18 DIAGNOSIS — R06.02 SHORTNESS OF BREATH: Primary | ICD-10-CM

## 2022-09-18 DIAGNOSIS — R06.02 SHORTNESS OF BREATH: ICD-10-CM

## 2022-09-18 PROCEDURE — 71046 X-RAY EXAM CHEST 2 VIEWS: CPT

## 2022-09-19 ENCOUNTER — DOCUMENTATION (OUTPATIENT)
Dept: GASTROENTEROLOGY | Facility: CLINIC | Age: 62
End: 2022-09-19

## 2022-09-22 ENCOUNTER — TRANSCRIBE ORDERS (OUTPATIENT)
Dept: SCHEDULING | Age: 62
End: 2022-09-22

## 2022-09-22 ENCOUNTER — HOSPITAL ENCOUNTER (OUTPATIENT)
Dept: RADIOLOGY | Facility: HOSPITAL | Age: 62
Discharge: HOME | End: 2022-09-22
Attending: NURSE PRACTITIONER
Payer: COMMERCIAL

## 2022-09-22 DIAGNOSIS — R06.02 SHORTNESS OF BREATH: Primary | ICD-10-CM

## 2022-09-22 DIAGNOSIS — R79.89 OTHER SPECIFIED ABNORMAL FINDINGS OF BLOOD CHEMISTRY: ICD-10-CM

## 2022-09-22 DIAGNOSIS — R06.02 SHORTNESS OF BREATH: ICD-10-CM

## 2022-09-22 DIAGNOSIS — R10.84 GENERALIZED ABDOMINAL PAIN: Primary | ICD-10-CM

## 2022-09-22 PROCEDURE — 63600105 HC IODINE BASED CONTRAST

## 2022-09-22 PROCEDURE — 71275 CT ANGIOGRAPHY CHEST: CPT

## 2022-09-22 RX ADMIN — IOHEXOL 100 ML: 350 INJECTION, SOLUTION INTRAVENOUS at 14:32

## 2022-09-23 ENCOUNTER — APPOINTMENT (OUTPATIENT)
Dept: LAB | Facility: HOSPITAL | Age: 62
End: 2022-09-23
Attending: INTERNAL MEDICINE
Payer: COMMERCIAL

## 2022-09-23 ENCOUNTER — TRANSCRIBE ORDERS (OUTPATIENT)
Dept: REGISTRATION | Facility: HOSPITAL | Age: 62
End: 2022-09-23

## 2022-09-23 DIAGNOSIS — R74.8 ABNORMAL LEVELS OF OTHER SERUM ENZYMES: Primary | ICD-10-CM

## 2022-09-23 DIAGNOSIS — R74.8 ABNORMAL LEVELS OF OTHER SERUM ENZYMES: ICD-10-CM

## 2022-09-23 LAB
ALBUMIN SERPL-MCNC: 3.6 G/DL (ref 3.4–5)
ALP SERPL-CCNC: 129 IU/L (ref 35–126)
ALT SERPL-CCNC: 35 IU/L (ref 11–54)
ANION GAP SERPL CALC-SCNC: 8 MEQ/L (ref 3–15)
AST SERPL-CCNC: 28 IU/L (ref 15–41)
BILIRUB SERPL-MCNC: 0.8 MG/DL (ref 0.3–1.2)
BUN SERPL-MCNC: <5 MG/DL (ref 8–20)
CALCIUM SERPL-MCNC: 10.1 MG/DL (ref 8.9–10.3)
CHLORIDE SERPL-SCNC: 101 MEQ/L (ref 98–109)
CO2 SERPL-SCNC: 29 MEQ/L (ref 22–32)
CREAT SERPL-MCNC: 0.6 MG/DL (ref 0.6–1.1)
GFR SERPL CREATININE-BSD FRML MDRD: >60 ML/MIN/1.73M*2
GGT SERPL-CCNC: 116 IU/L (ref 7–50)
GLUCOSE SERPL-MCNC: 100 MG/DL (ref 70–99)
POTASSIUM SERPL-SCNC: 4.6 MEQ/L (ref 3.6–5.1)
PROT SERPL-MCNC: 6.2 G/DL (ref 6–8.2)
SODIUM SERPL-SCNC: 138 MEQ/L (ref 136–144)

## 2022-09-23 PROCEDURE — 82977 ASSAY OF GGT: CPT

## 2022-09-23 PROCEDURE — 36415 COLL VENOUS BLD VENIPUNCTURE: CPT

## 2022-09-23 PROCEDURE — 80053 COMPREHEN METABOLIC PANEL: CPT

## 2022-09-27 ENCOUNTER — HOSPITAL ENCOUNTER (OUTPATIENT)
Dept: RADIOLOGY | Facility: HOSPITAL | Age: 62
Discharge: HOME | End: 2022-09-27
Attending: INTERNAL MEDICINE
Payer: COMMERCIAL

## 2022-09-27 VITALS — WEIGHT: 164 LBS | BODY MASS INDEX: 22.87 KG/M2

## 2022-09-27 DIAGNOSIS — R10.84 GENERALIZED ABDOMINAL PAIN: ICD-10-CM

## 2022-09-27 RX ORDER — GADOBUTROL 604.72 MG/ML
7.4 INJECTION INTRAVENOUS ONCE
Status: COMPLETED | OUTPATIENT
Start: 2022-09-27 | End: 2022-09-27

## 2022-09-27 RX ADMIN — GADOBUTROL 7.4 ML: 604.72 INJECTION INTRAVENOUS at 13:26

## 2022-10-11 NOTE — PROCEDURE: LIQUID NITROGEN
Per Dr. Caba, Ms. Zacarias has been called with recent lab results & recommendations.  Continue current medications and follow-up as planned or sooner if any problems. 
Medical Necessity Clause: This procedure was medically necessary because the lesions that were treated were: causing pain
Number Of Freeze-Thaw Cycles: 2 freeze-thaw cycles
Post-Care Instructions: I reviewed with the patient in detail post-care instructions. Patient is to wear sunprotection, and avoid picking at any of the treated lesions. Pt may apply Vaseline to crusted or scabbing areas.
Medical Necessity Information: It is in your best interest to select a reason for this procedure from the list below. All of these items fulfill various CMS LCD requirements except the new and changing color options.
Detail Level: Detailed
Pared With?: Dermablade
Include Z78.9 (Other Specified Conditions Influencing Health Status) As An Associated Diagnosis?: Yes
Add 52 Modifier (Optional): no
Consent: The patient's consent was obtained including but not limited to risks of crusting, scabbing, blistering, scarring, darker or lighter pigmentary change, recurrence, incomplete removal and infection.

## 2022-10-12 ENCOUNTER — TRANSCRIBE ORDERS (OUTPATIENT)
Dept: SCHEDULING | Age: 62
End: 2022-10-12

## 2022-10-12 DIAGNOSIS — R74.8 ABNORMAL LEVELS OF OTHER SERUM ENZYMES: Primary | ICD-10-CM

## 2022-10-13 ENCOUNTER — HOSPITAL ENCOUNTER (OUTPATIENT)
Dept: RADIOLOGY | Facility: HOSPITAL | Age: 62
Discharge: HOME | End: 2022-10-13
Attending: INTERNAL MEDICINE
Payer: COMMERCIAL

## 2022-10-13 DIAGNOSIS — R74.8 ABNORMAL LEVELS OF OTHER SERUM ENZYMES: ICD-10-CM

## 2022-10-18 ENCOUNTER — HOSPITAL ENCOUNTER (OUTPATIENT)
Dept: RADIOLOGY | Facility: HOSPITAL | Age: 62
Discharge: HOME | End: 2022-10-18
Attending: INTERNAL MEDICINE
Payer: COMMERCIAL

## 2022-10-18 DIAGNOSIS — R74.8 ABNORMAL LEVELS OF OTHER SERUM ENZYMES: ICD-10-CM

## 2022-10-18 PROCEDURE — 76705 ECHO EXAM OF ABDOMEN: CPT

## 2022-10-19 ENCOUNTER — APPOINTMENT (OUTPATIENT)
Dept: RADIOLOGY | Facility: HOSPITAL | Age: 62
Setting detail: OBSERVATION
DRG: 444 | End: 2022-10-19
Attending: INTERNAL MEDICINE
Payer: COMMERCIAL

## 2022-10-19 ENCOUNTER — HOSPITAL ENCOUNTER (INPATIENT)
Facility: HOSPITAL | Age: 62
LOS: 3 days | Discharge: HOME | DRG: 444 | End: 2022-10-24
Attending: EMERGENCY MEDICINE | Admitting: STUDENT IN AN ORGANIZED HEALTH CARE EDUCATION/TRAINING PROGRAM
Payer: COMMERCIAL

## 2022-10-19 DIAGNOSIS — R74.8 ELEVATED LIVER ENZYMES: Primary | ICD-10-CM

## 2022-10-19 DIAGNOSIS — K80.50 CHOLEDOCHOLITHIASIS: ICD-10-CM

## 2022-10-19 PROBLEM — R10.9 ABDOMINAL PAIN: Status: ACTIVE | Noted: 2022-10-19

## 2022-10-19 PROBLEM — U07.1 COVID-19: Status: ACTIVE | Noted: 2022-10-19

## 2022-10-19 PROBLEM — R74.01 TRANSAMINITIS: Status: ACTIVE | Noted: 2022-10-19

## 2022-10-19 LAB
ALBUMIN SERPL-MCNC: 3.7 G/DL (ref 3.4–5)
ALBUMIN SERPL-MCNC: 3.7 G/DL (ref 3.4–5)
ALP SERPL-CCNC: 693 IU/L (ref 35–126)
ALP SERPL-CCNC: 693 IU/L (ref 35–126)
ALT SERPL-CCNC: 265 IU/L (ref 11–54)
ALT SERPL-CCNC: 265 IU/L (ref 11–54)
ANION GAP SERPL CALC-SCNC: 12 MEQ/L (ref 3–15)
APTT PPP: 25 SEC (ref 23–35)
AST SERPL-CCNC: 126 IU/L (ref 15–41)
AST SERPL-CCNC: 126 IU/L (ref 15–41)
BACTERIA URNS QL MICRO: ABNORMAL /HPF
BASOPHILS # BLD: 0.02 K/UL (ref 0.01–0.1)
BASOPHILS NFR BLD: 0.5 %
BILIRUB DIRECT SERPL-MCNC: 0.3 MG/DL
BILIRUB SERPL-MCNC: 0.9 MG/DL (ref 0.3–1.2)
BILIRUB SERPL-MCNC: 0.9 MG/DL (ref 0.3–1.2)
BILIRUB UR QL STRIP.AUTO: NEGATIVE MG/DL
BUN SERPL-MCNC: <5 MG/DL (ref 8–20)
CALCIUM SERPL-MCNC: 10.1 MG/DL (ref 8.9–10.3)
CHLORIDE SERPL-SCNC: 99 MEQ/L (ref 98–109)
CLARITY UR REFRACT.AUTO: CLEAR
CO2 SERPL-SCNC: 26 MEQ/L (ref 22–32)
COLOR UR AUTO: YELLOW
CREAT SERPL-MCNC: 0.6 MG/DL (ref 0.6–1.1)
D DIMER PPP IA.FEU-MCNC: 0.6 UG/ML FEU (ref 0–0.5)
DIFFERENTIAL METHOD BLD: ABNORMAL
EOSINOPHIL # BLD: 0.01 K/UL (ref 0.04–0.36)
EOSINOPHIL NFR BLD: 0.2 %
ERYTHROCYTE [DISTWIDTH] IN BLOOD BY AUTOMATED COUNT: 14.6 % (ref 11.7–14.4)
GFR SERPL CREATININE-BSD FRML MDRD: >60 ML/MIN/1.73M*2
GLUCOSE SERPL-MCNC: 133 MG/DL (ref 70–99)
GLUCOSE UR STRIP.AUTO-MCNC: NEGATIVE MG/DL
HCT VFR BLDCO AUTO: 40.2 % (ref 35–45)
HGB BLD-MCNC: 13.5 G/DL (ref 11.8–15.7)
HGB UR QL STRIP.AUTO: ABNORMAL
HYALINE CASTS #/AREA URNS LPF: ABNORMAL /LPF
IMM GRANULOCYTES # BLD AUTO: 0.01 K/UL (ref 0–0.08)
IMM GRANULOCYTES NFR BLD AUTO: 0.2 %
INR PPP: 0.9
KETONES UR STRIP.AUTO-MCNC: NEGATIVE MG/DL
LDH SERPL L TO P-CCNC: 128 IU/L (ref 98–192)
LEUKOCYTE ESTERASE UR QL STRIP.AUTO: NEGATIVE
LIPASE SERPL-CCNC: 40 U/L (ref 20–51)
LYMPHOCYTES # BLD: 1.06 K/UL (ref 1.2–3.5)
LYMPHOCYTES NFR BLD: 24.7 %
MCH RBC QN AUTO: 30.5 PG (ref 28–33.2)
MCHC RBC AUTO-ENTMCNC: 33.6 G/DL (ref 32.2–35.5)
MCV RBC AUTO: 90.7 FL (ref 83–98)
MONOCYTES # BLD: 0.44 K/UL (ref 0.28–0.8)
MONOCYTES NFR BLD: 10.2 %
NEUTROPHILS # BLD: 2.76 K/UL (ref 1.7–7)
NEUTS SEG NFR BLD: 64.2 %
NITRITE UR QL STRIP.AUTO: NEGATIVE
NRBC BLD-RTO: 0 %
PDW BLD AUTO: 10.5 FL (ref 9.4–12.3)
PH UR STRIP.AUTO: 6.5 [PH]
PLATELET # BLD AUTO: 209 K/UL (ref 150–369)
POTASSIUM SERPL-SCNC: 3.6 MEQ/L (ref 3.6–5.1)
PROT SERPL-MCNC: 7.4 G/DL (ref 6–8.2)
PROT SERPL-MCNC: 7.4 G/DL (ref 6–8.2)
PROT UR QL STRIP.AUTO: NEGATIVE
PROTHROMBIN TIME: 12.1 SEC (ref 12.2–14.5)
RBC # BLD AUTO: 4.43 M/UL (ref 3.93–5.22)
RBC #/AREA URNS HPF: ABNORMAL /HPF
SARS-COV-2 RNA RESP QL NAA+PROBE: POSITIVE
SODIUM SERPL-SCNC: 137 MEQ/L (ref 136–144)
SP GR UR REFRACT.AUTO: 1
SQUAMOUS URNS QL MICRO: ABNORMAL /HPF
TROPONIN I SERPL HS-MCNC: 5 PG/ML
TROPONIN I SERPL HS-MCNC: 6 PG/ML
UROBILINOGEN UR STRIP-ACNC: 0.2 EU/DL
WBC # BLD AUTO: 4.3 K/UL (ref 3.8–10.5)
WBC #/AREA URNS HPF: ABNORMAL /HPF

## 2022-10-19 PROCEDURE — 8E0ZXY6 ISOLATION: ICD-10-PCS | Performed by: INTERNAL MEDICINE

## 2022-10-19 PROCEDURE — G0378 HOSPITAL OBSERVATION PER HR: HCPCS

## 2022-10-19 PROCEDURE — 80053 COMPREHEN METABOLIC PANEL: CPT | Performed by: EMERGENCY MEDICINE

## 2022-10-19 PROCEDURE — 84484 ASSAY OF TROPONIN QUANT: CPT

## 2022-10-19 PROCEDURE — 96374 THER/PROPH/DIAG INJ IV PUSH: CPT

## 2022-10-19 PROCEDURE — 81001 URINALYSIS AUTO W/SCOPE: CPT | Performed by: EMERGENCY MEDICINE

## 2022-10-19 PROCEDURE — 96361 HYDRATE IV INFUSION ADD-ON: CPT

## 2022-10-19 PROCEDURE — 80074 ACUTE HEPATITIS PANEL: CPT | Performed by: HOSPITALIST

## 2022-10-19 PROCEDURE — 81003 URINALYSIS AUTO W/O SCOPE: CPT

## 2022-10-19 PROCEDURE — 84484 ASSAY OF TROPONIN QUANT: CPT | Mod: 91 | Performed by: EMERGENCY MEDICINE

## 2022-10-19 PROCEDURE — 96376 TX/PRO/DX INJ SAME DRUG ADON: CPT

## 2022-10-19 PROCEDURE — 85025 COMPLETE CBC W/AUTO DIFF WBC: CPT

## 2022-10-19 PROCEDURE — 93005 ELECTROCARDIOGRAM TRACING: CPT

## 2022-10-19 PROCEDURE — 85610 PROTHROMBIN TIME: CPT | Performed by: INTERNAL MEDICINE

## 2022-10-19 PROCEDURE — 83690 ASSAY OF LIPASE: CPT | Performed by: EMERGENCY MEDICINE

## 2022-10-19 PROCEDURE — 36415 COLL VENOUS BLD VENIPUNCTURE: CPT

## 2022-10-19 PROCEDURE — 74183 MRI ABD W/O CNTR FLWD CNTR: CPT | Mod: MG

## 2022-10-19 PROCEDURE — G1004 CDSM NDSC: HCPCS

## 2022-10-19 PROCEDURE — 96375 TX/PRO/DX INJ NEW DRUG ADDON: CPT | Mod: 59

## 2022-10-19 PROCEDURE — 85379 FIBRIN DEGRADATION QUANT: CPT | Performed by: INTERNAL MEDICINE

## 2022-10-19 PROCEDURE — 84484 ASSAY OF TROPONIN QUANT: CPT | Performed by: EMERGENCY MEDICINE

## 2022-10-19 PROCEDURE — 96374 THER/PROPH/DIAG INJ IV PUSH: CPT | Mod: 59

## 2022-10-19 PROCEDURE — 82728 ASSAY OF FERRITIN: CPT | Performed by: INTERNAL MEDICINE

## 2022-10-19 PROCEDURE — 86140 C-REACTIVE PROTEIN: CPT | Performed by: INTERNAL MEDICINE

## 2022-10-19 PROCEDURE — 86704 HEP B CORE ANTIBODY TOTAL: CPT | Performed by: HOSPITALIST

## 2022-10-19 PROCEDURE — 96372 THER/PROPH/DIAG INJ SC/IM: CPT

## 2022-10-19 PROCEDURE — U0003 INFECTIOUS AGENT DETECTION BY NUCLEIC ACID (DNA OR RNA); SEVERE ACUTE RESPIRATORY SYNDROME CORONAVIRUS 2 (SARS-COV-2) (CORONAVIRUS DISEASE [COVID-19]), AMPLIFIED PROBE TECHNIQUE, MAKING USE OF HIGH THROUGHPUT TECHNOLOGIES AS DESCRIBED BY CMS-2020-01-R: HCPCS | Performed by: EMERGENCY MEDICINE

## 2022-10-19 PROCEDURE — 85730 THROMBOPLASTIN TIME PARTIAL: CPT | Performed by: INTERNAL MEDICINE

## 2022-10-19 PROCEDURE — 83615 LACTATE (LD) (LDH) ENZYME: CPT | Performed by: INTERNAL MEDICINE

## 2022-10-19 PROCEDURE — 96375 TX/PRO/DX INJ NEW DRUG ADDON: CPT

## 2022-10-19 PROCEDURE — 96372 THER/PROPH/DIAG INJ SC/IM: CPT | Mod: 59

## 2022-10-19 PROCEDURE — 1123F ACP DISCUSS/DSCN MKR DOCD: CPT | Performed by: INTERNAL MEDICINE

## 2022-10-19 PROCEDURE — 86706 HEP B SURFACE ANTIBODY: CPT | Performed by: HOSPITALIST

## 2022-10-19 PROCEDURE — 99285 EMERGENCY DEPT VISIT HI MDM: CPT | Mod: 25

## 2022-10-19 PROCEDURE — 99220 PR INITIAL OBSERVATION CARE/DAY 70 MINUTES: CPT | Mod: CR | Performed by: INTERNAL MEDICINE

## 2022-10-19 PROCEDURE — 93005 ELECTROCARDIOGRAM TRACING: CPT | Performed by: EMERGENCY MEDICINE

## 2022-10-19 PROCEDURE — 85025 COMPLETE CBC W/AUTO DIFF WBC: CPT | Performed by: EMERGENCY MEDICINE

## 2022-10-19 RX ORDER — OMEPRAZOLE 40 MG/1
1 CAPSULE, DELAYED RELEASE ORAL
COMMUNITY
Start: 2022-09-21 | End: 2023-01-26

## 2022-10-19 RX ORDER — ALBUTEROL SULFATE 90 UG/1
2 INHALANT RESPIRATORY (INHALATION) EVERY 6 HOURS PRN
Status: DISCONTINUED | OUTPATIENT
Start: 2022-10-19 | End: 2022-10-24 | Stop reason: HOSPADM

## 2022-10-19 RX ORDER — CICLOPIROX 1 G/100ML
SHAMPOO TOPICAL 2 TIMES WEEKLY
COMMUNITY
End: 2023-01-26

## 2022-10-19 RX ORDER — DIAZEPAM 5 MG/1
5 TABLET ORAL ONCE
Status: COMPLETED | OUTPATIENT
Start: 2022-10-19 | End: 2022-10-20

## 2022-10-19 RX ORDER — ENOXAPARIN SODIUM 100 MG/ML
40 INJECTION SUBCUTANEOUS
Status: DISCONTINUED | OUTPATIENT
Start: 2022-10-20 | End: 2022-10-24 | Stop reason: HOSPADM

## 2022-10-19 RX ORDER — PANTOPRAZOLE SODIUM 40 MG/1
40 TABLET, DELAYED RELEASE ORAL DAILY
Status: DISCONTINUED | OUTPATIENT
Start: 2022-10-20 | End: 2022-10-24 | Stop reason: HOSPADM

## 2022-10-19 RX ORDER — DEXTROSE 50 % IN WATER (D50W) INTRAVENOUS SYRINGE
25 AS NEEDED
Status: DISCONTINUED | OUTPATIENT
Start: 2022-10-19 | End: 2022-10-24 | Stop reason: HOSPADM

## 2022-10-19 RX ORDER — IBUPROFEN 200 MG
16-32 TABLET ORAL AS NEEDED
Status: DISCONTINUED | OUTPATIENT
Start: 2022-10-19 | End: 2022-10-24 | Stop reason: HOSPADM

## 2022-10-19 RX ORDER — HYOSCYAMINE SULFATE 0.12 MG/1
0.12 TABLET SUBLINGUAL 3 TIMES DAILY PRN
COMMUNITY
End: 2022-12-08

## 2022-10-19 RX ORDER — NAPROXEN 500 MG/1
500 TABLET ORAL DAILY PRN
COMMUNITY
End: 2022-10-24 | Stop reason: HOSPADM

## 2022-10-19 RX ORDER — HYOSCYAMINE SULFATE 0.12 MG/1
125 TABLET SUBLINGUAL 3 TIMES DAILY PRN
Status: DISCONTINUED | OUTPATIENT
Start: 2022-10-19 | End: 2022-10-24 | Stop reason: HOSPADM

## 2022-10-19 RX ORDER — NAPROXEN 500 MG/1
500 TABLET ORAL DAILY PRN
Status: DISCONTINUED | OUTPATIENT
Start: 2022-10-19 | End: 2022-10-20

## 2022-10-19 RX ORDER — DEXTROSE 40 %
15-30 GEL (GRAM) ORAL AS NEEDED
Status: DISCONTINUED | OUTPATIENT
Start: 2022-10-19 | End: 2022-10-24 | Stop reason: HOSPADM

## 2022-10-19 ASSESSMENT — ENCOUNTER SYMPTOMS
BACK PAIN: 0
WOUND: 0
ABDOMINAL PAIN: 1
VOMITING: 0
DYSURIA: 0
FEVER: 0
FLANK PAIN: 0
SHORTNESS OF BREATH: 0
COUGH: 0
NAUSEA: 0
WEAKNESS: 0

## 2022-10-19 NOTE — ED PROVIDER NOTES
Emergency Medicine Note  HPI   HISTORY OF PRESENT ILLNESS     Patient is 62-year-old female with history of Crohn's disease, status post cholecystectomy and sphincterotomy with ERCP pancreatitis in September was referred to the ED for admission.  She reports having some abdominal discomfort that is been ongoing since September.  It is a full feeling she feels in her right abdomen.  She had some outpatient blood work and her LFTs were abnormal.  She followed up with her gastroenterologist yesterday.  She had an ultrasound that showed dilated bile ducts.  She was referred to the ED to be admitted for an MRCP and possible procedure.  She denies fever, nausea, or vomiting.        History provided by:  Patient  Illness  Location:  Abdomen  Quality:  Abnormal LFTs  Severity:  Unable to specify  Onset quality:  Unable to specify  Chronicity:  Recurrent  Associated symptoms: abdominal pain and chest pain    Associated symptoms: no cough, no fever, no nausea, no shortness of breath and no vomiting          Patient History   PAST HISTORY     Reviewed from Nursing Triage:       Past Medical History:   Diagnosis Date    Breast cancer (CMS/HCC) 2007    Crohn's disease (CMS/HCC)     Dystonia     Osteomyelitis (CMS/HCC)     Thyroid nodule     Tremor        Past Surgical History:   Procedure Laterality Date    BREAST LUMPECTOMY      BREAST SURGERY      CATARACT EXTRACTION      CYST REMOVAL      throat    KNEE SURGERY      TONSILLECTOMY         Family History   Problem Relation Age of Onset    COPD Biological Mother     Alzheimer's disease Biological Father     Spina bifida Biological Brother        Social History     Tobacco Use    Smoking status: Never    Smokeless tobacco: Never   Substance Use Topics    Alcohol use: Yes    Drug use: Never         Review of Systems   REVIEW OF SYSTEMS     Review of Systems   Constitutional: Negative for fever.   Eyes: Negative for visual disturbance.   Respiratory: Negative for  cough and shortness of breath.    Cardiovascular: Positive for chest pain.   Gastrointestinal: Positive for abdominal pain. Negative for nausea and vomiting.   Genitourinary: Negative for dysuria and flank pain.   Musculoskeletal: Negative for back pain.   Skin: Negative for wound.   Neurological: Negative for weakness.   Psychiatric/Behavioral: Negative for suicidal ideas.         VITALS     ED Vitals    Date/Time Temp Pulse Resp BP SpO2 [facility]   10/19/22 1625 37 °C (98.6 °F) 83 16 126/64 98 % TE                       Physical Exam   PHYSICAL EXAM     Physical Exam  Constitutional:       Appearance: Normal appearance. She is normal weight.   HENT:      Head: Normocephalic.      Mouth/Throat:      Pharynx: Oropharynx is clear.   Eyes:      General: No scleral icterus.  Cardiovascular:      Rate and Rhythm: Normal rate.      Pulses: Normal pulses.   Pulmonary:      Effort: Pulmonary effort is normal. No respiratory distress.   Abdominal:      Tenderness: There is no abdominal tenderness. There is no guarding or rebound.   Musculoskeletal:      Cervical back: Normal range of motion.      Right lower leg: No edema.      Left lower leg: No edema.   Skin:     General: Skin is warm and dry.   Neurological:      General: No focal deficit present.      Mental Status: She is alert and oriented to person, place, and time. Mental status is at baseline.   Psychiatric:         Mood and Affect: Mood normal.         Behavior: Behavior normal.         Thought Content: Thought content normal.           PROCEDURES     Procedures     DATA     Results     Procedure Component Value Units Date/Time    HS Troponin I (with 2 hour reflex) [[id number]]  (Normal) Collected: 10/19/22 1756    Specimen: Blood, Venous Updated: 10/19/22 1848     High Sens Troponin I 6 pg/mL     SARS-CoV-2 (COVID-19), PCR Nasopharynx [[id number]] Collected: 10/19/22 1758    Specimen: Nasopharyngeal Swab from Nasopharynx Updated: 10/19/22 1847    Narrative:      The  following orders were created for panel order SARS-CoV-2 (COVID-19), PCR Nasopharynx.  Procedure                               Abnormality         Status                     ---------                               -----------         ------                     SARS-CoV-2 (COVID-19), P...[[id number]]                      In process                   Please view results for these tests on the individual orders.    SARS-CoV-2 (COVID-19), PCR Nasopharynx [[id number]] Collected: 10/19/22 1758    Specimen: Nasopharyngeal Swab from Nasopharynx Updated: 10/19/22 1847    UA with reflex culture [[id number]]  (Abnormal) Collected: 10/19/22 1806    Specimen: Urine, Clean Catch Updated: 10/19/22 1839    Narrative:      The following orders were created for panel order UA with reflex culture.  Procedure                               Abnormality         Status                     ---------                               -----------         ------                     UA Reflex to Culture (Ma...[[id number]]  Abnormal            Final result               UA Microscopic[[id number]]               Abnormal            Final result                 Please view results for these tests on the individual orders.    UA Microscopic [[id number]]  (Abnormal) Collected: 10/19/22 1806    Specimen: Urine, Clean Catch Updated: 10/19/22 1839     RBC, Urine 0 TO 4 /HPF      WBC, Urine 0 TO 3 /HPF      Squamous Epithelial Rare /hpf      Hyaline Cast None Seen /lpf      Bacteria, Urine Rare /HPF     UA Reflex to Culture (Macroscopic) [[id number]]  (Abnormal) Collected: 10/19/22 1806    Specimen: Urine, Clean Catch Updated: 10/19/22 1833     Color, Urine Yellow     Clarity, Urine Clear     Specific Gravity, Urine 1.005     pH, Urine 6.5     Leukocyte Esterase Negative     Comment: Results can be falsely negative due to high specific gravity, some antibiotics, glucose >3 g/dl, or WBC other than neutrophils.        Nitrite, Urine Negative     Protein, Urine  Negative     Glucose, Urine Negative mg/dL      Ketones, Urine Negative mg/dL      Urobilinogen, Urine 0.2 EU/dL      Bilirubin, Urine Negative mg/dL      Blood, Urine Trace     Comment: The sensitivity of the occult blood test is equivalent to approximately 4 intact RBC/HPF.       CBC and differential [[id number]]  (Abnormal) Collected: 10/19/22 1756    Specimen: Blood, Venous Updated: 10/19/22 1823     WBC 4.30 K/uL      RBC 4.43 M/uL      Hemoglobin 13.5 g/dL      Hematocrit 40.2 %      MCV 90.7 fL      MCH 30.5 pg      MCHC 33.6 g/dL      RDW 14.6 %      Platelets 209 K/uL      MPV 10.5 fL      Differential Type Auto     nRBC 0.0 %      Immature Granulocytes 0.2 %      Neutrophils 64.2 %      Lymphocytes 24.7 %      Monocytes 10.2 %      Eosinophils 0.2 %      Basophils 0.5 %      Immature Granulocytes, Absolute 0.01 K/uL      Neutrophils, Absolute 2.76 K/uL      Lymphocytes, Absolute 1.06 K/uL      Monocytes, Absolute 0.44 K/uL      Eosinophils, Absolute 0.01 K/uL      Basophils, Absolute 0.02 K/uL     RAINBOW LT BLUE [[id number]] Collected: 10/19/22 1756    Specimen: Blood, Venous Updated: 10/19/22 1807    Comprehensive metabolic panel [[id number]] Collected: 10/19/22 1756    Specimen: Blood, Venous Updated: 10/19/22 1807    Hepatic function panel [[id number]] Collected: 10/19/22 1756    Specimen: Blood, Venous Updated: 10/19/22 1807    [place] Draw Panel [[id number]] Collected: 10/19/22 1756    Specimen: Blood, Venous Updated: 10/19/22 1807    Narrative:      The following orders were created for panel order [place] Draw Panel.  Procedure                               Abnormality         Status                     ---------                               -----------         ------                     RAINBOW PINK[[id number]]                                     In process                 RAINBOW LT BLUE[[id number]]                                  In process                   Please view results for these tests on the  individual orders.    RAINBOW PINK [[id number]] Collected: 10/19/22 1756    Specimen: Blood, Venous Updated: 10/19/22 1807          Imaging Results    None         ECG 12 lead             Scoring tools                                  ED Course & MDM   MDM / ED COURSE / CLINICAL IMPRESSION / DISPO     MDM    ED Course as of 10/19/22 2334   Wed Oct 19, 2022   1953 DW gastroenterology. Get MRCP tonight and admit. Pt stable.  Do not suspect sepsis at this time. [DP]   2042 Discussed with hospitalist. [DP]   2334 I ordered the MRCP and admitting team will follow up the results. [DP]      ED Course User Index  [DP] Loco Hayden MD     Clinical Impression      None              Loco Hayden MD  10/19/22 2334

## 2022-10-20 ENCOUNTER — ANESTHESIA EVENT (OUTPATIENT)
Dept: ENDOSCOPY | Facility: HOSPITAL | Age: 62
Setting detail: OBSERVATION
DRG: 444 | End: 2022-10-20
Payer: COMMERCIAL

## 2022-10-20 PROBLEM — K80.50 CHOLEDOCHOLITHIASIS: Status: RESOLVED | Noted: 2022-10-19 | Resolved: 2022-10-20

## 2022-10-20 PROBLEM — K83.8 COMMON BILE DUCT DILATATION: Status: ACTIVE | Noted: 2022-10-20

## 2022-10-20 PROBLEM — R74.8 ELEVATED LIVER ENZYMES: Status: ACTIVE | Noted: 2022-10-19

## 2022-10-20 PROBLEM — R74.8 ELEVATED LIVER ENZYMES: Status: RESOLVED | Noted: 2022-10-19 | Resolved: 2022-10-20

## 2022-10-20 PROBLEM — K80.50 CHOLEDOCHOLITHIASIS: Status: ACTIVE | Noted: 2022-10-19

## 2022-10-20 LAB
ALBUMIN SERPL-MCNC: 3 G/DL (ref 3.4–5)
ALBUMIN SERPL-MCNC: 3.4 G/DL (ref 3.4–5)
ALP SERPL-CCNC: 628 IU/L (ref 35–126)
ALP SERPL-CCNC: 781 IU/L (ref 35–126)
ALT SERPL-CCNC: 238 IU/L (ref 11–54)
ALT SERPL-CCNC: 270 IU/L (ref 11–54)
ANION GAP SERPL CALC-SCNC: 14 MEQ/L (ref 3–15)
ANION GAP SERPL CALC-SCNC: 8 MEQ/L (ref 3–15)
AST SERPL-CCNC: 156 IU/L (ref 15–41)
AST SERPL-CCNC: 184 IU/L (ref 15–41)
ATRIAL RATE: 83
BASOPHILS # BLD: 0.06 K/UL (ref 0.01–0.1)
BASOPHILS NFR BLD: 2 %
BILIRUB SERPL-MCNC: 1.4 MG/DL (ref 0.3–1.2)
BILIRUB SERPL-MCNC: 2.1 MG/DL (ref 0.3–1.2)
BUN SERPL-MCNC: 9 MG/DL (ref 8–20)
BUN SERPL-MCNC: <5 MG/DL (ref 8–20)
CALCIUM SERPL-MCNC: 9.4 MG/DL (ref 8.9–10.3)
CALCIUM SERPL-MCNC: 9.5 MG/DL (ref 8.9–10.3)
CHLORIDE SERPL-SCNC: 104 MEQ/L (ref 98–109)
CHLORIDE SERPL-SCNC: 105 MEQ/L (ref 98–109)
CK SERPL-CCNC: 29 U/L (ref 15–200)
CO2 SERPL-SCNC: 20 MEQ/L (ref 22–32)
CO2 SERPL-SCNC: 26 MEQ/L (ref 22–32)
CREAT SERPL-MCNC: 0.5 MG/DL (ref 0.6–1.1)
CREAT SERPL-MCNC: 0.7 MG/DL (ref 0.6–1.1)
CRP SERPL-MCNC: 8.52 MG/L
DIFFERENTIAL METHOD BLD: ABNORMAL
EOSINOPHIL # BLD: 0.09 K/UL (ref 0.04–0.36)
EOSINOPHIL NFR BLD: 3 %
ERYTHROCYTE [DISTWIDTH] IN BLOOD BY AUTOMATED COUNT: 14.2 % (ref 11.7–14.4)
ERYTHROCYTE [DISTWIDTH] IN BLOOD BY AUTOMATED COUNT: 14.5 % (ref 11.7–14.4)
FERRITIN SERPL-MCNC: 524 NG/ML (ref 11–250)
GFR SERPL CREATININE-BSD FRML MDRD: >60 ML/MIN/1.73M*2
GFR SERPL CREATININE-BSD FRML MDRD: >60 ML/MIN/1.73M*2
GLUCOSE SERPL-MCNC: 113 MG/DL (ref 70–99)
GLUCOSE SERPL-MCNC: 124 MG/DL (ref 70–99)
HAV IGM SER QL: NONREACTIVE
HBV CORE AB SER QL: NONREACTIVE
HBV CORE IGM SER QL: NONREACTIVE
HBV SURFACE AB SER QL: NONREACTIVE
HBV SURFACE AG SER QL: NONREACTIVE
HCT VFR BLDCO AUTO: 35.8 % (ref 35–45)
HCT VFR BLDCO AUTO: 40.2 % (ref 35–45)
HCV AB SER QL: NONREACTIVE
HGB BLD-MCNC: 11.9 G/DL (ref 11.8–15.7)
HGB BLD-MCNC: 14 G/DL (ref 11.8–15.7)
HYPOCHROMIA BLD QL SMEAR: ABNORMAL
LDH SERPL L TO P-CCNC: 159 IU/L (ref 98–192)
LYMPHOCYTES # BLD: 1.26 K/UL (ref 1.2–3.5)
LYMPHOCYTES NFR BLD: 42 %
MAGNESIUM SERPL-MCNC: 1.8 MG/DL (ref 1.8–2.5)
MCH RBC QN AUTO: 30.2 PG (ref 28–33.2)
MCH RBC QN AUTO: 31.7 PG (ref 28–33.2)
MCHC RBC AUTO-ENTMCNC: 33.2 G/DL (ref 32.2–35.5)
MCHC RBC AUTO-ENTMCNC: 34.8 G/DL (ref 32.2–35.5)
MCV RBC AUTO: 90.9 FL (ref 83–98)
MCV RBC AUTO: 91.2 FL (ref 83–98)
MONOCYTES # BLD: 0.51 K/UL (ref 0.28–0.8)
MONOCYTES NFR BLD: 17 %
NEUTS BAND # BLD: 0.78 K/UL (ref 1.7–7)
NEUTS SEG NFR BLD: 26 %
OVALOCYTES BLD QL SMEAR: ABNORMAL
P AXIS: 74
PDW BLD AUTO: 10.4 FL (ref 9.4–12.3)
PDW BLD AUTO: 10.9 FL (ref 9.4–12.3)
PLATELET # BLD AUTO: 198 K/UL (ref 150–369)
PLATELET # BLD AUTO: 207 K/UL (ref 150–369)
PLATELET # BLD EST: ABNORMAL 10*3/UL
PLATELET CLUMP BLD QL SMEAR: PRESENT
POTASSIUM SERPL-SCNC: 3.8 MEQ/L (ref 3.6–5.1)
POTASSIUM SERPL-SCNC: 4.1 MEQ/L (ref 3.6–5.1)
PR INTERVAL: 140
PROT SERPL-MCNC: 6 G/DL (ref 6–8.2)
PROT SERPL-MCNC: 6.2 G/DL (ref 6–8.2)
QRS DURATION: 76
QT INTERVAL: 358
QTC CALCULATION(BAZETT): 420
R AXIS: 82
RBC # BLD AUTO: 3.94 M/UL (ref 3.93–5.22)
RBC # BLD AUTO: 4.41 M/UL (ref 3.93–5.22)
SODIUM SERPL-SCNC: 138 MEQ/L (ref 136–144)
SODIUM SERPL-SCNC: 139 MEQ/L (ref 136–144)
T WAVE AXIS: 57
T4 FREE SERPL-MCNC: 1.18 NG/DL (ref 0.58–1.64)
TSH SERPL DL<=0.05 MIU/L-ACNC: 0.31 MIU/L (ref 0.34–5.6)
VARIANT LYMPHS # BLD MANUAL: 0.3 K/UL
VARIANT LYMPHS NFR BLD: 10 %
VENTRICULAR RATE: 83
WBC # BLD AUTO: 3 K/UL (ref 3.8–10.5)
WBC # BLD AUTO: 6.49 K/UL (ref 3.8–10.5)

## 2022-10-20 PROCEDURE — 63600000 HC DRUGS/DETAIL CODE: Performed by: STUDENT IN AN ORGANIZED HEALTH CARE EDUCATION/TRAINING PROGRAM

## 2022-10-20 PROCEDURE — 63600000 HC DRUGS/DETAIL CODE: Performed by: INTERNAL MEDICINE

## 2022-10-20 PROCEDURE — 36415 COLL VENOUS BLD VENIPUNCTURE: CPT | Performed by: INTERNAL MEDICINE

## 2022-10-20 PROCEDURE — 99226 PR SBSQ OBSERVATION CARE/DAY 35 MINUTES: CPT | Performed by: STUDENT IN AN ORGANIZED HEALTH CARE EDUCATION/TRAINING PROGRAM

## 2022-10-20 PROCEDURE — 83615 LACTATE (LD) (LDH) ENZYME: CPT | Performed by: HOSPITALIST

## 2022-10-20 PROCEDURE — 0F7D8DZ DILATION OF PANCREATIC DUCT WITH INTRALUMINAL DEVICE, VIA NATURAL OR ARTIFICIAL OPENING ENDOSCOPIC: ICD-10-PCS | Performed by: INTERNAL MEDICINE

## 2022-10-20 PROCEDURE — G0378 HOSPITAL OBSERVATION PER HR: HCPCS

## 2022-10-20 PROCEDURE — 25800000 HC PHARMACY IV SOLUTIONS: Performed by: ANESTHESIOLOGY

## 2022-10-20 PROCEDURE — 63700000 HC SELF-ADMINISTRABLE DRUG: Performed by: INTERNAL MEDICINE

## 2022-10-20 PROCEDURE — 63600000 HC DRUGS/DETAIL CODE: Performed by: ANESTHESIOLOGY

## 2022-10-20 PROCEDURE — 63700000 HC SELF-ADMINISTRABLE DRUG: Performed by: EMERGENCY MEDICINE

## 2022-10-20 PROCEDURE — 99214 OFFICE O/P EST MOD 30 MIN: CPT | Mod: 25 | Performed by: INTERNAL MEDICINE

## 2022-10-20 PROCEDURE — 25000000 HC PHARMACY GENERAL: Performed by: SPECIALIST

## 2022-10-20 PROCEDURE — 71000001 HC PACU PHASE 1 INITIAL 30MIN: Performed by: INTERNAL MEDICINE

## 2022-10-20 PROCEDURE — 25000000 HC PHARMACY GENERAL: Performed by: ANESTHESIOLOGY

## 2022-10-20 PROCEDURE — 75000091 HC ERCP DUCT STENT PLACE EACH: Performed by: INTERNAL MEDICINE

## 2022-10-20 PROCEDURE — 27200000 HC STERILE SUPPLY: Performed by: INTERNAL MEDICINE

## 2022-10-20 PROCEDURE — C1727 CATH, BAL TIS DIS, NON-VAS: HCPCS | Performed by: INTERNAL MEDICINE

## 2022-10-20 PROCEDURE — 43274 ERCP DUCT STENT PLACEMENT: CPT | Mod: 22 | Performed by: INTERNAL MEDICINE

## 2022-10-20 PROCEDURE — 37000001 HC ANESTHESIA GENERAL: Performed by: INTERNAL MEDICINE

## 2022-10-20 PROCEDURE — 88173 CYTOPATH EVAL FNA REPORT: CPT | Performed by: INTERNAL MEDICINE

## 2022-10-20 PROCEDURE — 43242 EGD US FINE NEEDLE BX/ASPIR: CPT | Performed by: INTERNAL MEDICINE

## 2022-10-20 PROCEDURE — 88305 TISSUE EXAM BY PATHOLOGIST: CPT | Performed by: INTERNAL MEDICINE

## 2022-10-20 PROCEDURE — 0FPD8DZ REMOVAL OF INTRALUMINAL DEVICE FROM PANCREATIC DUCT, VIA NATURAL OR ARTIFICIAL OPENING ENDOSCOPIC: ICD-10-PCS | Performed by: INTERNAL MEDICINE

## 2022-10-20 PROCEDURE — C1726 CATH, BAL DIL, NON-VASCULAR: HCPCS | Performed by: INTERNAL MEDICINE

## 2022-10-20 PROCEDURE — 63700000 HC SELF-ADMINISTRABLE DRUG: Performed by: STUDENT IN AN ORGANIZED HEALTH CARE EDUCATION/TRAINING PROGRAM

## 2022-10-20 PROCEDURE — 85027 COMPLETE CBC AUTOMATED: CPT | Performed by: INTERNAL MEDICINE

## 2022-10-20 PROCEDURE — 93010 ELECTROCARDIOGRAM REPORT: CPT | Performed by: INTERNAL MEDICINE

## 2022-10-20 PROCEDURE — A9585 GADOBUTROL INJECTION: HCPCS | Performed by: INTERNAL MEDICINE

## 2022-10-20 PROCEDURE — 71000011 HC PACU PHASE 1 EA ADDL MIN: Performed by: INTERNAL MEDICINE

## 2022-10-20 PROCEDURE — C1769 GUIDE WIRE: HCPCS | Performed by: INTERNAL MEDICINE

## 2022-10-20 PROCEDURE — 75000047 HC EG FLEX FB REMOVE: Performed by: INTERNAL MEDICINE

## 2022-10-20 PROCEDURE — BF11YZZ FLUOROSCOPY OF BILIARY AND PANCREATIC DUCTS USING OTHER CONTRAST: ICD-10-PCS | Performed by: INTERNAL MEDICINE

## 2022-10-20 PROCEDURE — 80053 COMPREHEN METABOLIC PANEL: CPT | Performed by: INTERNAL MEDICINE

## 2022-10-20 PROCEDURE — 84443 ASSAY THYROID STIM HORMONE: CPT | Performed by: HOSPITALIST

## 2022-10-20 PROCEDURE — 84439 ASSAY OF FREE THYROXINE: CPT | Performed by: HOSPITALIST

## 2022-10-20 PROCEDURE — 75000076 HC EGD US FNA BIOPSY: Performed by: INTERNAL MEDICINE

## 2022-10-20 PROCEDURE — C1874 STENT, COATED/COV W/DEL SYS: HCPCS | Performed by: INTERNAL MEDICINE

## 2022-10-20 PROCEDURE — 85025 COMPLETE CBC W/AUTO DIFF WBC: CPT | Performed by: INTERNAL MEDICINE

## 2022-10-20 PROCEDURE — 83735 ASSAY OF MAGNESIUM: CPT | Performed by: STUDENT IN AN ORGANIZED HEALTH CARE EDUCATION/TRAINING PROGRAM

## 2022-10-20 PROCEDURE — 83690 ASSAY OF LIPASE: CPT | Performed by: INTERNAL MEDICINE

## 2022-10-20 PROCEDURE — 82550 ASSAY OF CK (CPK): CPT | Performed by: HOSPITALIST

## 2022-10-20 PROCEDURE — 3E0333Z INTRODUCTION OF ANTI-INFLAMMATORY INTO PERIPHERAL VEIN, PERCUTANEOUS APPROACH: ICD-10-PCS | Performed by: INTERNAL MEDICINE

## 2022-10-20 DEVICE — STENT SYSTEM RMV
Type: IMPLANTABLE DEVICE | Status: NON-FUNCTIONAL
Brand: WALLFLEX BILIARY
Removed: 2022-12-15

## 2022-10-20 RX ORDER — SODIUM CHLORIDE 9 MG/ML
INJECTION, SOLUTION INTRAVENOUS CONTINUOUS PRN
Status: DISCONTINUED | OUTPATIENT
Start: 2022-10-20 | End: 2022-10-20 | Stop reason: SURG

## 2022-10-20 RX ORDER — DEXAMETHASONE SODIUM PHOSPHATE 4 MG/ML
INJECTION, SOLUTION INTRA-ARTICULAR; INTRALESIONAL; INTRAMUSCULAR; INTRAVENOUS; SOFT TISSUE AS NEEDED
Status: DISCONTINUED | OUTPATIENT
Start: 2022-10-20 | End: 2022-10-20 | Stop reason: SURG

## 2022-10-20 RX ORDER — GADOBUTROL 604.72 MG/ML
0.1 INJECTION INTRAVENOUS ONCE
Status: COMPLETED | OUTPATIENT
Start: 2022-10-20 | End: 2022-10-20

## 2022-10-20 RX ORDER — MORPHINE SULFATE 2 MG/ML
3 INJECTION, SOLUTION INTRAMUSCULAR; INTRAVENOUS
Status: DISCONTINUED | OUTPATIENT
Start: 2022-10-20 | End: 2022-10-23

## 2022-10-20 RX ORDER — POTASSIUM CHLORIDE 750 MG/1
20 TABLET, EXTENDED RELEASE ORAL ONCE
Status: COMPLETED | OUTPATIENT
Start: 2022-10-20 | End: 2022-10-20

## 2022-10-20 RX ORDER — PHENYLEPHRINE HCL IN 0.9% NACL 1 MG/10 ML
SYRINGE (ML) INTRAVENOUS AS NEEDED
Status: DISCONTINUED | OUTPATIENT
Start: 2022-10-20 | End: 2022-10-20 | Stop reason: SURG

## 2022-10-20 RX ORDER — MORPHINE SULFATE 2 MG/ML
2 INJECTION, SOLUTION INTRAMUSCULAR; INTRAVENOUS ONCE
Status: COMPLETED | OUTPATIENT
Start: 2022-10-20 | End: 2022-10-20

## 2022-10-20 RX ORDER — PROPOFOL 200MG/20ML
SYRINGE (ML) INTRAVENOUS AS NEEDED
Status: DISCONTINUED | OUTPATIENT
Start: 2022-10-20 | End: 2022-10-20 | Stop reason: SURG

## 2022-10-20 RX ORDER — DIAZEPAM 5 MG/1
TABLET ORAL
Status: DISPENSED
Start: 2022-10-20 | End: 2022-10-20

## 2022-10-20 RX ORDER — MORPHINE SULFATE 2 MG/ML
2 INJECTION, SOLUTION INTRAMUSCULAR; INTRAVENOUS
Status: DISCONTINUED | OUTPATIENT
Start: 2022-10-20 | End: 2022-10-20

## 2022-10-20 RX ORDER — SODIUM CHLORIDE, SODIUM LACTATE, POTASSIUM CHLORIDE, CALCIUM CHLORIDE 600; 310; 30; 20 MG/100ML; MG/100ML; MG/100ML; MG/100ML
INJECTION, SOLUTION INTRAVENOUS CONTINUOUS
Status: DISCONTINUED | OUTPATIENT
Start: 2022-10-20 | End: 2022-10-21

## 2022-10-20 RX ORDER — ONDANSETRON HYDROCHLORIDE 2 MG/ML
INJECTION, SOLUTION INTRAVENOUS AS NEEDED
Status: DISCONTINUED | OUTPATIENT
Start: 2022-10-20 | End: 2022-10-20 | Stop reason: SURG

## 2022-10-20 RX ORDER — FENTANYL CITRATE 50 UG/ML
INJECTION, SOLUTION INTRAMUSCULAR; INTRAVENOUS AS NEEDED
Status: DISCONTINUED | OUTPATIENT
Start: 2022-10-20 | End: 2022-10-20 | Stop reason: SURG

## 2022-10-20 RX ORDER — GLYCOPYRROLATE 0.6MG/3ML
SYRINGE (ML) INTRAVENOUS AS NEEDED
Status: DISCONTINUED | OUTPATIENT
Start: 2022-10-20 | End: 2022-10-20 | Stop reason: SURG

## 2022-10-20 RX ORDER — TRAMADOL HYDROCHLORIDE 50 MG/1
25 TABLET ORAL EVERY 6 HOURS PRN
Status: DISCONTINUED | OUTPATIENT
Start: 2022-10-20 | End: 2022-10-20

## 2022-10-20 RX ORDER — ONDANSETRON HYDROCHLORIDE 2 MG/ML
4 INJECTION, SOLUTION INTRAVENOUS EVERY 6 HOURS PRN
Status: DISCONTINUED | OUTPATIENT
Start: 2022-10-20 | End: 2022-10-24 | Stop reason: HOSPADM

## 2022-10-20 RX ORDER — LIDOCAINE HYDROCHLORIDE 10 MG/ML
INJECTION, SOLUTION EPIDURAL; INFILTRATION; INTRACAUDAL; PERINEURAL AS NEEDED
Status: DISCONTINUED | OUTPATIENT
Start: 2022-10-20 | End: 2022-10-20 | Stop reason: SURG

## 2022-10-20 RX ADMIN — DIAZEPAM 5 MG: 5 TABLET ORAL at 00:03

## 2022-10-20 RX ADMIN — Medication 100 MCG: at 17:45

## 2022-10-20 RX ADMIN — SODIUM CHLORIDE, POTASSIUM CHLORIDE, SODIUM LACTATE AND CALCIUM CHLORIDE: 600; 310; 30; 20 INJECTION, SOLUTION INTRAVENOUS at 22:57

## 2022-10-20 RX ADMIN — FENTANYL CITRATE 100 MCG: 50 INJECTION, SOLUTION INTRAMUSCULAR; INTRAVENOUS at 16:54

## 2022-10-20 RX ADMIN — ONDANSETRON HYDROCHLORIDE 4 MG: 2 SOLUTION INTRAMUSCULAR; INTRAVENOUS at 17:40

## 2022-10-20 RX ADMIN — GLYCOPYRROLATE 0.2 MG: 0.2 INJECTION, SOLUTION INTRAMUSCULAR; INTRAVITREAL at 17:00

## 2022-10-20 RX ADMIN — GADOBUTROL 7.6 MMOL: 604.72 INJECTION INTRAVENOUS at 00:53

## 2022-10-20 RX ADMIN — SUCCINYLCHOLINE CHLORIDE 100 MG: 20 INJECTION, SOLUTION INTRAMUSCULAR; INTRAVENOUS; PARENTERAL at 16:54

## 2022-10-20 RX ADMIN — Medication 100 MCG: at 17:50

## 2022-10-20 RX ADMIN — DEXAMETHASONE SODIUM PHOSPHATE 4 MG: 4 INJECTION, SOLUTION INTRAMUSCULAR; INTRAVENOUS at 17:41

## 2022-10-20 RX ADMIN — DEXAMETHASONE SODIUM PHOSPHATE 4 MG: 4 INJECTION, SOLUTION INTRAMUSCULAR; INTRAVENOUS at 17:21

## 2022-10-20 RX ADMIN — POTASSIUM CHLORIDE 20 MEQ: 750 TABLET, EXTENDED RELEASE ORAL at 08:32

## 2022-10-20 RX ADMIN — ONDANSETRON HYDROCHLORIDE 4 MG: 2 SOLUTION INTRAMUSCULAR; INTRAVENOUS at 17:21

## 2022-10-20 RX ADMIN — TRAMADOL HYDROCHLORIDE 25 MG: 50 TABLET, COATED ORAL at 21:34

## 2022-10-20 RX ADMIN — ENOXAPARIN SODIUM 40 MG: 40 INJECTION SUBCUTANEOUS at 21:21

## 2022-10-20 RX ADMIN — PROPOFOL 200 MG: 10 INJECTION, EMULSION INTRAVENOUS at 16:54

## 2022-10-20 RX ADMIN — MAGNESIUM SULFATE HEPTAHYDRATE 2 G: 40 INJECTION, SOLUTION INTRAVENOUS at 13:58

## 2022-10-20 RX ADMIN — MORPHINE SULFATE 2 MG: 2 INJECTION, SOLUTION INTRAMUSCULAR; INTRAVENOUS at 22:56

## 2022-10-20 RX ADMIN — PANTOPRAZOLE SODIUM 40 MG: 40 TABLET, DELAYED RELEASE ORAL at 08:32

## 2022-10-20 RX ADMIN — PROPOFOL 50 MG: 10 INJECTION, EMULSION INTRAVENOUS at 17:00

## 2022-10-20 RX ADMIN — LIDOCAINE HYDROCHLORIDE 5 ML: 10 INJECTION, SOLUTION EPIDURAL; INFILTRATION; INTRACAUDAL; PERINEURAL at 16:54

## 2022-10-20 RX ADMIN — BENZOCAINE AND MENTHOL 1 LOZENGE: 15; 3.6 LOZENGE ORAL at 21:21

## 2022-10-20 RX ADMIN — ONDANSETRON HYDROCHLORIDE 4 MG: 2 INJECTION, SOLUTION INTRAMUSCULAR; INTRAVENOUS at 23:16

## 2022-10-20 RX ADMIN — SODIUM CHLORIDE: 9 INJECTION, SOLUTION INTRAVENOUS at 16:47

## 2022-10-20 RX ADMIN — SUCCINYLCHOLINE CHLORIDE 60 MG: 20 INJECTION, SOLUTION INTRAMUSCULAR; INTRAVENOUS; PARENTERAL at 17:00

## 2022-10-20 RX ADMIN — MORPHINE SULFATE 2 MG: 2 INJECTION, SOLUTION INTRAMUSCULAR; INTRAVENOUS at 22:08

## 2022-10-20 NOTE — ANESTHESIA PREPROCEDURE EVALUATION
Relevant Problems   URINARY SYSTEM   (+) Hypokalemia   (+) Hyponatremia      Other   (+) COVID-19     Past Medical History:   Diagnosis Date    Breast cancer (CMS/HCC) 2007    Crohn's disease (CMS/HCC)     Dystonia     Osteomyelitis (CMS/HCC)     Thyroid nodule     Tremor        Anesthesia ROS/MED HX      Pulmonary    Pneumonia  Renal Disease   electrolyte problem  Endo/Other  History of cancer and breast cancer       Past Surgical History:   Procedure Laterality Date    BREAST LUMPECTOMY      BREAST SURGERY      CATARACT EXTRACTION      CYST REMOVAL      throat    KNEE SURGERY      TONSILLECTOMY         Physical Exam    Airway   Mallampati: III   TM distance: >3 FB   Neck ROM: full  Cardiovascular    Rhythm: regular   Rate: normalPulmonary    Decreased breath sounds        Anesthesia Plan    Plan: general    Technique: general endotracheal     Airway: oral intubation, direct visual laryngoscopy and video laryngoscope   ASA 3  Anesthetic plan and risks discussed with: patient  Induction:    intravenous   Postop Plan:   Patient Disposition: phase II then home   Pain Management: IV analgesics

## 2022-10-20 NOTE — ASSESSMENT & PLAN NOTE
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 Miguel Riojas (GI)

## 2022-10-20 NOTE — PROGRESS NOTES
Spoke with patient and confirmed with medication list from [facility] and/or patient's own pharmacy to complete the medication reconciliation.     Prior to admission medication list:    Current Outpatient Medications:     ciclopirox (LOPROX) 1 % shampoo, Apply topically 2 (two) times a week (Mon, Thu). gently massage into affected areas and surrounding skin    hyoscyamine (LEVSIN) 0.125 mg SL tablet, Place 0.125 mg under the tongue 3 (three) times a day as needed for cramping.    naproxen (NAPROSYN) 500 mg tablet, Take 500 mg by mouth daily as needed.    omeprazole (PriLOSEC) 40 mg capsule, Take 1 capsule by mouth once daily.    Comments about home medications:  -Patient states she doesn't recall taking pantoprazole, despite [facility] stating it was filled on 9/16/22.  -Patient states she finished course of cephalexin on 9/18/22.      Compliance:   -questionable compliance based on patient interview.

## 2022-10-20 NOTE — ANESTHESIOLOGIST PRE-PROCEDURE ATTESTATION
Pre-Procedure Patient Identification:  I am the Primary Anesthesiologist and have identified the patient on 10/20/22 at 4:44 PM.   I have confirmed the procedure(s) will be performed by the following surgeon/proceduralist Perry Tenorio MD.

## 2022-10-20 NOTE — ANESTHESIA PROCEDURE NOTES
Airway  Start Time: 10/20/2022 4:51 PM  Stop Time: 10/20/2022 4:52 PM      General Information and Staff    Patient location: GI.    Indications and Patient Condition  Indications for airway management: anesthesia  Sedation level: general  Preoxygenated: yes  Patient position: sniffing  Mask difficulty assessment: 0 - not attempted    Final Airway Details  Final airway type: endotracheal airway      Successful airway: ETT  Cuffed: yes   Successful intubation technique: video laryngoscopy  Facilitating devices/methods: intubating stylet  Endotracheal tube insertion site: oral  Blade size: #4  ETT size (mm): 7.0  Cormack-Lehane Classification: grade I - full view of glottis  Placement verified by: chest auscultation and capnometry   Measured from: lips (22)  Number of attempts at approach: 1  Number of other approaches attempted: 0  Atraumatic intubation: Bite block by GI.

## 2022-10-20 NOTE — CONSULTS
"     Gastroenterology  Consultation Note       REASON FOR CONSULT   \"please see for abdominal pain with transaminitis and abnormal U abdomen with intra and extra hepatic duct dilation\"    Consulting Physician: Dr. Kelsy Esquivel   HISTORY OF PRESENT ILLNESS      This is a 62 y.o. 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 GI consulted for \"please see for abdominal pain with transaminitis and abnormal U abdomen with intra and extra hepatic duct dilation\".    8/1/2022 MRI/MRCP [facility].  There is extrahepatic ductal dilatation which is increased in diameter since previous study.  There is abrupt tapering at the level of the ampulla.  The causes not otherwise identified on the study.  There is no evidence of choledocholithiasis.    9/9/2022 EUS: Dilated bile duct patent status postcholecystectomy. No evidence for choledocholithiasis, ampullary mass, pancreatic mass or abnormal lymph nodes.     9/9/2022 ERCP: Prominent common bile duct. The presumptive diagnosis is either papillary stenosis or sphincter of Oddi dysfunction. Treated with 10mm biliary sphincterotomy.    9/9/22 CTAP with IV: 1. Pneumobilia, likely related to patient's recent surgery/procedure. 2. Left adnexal cyst measuring 2.3 cm. The pancreas appears normal.  Common bile duct is dilated measuring 1 cm.  The gallbladder surgically absent.     9/22/22 CTA chest: No evidence of pulmonary embolism. " Small left pleural effusion. Tiny 2 mm right peripheral visual nodule.     9/27/22 MRE: No definite abnormality seen in the small bowel. Status post cholecystectomy. 2.2 cm left adnexal cyst.    10/18/22 US ABD: There is new intra and extrahepatic biliary ductal dilatation. The common bile duct measures up to 1.7 cm. It measured up to 1.1cm on the MRI dated September 27, 2022. The gallbladder is surgically absent.        She's been struggling with intermittent upper abdominal pain for months now, with only mild improvement since ERCP, though recurrent episodes with eating. Her last pain episode was 2 days ago, when she underwent an outpatient abdominal ultrasound demonstrating worsening ductal dilatation to 1.7cm (from 1.1cm on MRE several weeks ago). She denied any nausea, vomiting, fevers, chils, jaundice, though has noted anorexia and weight loss of unclear amount since her bout of post-ERCP pancreatitis. She is feeling better this morning without pain or nausea. Initial VS: T 98.6, RR 16, HR 83, /64, 98% RA. Initial work-up: Na 137, K 3.6, CO2 26, BUN/Cr <5/0.66, glc 133, AST//265, , TP 7.4, sAlb 3.7, TB 0.9/0.3, AG 12, lipase 40, ferritin 524, ldh 128, crp 8.5, WBC 4.3, Hgb 13.5/91, PLt 209, acute hep panel neg, tsh 0.31, covid pos, HBsAb negative.    PAST MEDICAL AND SURGICAL HISTORY      PMHx:  Past Medical History:   Diagnosis Date    Breast cancer (CMS/HCC) 2007    Crohn's disease (CMS/HCC)     Dystonia     Osteomyelitis (CMS/HCC)     Thyroid nodule     Tremor        PSHx:  Past Surgical History:   Procedure Laterality Date    BREAST LUMPECTOMY      BREAST SURGERY      CATARACT EXTRACTION      CYST REMOVAL      throat    KNEE SURGERY      TONSILLECTOMY         PCP:   Ankita Ho MD    MEDICATIONS      Prior to Admission medications    Medication Sig Start Date End Date Taking? Authorizing Provider   ciclopirox (LOPROX) 1 % shampoo Apply topically 2 (two) times a week  (Mon, Thu). gently massage into affected areas and surrounding skin   Yes ProviderJosh MD   hyoscyamine (LEVSIN) 0.125 mg SL tablet Place 0.125 mg under the tongue 3 (three) times a day as needed for cramping.   Yes Provider, Josh Garza MD   naproxen (NAPROSYN) 500 mg tablet Take 500 mg by mouth daily as needed.   Yes ProviderJosh MD   omeprazole (PriLOSEC) 40 mg capsule Take 1 capsule by mouth once daily. 9/21/22  Yes ProviderJosh MD       Home medications were personally reviewed.    ALLERGIES      Prednisone    FAMILY HISTORY      Family History   Problem Relation Age of Onset    COPD Biological Mother     Alzheimer's disease Biological Father     Spina bifida Biological Brother        SOCIAL HISTORY      Social History     Socioeconomic History    Marital status:      Spouse name: None    Number of children: None    Years of education: None    Highest education level: None   Tobacco Use    Smoking status: Never    Smokeless tobacco: Never   Substance and Sexual Activity    Alcohol use: Yes    Drug use: Never    Sexual activity: Defer     Social Determinants of Health     Food Insecurity: No Food Insecurity    Worried About Running Out of Food in the Last Year: Never true    Ran Out of Food in the Last Year: Never true       REVIEW OF SYSTEMS      Review of Systems   All other systems reviewed and are negative.      PHYSICAL EXAMINATION      Temp:  [36.5 °C (97.7 °F)-37 °C (98.6 °F)] 36.5 °C (97.7 °F)  Heart Rate:  [67-83] 67  Resp:  [14-18] 18  BP: ()/(51-75) 109/52  Body mass index is 23.29 kg/m².    Physical Exam    Chronically ill middle aged white female patient on RA in NAD  No scleral icterus or conjunctival injection  Dry MMs, clear oropharynx  Supple neck, full ROM  CTAB/L no iWOB on RA  RRR no m/r/g  Abdomen soft mildly distended, nontender in 4 quadrants, NABS  No significant LE edema  AOx4, no asterixis or milk-maid's  No  jaundice    LABS / IMAGING / EKG        Labs  Results from last 7 days   Lab Units 10/20/22  0612 10/19/22  1756   SODIUM mEQ/L 138 137   POTASSIUM mEQ/L 3.8 3.6   CHLORIDE mEQ/L 104 99   CO2 mEQ/L 26 26   BUN mg/dL <5* <5*   CREATININE mg/dL 0.5* 0.6   CALCIUM mg/dL 9.5 10.1   ALBUMIN g/dL 3.0* 3.7  3.7   BILIRUBIN TOTAL mg/dL 1.4* 0.9  0.9   ALK PHOS IU/L 628* 693*  693*   ALT IU/L 238* 265*  265*   AST IU/L 156* 126*  126*   GLUCOSE mg/dL 113* 133*     Results from last 7 days   Lab Units 10/19/22  1756   INR  0.9       Results from last 7 days   Lab Units 10/20/22  0612   MAGNESIUM mg/dL 1.8     Results from last 7 days   Lab Units 10/20/22  0612 10/19/22  1756   WBC K/uL 3.00* 4.30   HEMOGLOBIN g/dL 11.9 13.5   HEMATOCRIT % 35.8 40.2   PLATELETS K/uL 198 209         Imaging  MRI ABDOMEN WITH AND WITHOUT CONTRAST MRCP   Final Result   IMPRESSION:   1. Increasing intrahepatic and extrahepatic biliary duct dilatation extending to   the level the ampulla. No CBD stone or mass identified. Biliary stricture is   suspected.   2. Nonspecific heterogeneous enhancement of the liver which may be due to   biliary stasis versus nonspecific hepatitis. Correlate clinically.         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 GI consulted for \"please see for abdominal pain with transaminitis and abnormal U abdomen with intra and extra hepatic duct dilation\".    # Choledocholithiasis reported on preliminary MRI/MRCP report (T2 hypodensity in distal CBD), but later over-read as increasing intrahepatic/extrahepatic sheba-dil extending to level of ampulla, no CBD stone or mass, biliary stricture suspected  # Severe cholestatic patter liver injury    Impression: Initially had suspected choledocholithiasis based on overnight MRI/MRCP, but with overread demonstrating worsening biliary ductal dilatation with worsening cholestatic injury, now more suspicious for an ampullary pathology, including papillary stenosis / sphincter of oddi stenosis unresponsive to a 10mm biliary sphincterotomy in early 9/2022. While new MR read doesn't comment on choledocholithiasis, cannot definitively say whether or not there might not be a stone or lesion at the level of the ampulla that would be developed in the last 6 weeks since last EUS/ERCP.     Recommendations:  - Trend serial liver biochemistries, including direct bilirubin, BID for now  - Supportive care including IVF, symptom management  - Keep NPO for now pending add-on late-day ERCP later today to directly visualize and intervene upon any ampullary pathology  - Monitor fever/WBC curve, hemodynamics --> if clinically worsening or systemically toxic, low threshold to check peripheral BCxx2 and start broad spectrum empiric abx  - Discussed with Dr. Tenorio on AM attending rounds. Further recs pending endoscopic findings    1. Increasing intrahepatic and extrahepatic biliary duct dilatation extending to the level the ampulla. No CBD stone or mass identified. Biliary stricture is suspected. 2. Nonspecific heterogeneous enhancement of the liver which may be due to biliary stasis versus nonspecific hepatitis. Correlate clinically.      Final recommendations pending attending " attestation.    Sherif Cowan MD  10/20/2022 12:21 PM  Gastroenterology Fellow PGY-6  Pager [phone]          VTE Assessment: Padua VTE Score: 0  Code Status: Full Code  Estimated discharge date: 10/21/2022

## 2022-10-20 NOTE — ANESTHESIA POSTPROCEDURE EVALUATION
Patient: Sowmya Clark    Procedure Summary     Date: 10/20/22 Room / Location: [facility] (E) / [facility]    Anesthesia Start: 1647 Anesthesia Stop: 1825    Procedures:       VA EGD INTRMURAL NEEDLE ASPIR/BIOP ALTERED ANATOMY [42667 (CPT®)] (Left: Esophagus)      ENDOSCOPIC RETROGRADE CHOLANGIOPANCREATOGRAPHY WITH PLACEMENT OF STENT OF PANCREATIC DUCT AND SPHINCTERECTOMY      ENDOSCOPIC RETROGRADE CHOLANGIOPANCREATOGRAPHY WITH PLACEMENT OF STENT OF PANCREATIC DUCT AND SPHINCTERECTOMY      UPPER GASTROINTESTINAL ENDOSCOPY WITH REMOVAL OF FOREIGN BODY (Esophagus) Diagnosis:       Elevated liver enzymes      Choledocholithiasis      (Elevated liver enzymes [R74.8])      (Choledocholithiasis [K80.50])    Providers: Perry Tenorio MD Responsible Provider: Casa Toledo MD    Anesthesia Type: general ASA Status: 3          Anesthesia Type: general  PACU Vitals  10/20/2022 1820 - 10/20/2022 1828      10/20/2022  1825             BP: 136/63    Pulse: 85    SpO2: 100 %            Anesthesia Post Evaluation    Pain management: satisfactory to patient  Patient location: OR E.  Patient participation: complete - patient participated  Level of consciousness: arousable and awake  Cardiovascular status: acceptable  Airway Patency: adequate and patent  Respiratory status: nonlabored ventilation, spontaneous ventilation and face mask  Anesthetic complications: no

## 2022-10-20 NOTE — PLAN OF CARE
Problem: Adult Inpatient Plan of Care  Goal: Plan of Care Review  Flowsheets (Taken 10/20/2022 1441)  Progress: improving  Plan of Care Reviewed With: spouse  Outcome Summary: Per rounds, patient has icreased LFTs. ERCP scheduled for today. Patient also found to be Covid positive. EDI Friday vs saturday, home with no needs.     Problem: Adult Inpatient Plan of Care  Goal: Readiness for Transition of Care  Intervention: Mutually Develop Transition Plan  Flowsheets (Taken 10/20/2022 1443)  Anticipated Discharge Disposition: home without assistance or services  Equipment Needed After Discharge: none  Assistive Device/Animal Currently Used at Home: none  Anticipated Changes Related to Illness: none  Transportation Concerns: car, none  Readmission Within the Last 30 Days: no previous admission in last 30 days  Patient/Family Anticipated Services at Transition: none  Patient/Family Anticipates Transition to: home with family  Transportation Anticipated: family or friend will provide  Concerns to be Addressed:   denies needs/concerns at this time   no discharge needs identified  Patient's Choice of Community Agency(s): denies needs at this time  Offered/Gave Vendor List: no    Initial assessment completed with patient's . Patient PLOF is independent and lives  with her  and son who is at college in a 2SH w/1STE. Denies use or need of any DME or HH. PCP , Insurance, demographics, and Pharmacy verified. Patient will be able to obtain transportation at discharge. Patient is  vaccinated for Covid with JJ, no boosters.  states he is the POA.  Patient also does not have issues affording food and medications at discharge. Will continue to follow for transition of care needs until discharge is completed.

## 2022-10-20 NOTE — ASSESSMENT & PLAN NOTE
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 GI consulted for \"please see for abdominal pain with transaminitis and abnormal U abdomen with intra and extra hepatic duct dilation\".    # Choledocholithiasis reported on preliminary MRI/MRCP report (T2 hypodensity in distal CBD), but later over-read as increasing intrahepatic/extrahepatic sheba-dil extending to level of ampulla, no CBD stone or mass, biliary stricture suspected  # Severe cholestatic patter liver injury    Impression: Initially had suspected choledocholithiasis based on overnight MRI/MRCP, but with overread demonstrating worsening biliary ductal dilatation with worsening cholestatic injury, now more suspicious for an ampullary pathology, including papillary stenosis / sphincter of oddi stenosis unresponsive to a 10mm biliary sphincterotomy in early 9/2022. While new MR read doesn't comment on choledocholithiasis, cannot definitively say whether or not there might not be a stone or lesion at the level of the ampulla that would be developed in the last 6 weeks since last EUS/ERCP.     10/20/22 ERCP w EUS guided biliary access  Papillary stenosis. It does not appear to be malignant. FNA was performed. EUS guided access to the bile duct was required. The pancreatic stent was initially placed and then removed. A 10 mm x 60 mm fully covered [employer] self-expanding metal stent was placed in the bile duct for " drainage.    Post-ERCP pancreatitis managed with IVF    Recommendations:  - okay to continue low fat diet   - can decrease fluids as increases PO intake   - start miralax daily for relief of constipation   - F/u on AM labs.   - Await cytology results  - If pt able to tolerate low fat diet, and assuming labs continue to demonstrate downtrending LFTs, she can be discharged home later today.   - Plan to reevaluate ampullary region with ERCP in 6-8 weeks. Pt to call the office @ [phone] to schedule repeat ERCP.

## 2022-10-20 NOTE — ASSESSMENT & PLAN NOTE
Subacute right upper quadrant and epigastric pain  Continue hyoscyamine as needed, Protonix 40 mg daily  Given abnormal imaging findings on ultrasound scan, MRCP ordered  GI consult placed

## 2022-10-20 NOTE — ASSESSMENT & PLAN NOTE
Cholestatic liver injury on LFT's in patient presenting with subacute RUQ/epigastric abdominal pain  MRCP with increasing intrahepatic and extrahepatic biliary duct dilatation extending to the level the ampulla with biliary stricture suspected; No CBD stone or mass identified  Acute hepatitis panel negative  - ERCP 10/20 with e/o papillary stenosis (non-malignant appearing) s/p biliary stent placement  - F/u cytology results  - LFT's continue to downtrend following stent placement -- Not yet resolved, Repeat CMP in 1 week  - F/u with Dr. Tenorio and repeat ERCP in 6-8 weeks to reevaluate ampulla region

## 2022-10-20 NOTE — H&P
Hospital Medicine Service -  History & Physical        CHIEF COMPLAINT   Abdominal pain, abnormal lab findings of transaminitis, abnormal imaging findings on ultrasound scan of the abdomen     HISTORY OF PRESENT ILLNESS      Sowmya Clark is a 62 y.o. female with a past medical history of cholecystectomy, ERCP s/p biliary sphincterectomy, breast cancer, Crohn's disease who presents with abnormal lab findings of transaminitis as well as abnormal ultrasound scan of the abdomen which showed new intrahepatic and extrahepatic biliary ductal dilation..      Background: She had been having right upper quadrant abdominal discomfort and underwent ERCP on 9/9/2022 for right upper quadrant discomfort as well as dilated common bile duct and abnormal liver enzymes.  The presumptive diagnosis was papillary stenosis or sphincter of Oddi dysfunction.  Unfortunately following ERCP she developed pancreatitis and was treated at [facility] for same.    Since discharge her abdominal pain has improved but persists worsened with food.    She underwent repeat ultrasound scan of the abdomen with above results and also underwent blood test which showed elevated liver enzymes prompting presentation to the ER    At present she has no new complaints to offer.  She denies any other new symptoms.    She was incidentally found to be COVID-positive but is asymptomatic.      PAST MEDICAL AND SURGICAL HISTORY      Past Medical History:   Diagnosis Date    Breast cancer (CMS/HCC) 2007    Crohn's disease (CMS/HCC)     Dystonia     Osteomyelitis (CMS/HCC)     Thyroid nodule     Tremor        Past Surgical History:   Procedure Laterality Date    BREAST LUMPECTOMY      BREAST SURGERY      CATARACT EXTRACTION      CYST REMOVAL      throat    KNEE SURGERY      TONSILLECTOMY         PCP: Ankita Ho MD    MEDICATIONS      Prior to Admission medications    Medication Sig Start Date End Date Taking? Authorizing Provider   ciclopirox  (LOPROX) 1 % shampoo Apply topically 2 (two) times a week (Mon, Thu). gently massage into affected areas and surrounding skin   Yes Provider, Josh Garza MD   hyoscyamine (LEVSIN) 0.125 mg SL tablet Place 0.125 mg under the tongue 3 (three) times a day as needed for cramping.   Yes Provider, Josh Garza MD   naproxen (NAPROSYN) 500 mg tablet Take 500 mg by mouth daily as needed.   Yes Provider, Josh Garza MD   omeprazole (PriLOSEC) 40 mg capsule Take 1 capsule by mouth once daily. 9/21/22  Yes Provider, Josh Garza MD   pantoprazole (PROTONIX) 40 mg EC tablet Take 1 tablet (40 mg total) by mouth daily before breakfast for 88 doses Indications: gastroesophageal reflux disease. 9/17/22 10/19/22  Rodri Ayers MD       ALLERGIES      Prednisone    FAMILY HISTORY      Family History   Problem Relation Age of Onset    COPD Biological Mother     Alzheimer's disease Biological Father     Spina bifida Biological Brother        SOCIAL HISTORY      Social History     Socioeconomic History    Marital status:      Spouse name: None    Number of children: None    Years of education: None    Highest education level: None   Tobacco Use    Smoking status: Never    Smokeless tobacco: Never   Substance and Sexual Activity    Alcohol use: Yes    Drug use: Never    Sexual activity: Defer     Social Determinants of Health     Food Insecurity: No Food Insecurity    Worried About Running Out of Food in the Last Year: Never true    Ran Out of Food in the Last Year: Never true       REVIEW OF SYSTEMS      All other systems reviewed and negative except as noted in HPI    PHYSICAL EXAMINATION      Temp:  [36.7 °C (98.1 °F)-37 °C (98.6 °F)] 36.7 °C (98.1 °F)  Heart Rate:  [75-83] 80  Resp:  [16-17] 16  BP: ()/(51-75) 112/57  Body mass index is 23.29 kg/m².    Physical Exam  Vitals reviewed.   Constitutional:       General: She is not in acute distress.     Appearance: Normal appearance. She is  not ill-appearing, toxic-appearing or diaphoretic.   HENT:      Head: Normocephalic and atraumatic.      Nose: Nose normal. No congestion or rhinorrhea.      Mouth/Throat:      Mouth: Mucous membranes are moist.      Pharynx: Oropharynx is clear. No oropharyngeal exudate or posterior oropharyngeal erythema.   Eyes:      General: No scleral icterus.        Right eye: No discharge.         Left eye: No discharge.      Extraocular Movements: Extraocular movements intact.      Conjunctiva/sclera: Conjunctivae normal.      Pupils: Pupils are equal, round, and reactive to light.   Cardiovascular:      Rate and Rhythm: Normal rate and regular rhythm.      Pulses: Normal pulses.      Heart sounds: Normal heart sounds. No murmur heard.  Pulmonary:      Effort: Pulmonary effort is normal. No respiratory distress.      Breath sounds: Normal breath sounds. No stridor. No wheezing or rales.   Chest:      Chest wall: No tenderness.   Abdominal:      General: Abdomen is flat. Bowel sounds are normal. There is no distension.      Palpations: Abdomen is soft. There is no mass.      Tenderness: There is no abdominal tenderness. There is no right CVA tenderness, left CVA tenderness, guarding or rebound.      Hernia: No hernia is present.   Musculoskeletal:         General: No swelling, tenderness, deformity or signs of injury. Normal range of motion.      Cervical back: Normal range of motion and neck supple. No rigidity or tenderness.      Right lower leg: No edema.      Left lower leg: No edema.   Lymphadenopathy:      Cervical: No cervical adenopathy.   Skin:     General: Skin is warm.      Coloration: Skin is not jaundiced or pale.      Findings: No bruising, erythema, lesion or rash.   Neurological:      General: No focal deficit present.      Mental Status: She is alert and oriented to person, place, and time. Mental status is at baseline.      Cranial Nerves: No cranial nerve deficit.      Motor: No weakness.   Psychiatric:          Mood and Affect: Mood normal.         Behavior: Behavior normal.         LABS / IMAGING / EKG        Labs  I have reviewed the patient's pertinent labs.  Significant abnormals are Transaminitis including elevated AST, AST and ALP.    Imaging  I have independently reviewed the pertinent imaging from the last 24 hrs.    SARS-CoV-2 (COVID-19) (no units)   Date/Time Value   10/19/2022 1758 Positive (A)       ECG/Telemetry  .    ASSESSMENT AND PLAN           * Abdominal pain  Assessment & Plan  Subacute right upper quadrant and epigastric pain  Continue hyoscyamine as needed, Protonix 40 mg daily  Given abnormal imaging findings on ultrasound scan, MRCP ordered  GI consult placed    Transaminitis  Assessment & Plan  Notably AST, ALT and ALP elevation with abnormal abdominal ultrasound showing new intrahepatic and extrahepatic biliary ductal dilation  Transaminitis could be from COVID  Check MRCP  Consult GI    COVID-19  Assessment & Plan  Incidental finding, she is asymptomatic  Conservative management  COVID labs sent, follow       VTE Assessment: Padua VTE Score: 0  VTE Prophylaxis: Current anticoagulants:  [START ON 10/20/2022] enoxaparin (LOVENOX) syringe 40 mg, subcutaneous, Daily (6p)      Code Status: Full Code  Palliative Care Screening Score: 0   Discussed advanced care planning. Sowmya has an ACP and Sowmya's surrogate decision maker is Hilario Raman.  Estimated Discharge Date: 10/20/2022  Disposition Planning: Likely home within the next 24 to 48 hours     Bernard Quinones MD  10/19/2022

## 2022-10-20 NOTE — PROGRESS NOTES
Hospital Medicine Service -  Daily Progress Note       SUBJECTIVE   Interval History: Patient seen at the bedside this morning. She is overall doing well. No abdominal pain while NPO as her pain (RUQ, epigastric) generally only occurs after eating.      OBJECTIVE      Vital signs in last 24 hours:  Temp:  [36.5 °C (97.7 °F)-37 °C (98.6 °F)] 36.5 °C (97.7 °F)  Heart Rate:  [67-83] 67  Resp:  [14-18] 18  BP: ()/(51-75) 109/52  No intake or output data in the 24 hours ending 10/20/22 1155    PHYSICAL EXAMINATION        GEN: Well-appearing, Comfortable, No acute distress, Alert and oriented x3  HEENT: PERRL, EOMI, Moist mucous membranes, Neck supple, No JVD  CV: Regular rate and rhythm, Normal S1/S2, No murmurs, No rubs  PULM:  Non-labored breathing on room air, Clear to auscultation bilaterally, No crackles or rhonchi  ABD: Soft, Nontender, Non-distended, Bowel sounds present   EXT: No deformities, No edema, Nontender, No rash  NEURO: CN II-XII grossly intact, No focal motor or sensory deficits, Baseline truncal tremor  : No ness catheter  PSYCH: Appropriate mood and affect, Full range     LINES, CATHETERS, DRAINS, AIRWAYS, AND WOUNDS   Lines, Drains, and Airways:        Wounds (agree with documentation and present on admission)     LABS / IMAGING / TELE      Labs  Lab Results   Component Value Date    GLUCOSE 113 (H) 10/20/2022    CALCIUM 9.5 10/20/2022     10/20/2022    K 3.8 10/20/2022    CO2 26 10/20/2022     10/20/2022    BUN <5 (L) 10/20/2022    CREATININE 0.5 (L) 10/20/2022       Lab Results   Component Value Date    WBC 3.00 (L) 10/20/2022    HGB 11.9 10/20/2022    HCT 35.8 10/20/2022    MCV 90.9 10/20/2022     10/20/2022       Imaging  No results found.     Telemetry  Reviewed independently. No events.    ASSESSMENT AND PLAN      Abdominal pain  Assessment & Plan  Subacute right upper quadrant and epigastric pain with cholestatic liver injury and dilated intra/extrahepatic  biliary ducts on MRCP c/f biliary stricture (most likely cause of abdominal pain); No masses/stones  Lipase wnl  - Management of cholestatic liver injury/ductal dilation as under that problem  - Continue home PRN hyoscyamine and PPI daily    * Transaminitis  Assessment & Plan  Cholestatic liver injury on LFT's in patient presenting with subacute RUQ/epigastric abdominal pain  MRCP with increasing intrahepatic and extrahepatic biliary duct dilatation extending to the level the ampulla with biliary stricture suspected; No CBD stone or mass identified  - GI consulted; Planning for ERCP later today  - CTM LFT's (mild improvement today in ALT/AlkPhos)  - F/u final GI recs    COVID-19  Assessment & Plan  Tested positive for COVID-19 on 10/19/2022; She was known to be infected sometime in August  - Incidental finding as she is asymptomatic  - No specific treatment required  - CTM COVID labs       VTE Assessment: Padua VTE Score: 0  VTE Prophylaxis:  Current anticoagulants:  enoxaparin (LOVENOX) syringe 40 mg, subcutaneous, Daily (6p)      Code Status: Full Code  Palliative Care Screening Score: 0   Estimated Discharge Date: 10/21/2022     Disposition Plannin-2 days home     Kelsy Esquivel MD  10/20/2022

## 2022-10-20 NOTE — OP NOTE
_______________________________________________________________________________  Patient Name: Sowmya Clark           Procedure Date: 10/20/2022 4:19 PM  MRN: [id number]                     Account Number: [id number]  YOB: 1960              Age: 62  Gender: Female                        Note Status: Finalized  Attending MD: NATANAEL VILLA MD  _______________________________________________________________________________  Procedure:             ERCP  Indications:           Elevated liver enzymes  Providers:             NATANAEL VILLA MD (Doctor)  Referring MD:          ZORAIDA SALAS MD  Requesting Provider:  Medicines:             See the Anesthesia note for documentation of the  administered medications  Complications:         No immediate complications.  _______________________________________________________________________________  Procedure:             After obtaining informed consent, the scope was passed  under direct vision. Throughout the procedure, the  patient's blood pressure, pulse, and oxygen  saturations were monitored continuously. The was  introduced through the mouth, and advanced to the  duodenum and used to inject contrast into the bile  duct and ventral pancreatic duct.  Findings:   film demonstrates clips in the right upper quadrant consistent  with the patient's previous cholecystectomy. The scope was advanced to  the second portion of the duodenum. Remarkably the ampullary orifice is  completely scarred down. There is no bile in the duodenum. Attempts to  access the bile duct with a wire interrogating the apex of the previous  enterotomy are not successful. Multiple attempts were made. This is  followed then by accessing the pancreatic duct which is widely patent. A  small amount of contrast is injected to confirm the pancreatic duct is  where the wire is placed. This is followed by the placement of a 5  Welsh by 7 cm single pigtail stent with a single internal flap.  This  was done to prophylactically protect the pancreas. This is followed then  by the placement of a linear array echoendoscope. The bile duct is  identified in the region of the ampulla. There is no obvious mass at the  area of the ampulla. Using a 22-gauge needle fine-needle aspiration is  performed at the ampulla. Material is placed in cytology solution for  evaluation. This is followed by an 19-gauge duct puncture with the  placement of wire traversing the papilla into the duodenum. With the  wire in the duodenum the echoendoscope was removed and the side-viewing  scope was then inserted. A second wire was placed alongside the wire  that was placed during endoscopic ultrasound and access was obtained.  Contrast is injected. There is no evidence of an obstruction upstream  from the ampulla. The obstruction is primarily at the ampulla. The duct  is dilated to approximately 15 mm. The bifurcation is normal. The  intrahepatics are dilated but no other strictures identified. A 10  Uzbek Soehendra dilator is used to dilate the orifice of the papilla  followed by the placement of a 10 mm x 60 mm fully covered [employer] self-expanding metal stent. The stent is deployed  successfully in a good position and contrast and bile are seen draining  from the bile duct at the end of the case. The previously placed  pancreatic stent is then removed from the patient.  Impression:            Papillary stenosis. It does not appear to be  malignant. FNA was performed. EUS guided access to the  bile duct was required. The pancreatic stent was  initially placed and then removed. A 10 mm x 60 mm  fully covered [employer] self-expanding metal  stent was placed in the bile duct for drainage.  Recommendation:        - Await cytology results.  - Trend LFT's  - Reevaluate ampullary region in 6-8 weeks  Procedure Code(s):     --- Professional ---  11116, Endoscopic retrograde cholangiopancreatography  (ERCP); with placement  of endoscopic stent into  biliary or pancreatic duct, including pre- and  post-dilation and guide wire passage, when performed,  including sphincterotomy, when performed, each stent  28566, Endoscopic retrograde cholangiopancreatography  (ERCP); with placement of endoscopic stent into  biliary or pancreatic duct, including pre- and  post-dilation and guide wire passage, when performed,  including sphincterotomy, when performed, each stent  18593, Esophagogastroduodenoscopy, flexible,  transoral; with transendoscopic ultrasound-guided  intramural or transmural fine needle  aspiration/biopsy(s) (includes endoscopic ultrasound  examination of the esophagus, stomach, and either the  duodenum or a surgically altered stomach where the  jejunum is examined distal to the anastomosis)  12075, Esophagogastroduodenoscopy, flexible,  transoral; with removal of foreign body(s)  Diagnosis Code(s):     --- Professional ---  R74.8, Abnormal levels of other serum enzymes  CPT copyright 2020 [employer]. All rights reserved.  The codes documented in this report are preliminary and upon  review may  be revised to meet current compliance requirements.  Attending Participation:  I personally performed the entire procedure.  Brennen Villa MD  ________________  NATANAEL VILLA MD  10/20/2022 6:29:30 PM  This report has been signed electronically.  Number of Addenda: 0  Note Initiated On: 10/20/2022 4:19 PM

## 2022-10-20 NOTE — CONSULTS
Wound Ostomy Continence Note    Subjective    HPI Patient is a 62 y.o. female who was admitted on 10/19/2022 with a diagnosis of Choledocholithiasis [K80.50]  Abdominal pain [R10.9]  Elevated liver enzymes [R74.8].    Problem list Problem List:   Patient Active Problem List   Diagnosis    Post-ERCP acute pancreatitis    History of cholecystectomy    Abdominal pain, epigastric    Hypokalemia    Hyponatremia    Leukocytosis    Urinary frequency    Abdominal pain    Transaminitis    COVID-19      PMH/PSH Medical History:   Past Medical History:   Diagnosis Date    Breast cancer (CMS/HCC) 2007    Crohn's disease (CMS/HCC)     Dystonia     Osteomyelitis (CMS/HCC)     Thyroid nodule     Tremor      Surgical History:   Past Surgical History:   Procedure Laterality Date    BREAST LUMPECTOMY      BREAST SURGERY      CATARACT EXTRACTION      CYST REMOVAL      throat    KNEE SURGERY      TONSILLECTOMY        Assessment and Recommendation Consult received for the prevention of pressure injuries. Recommend Prevent Bundle. Re-consult as needed for skin loss recommendations.             Date: 10/20/22  Signature: Allison Lobo RN

## 2022-10-20 NOTE — ASSESSMENT & PLAN NOTE
Tested positive for COVID-19 on 10/19/2022; She was known to be infected sometime in August  - Incidental finding as she is asymptomatic  - Supportive care

## 2022-10-20 NOTE — PLAN OF CARE
Problem: Adult Inpatient Plan of Care  Goal: Plan of Care Review  Outcome: Progressing  Flowsheets (Taken 10/20/2022 1841)  Progress: improving  Plan of Care Reviewed With: patient  Outcome Summary: Pt maintain stable VSS, deneis pain/sob, mild tremors noted, had ERCP done, afebrile and spouse remain at the bedside during care.  Goal: Patient-Specific Goal (Individualized)  Outcome: Progressing  Goal: Absence of Hospital-Acquired Illness or Injury  Outcome: Progressing  Goal: Optimal Comfort and Wellbeing  Outcome: Progressing  Goal: Readiness for Transition of Care  Outcome: Progressing     Problem: Infection  Goal: Absence of Infection Signs and Symptoms  Outcome: Progressing   Plan of Care Review  Plan of Care Reviewed With: patient  Progress: improving  Outcome Summary: Pt maintain stable VSS, deneis pain/sob, mild tremors noted, had ERCP done, afebrile and spouse remain at the bedside during care.

## 2022-10-21 PROBLEM — R74.8 ELEVATED LIVER ENZYMES: Status: ACTIVE | Noted: 2022-10-21

## 2022-10-21 PROBLEM — D72.829 LEUKOCYTOSIS: Status: RESOLVED | Noted: 2022-09-13 | Resolved: 2022-10-21

## 2022-10-21 LAB
ALBUMIN SERPL-MCNC: 2.7 G/DL (ref 3.4–5)
ALBUMIN SERPL-MCNC: 3.2 G/DL (ref 3.4–5)
ALP SERPL-CCNC: 536 IU/L (ref 35–126)
ALP SERPL-CCNC: 687 IU/L (ref 35–126)
ALT SERPL-CCNC: 166 IU/L (ref 11–54)
ALT SERPL-CCNC: 228 IU/L (ref 11–54)
ANION GAP SERPL CALC-SCNC: 10 MEQ/L (ref 3–15)
ANION GAP SERPL CALC-SCNC: 9 MEQ/L (ref 3–15)
AST SERPL-CCNC: 125 IU/L (ref 15–41)
AST SERPL-CCNC: 77 IU/L (ref 15–41)
BASOPHILS # BLD: 0.02 K/UL (ref 0.01–0.1)
BASOPHILS NFR BLD: 0.2 %
BILIRUB SERPL-MCNC: 1.5 MG/DL (ref 0.3–1.2)
BILIRUB SERPL-MCNC: 1.7 MG/DL (ref 0.3–1.2)
BUN SERPL-MCNC: 6 MG/DL (ref 8–20)
BUN SERPL-MCNC: 9 MG/DL (ref 8–20)
CALCIUM SERPL-MCNC: 8.7 MG/DL (ref 8.9–10.3)
CALCIUM SERPL-MCNC: 9 MG/DL (ref 8.9–10.3)
CHLORIDE SERPL-SCNC: 101 MEQ/L (ref 98–109)
CHLORIDE SERPL-SCNC: 98 MEQ/L (ref 98–109)
CO2 SERPL-SCNC: 22 MEQ/L (ref 22–32)
CO2 SERPL-SCNC: 24 MEQ/L (ref 22–32)
CREAT SERPL-MCNC: 0.6 MG/DL (ref 0.6–1.1)
CREAT SERPL-MCNC: 0.7 MG/DL (ref 0.6–1.1)
DIFFERENTIAL METHOD BLD: ABNORMAL
EOSINOPHIL # BLD: 0 K/UL (ref 0.04–0.36)
EOSINOPHIL NFR BLD: 0 %
ERYTHROCYTE [DISTWIDTH] IN BLOOD BY AUTOMATED COUNT: 14.3 % (ref 11.7–14.4)
GFR SERPL CREATININE-BSD FRML MDRD: >60 ML/MIN/1.73M*2
GFR SERPL CREATININE-BSD FRML MDRD: >60 ML/MIN/1.73M*2
GLUCOSE SERPL-MCNC: 113 MG/DL (ref 70–99)
GLUCOSE SERPL-MCNC: 91 MG/DL (ref 70–99)
HCT VFR BLDCO AUTO: 37.9 % (ref 35–45)
HGB BLD-MCNC: 12.9 G/DL (ref 11.8–15.7)
IMM GRANULOCYTES # BLD AUTO: 0.02 K/UL (ref 0–0.08)
IMM GRANULOCYTES NFR BLD AUTO: 0.2 %
LIPASE SERPL-CCNC: 1606 U/L (ref 20–51)
LYMPHOCYTES # BLD: 0.76 K/UL (ref 1.2–3.5)
LYMPHOCYTES NFR BLD: 7.5 %
MCH RBC QN AUTO: 30.2 PG (ref 28–33.2)
MCHC RBC AUTO-ENTMCNC: 34 G/DL (ref 32.2–35.5)
MCV RBC AUTO: 88.8 FL (ref 83–98)
MONOCYTES # BLD: 0.76 K/UL (ref 0.28–0.8)
MONOCYTES NFR BLD: 7.5 %
NEUTROPHILS # BLD: 8.6 K/UL (ref 1.7–7)
NEUTS SEG NFR BLD: 84.6 %
NRBC BLD-RTO: 0 %
PDW BLD AUTO: 10.9 FL (ref 9.4–12.3)
PLATELET # BLD AUTO: 211 K/UL (ref 150–369)
POTASSIUM SERPL-SCNC: 3.8 MEQ/L (ref 3.6–5.1)
POTASSIUM SERPL-SCNC: 4.2 MEQ/L (ref 3.6–5.1)
PROT SERPL-MCNC: 5.1 G/DL (ref 6–8.2)
PROT SERPL-MCNC: 6 G/DL (ref 6–8.2)
RBC # BLD AUTO: 4.27 M/UL (ref 3.93–5.22)
SODIUM SERPL-SCNC: 131 MEQ/L (ref 136–144)
SODIUM SERPL-SCNC: 133 MEQ/L (ref 136–144)
WBC # BLD AUTO: 10.16 K/UL (ref 3.8–10.5)

## 2022-10-21 PROCEDURE — 63600000 HC DRUGS/DETAIL CODE: Performed by: INTERNAL MEDICINE

## 2022-10-21 PROCEDURE — 25000000 HC PHARMACY GENERAL: Performed by: HOSPITALIST

## 2022-10-21 PROCEDURE — 99232 SBSQ HOSP IP/OBS MODERATE 35: CPT | Performed by: INTERNAL MEDICINE

## 2022-10-21 PROCEDURE — 83735 ASSAY OF MAGNESIUM: CPT | Performed by: HOSPITALIST

## 2022-10-21 PROCEDURE — 36415 COLL VENOUS BLD VENIPUNCTURE: CPT | Performed by: STUDENT IN AN ORGANIZED HEALTH CARE EDUCATION/TRAINING PROGRAM

## 2022-10-21 PROCEDURE — 63600000 HC DRUGS/DETAIL CODE: Performed by: HOSPITALIST

## 2022-10-21 PROCEDURE — 85025 COMPLETE CBC W/AUTO DIFF WBC: CPT | Performed by: STUDENT IN AN ORGANIZED HEALTH CARE EDUCATION/TRAINING PROGRAM

## 2022-10-21 PROCEDURE — 80053 COMPREHEN METABOLIC PANEL: CPT | Performed by: STUDENT IN AN ORGANIZED HEALTH CARE EDUCATION/TRAINING PROGRAM

## 2022-10-21 PROCEDURE — 99233 SBSQ HOSP IP/OBS HIGH 50: CPT | Performed by: STUDENT IN AN ORGANIZED HEALTH CARE EDUCATION/TRAINING PROGRAM

## 2022-10-21 PROCEDURE — G0378 HOSPITAL OBSERVATION PER HR: HCPCS

## 2022-10-21 PROCEDURE — 87040 BLOOD CULTURE FOR BACTERIA: CPT | Performed by: STUDENT IN AN ORGANIZED HEALTH CARE EDUCATION/TRAINING PROGRAM

## 2022-10-21 PROCEDURE — 12000000 HC ROOM AND CARE MED/SURG

## 2022-10-21 RX ORDER — PANTOPRAZOLE SODIUM 40 MG/10ML
40 INJECTION, POWDER, LYOPHILIZED, FOR SOLUTION INTRAVENOUS 2 TIMES DAILY
Status: DISCONTINUED | OUTPATIENT
Start: 2022-10-21 | End: 2022-10-24 | Stop reason: HOSPADM

## 2022-10-21 RX ORDER — ACETAMINOPHEN 325 MG/1
650 TABLET ORAL EVERY 6 HOURS PRN
Status: DISCONTINUED | OUTPATIENT
Start: 2022-10-21 | End: 2022-10-24 | Stop reason: HOSPADM

## 2022-10-21 RX ORDER — SODIUM CHLORIDE, SODIUM LACTATE, POTASSIUM CHLORIDE, CALCIUM CHLORIDE 600; 310; 30; 20 MG/100ML; MG/100ML; MG/100ML; MG/100ML
INJECTION, SOLUTION INTRAVENOUS CONTINUOUS
Status: DISCONTINUED | OUTPATIENT
Start: 2022-10-21 | End: 2022-10-22

## 2022-10-21 RX ADMIN — MORPHINE SULFATE 3 MG: 2 INJECTION, SOLUTION INTRAMUSCULAR; INTRAVENOUS at 10:22

## 2022-10-21 RX ADMIN — PANTOPRAZOLE SODIUM 40 MG: 40 INJECTION, POWDER, FOR SOLUTION INTRAVENOUS at 08:41

## 2022-10-21 RX ADMIN — MORPHINE SULFATE 3 MG: 2 INJECTION, SOLUTION INTRAMUSCULAR; INTRAVENOUS at 06:57

## 2022-10-21 RX ADMIN — ONDANSETRON HYDROCHLORIDE 4 MG: 2 INJECTION, SOLUTION INTRAMUSCULAR; INTRAVENOUS at 06:57

## 2022-10-21 RX ADMIN — ONDANSETRON HYDROCHLORIDE 4 MG: 2 INJECTION, SOLUTION INTRAMUSCULAR; INTRAVENOUS at 14:17

## 2022-10-21 RX ADMIN — MORPHINE SULFATE 3 MG: 2 INJECTION, SOLUTION INTRAMUSCULAR; INTRAVENOUS at 04:01

## 2022-10-21 RX ADMIN — MORPHINE SULFATE 3 MG: 2 INJECTION, SOLUTION INTRAMUSCULAR; INTRAVENOUS at 14:17

## 2022-10-21 RX ADMIN — SODIUM CHLORIDE, POTASSIUM CHLORIDE, SODIUM LACTATE AND CALCIUM CHLORIDE: 600; 310; 30; 20 INJECTION, SOLUTION INTRAVENOUS at 04:39

## 2022-10-21 RX ADMIN — PANTOPRAZOLE SODIUM 40 MG: 40 INJECTION, POWDER, FOR SOLUTION INTRAVENOUS at 20:42

## 2022-10-21 RX ADMIN — SODIUM CHLORIDE, POTASSIUM CHLORIDE, SODIUM LACTATE AND CALCIUM CHLORIDE: 600; 310; 30; 20 INJECTION, SOLUTION INTRAVENOUS at 20:50

## 2022-10-21 RX ADMIN — ENOXAPARIN SODIUM 40 MG: 40 INJECTION SUBCUTANEOUS at 17:26

## 2022-10-21 NOTE — PLAN OF CARE
Notified by nursing that patient was having severe abdominal pain. Patient is post-ERCP today. Obtained CBC, CMP, Lipase. LFT's up-trending slightly. CBC grossly WNL. Lipase elevated at 1,606. Concern for post-ERCP pancreatitis. Have started aggressive IVF's with LR. Pain control with PRN morphine.     Dr. Roby Olivas

## 2022-10-21 NOTE — PROGRESS NOTES
Gastroenterology  Daily Progress Note       SUBJECTIVE   Interval History: S/p ERCP yesterday with findings of complete papillary stenosis requiring EUS guided biliary access. Metal biliary stent placed in CBD. Post-procedural lipase elevated to 1606 and pt complaining of epigastric pain that started late last night, consistent with post-ERCP pancreatitis. LFTs trending down today.     OBJECTIVE      Vital signs in last 24 hours:  Temp:  [36.8 °C (98.2 °F)-37.7 °C (99.9 °F)] 37.7 °C (99.9 °F)  Heart Rate:  [66-92] 92  Resp:  [18-20] 18  BP: (104-143)/(49-78) 140/76    Intake/Output Summary (Last 24 hours) at 10/21/2022 1005  Last data filed at 10/21/2022 0435  Gross per 24 hour   Intake 704.17 ml   Output --   Net 704.17 ml       PHYSICAL EXAMINATION      Physical Exam  Constitutional:       General: She is not in acute distress.     Appearance: She is well-developed and well-nourished. She is not diaphoretic.   HENT:      Head: Normocephalic and atraumatic.   Eyes:      General: No scleral icterus.     Conjunctiva/sclera: Conjunctivae normal.   Cardiovascular:      Rate and Rhythm: Normal rate and regular rhythm.   Pulmonary:      Effort: Pulmonary effort is normal. No respiratory distress.   Abdominal:      General: There is no distension.      Palpations: Abdomen is soft. There is no mass.      Tenderness: There is abdominal tenderness. There is no guarding or rebound.      Hernia: No hernia is present.      Comments: +upper abdominal tenderness with light palpation   Musculoskeletal:         General: No edema.   Lymphadenopathy:      Cervical: No cervical adenopathy.   Skin:     General: Skin is warm and dry.      Findings: No rash.   Neurological:      Mental Status: She is alert and oriented to person, place, and time.   Psychiatric:         Mood and Affect: Mood and affect normal.         Behavior: Behavior normal.         Thought Content: Thought content normal.          LABS / IMAGING / TELE     "    Labs  Results from last 7 days   Lab Units 10/21/22  0659 10/20/22  2254 10/20/22  0612   SODIUM mEQ/L 133* 139 138   POTASSIUM mEQ/L 4.2 4.1 3.8   CHLORIDE mEQ/L 101 105 104   CO2 mEQ/L 22 20* 26   BUN mg/dL 9 9 <5*   CREATININE mg/dL 0.7 0.7 0.5*   CALCIUM mg/dL 9.0 9.4 9.5   ALBUMIN g/dL 3.2* 3.4 3.0*   BILIRUBIN TOTAL mg/dL 1.5* 2.1* 1.4*   ALK PHOS IU/L 687* 781* 628*   ALT IU/L 228* 270* 238*   AST IU/L 125* 184* 156*   GLUCOSE mg/dL 113* 124* 113*     Results from last 7 days   Lab Units 10/19/22  1756   INR  0.9       Results from last 7 days   Lab Units 10/20/22  0612   MAGNESIUM mg/dL 1.8     Results from last 7 days   Lab Units 10/21/22  0659 10/20/22  2252 10/20/22  0612   WBC K/uL 10.16 6.49 3.00*   HEMOGLOBIN g/dL 12.9 14.0 11.9   HEMATOCRIT % 37.9 40.2 35.8   PLATELETS K/uL 211 207 198         Imaging  I have independently reviewed the pertinent imaging from the last 24 hrs.    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 GI consulted for \"please see for abdominal pain with transaminitis and abnormal U abdomen with intra and extra hepatic duct dilation\".    # Choledocholithiasis reported on preliminary MRI/MRCP report (T2 hypodensity in distal CBD), but later over-read as increasing intrahepatic/extrahepatic sheba-dil extending to level of ampulla, no CBD stone or mass, biliary stricture " suspected  # Severe cholestatic patter liver injury    Impression: Initially had suspected choledocholithiasis based on overnight MRI/MRCP, but with overread demonstrating worsening biliary ductal dilatation with worsening cholestatic injury, now more suspicious for an ampullary pathology, including papillary stenosis / sphincter of oddi stenosis unresponsive to a 10mm biliary sphincterotomy in early 9/2022. While new MR read doesn't comment on choledocholithiasis, cannot definitively say whether or not there might not be a stone or lesion at the level of the ampulla that would be developed in the last 6 weeks since last EUS/ERCP.     10/20/22 ERCP w EUS guided biliary access  Papillary stenosis. It does not appear to be malignant. FNA was performed. EUS guided access to the bile duct was required. The pancreatic stent was initially placed and then removed. A 10 mm x 60 mm fully covered [employer] self-expanding metal stent was placed in the bile duct for drainage.    Recommendations:  - Continue bowel rest w LR @ 250 for management of post-ERCP pancreatitis   - Pain control per primary  - Trend LFT's  - Monitor fever curve   - Await cytology results  - Plan to reevaluate ampullary region with ERCP in 6-8 weeks               VTE Assessment: Padua VTE Score: 0  Code Status: Full Code  Estimated discharge date: 10/22/2022     Marina Lockwood PA-C  Interventional Gastroenterology  [facility].[phone] [facility]  Office: [phone]  Pager: [phone]

## 2022-10-21 NOTE — PLAN OF CARE
Per rounds, patient has icreased LFTs. ERCP completed. Patient being treated for Pancreatitis. Patient also found to be Covid positive. EDI Sunday, home with no needs.

## 2022-10-21 NOTE — PLAN OF CARE
Plan of Care Review  Plan of Care Reviewed With: patient  Progress: improving  Outcome Summary: VSS throughout shift. Mid upper abdominal pain throughout shift; assessed by nocturnist and made NPO. IV morphine and IV zofran per MAR with fair management of pain and nausea. Ambulates to bathroom. Safety and isolation precautions maintained.

## 2022-10-21 NOTE — PROGRESS NOTES
Hospital Medicine Service -  Daily Progress Note       SUBJECTIVE   Interval History: Following ERCP yesterday, patient developed severe abdominal pain. Lipase elevated >1600 c/w post-ERCP pancreatitis which she has suffered from in the past. She was kept NPO and started on IVF/IV pain medication. This morning, she reports ongoing severe abdominal pain mostly relieved by IV morphine. She continues to have little appetite. No nausea.     OBJECTIVE      Vital signs in last 24 hours:  Temp:  [36.8 °C (98.2 °F)-37.7 °C (99.9 °F)] 37.7 °C (99.8 °F)  Heart Rate:  [66-92] 90  Resp:  [18-20] 18  BP: (104-143)/(49-78) 120/60    Intake/Output Summary (Last 24 hours) at 10/21/2022 1250  Last data filed at 10/21/2022 0435  Gross per 24 hour   Intake 704.17 ml   Output --   Net 704.17 ml       PHYSICAL EXAMINATION        GEN: Well-appearing, Comfortable, No acute distress, Alert and oriented x3  HEENT: PERRL, EOMI, Moist mucous membranes, Neck supple, No JVD  CV: Regular rate and rhythm, Normal S1/S2, No murmurs, No rubs  PULM:  Non-labored breathing on room air, Clear to auscultation bilaterally, No crackles or rhonchi  ABD: Soft, Nontender, Non-distended, Bowel sounds present   EXT: No deformities, No edema, Nontender, No rash  NEURO: CN II-XII grossly intact, No focal motor or sensory deficits, Baseline neck tremor  : No ness catheter  PSYCH: Appropriate mood and affect, Full range     LINES, CATHETERS, DRAINS, AIRWAYS, AND WOUNDS   Lines, Drains, and Airways:   Peripheral IV (Adult) 10/20/22 Left;Posterior Hand (Active)   Number of days: 1       Wounds (agree with documentation and present on admission)     LABS / IMAGING / TELE      Labs  Lab Results   Component Value Date    GLUCOSE 113 (H) 10/21/2022    CALCIUM 9.0 10/21/2022     (L) 10/21/2022    K 4.2 10/21/2022    CO2 22 10/21/2022     10/21/2022    BUN 9 10/21/2022    CREATININE 0.7 10/21/2022       Lab Results   Component Value Date    WBC 10.16  10/21/2022    HGB 12.9 10/21/2022    HCT 37.9 10/21/2022    MCV 88.8 10/21/2022     10/21/2022       Imaging  MRI ABDOMEN WITH AND WITHOUT CONTRAST MRCP    Result Date: 10/20/2022  CLINICAL HISTORY: Abnormal ERCP dilated biliary ducts TECHNIQUE: Multisequence, multiplanar MRI of the abdomen was performed before and after contrast administration. This included heavily T2 weighted MRCP sequences with MIP reformation. Administered contrast and dose: 7.6mmol of gadobutroL (GADAVIST) solution 7.6 mmol COMPARISON: Ultrasound dated 10/18/2022, MRI dated 9/27/2022, and other studies COMMENT: Normal liver size. No hepatic steatosis. No focal hepatic mass. There is patchy heterogeneous enhancement of the liver on early postcontrast phases with normal, homogeneous enhancement on delayed phases. This can be related to biliary stasis or a sign of nonspecific hepatitis, including autoimmune hepatitis. Correlate clinically. The gallbladder surgically absent. There is moderate intrahepatic biliary ductal dilatation, especially in the left hepatic lobe. There is also dilatation of the common hepatic and common bile ducts measuring up to 19 mm and 16 mm, respectively. This represents an increase compared to the MR enterography from one month ago. The dilatation extends to the ampulla without suspicious transition point. Nondependent filling defects within the CBD are noted, compatible with flow voids versus pneumobilia. No choledocholithiasis identified. The pancreas is unremarkable. No pancreatic ductal dilatation. No peripancreatic fluid collection. The spleen, adrenal glands and kidneys are unremarkable. There are scattered colonic diverticula without MR findings of acute cholecystitis. No ascites. No lymphadenopathy. Multiple stable T2 hyperintense osseous lesions in the spine, likely hemangiomas.     IMPRESSION: 1. Increasing intrahepatic and extrahepatic biliary duct dilatation extending to the level the ampulla. No CBD  stone or mass identified. Biliary stricture is suspected. 2. Nonspecific heterogeneous enhancement of the liver which may be due to biliary stasis versus nonspecific hepatitis. Correlate clinically.        Telemetry  Reviewed independently. No events.    ASSESSMENT AND PLAN      Abdominal pain  Assessment & Plan  Subacute post-prandial right upper quadrant and epigastric pain with cholestatic liver injury and dilated intra/extrahepatic biliary ducts on MRCP); No masses/stones  ERCP 10/20 with e/o papillary stenosis s/p biliary stent placement (stenosis was most likely cause of abdominal pain)  - Management of cholestatic liver injury/ductal dilation as under that problem  - Continue home PRN hyoscyamine and PPI daily  - Advance diet as tolerated    Post-ERCP acute pancreatitis  Assessment & Plan  Patient developed post-ERCP pancreatitis with severe epigastric abdominal pain and lipase >1600 after ERCP on 10/20  She has a history of post-ERCP pancreatitis as well  - NPO, Consider advancing diet to clears tomorrow  - LR at 250cc/hr  - IV morphine 3mg q3h    * Transaminitis  Assessment & Plan  Cholestatic liver injury on LFT's in patient presenting with subacute RUQ/epigastric abdominal pain  MRCP with increasing intrahepatic and extrahepatic biliary duct dilatation extending to the level the ampulla with biliary stricture suspected; No CBD stone or mass identified  Acute hepatitis panel negative  - ERCP 10/20 with e/o papillary stenosis (non-malignant appearing) s/p biliary stent placement  - F/u cytology results  - LFT's now downtrending following stent placement  - CTM LFTs for ongoing improvement  - F/u with Dr. Tenorio and repeat ERCP in 6-8 weeks to reevaluate ampulla region    COVID-19  Assessment & Plan  Tested positive for COVID-19 on 10/19/2022; She was known to be infected sometime in August  - Incidental finding as she is asymptomatic  - No specific treatment required  - CTM COVID labs       VTE Assessment:  Padua VTE Score: 0  VTE Prophylaxis:  Current anticoagulants:  enoxaparin (LOVENOX) syringe 40 mg, subcutaneous, Daily (6p)      Code Status: Full Code  Palliative Care Screening Score: 0   Estimated Discharge Date: 10/22/2022     Disposition Plannin-3 days pending improvement in post-ERCP pancreatitis and improvement in LFT's after biliary stent placement     Kelsy Esquivel MD  10/21/2022

## 2022-10-22 PROBLEM — R00.2 PALPITATIONS: Status: ACTIVE | Noted: 2022-10-22

## 2022-10-22 LAB
ALBUMIN SERPL-MCNC: 2.8 G/DL (ref 3.4–5)
ALP SERPL-CCNC: 491 IU/L (ref 35–126)
ALT SERPL-CCNC: 135 IU/L (ref 11–54)
ANION GAP SERPL CALC-SCNC: 9 MEQ/L (ref 3–15)
AST SERPL-CCNC: 59 IU/L (ref 15–41)
BILIRUB SERPL-MCNC: 1.2 MG/DL (ref 0.3–1.2)
BUN SERPL-MCNC: 5 MG/DL (ref 8–20)
CALCIUM SERPL-MCNC: 8.9 MG/DL (ref 8.9–10.3)
CHLORIDE SERPL-SCNC: 100 MEQ/L (ref 98–109)
CO2 SERPL-SCNC: 26 MEQ/L (ref 22–32)
CREAT SERPL-MCNC: 0.5 MG/DL (ref 0.6–1.1)
GFR SERPL CREATININE-BSD FRML MDRD: >60 ML/MIN/1.73M*2
GLUCOSE SERPL-MCNC: 138 MG/DL (ref 70–99)
MAGNESIUM SERPL-MCNC: 1.7 MG/DL (ref 1.8–2.5)
MAGNESIUM SERPL-MCNC: 1.7 MG/DL (ref 1.8–2.5)
POTASSIUM SERPL-SCNC: 3.4 MEQ/L (ref 3.6–5.1)
PROT SERPL-MCNC: 5.2 G/DL (ref 6–8.2)
SODIUM SERPL-SCNC: 135 MEQ/L (ref 136–144)

## 2022-10-22 PROCEDURE — 83735 ASSAY OF MAGNESIUM: CPT | Performed by: STUDENT IN AN ORGANIZED HEALTH CARE EDUCATION/TRAINING PROGRAM

## 2022-10-22 PROCEDURE — 82947 ASSAY GLUCOSE BLOOD QUANT: CPT | Performed by: STUDENT IN AN ORGANIZED HEALTH CARE EDUCATION/TRAINING PROGRAM

## 2022-10-22 PROCEDURE — 63700000 HC SELF-ADMINISTRABLE DRUG: Performed by: HOSPITALIST

## 2022-10-22 PROCEDURE — 36415 COLL VENOUS BLD VENIPUNCTURE: CPT | Performed by: STUDENT IN AN ORGANIZED HEALTH CARE EDUCATION/TRAINING PROGRAM

## 2022-10-22 PROCEDURE — 99233 SBSQ HOSP IP/OBS HIGH 50: CPT | Performed by: HOSPITALIST

## 2022-10-22 PROCEDURE — 63600000 HC DRUGS/DETAIL CODE: Performed by: HOSPITALIST

## 2022-10-22 PROCEDURE — 12000000 HC ROOM AND CARE MED/SURG

## 2022-10-22 PROCEDURE — 25000000 HC PHARMACY GENERAL: Performed by: HOSPITALIST

## 2022-10-22 RX ORDER — POTASSIUM CHLORIDE 750 MG/1
40 TABLET, EXTENDED RELEASE ORAL ONCE
Status: COMPLETED | OUTPATIENT
Start: 2022-10-22 | End: 2022-10-22

## 2022-10-22 RX ORDER — SODIUM CHLORIDE, SODIUM LACTATE, POTASSIUM CHLORIDE, CALCIUM CHLORIDE 600; 310; 30; 20 MG/100ML; MG/100ML; MG/100ML; MG/100ML
INJECTION, SOLUTION INTRAVENOUS CONTINUOUS
Status: DISCONTINUED | OUTPATIENT
Start: 2022-10-22 | End: 2022-10-24 | Stop reason: HOSPADM

## 2022-10-22 RX ADMIN — PANTOPRAZOLE SODIUM 40 MG: 40 INJECTION, POWDER, FOR SOLUTION INTRAVENOUS at 21:12

## 2022-10-22 RX ADMIN — SODIUM CHLORIDE, POTASSIUM CHLORIDE, SODIUM LACTATE AND CALCIUM CHLORIDE: 600; 310; 30; 20 INJECTION, SOLUTION INTRAVENOUS at 14:15

## 2022-10-22 RX ADMIN — POTASSIUM CHLORIDE 40 MEQ: 750 TABLET, EXTENDED RELEASE ORAL at 14:12

## 2022-10-22 RX ADMIN — SODIUM CHLORIDE, POTASSIUM CHLORIDE, SODIUM LACTATE AND CALCIUM CHLORIDE: 600; 310; 30; 20 INJECTION, SOLUTION INTRAVENOUS at 09:45

## 2022-10-22 RX ADMIN — PANTOPRAZOLE SODIUM 40 MG: 40 INJECTION, POWDER, FOR SOLUTION INTRAVENOUS at 08:37

## 2022-10-22 RX ADMIN — SODIUM CHLORIDE, POTASSIUM CHLORIDE, SODIUM LACTATE AND CALCIUM CHLORIDE: 600; 310; 30; 20 INJECTION, SOLUTION INTRAVENOUS at 01:32

## 2022-10-22 RX ADMIN — MAGNESIUM SULFATE HEPTAHYDRATE 2 G: 40 INJECTION, SOLUTION INTRAVENOUS at 14:12

## 2022-10-22 NOTE — PROGRESS NOTES
Hospital Medicine Service -  Daily Progress Note       SUBJECTIVE   Interval History: Feeling better today, abdominal pain is improved. Feels ready to try clear liquid diet. Complains of sensation of irregular heart beat.      OBJECTIVE      Vital signs in last 24 hours:  Temp:  [36.9 °C (98.5 °F)-37.2 °C (98.9 °F)] 37 °C (98.6 °F)  Heart Rate:  [71-85] 80  Resp:  [16-18] 16  BP: (114-133)/(54-70) 120/54    Intake/Output Summary (Last 24 hours) at 10/22/2022 1257  Last data filed at 10/22/2022 0528  Gross per 24 hour   Intake 900 ml   Output --   Net 900 ml       PHYSICAL EXAMINATION      Physical Exam  Vitals and nursing note reviewed.   Constitutional:       General: She is not in acute distress.     Appearance: She is well-developed and well-nourished.   HENT:      Head: Normocephalic and atraumatic.      Mouth/Throat:      Mouth: Oropharynx is clear and moist.   Eyes:      Conjunctiva/sclera: Conjunctivae normal.   Cardiovascular:      Rate and Rhythm: Normal rate and regular rhythm.      Heart sounds: Normal heart sounds. No murmur heard.  Pulmonary:      Effort: Pulmonary effort is normal. No respiratory distress.   Abdominal:      General: Bowel sounds are normal. There is no distension.      Palpations: Abdomen is soft.      Tenderness: There is no abdominal tenderness.      Comments: Minimal abdominal discomfort to palpation in epigastric region   Musculoskeletal:         General: No edema.   Skin:     General: Skin is warm and dry.   Neurological:      Mental Status: She is alert and oriented to person, place, and time.      Cranial Nerves: No cranial nerve deficit.   Psychiatric:         Mood and Affect: Mood and affect normal.            LINES, CATHETERS, DRAINS, AIRWAYS, AND WOUNDS   Lines, Drains, and Airways:  Wounds (agree with documentation and present on admission):  Peripheral IV (Adult) 10/20/22 Left;Posterior Hand (Active)   Number of days: 2         Comments:      LABS / IMAGING / TELE       Labs  I have reviewed the patient's pertinent labs.  Significant abnormals are K 3.5, Mg 1.7; AST 59, , Alk Phos 491.     Results from last 7 days   Lab Units 10/22/22  1110 10/21/22  1832 10/21/22  0659   SODIUM mEQ/L 135* 131* 133*   POTASSIUM mEQ/L 3.4* 3.8 4.2   CHLORIDE mEQ/L 100 98 101   CO2 mEQ/L 26 24 22   BUN mg/dL 5* 6* 9   CREATININE mg/dL 0.5* 0.6 0.7   CALCIUM mg/dL 8.9 8.7* 9.0   ALBUMIN g/dL 2.8* 2.7* 3.2*   BILIRUBIN TOTAL mg/dL 1.2 1.7* 1.5*   ALK PHOS IU/L 491* 536* 687*   ALT IU/L 135* 166* 228*   AST IU/L 59* 77* 125*   GLUCOSE mg/dL 138* 91 113*     Results from last 7 days   Lab Units 10/22/22  1110 10/21/22  1832 10/20/22  0612   MAGNESIUM mg/dL 1.7* 1.7* 1.8       Results from last 7 days   Lab Units 10/21/22  0659 10/20/22  2252 10/20/22  0612   WBC K/uL 10.16 6.49 3.00*   HEMOGLOBIN g/dL 12.9 14.0 11.9   HEMATOCRIT % 37.9 40.2 35.8   PLATELETS K/uL 211 207 198       SARS-CoV-2 (COVID-19) (no units)   Date/Time Value   10/19/2022 1758 Positive (A)       Imaging  Not applicable    ECG/Telemetry  Patient is not on telemetry.    ASSESSMENT AND PLAN      * Transaminitis  Assessment & Plan  Cholestatic liver injury on LFT's in patient presenting with subacute RUQ/epigastric abdominal pain  MRCP with increasing intrahepatic and extrahepatic biliary duct dilatation extending to the level the ampulla with biliary stricture suspected; No CBD stone or mass identified  Acute hepatitis panel negative  - ERCP 10/20 with e/o papillary stenosis (non-malignant appearing) s/p biliary stent placement  - F/u cytology results  - LFT's continue to downtrend following stent placement  - CTM LFTs for ongoing improvement  - F/u with Dr. Etemad and repeat ERCP in 6-8 weeks to reevaluate ampulla region    Post-ERCP acute pancreatitis  Assessment & Plan  Patient developed post-ERCP pancreatitis with severe epigastric abdominal pain and lipase >1600 after ERCP on 10/20  She has a history of post-ERCP  pancreatitis as well  - Improved this morning - will advance to clear liquids and monitor for tolerance. Can possibly try low fat/low res solid diet for dinner if does well today  - Reduce IVF to 100 cc/hour  - IV morphine 3mg q3h    Palpitations  Assessment & Plan  Endorses symptomatic palpitations - possibly PVC's in light of mild electrolyte abnormalities  Monitor on telemetry overnight     COVID-19  Assessment & Plan  Tested positive for COVID-19 on 10/19/2022; She was known to be infected sometime in August  - Incidental finding as she is asymptomatic  - No specific treatment required  - CTM COVID labs    Abdominal pain  Assessment & Plan  Subacute post-prandial right upper quadrant and epigastric pain with cholestatic liver injury and dilated intra/extrahepatic biliary ducts on MRCP); No masses/stones  ERCP 10/20 with e/o papillary stenosis s/p biliary stent placement (stenosis was most likely cause of abdominal pain)  - Management of cholestatic liver injury/ductal dilation as under that problem  - Continue home PRN hyoscyamine and PPI daily  - Advance diet as tolerated       VTE Assessment: Padua VTE Score: 0  VTE Prophylaxis:  Current anticoagulants:  enoxaparin (LOVENOX) syringe 40 mg, subcutaneous, Daily (6p)      Code Status: Full Code  Palliative Care Screening Score: 0   Estimated Discharge Date: 10/23/2022     Disposition Planning: pending ability to tolerate PO, home w/o needs when medically stable     Pam Zarate MD  10/22/2022

## 2022-10-22 NOTE — PLAN OF CARE
Plan of Care Review  Plan of Care Reviewed With: patient  Progress: improving  Outcome Summary: VSS; pain 2/10 without request for prn pain medication throughout shift. Indep ambulates in room. Continues NPO with maintainance IVF. Isolation and safety precautions maintained.

## 2022-10-22 NOTE — ASSESSMENT & PLAN NOTE
Endorses symptomatic palpitations - possibly PVC's in light of mild electrolyte abnormalities  - No major events on tele

## 2022-10-22 NOTE — PROGRESS NOTES
Gastroenterology  Daily Progress Note       SUBJECTIVE   Patient feeling somewhat better today still having a little bit of abdominal pain.  Tolerating sips of water.  Does feel motivated to try clear liquid diet today.     OBJECTIVE      Vital signs in last 24 hours:  Temp:  [36.9 °C (98.5 °F)-37.2 °C (98.9 °F)] 37 °C (98.6 °F)  Heart Rate:  [77-85] 85  Resp:  [16-18] 16  BP: (116-133)/(59-70) 127/65    Intake/Output Summary (Last 24 hours) at 10/22/2022 0844  Last data filed at 10/22/2022 0528  Gross per 24 hour   Intake 900 ml   Output --   Net 900 ml       PHYSICAL EXAMINATION      Physical Exam  Constitutional:       General: She is not in acute distress.     Appearance: She is well-developed.   Eyes:      General: No scleral icterus.     Pupils: Pupils are equal, round, and reactive to light.   Abdominal:      General: There is no distension.      Palpations: Abdomen is soft. There is no mass.      Tenderness: There is no abdominal tenderness. There is no guarding or rebound.   Skin:     General: Skin is warm and dry.   Neurological:      Mental Status: She is alert and oriented to person, place, and time.          LABS / IMAGING / TELE      Labs    Results from last 7 days   Lab Units 10/21/22  1832 10/21/22  0659 10/20/22  2254   SODIUM mEQ/L 131* 133* 139   POTASSIUM mEQ/L 3.8 4.2 4.1   CHLORIDE mEQ/L 98 101 105   CO2 mEQ/L 24 22 20*   BUN mg/dL 6* 9 9   CREATININE mg/dL 0.6 0.7 0.7   CALCIUM mg/dL 8.7* 9.0 9.4   ALBUMIN g/dL 2.7* 3.2* 3.4   BILIRUBIN TOTAL mg/dL 1.7* 1.5* 2.1*   ALK PHOS IU/L 536* 687* 781*   ALT IU/L 166* 228* 270*   AST IU/L 77* 125* 184*   GLUCOSE mg/dL 91 113* 124*     Results from last 7 days   Lab Units 10/20/22  0612   MAGNESIUM mg/dL 1.8     Results from last 7 days   Lab Units 10/21/22  0659 10/20/22  2252 10/20/22  0612 10/19/22  1756   WBC K/uL 10.16 6.49 3.00* 4.30   HEMOGLOBIN g/dL 12.9 14.0 11.9 13.5   HEMATOCRIT % 37.9 40.2 35.8 40.2   PLATELETS K/uL 211 207 198 209  "  DIFF TYPE  Auto  --  Manu Auto   NRBC % 0.0  --   --  0.0   IMM GRANULOCYTES % 0.2  --   --  0.2   NEUTROPHILS % 84.6  --   --  64.2   NEUTROS PCT MAN %  --   --  26  --    LYMPHOCYTES % 7.5  --   --  24.7   LYMPHO PCT MAN %  --   --  42  --    MONOCYTES % 7.5  --   --  10.2   MONO PCT MAN %  --   --  17  --    EOSINOPHILS % 0.0  --   --  0.2   EOSINO PCT MAN %  --   --  3  --    BASOPHILS % 0.2  --   --  0.5   BASOS PCT MAN %  --   --  2  --    IMM GRANUCOCYTES ABS K/uL 0.02  --   --  0.01   SEGS ABS MAN K/uL  --   --  0.78*  --    LYMPHO ABS MAN K/uL  --   --  1.26  --    MONO ABS AUTO K/uL 0.76  --   --  0.44   MONO ABS MAN K/uL  --   --  0.51  --    EOS ABS AUTO K/uL 0.00*  --   --  0.01*   EOS ABS MAN K/uL  --   --  0.09  --    BASO ABS AUTO K/uL 0.02  --   --  0.02   BASOS ABS MAN K/uL  --   --  0.06  --          SARS-CoV-2 (COVID-19) (no units)   Date/Time Value   10/19/2022 1758 Positive (A)          Imaging      ASSESSMENT AND PLAN      Common bile duct dilatation  Assessment & Plan  62F with Crohn's disease, breast cancer, s/p cholecystectomy with biliary ductal dilatation 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 GI consulted for \"please see for abdominal pain with transaminitis and abnormal U abdomen with intra and extra hepatic duct dilation\".    # Choledocholithiasis reported on preliminary MRI/MRCP report (T2 hypodensity in distal CBD), but later over-read as increasing intrahepatic/extrahepatic sheba-dil extending to level of ampulla, no CBD stone or " mass, biliary stricture suspected  # Severe cholestatic patter liver injury    Impression: Initially had suspected choledocholithiasis based on overnight MRI/MRCP, but with overread demonstrating worsening biliary ductal dilatation with worsening cholestatic injury, now more suspicious for an ampullary pathology, including papillary stenosis / sphincter of oddi stenosis unresponsive to a 10mm biliary sphincterotomy in early 9/2022. While new MR read doesn't comment on choledocholithiasis, cannot definitively say whether or not there might not be a stone or lesion at the level of the ampulla that would be developed in the last 6 weeks since last EUS/ERCP.     10/20/22 ERCP w EUS guided biliary access  Papillary stenosis. It does not appear to be malignant. FNA was performed. EUS guided access to the bile duct was required. The pancreatic stent was initially placed and then removed. A 10 mm x 60 mm fully covered [employer] self-expanding metal stent was placed in the bile duct for drainage.    Post-ERCP pancreatitis managed with IVF    Recommendations:  - okay to advance diet to CLD  - if tolerates this okay for low fat later this afternoon  - can decrease fluids as increases PO intake   - Trend LFT's  - Monitor fever curve   - Await cytology results  - Plan to reevaluate ampullary region with ERCP in 6-8 weeks        Please see attending attestation for final recommendations    Oriana Alvarado D.O.  Gastroenterology Fellow PGY-5  Pager #[phone]     VTE Assessment: Padua VTE Score: 0  Code Status: Full Code  Estimated discharge date: 10/22/2022

## 2022-10-23 LAB
ALBUMIN SERPL-MCNC: 2.7 G/DL (ref 3.4–5)
ALP SERPL-CCNC: 432 IU/L (ref 35–126)
ALT SERPL-CCNC: 108 IU/L (ref 11–54)
ANION GAP SERPL CALC-SCNC: 6 MEQ/L (ref 3–15)
AST SERPL-CCNC: 52 IU/L (ref 15–41)
BILIRUB SERPL-MCNC: 0.9 MG/DL (ref 0.3–1.2)
BUN SERPL-MCNC: <5 MG/DL (ref 8–20)
CALCIUM SERPL-MCNC: 9.1 MG/DL (ref 8.9–10.3)
CHLORIDE SERPL-SCNC: 105 MEQ/L (ref 98–109)
CO2 SERPL-SCNC: 27 MEQ/L (ref 22–32)
CREAT SERPL-MCNC: 0.5 MG/DL (ref 0.6–1.1)
GFR SERPL CREATININE-BSD FRML MDRD: >60 ML/MIN/1.73M*2
GLUCOSE SERPL-MCNC: 123 MG/DL (ref 70–99)
MAGNESIUM SERPL-MCNC: 1.8 MG/DL (ref 1.8–2.5)
POTASSIUM SERPL-SCNC: 3.6 MEQ/L (ref 3.6–5.1)
PROT SERPL-MCNC: 5.2 G/DL (ref 6–8.2)
SODIUM SERPL-SCNC: 138 MEQ/L (ref 136–144)

## 2022-10-23 PROCEDURE — 63600000 HC DRUGS/DETAIL CODE: Performed by: HOSPITALIST

## 2022-10-23 PROCEDURE — 80053 COMPREHEN METABOLIC PANEL: CPT | Performed by: STUDENT IN AN ORGANIZED HEALTH CARE EDUCATION/TRAINING PROGRAM

## 2022-10-23 PROCEDURE — 12000000 HC ROOM AND CARE MED/SURG

## 2022-10-23 PROCEDURE — 25000000 HC PHARMACY GENERAL: Performed by: HOSPITALIST

## 2022-10-23 PROCEDURE — 99233 SBSQ HOSP IP/OBS HIGH 50: CPT | Performed by: HOSPITALIST

## 2022-10-23 PROCEDURE — 36415 COLL VENOUS BLD VENIPUNCTURE: CPT | Performed by: STUDENT IN AN ORGANIZED HEALTH CARE EDUCATION/TRAINING PROGRAM

## 2022-10-23 PROCEDURE — 83735 ASSAY OF MAGNESIUM: CPT | Performed by: HOSPITALIST

## 2022-10-23 PROCEDURE — 63700000 HC SELF-ADMINISTRABLE DRUG: Performed by: STUDENT IN AN ORGANIZED HEALTH CARE EDUCATION/TRAINING PROGRAM

## 2022-10-23 PROCEDURE — 63700000 HC SELF-ADMINISTRABLE DRUG: Performed by: HOSPITALIST

## 2022-10-23 PROCEDURE — 63600000 HC DRUGS/DETAIL CODE: Performed by: INTERNAL MEDICINE

## 2022-10-23 RX ORDER — POTASSIUM CHLORIDE 750 MG/1
20 TABLET, EXTENDED RELEASE ORAL ONCE
Status: COMPLETED | OUTPATIENT
Start: 2022-10-23 | End: 2022-10-23

## 2022-10-23 RX ADMIN — ACETAMINOPHEN 650 MG: 325 TABLET, FILM COATED ORAL at 19:10

## 2022-10-23 RX ADMIN — POTASSIUM CHLORIDE 20 MEQ: 750 TABLET, EXTENDED RELEASE ORAL at 16:41

## 2022-10-23 RX ADMIN — SODIUM CHLORIDE, POTASSIUM CHLORIDE, SODIUM LACTATE AND CALCIUM CHLORIDE: 600; 310; 30; 20 INJECTION, SOLUTION INTRAVENOUS at 01:33

## 2022-10-23 RX ADMIN — ENOXAPARIN SODIUM 40 MG: 40 INJECTION SUBCUTANEOUS at 18:59

## 2022-10-23 RX ADMIN — PANTOPRAZOLE SODIUM 40 MG: 40 INJECTION, POWDER, FOR SOLUTION INTRAVENOUS at 10:42

## 2022-10-23 RX ADMIN — SODIUM CHLORIDE, POTASSIUM CHLORIDE, SODIUM LACTATE AND CALCIUM CHLORIDE: 600; 310; 30; 20 INJECTION, SOLUTION INTRAVENOUS at 18:56

## 2022-10-23 RX ADMIN — PANTOPRAZOLE SODIUM 40 MG: 40 INJECTION, POWDER, FOR SOLUTION INTRAVENOUS at 18:59

## 2022-10-23 NOTE — PROGRESS NOTES
Hospital Medicine Service -  Daily Progress Note       SUBJECTIVE   Interval History: Started to have more RUQ pain today with clear liquid diet, notes this is the same pain she had when she came to the hospital with biliary obstruction, very different from what she has with pancreatitis. No nausea/vomiting. Pain is worsened by eating she states. Also notes loose stools after eating.      OBJECTIVE      Vital signs in last 24 hours:  Temp:  [37 °C (98.6 °F)-37.1 °C (98.8 °F)] 37.1 °C (98.8 °F)  Heart Rate:  [60-80] 60  Resp:  [16-18] 16  BP: (103-120)/(48-64) 103/48    Intake/Output Summary (Last 24 hours) at 10/23/2022 0730  Last data filed at 10/23/2022 0600  Gross per 24 hour   Intake 240 ml   Output --   Net 240 ml       PHYSICAL EXAMINATION      Physical Exam  Vitals and nursing note reviewed.   Constitutional:       General: She is not in acute distress.     Appearance: She is well-developed and well-nourished.   HENT:      Head: Normocephalic and atraumatic.      Mouth/Throat:      Mouth: Oropharynx is clear and moist.   Eyes:      Conjunctiva/sclera: Conjunctivae normal.   Cardiovascular:      Rate and Rhythm: Normal rate and regular rhythm.      Heart sounds: Normal heart sounds. No murmur heard.  Pulmonary:      Effort: Pulmonary effort is normal. No respiratory distress.   Abdominal:      General: Bowel sounds are normal. There is no distension.      Palpations: Abdomen is soft.      Tenderness: There is no abdominal tenderness.      Comments: Minimal abdominal discomfort to palpation in epigastric region   Musculoskeletal:         General: No edema.   Skin:     General: Skin is warm and dry.   Neurological:      Mental Status: She is alert and oriented to person, place, and time.      Cranial Nerves: No cranial nerve deficit.   Psychiatric:         Mood and Affect: Mood and affect normal.      LINES, CATHETERS, DRAINS, AIRWAYS, AND WOUNDS   Lines, Drains, and Airways:  Wounds (agree with  documentation and present on admission):  Peripheral IV (Adult) 10/20/22 Left;Posterior Hand (Active)   Number of days: 3         Comments:      LABS / IMAGING / TELE      Labs  I have reviewed the patient's labs to the time of note. No new clinical concern.     Results from last 7 days   Lab Units 10/23/22  1045 10/22/22  1110 10/21/22  1832   SODIUM mEQ/L 138 135* 131*   POTASSIUM mEQ/L 3.6 3.4* 3.8   CHLORIDE mEQ/L 105 100 98   CO2 mEQ/L 27 26 24   BUN mg/dL <5* 5* 6*   CREATININE mg/dL 0.5* 0.5* 0.6   CALCIUM mg/dL 9.1 8.9 8.7*   ALBUMIN g/dL 2.7* 2.8* 2.7*   BILIRUBIN TOTAL mg/dL 0.9 1.2 1.7*   ALK PHOS IU/L 432* 491* 536*   ALT IU/L 108* 135* 166*   AST IU/L 52* 59* 77*   GLUCOSE mg/dL 123* 138* 91       SARS-CoV-2 (COVID-19) (no units)   Date/Time Value   10/19/2022 1758 Positive (A)       Imaging  Not applicable    ECG/Telemetry  Patient is not on telemetry.    ASSESSMENT AND PLAN      * Transaminitis  Assessment & Plan  Cholestatic liver injury on LFT's in patient presenting with subacute RUQ/epigastric abdominal pain  MRCP with increasing intrahepatic and extrahepatic biliary duct dilatation extending to the level the ampulla with biliary stricture suspected; No CBD stone or mass identified  Acute hepatitis panel negative  - ERCP 10/20 with e/o papillary stenosis (non-malignant appearing) s/p biliary stent placement  - F/u cytology results  - LFT's continue to downtrend following stent placement  - CTM LFTs for ongoing improvement  - F/u with Dr. Tenorio and repeat ERCP in 6-8 weeks to reevaluate ampulla region    Discussed abdominal pain w/ GI today - they are not concerned for any new pathology, as long as LFT's continue to improve NTD. Her pain is chronic and functional.     Post-ERCP acute pancreatitis  Assessment & Plan  Patient developed post-ERCP pancreatitis with severe epigastric abdominal pain and lipase >1600 after ERCP on 10/20  She has a history of post-ERCP pancreatitis as well  - Advanced to  clear liquids yesterday, did have increase in abdominal pain but she attributes this to her biliary pathology. Will continue to monitor with advancement to low fat/low res today  - Continue with IVF    Palpitations  Assessment & Plan  Endorses symptomatic palpitations - possibly PVC's in light of mild electrolyte abnormalities  Monitor on telemetry overnight     COVID-19  Assessment & Plan  Tested positive for COVID-19 on 10/19/2022; She was known to be infected sometime in August  - Incidental finding as she is asymptomatic  - No specific treatment required  - CTM COVID labs    Abdominal pain  Assessment & Plan  Subacute post-prandial right upper quadrant and epigastric pain with cholestatic liver injury and dilated intra/extrahepatic biliary ducts on MRCP); No masses/stones  ERCP 10/20 with e/o papillary stenosis s/p biliary stent placement (stenosis was most likely cause of abdominal pain)  - Management of cholestatic liver injury/ductal dilation as under that problem  - Continue home PRN hyoscyamine and PPI daily  - Advance diet as tolerated       VTE Assessment: Padua VTE Score: 0  VTE Prophylaxis:  Current anticoagulants:  enoxaparin (LOVENOX) syringe 40 mg, subcutaneous, Daily (6p)      Code Status: Full Code  Palliative Care Screening Score: 0   Estimated Discharge Date: 10/24/2022     Disposition Planning: home when medically stable     Pam Zarate MD  10/23/2022

## 2022-10-23 NOTE — PLAN OF CARE
Problem: Adult Inpatient Plan of Care  Goal: Plan of Care Review  10/23/2022 0404 by Britni Ladd RN  Outcome: Progressing  Flowsheets (Taken 10/23/2022 0404)  Progress: improving  Plan of Care Reviewed With: patient  Outcome Summary: Pt's VSS with LR running @100ml/hr. Plan is to continue to monitor elevated liver enzymes, labs to be drawn in AM.  10/23/2022 0404 by Britni Ladd RN  Outcome: Progressing  Goal: Patient-Specific Goal (Individualized)  10/23/2022 0404 by Britni Ladd RN  Outcome: Progressing  10/23/2022 0404 by Britni Ladd RN  Outcome: Progressing  Goal: Absence of Hospital-Acquired Illness or Injury  10/23/2022 0404 by Britni Ladd RN  Outcome: Progressing  10/23/2022 0404 by Britni Ladd RN  Outcome: Progressing  Goal: Optimal Comfort and Wellbeing  10/23/2022 0404 by Britni Ladd RN  Outcome: Progressing  10/23/2022 0404 by Britni Ladd RN  Outcome: Progressing  Goal: Readiness for Transition of Care  10/23/2022 0404 by Britni Ladd RN  Outcome: Progressing  10/23/2022 0404 by Britni Ladd RN  Outcome: Progressing     Problem: Infection  Goal: Absence of Infection Signs and Symptoms  10/23/2022 0404 by Britni Ladd RN  Outcome: Progressing  10/23/2022 0404 by Britni Ladd RN  Outcome: Progressing   Plan of Care Review  Plan of Care Reviewed With: patient  Progress: improving  Outcome Summary: Pt's VSS with LR running @100ml/hr. Plan is to continue to monitor elevated liver enzymes, labs to be drawn in AM.

## 2022-10-23 NOTE — PLAN OF CARE
Problem: Adult Inpatient Plan of Care  Goal: Plan of Care Review  Outcome: Progressing  Flowsheets (Taken 10/22/2022 2020)  Progress: improving  Plan of Care Reviewed With: patient  Outcome Summary: Pt vss, cont fluid @100/hr of LR, LBM this shift , loose/liquid, cont Lab monitoring, clear liquid diet, pt yovani ctm.  Goal: Patient-Specific Goal (Individualized)  Outcome: Progressing  Goal: Absence of Hospital-Acquired Illness or Injury  Outcome: Progressing  Goal: Optimal Comfort and Wellbeing  Outcome: Progressing  Goal: Readiness for Transition of Care  Outcome: Progressing     Problem: Infection  Goal: Absence of Infection Signs and Symptoms  Outcome: Progressing   Plan of Care Review  Plan of Care Reviewed With: patient  Progress: improving  Outcome Summary: Pt vss, cont fluid @100/hr of LR, LBM this shift , loose/liquid, cont Lab monitoring, clear liquid diet, pt yovani ctm.

## 2022-10-24 ENCOUNTER — TELEPHONE (OUTPATIENT)
Dept: GASTROENTEROLOGY | Facility: CLINIC | Age: 62
End: 2022-10-24
Payer: COMMERCIAL

## 2022-10-24 VITALS
TEMPERATURE: 98.9 F | HEART RATE: 68 BPM | DIASTOLIC BLOOD PRESSURE: 61 MMHG | WEIGHT: 167 LBS | SYSTOLIC BLOOD PRESSURE: 118 MMHG | HEIGHT: 71 IN | BODY MASS INDEX: 23.38 KG/M2 | RESPIRATION RATE: 16 BRPM | OXYGEN SATURATION: 100 %

## 2022-10-24 LAB
ALBUMIN SERPL-MCNC: 3 G/DL (ref 3.4–5)
ALP SERPL-CCNC: 408 IU/L (ref 35–126)
ALT SERPL-CCNC: 109 IU/L (ref 11–54)
ANION GAP SERPL CALC-SCNC: 8 MEQ/L (ref 3–15)
AST SERPL-CCNC: 57 IU/L (ref 15–41)
BILIRUB SERPL-MCNC: 0.8 MG/DL (ref 0.3–1.2)
BUN SERPL-MCNC: <5 MG/DL (ref 8–20)
CALCIUM SERPL-MCNC: 9.6 MG/DL (ref 8.9–10.3)
CASE RPRT: NORMAL
CHLORIDE SERPL-SCNC: 103 MEQ/L (ref 98–109)
CLINICAL INFO: NORMAL
CO2 SERPL-SCNC: 28 MEQ/L (ref 22–32)
CREAT SERPL-MCNC: 0.6 MG/DL (ref 0.6–1.1)
GFR SERPL CREATININE-BSD FRML MDRD: >60 ML/MIN/1.73M*2
GGT SERPL-CCNC: 404 IU/L (ref 7–50)
GLUCOSE SERPL-MCNC: 106 MG/DL (ref 70–99)
MAGNESIUM SERPL-MCNC: 1.7 MG/DL (ref 1.8–2.5)
PATH REPORT.FINAL DX SPEC: NORMAL
PATH REPORT.GROSS SPEC: NORMAL
POTASSIUM SERPL-SCNC: 3.8 MEQ/L (ref 3.6–5.1)
PROT SERPL-MCNC: 5.8 G/DL (ref 6–8.2)
SODIUM SERPL-SCNC: 139 MEQ/L (ref 136–144)

## 2022-10-24 PROCEDURE — 99239 HOSP IP/OBS DSCHRG MGMT >30: CPT | Performed by: STUDENT IN AN ORGANIZED HEALTH CARE EDUCATION/TRAINING PROGRAM

## 2022-10-24 PROCEDURE — 63600000 HC DRUGS/DETAIL CODE: Performed by: HOSPITALIST

## 2022-10-24 PROCEDURE — 80053 COMPREHEN METABOLIC PANEL: CPT | Performed by: STUDENT IN AN ORGANIZED HEALTH CARE EDUCATION/TRAINING PROGRAM

## 2022-10-24 PROCEDURE — 25000000 HC PHARMACY GENERAL: Performed by: HOSPITALIST

## 2022-10-24 PROCEDURE — 36415 COLL VENOUS BLD VENIPUNCTURE: CPT | Performed by: STUDENT IN AN ORGANIZED HEALTH CARE EDUCATION/TRAINING PROGRAM

## 2022-10-24 PROCEDURE — 99232 SBSQ HOSP IP/OBS MODERATE 35: CPT | Performed by: INTERNAL MEDICINE

## 2022-10-24 PROCEDURE — 63600000 HC DRUGS/DETAIL CODE: Performed by: STUDENT IN AN ORGANIZED HEALTH CARE EDUCATION/TRAINING PROGRAM

## 2022-10-24 PROCEDURE — 82977 ASSAY OF GGT: CPT | Performed by: STUDENT IN AN ORGANIZED HEALTH CARE EDUCATION/TRAINING PROGRAM

## 2022-10-24 PROCEDURE — 63700000 HC SELF-ADMINISTRABLE DRUG: Performed by: STUDENT IN AN ORGANIZED HEALTH CARE EDUCATION/TRAINING PROGRAM

## 2022-10-24 PROCEDURE — 83735 ASSAY OF MAGNESIUM: CPT | Performed by: HOSPITALIST

## 2022-10-24 RX ORDER — POTASSIUM CHLORIDE 750 MG/1
20 TABLET, EXTENDED RELEASE ORAL ONCE
Status: COMPLETED | OUTPATIENT
Start: 2022-10-24 | End: 2022-10-24

## 2022-10-24 RX ORDER — POLYETHYLENE GLYCOL 3350 17 G/17G
17 POWDER, FOR SOLUTION ORAL DAILY PRN
Qty: 10 EACH | Refills: 3 | Status: SHIPPED | OUTPATIENT
Start: 2022-10-24 | End: 2023-01-26

## 2022-10-24 RX ORDER — POLYETHYLENE GLYCOL 3350 17 G/17G
17 POWDER, FOR SOLUTION ORAL 2 TIMES DAILY
Status: DISCONTINUED | OUTPATIENT
Start: 2022-10-24 | End: 2022-10-24 | Stop reason: HOSPADM

## 2022-10-24 RX ADMIN — PANTOPRAZOLE SODIUM 40 MG: 40 INJECTION, POWDER, FOR SOLUTION INTRAVENOUS at 09:29

## 2022-10-24 RX ADMIN — SODIUM CHLORIDE, POTASSIUM CHLORIDE, SODIUM LACTATE AND CALCIUM CHLORIDE: 600; 310; 30; 20 INJECTION, SOLUTION INTRAVENOUS at 03:23

## 2022-10-24 RX ADMIN — MAGNESIUM SULFATE HEPTAHYDRATE 2 G: 40 INJECTION, SOLUTION INTRAVENOUS at 12:04

## 2022-10-24 RX ADMIN — ACETAMINOPHEN 650 MG: 325 TABLET, FILM COATED ORAL at 12:05

## 2022-10-24 RX ADMIN — POTASSIUM CHLORIDE 20 MEQ: 750 TABLET, EXTENDED RELEASE ORAL at 12:05

## 2022-10-24 RX ADMIN — POLYETHYLENE GLYCOL 3350 17 G: 17 POWDER, FOR SOLUTION ORAL at 12:05

## 2022-10-24 NOTE — PLAN OF CARE
Problem: Adult Inpatient Plan of Care  Goal: Plan of Care Review  Flowsheets (Taken 10/24/2022 1404)  Progress: improving  Plan of Care Reviewed With: patient    Per CPR, patient is planned for discharge today, home with no needs. Patient has transportation arranged. Patient is agreeable to the dispo plan. CC will continue to follow for transition of care needs until discharge is complete.

## 2022-10-24 NOTE — TELEPHONE ENCOUNTER
Pt is s/p ERCP w EUS guided biliary access and fcSEMs placement on 10/20/22. She will require repeat ERCP in 6-8 weeks. She is not diabetic and is not on AC. Please schedule accordingly.     Dx: papillary stenosis

## 2022-10-24 NOTE — DISCHARGE SUMMARY
Hospital Medicine Service -  Inpatient Discharge Summary        BRIEF OVERVIEW   Admitting Provider: Kesly Esquivel MD  Attending Provider: Kelsy Esquivel MD Attending phys phone: [phone]    PCP: Ankita Ho [phone]    Admission Date: 10/19/2022  Discharge Date: 10/24/2022     DISCHARGE DIAGNOSES      Primary Discharge Diagnosis  Transaminitis    Secondary Discharge Diagnoses  Active Hospital Problems    Diagnosis Date Noted    Abdominal pain 10/19/2022     Priority: High    Transaminitis 10/19/2022     Priority: High    Post-ERCP acute pancreatitis 09/09/2022     Priority: High    Palpitations 10/22/2022     Priority: Low    COVID-19 10/19/2022     Priority: Low    Elevated liver enzymes 10/21/2022    Common bile duct dilatation 10/20/2022      Resolved Hospital Problems    Diagnosis Date Noted Date Resolved    Elevated liver enzymes 10/19/2022 10/20/2022     Priority: High    Choledocholithiasis 10/19/2022 10/20/2022     Priority: High       Problem List on Day of Discharge  Abdominal pain  Assessment & Plan  Subacute post-prandial right upper quadrant and epigastric pain with cholestatic liver injury and dilated intra/extrahepatic biliary ducts on MRCP); No masses/stones  ERCP 10/20 with e/o papillary stenosis s/p biliary stent placement (stenosis was most likely cause of abdominal pain)  - Management of cholestatic liver injury/ductal dilation as under that problem  - Continue home PRN hyoscyamine and PPI daily  - Continued post-prandial abdominal pain likely due to stent, per GI  - Follow-up with Dr. Miguel Riojas (GI)    Post-ERCP acute pancreatitis  Assessment & Plan  Patient developed post-ERCP pancreatitis with severe epigastric abdominal pain and lipase >1600 after ERCP on 10/20  She has a history of post-ERCP pancreatitis as well  - Managed with bowel rest, IVF, and IV morphine  - Diet advanced to low fat, Tolerating without epigastric/pancreatitis pain    *  Transaminitis  Assessment & Plan  Cholestatic liver injury on LFT's in patient presenting with subacute RUQ/epigastric abdominal pain  MRCP with increasing intrahepatic and extrahepatic biliary duct dilatation extending to the level the ampulla with biliary stricture suspected; No CBD stone or mass identified  Acute hepatitis panel negative  - ERCP 10/20 with e/o papillary stenosis (non-malignant appearing) s/p biliary stent placement  - F/u cytology results  - LFT's continue to downtrend following stent placement -- Not yet resolved, Repeat CMP in 1 week  - F/u with Dr. Tenorio and repeat ERCP in 6-8 weeks to reevaluate ampulla region    Palpitations  Assessment & Plan  Endorses symptomatic palpitations - possibly PVC's in light of mild electrolyte abnormalities  - No major events on tele    COVID-19  Assessment & Plan  Tested positive for COVID-19 on 10/19/2022; She was known to be infected sometime in August  - Incidental finding as she is asymptomatic  - Supportive care    SUMMARY OF HOSPITALIZATION      Presenting Problem/History of Present Illness  This is a 62 y.o. year-old female admitted on 10/19/2022 with Choledocholithiasis [K80.50]  Abdominal pain [R10.9]  Elevated liver enzymes [R74.8].    Ms. Clark is a 62 y.o. female with a past medical history of cholecystectomy, ERCP s/p biliary sphincterectomy, breast cancer, Crohn's disease, and post-ERCP pancreatitis who presented with abnormal lab findings of transaminitis as well as abnormal ultrasound scan of the abdomen which showed new intrahepatic and extrahepatic biliary ductal dilation. She has been having right upper quadrant abdominal discomfort for several weeks/months and underwent ERCP on 9/9/2022 for right upper quadrant discomfort as well as dilated common bile duct and abnormal liver enzymes.  The presumptive diagnosis was papillary stenosis or sphincter of Oddi dysfunction. Unfortunately, following ERCP she developed pancreatitis and was treated  at [facility]. Since discharge her abdominal pain has improved but persists and is worsened with food. She underwent repeat ultrasound scan of the abdomen with above results and also underwent blood test which showed elevated liver enzymes prompting presentation to the ER. She was incidentally found to be COVID-positive but is asymptomatic.      Hospital Course    Intra and extrahepatic ductal dilation was confirmed on MRCP here; no masses or stones. 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 Following this procedure, LFT's downtrended. They are not yet normalized.  Post-ERCP course was c/b pancreatitis for which she was treated with bowel rest, IV fluid, and IV pain meds. She is now able to tolerate a diet without epigastric/pancreatitis pain; however, she continues to experience post-prandial RUQ pain. GI feels this may be related to the stent. She will need to follow-up with Dr. Tenorio for repeat ERCP in 6-8 weeks to reevaluate ampulla region. FNA/cytology results from ERCP at still pending at the time of discharge. In the meantime, she should follow-up with her outpatient Gastroenterologist, Dr. Riojas. Obtain repeat CMP in 1 week to ensure LFT's are downtrending/resolved.    Incidentally found to be COVID-19 positive. No symptoms.      Exam on Day of Discharge  Physical Exam    GEN: Well-appearing, Comfortable, No acute distress, Alert and oriented x3  HEENT: PERRL, EOMI, Moist mucous membranes, Neck supple, No JVD  CV: Regular rate and rhythm, Normal S1/S2, No murmurs, No rubs  PULM:  Non-labored breathing on room air, Clear to auscultation bilaterally, No crackles or rhonchi  ABD: Soft, Non-distended, Bowel sounds present, Mild tenderness on palpation of RUQ  EXT: No deformities, No edema, Nontender, No rash  NEURO: CN II-XII grossly intact, No focal motor or sensory deficits, Baseline neck tremor  : No ness  catheter  PSYCH: Appropriate mood and affect, Full range    Consults During Admission  IP CONSULT TO WOUND OSTOMY CONTINENCE  IP CONSULT TO GASTROENTEROLOGY    DISCHARGE MEDICATIONS               Medication List      START taking these medications    polyethylene glycol 17 gram packet  Commonly known as: MIRALAX  Take 17 g by mouth daily as needed (Constipation) for up to 3 days.  Dose: 17 g        CONTINUE taking these medications    ciclopirox 1 % shampoo  Commonly known as: LOPROX  Apply topically 2 (two) times a week (Mon, Thu). gently massage into affected areas and surrounding skin     hyoscyamine 0.125 mg SL tablet  Commonly known as: LEVSIN  Place 0.125 mg under the tongue 3 (three) times a day as needed for cramping.  Dose: 0.125 mg     omeprazole 40 mg capsule  Commonly known as: PriLOSEC  Take 1 capsule by mouth once daily.  Dose: 1 capsule        STOP taking these medications    naproxen 500 mg tablet  Commonly known as: NAPROSYN                PROCEDURES / LABS / IMAGING      Operative Procedures  None    Other Procedures  ERCP, 10/20/2022    Pertinent Labs  Lab Results   Component Value Date    WBC 10.16 10/21/2022    HGB 12.9 10/21/2022    HCT 37.9 10/21/2022    MCV 88.8 10/21/2022     10/21/2022     Lab Results   Component Value Date    GLUCOSE 106 (H) 10/24/2022    CALCIUM 9.6 10/24/2022     10/24/2022    K 3.8 10/24/2022    CO2 28 10/24/2022     10/24/2022    BUN <5 (L) 10/24/2022    CREATININE 0.6 10/24/2022     SARS-CoV-2 (COVID-19) (no units)   Date/Time Value   10/19/2022 1758 Positive (A)       Pertinent Imaging  MRI ABDOMEN WITH AND WITHOUT CONTRAST MRCP    Result Date: 10/20/2022  IMPRESSION: 1. Increasing intrahepatic and extrahepatic biliary duct dilatation extending to the level the ampulla. No CBD stone or mass identified. Biliary stricture is suspected. 2. Nonspecific heterogeneous enhancement of the liver which may be due to biliary stasis versus nonspecific  hepatitis. Correlate clinically.     MRI ENTEROGRAPHY WITH AND WITHOUT CONTRAST    Result Date: 9/27/2022  IMPRESSION: No definite abnormality seen in the small bowel. Status post cholecystectomy. 2.2 cm left adnexal cyst. COMMENT: MR enterography was performed before and following the intravenous administration of 7.4 cc of Gadavist. VoLumen was utilized as contrast. Comparison is made to a CT scan of the abdomen and pelvis from 09/13/2022. The previously identified small bilateral pleural effusions and adjacent atelectasis have resolved. The lung bases are now clear. The heart does not appear enlarged. The gallbladder is again noted to be absent. The pneumobilia seen on the CT is not as well demonstrated by MRI, likely for technical reasons. The remainder of the upper abdominal visceral organs is are not optimally evaluated by this technique but appear grossly unremarkable. No gross gastric abnormality is seen. Small bowel loops are not optimally assessed due to suboptimal distention and motion, but are normal in caliber. There is no abnormal enhancement of the mucosa or wall of the small bowel. No definite mass or filling defects are identified. The colon is not optimally evaluated by this technique but appears grossly unremarkable. There is a 2.2 cm left adnexal cyst, as seen previously. This could be further assessed with follow-up pelvic ultrasound. No abdominal or pelvic lymphadenopathy is seen. Previously seen abdominal and pelvic ascites have resolved. The signal in the osseous structures is unremarkable. In accordance with [place] Act 112,  the patient will receive a letter notifying them to follow up with their physician.    ULTRASOUND ABDOMEN LIMITED    Addendum Date: 10/19/2022    Cole ECHOLS Reading Room Coordinator [facility] spoke to Kim in Dr.RYAN EUBANKS office and she confirmed receipt of the faxed report at 11:57 am on 10/19/22.    Result Date: 10/19/2022  IMPRESSION: New intrahepatic and  extrahepatic biliary ductal dilatation. MRI abdomen/MRCP evaluation recommended. COMMENT:   Ultrasound of the right upper quadrant was performed. Comparison:  CT dated September 13, 2022. MRI dated September 27, 2022 Liver:  The liver measures approximately 15.5 cm sagittally.  It is normal in echotexture. There is no evidence of focal lesion. Gallbladder and Bile Ducts: There is new intra and extrahepatic biliary ductal dilatation. The common bile duct measures up to 1.7 cm. It measured up to 1.1 cm on the MRI dated September 27, 2022. The gallbladder is surgically absent. Pancreas: The visualized portions are normal in contour and echogenicity. Right Kidney:  The right kidney measures 11.9 cm. There is no evidence of suspicious mass, calculus or hydronephrosis. In accordance with [place] Act 112,  the patient will receive a letter notifying them to follow up with their physician.      OUTPATIENT  FOLLOW-UP / REFERRALS / PENDING TESTS        Outpatient Follow-Up Appointments    [ ]  Have your labs drawn in 1 week (approx. Monday. 10/31) to check on your liver enzymes. These can be drawn in your PCP's office, or here at [facility].    [ ]  Follow-up with your Gastroenterologist, Dr. Riojas, as soon as possible.    [ ]  Follow-up with Dr. Tenorio in 6-8 weeks for repeat ERCP. Call the office at [phone] to schedule this procedure.       Test Results Pending at Discharge    Cytology/FNA from ERCP ampullary stenosis    DISCHARGE DISPOSITION AND DESTINATION      Disposition: Home   Destination: Home                            Code Status At Discharge: Full Code    Physician Order for Life-Sustaining Treatment Document Status      No documents found                 Encounter time 40 minutes; Face to Face Patient Counseling / Coordinating Care >50% of Encounter Time

## 2022-10-24 NOTE — PROGRESS NOTES
Gastroenterology  Daily Progress Note       SUBJECTIVE   Interval History: AFVSS. LFTs trending down over the weekend, AM labs pending. Pt sitting up in bed eating Urdu toast. +RUQ pain post-prandially last night. Explained as different than the abdominal pain she was having with pancreatitis. Abdominal exam benign. Diarrhea resolved, now feeling constipated.      OBJECTIVE      Vital signs in last 24 hours:  Temp:  [36.2 °C (97.2 °F)-36.8 °C (98.3 °F)] 36.7 °C (98 °F)  Heart Rate:  [58-68] 63  Resp:  [18] 18  BP: (107-116)/(51-59) 116/59  No intake or output data in the 24 hours ending 10/24/22 0917    PHYSICAL EXAMINATION      Physical Exam  Constitutional:       General: She is not in acute distress.     Appearance: She is well-developed and well-nourished.   HENT:      Head: Normocephalic and atraumatic.   Eyes:      General: No scleral icterus.     Conjunctiva/sclera: Conjunctivae normal.   Cardiovascular:      Rate and Rhythm: Normal rate and regular rhythm.   Pulmonary:      Effort: Pulmonary effort is normal. No respiratory distress.   Abdominal:      General: There is no distension.      Palpations: Abdomen is soft. There is no mass.      Tenderness: There is no abdominal tenderness. There is no guarding or rebound.      Hernia: No hernia is present.   Musculoskeletal:         General: No edema.   Lymphadenopathy:      Cervical: No cervical adenopathy.   Skin:     General: Skin is warm and dry.      Findings: No rash.   Neurological:      Mental Status: She is alert and oriented to person, place, and time.   Psychiatric:         Mood and Affect: Mood and affect normal.         Behavior: Behavior normal.         Thought Content: Thought content normal.          LABS / IMAGING / TELE        Labs  Results from last 7 days   Lab Units 10/23/22  1045 10/22/22  1110 10/21/22  1832   SODIUM mEQ/L 138 135* 131*   POTASSIUM mEQ/L 3.6 3.4* 3.8   CHLORIDE mEQ/L 105 100 98   CO2 mEQ/L 27 26 24   BUN mg/dL <5* 5*  "6*   CREATININE mg/dL 0.5* 0.5* 0.6   CALCIUM mg/dL 9.1 8.9 8.7*   ALBUMIN g/dL 2.7* 2.8* 2.7*   BILIRUBIN TOTAL mg/dL 0.9 1.2 1.7*   ALK PHOS IU/L 432* 491* 536*   ALT IU/L 108* 135* 166*   AST IU/L 52* 59* 77*   GLUCOSE mg/dL 123* 138* 91     Results from last 7 days   Lab Units 10/19/22  1756   INR  0.9       Results from last 7 days   Lab Units 10/23/22  1045   MAGNESIUM mg/dL 1.8     Results from last 7 days   Lab Units 10/21/22  0659 10/20/22  2252 10/20/22  0612   WBC K/uL 10.16 6.49 3.00*   HEMOGLOBIN g/dL 12.9 14.0 11.9   HEMATOCRIT % 37.9 40.2 35.8   PLATELETS K/uL 211 207 198         Imaging  I have independently reviewed the pertinent imaging from the last 24 hrs.    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 GI consulted for \"please see for abdominal pain with transaminitis and abnormal U abdomen with intra and extra hepatic duct dilation\".    # Choledocholithiasis reported on preliminary MRI/MRCP report (T2 hypodensity in distal CBD), but later over-read as increasing intrahepatic/extrahepatic sheba-dil extending to level of ampulla, no CBD stone or mass, biliary stricture suspected  # Severe cholestatic patter liver injury    Impression: Initially had suspected choledocholithiasis based on overnight MRI/MRCP, but with overread demonstrating worsening biliary ductal dilatation with worsening " cholestatic injury, now more suspicious for an ampullary pathology, including papillary stenosis / sphincter of oddi stenosis unresponsive to a 10mm biliary sphincterotomy in early 9/2022. While new MR delphine doesn't comment on choledocholithiasis, cannot definitively say whether or not there might not be a stone or lesion at the level of the ampulla that would be developed in the last 6 weeks since last EUS/ERCP.     10/20/22 ERCP w EUS guided biliary access  Papillary stenosis. It does not appear to be malignant. FNA was performed. EUS guided access to the bile duct was required. The pancreatic stent was initially placed and then removed. A 10 mm x 60 mm fully covered [employer] self-expanding metal stent was placed in the bile duct for drainage.    Post-ERCP pancreatitis managed with IVF    Recommendations:  - okay to continue low fat diet   - can decrease fluids as increases PO intake   - start miralax daily for relief of constipation   - F/u on AM labs.   - Await cytology results  - If pt able to tolerate low fat diet, and assuming labs continue to demonstrate downtrending LFTs, she can be discharged home later today.   - Plan to reevaluate ampullary region with ERCP in 6-8 weeks. Pt to call the office @ [phone] to schedule repeat ERCP.                VTE Assessment: [place] VTE Score: 0  Code Status: Full Code  Estimated discharge date: 10/24/2022     Marina Lockwood PA-C  Interventional Gastroenterology  [facility], [id number]  [address] [facility]  Office: [phone]  Pager: [phone]

## 2022-10-24 NOTE — DISCHARGE INSTRUCTIONS
Ms. Clark,    Jose were admitted to [facility] on 10/19/2022 for abdominal pain, elevated liver enzymes, and dilated biliary ducts seen on imaging. You were evaluated by our GI team and underwent an MRI which confirmed biliary dilation. You then went on to undergo an ERCP which demonstrated a narrowing/stenosis of the biliary tract, likely causing your elevated liver enzymes and dilated ducts. A stent was placed in this region to relieve the obstruction and cells were collected to be sent to the pathologist. With this intervention, your liver enzyme numbers came down nicely. They are not yet completely back to normal and will need to be rechecked in approximately 1 week.    Unfortunately, your ERCP was complicated by pancreatitis which was treated with intravenous fluids, bowel rest, and pain medications. Your pancreatitis pain has improved/resolved. You have been able to tolerate food without return of this pain.    You continue to experience right-sided abdominal pain following meals. Our GI team thinks that this may be related to the stent now in place in your biliary tract. They are recommending that you continue to eat a low fat diet. You may incorporate fiber as needed. They also recommend that you treat your constipation with miralax 1-2 times daily as needed.    To Do:  [ ]  Have your labs drawn in 1 week (approx. Monday. 10/31) to check on your liver enzymes. These can be drawn in your PCP's office, or here at [facility].  [ ]  Follow-up with your Gastroenterologist, Dr. Riojas, as soon as possible  [ ]  Follow-up with Dr. Tenorio in 6-8 weeks for repeat ERCP. Call the office at [phone] to schedule this procedure.    It was a pleasure taking care of you! We wish you the best in your health.

## 2022-10-25 ENCOUNTER — TELEPHONE (OUTPATIENT)
Dept: GASTROENTEROLOGY | Facility: CLINIC | Age: 62
End: 2022-10-25
Payer: COMMERCIAL

## 2022-10-26 ENCOUNTER — TELEPHONE (OUTPATIENT)
Dept: GASTROENTEROLOGY | Facility: CLINIC | Age: 62
End: 2022-10-26
Payer: COMMERCIAL

## 2022-10-26 LAB
BACTERIA BLD CULT: NORMAL
BACTERIA BLD CULT: NORMAL

## 2022-10-28 DIAGNOSIS — K91.89 POST-ERCP ACUTE PANCREATITIS: ICD-10-CM

## 2022-10-28 DIAGNOSIS — R10.13 EPIGASTRIC PAIN: Primary | ICD-10-CM

## 2022-10-28 DIAGNOSIS — K31.5 DUODENAL PAPILLARY STENOSIS: ICD-10-CM

## 2022-10-28 DIAGNOSIS — K85.90 POST-ERCP ACUTE PANCREATITIS: ICD-10-CM

## 2022-10-28 NOTE — TELEPHONE ENCOUNTER
Called patient to give conformation of authorization approval number for CT Abdomen Pelvis With IV Contrast. Authorization # is [id number]).

## 2022-10-29 ENCOUNTER — APPOINTMENT (OUTPATIENT)
Dept: LAB | Age: 62
End: 2022-10-29
Attending: STUDENT IN AN ORGANIZED HEALTH CARE EDUCATION/TRAINING PROGRAM
Payer: COMMERCIAL

## 2022-10-29 ENCOUNTER — TRANSCRIBE ORDERS (OUTPATIENT)
Dept: LAB | Age: 62
End: 2022-10-29

## 2022-10-29 DIAGNOSIS — R74.8 ABNORMAL LEVELS OF OTHER SERUM ENZYMES: ICD-10-CM

## 2022-10-29 DIAGNOSIS — R74.8 ABNORMAL LEVELS OF OTHER SERUM ENZYMES: Primary | ICD-10-CM

## 2022-10-29 DIAGNOSIS — R74.8 ELEVATED LIVER ENZYMES: ICD-10-CM

## 2022-10-29 LAB
ALBUMIN SERPL-MCNC: 3.3 G/DL (ref 3.4–5)
ALP SERPL-CCNC: 278 IU/L (ref 35–126)
ALT SERPL-CCNC: 56 IU/L (ref 11–54)
ANION GAP SERPL CALC-SCNC: 9 MEQ/L (ref 3–15)
AST SERPL-CCNC: 33 IU/L (ref 15–41)
BILIRUB SERPL-MCNC: 0.7 MG/DL (ref 0.3–1.2)
BUN SERPL-MCNC: 6 MG/DL (ref 8–20)
CALCIUM SERPL-MCNC: 9.8 MG/DL (ref 8.9–10.3)
CHLORIDE SERPL-SCNC: 103 MEQ/L (ref 98–109)
CO2 SERPL-SCNC: 27 MEQ/L (ref 22–32)
CREAT SERPL-MCNC: 0.6 MG/DL (ref 0.6–1.1)
ERYTHROCYTE [DISTWIDTH] IN BLOOD BY AUTOMATED COUNT: 13.3 % (ref 11.7–14.4)
GFR SERPL CREATININE-BSD FRML MDRD: >60 ML/MIN/1.73M*2
GLUCOSE SERPL-MCNC: 92 MG/DL (ref 70–99)
HCT VFR BLDCO AUTO: 39.3 % (ref 35–45)
HGB BLD-MCNC: 12.7 G/DL (ref 11.8–15.7)
MCH RBC QN AUTO: 31 PG (ref 28–33.2)
MCHC RBC AUTO-ENTMCNC: 32.3 G/DL (ref 32.2–35.5)
MCV RBC AUTO: 95.9 FL (ref 83–98)
PDW BLD AUTO: 10.7 FL (ref 9.4–12.3)
PLATELET # BLD AUTO: 418 K/UL (ref 150–369)
POTASSIUM SERPL-SCNC: 4.6 MEQ/L (ref 3.6–5.1)
PROT SERPL-MCNC: 5.9 G/DL (ref 6–8.2)
RBC # BLD AUTO: 4.1 M/UL (ref 3.93–5.22)
SODIUM SERPL-SCNC: 139 MEQ/L (ref 136–144)
WBC # BLD AUTO: 6.11 K/UL (ref 3.8–10.5)

## 2022-10-29 PROCEDURE — 82040 ASSAY OF SERUM ALBUMIN: CPT

## 2022-10-29 PROCEDURE — 85027 COMPLETE CBC AUTOMATED: CPT

## 2022-10-29 PROCEDURE — 36415 COLL VENOUS BLD VENIPUNCTURE: CPT

## 2022-11-01 ENCOUNTER — TELEPHONE (OUTPATIENT)
Dept: GASTROENTEROLOGY | Facility: CLINIC | Age: 62
End: 2022-11-01
Payer: COMMERCIAL

## 2022-11-01 DIAGNOSIS — Z01.818 PREPROCEDURAL EXAMINATION: Primary | ICD-10-CM

## 2022-11-01 NOTE — TELEPHONE ENCOUNTER
Patient scheduled for ERCP 12/15/22 @11:00a.  Advised of covid test requirement.  Instructions emailed to patient.  No blood thinners.  Dx: papillary stenosis  Not diabetic.

## 2022-11-02 ENCOUNTER — HOSPITAL ENCOUNTER (OUTPATIENT)
Dept: RADIOLOGY | Facility: HOSPITAL | Age: 62
Discharge: HOME | End: 2022-11-02
Attending: PHYSICIAN ASSISTANT
Payer: COMMERCIAL

## 2022-11-02 DIAGNOSIS — K91.89 POST-ERCP ACUTE PANCREATITIS: ICD-10-CM

## 2022-11-02 DIAGNOSIS — K85.90 POST-ERCP ACUTE PANCREATITIS: ICD-10-CM

## 2022-11-02 DIAGNOSIS — K31.5 DUODENAL PAPILLARY STENOSIS: ICD-10-CM

## 2022-11-02 DIAGNOSIS — R10.13 EPIGASTRIC PAIN: ICD-10-CM

## 2022-11-02 PROCEDURE — G1004 CDSM NDSC: HCPCS

## 2022-11-02 PROCEDURE — 63600105 HC IODINE BASED CONTRAST: Mod: JW | Performed by: PHYSICIAN ASSISTANT

## 2022-11-02 RX ADMIN — IOHEXOL 75 ML: 350 INJECTION, SOLUTION INTRAVENOUS at 09:20

## 2022-11-05 ENCOUNTER — APPOINTMENT (OUTPATIENT)
Dept: LAB | Age: 62
End: 2022-11-05
Attending: INTERNAL MEDICINE
Payer: COMMERCIAL

## 2022-11-05 ENCOUNTER — TRANSCRIBE ORDERS (OUTPATIENT)
Dept: LAB | Age: 62
End: 2022-11-05

## 2022-11-05 DIAGNOSIS — K31.5 OBSTRUCTION OF DUODENUM: Primary | ICD-10-CM

## 2022-11-05 DIAGNOSIS — R10.11 RIGHT UPPER QUADRANT PAIN: ICD-10-CM

## 2022-11-05 DIAGNOSIS — K85.90 ACUTE PANCREATITIS WITHOUT NECROSIS OR INFECTION, UNSPECIFIED: ICD-10-CM

## 2022-11-05 DIAGNOSIS — R74.8 ABNORMAL LEVELS OF OTHER SERUM ENZYMES: ICD-10-CM

## 2022-11-05 DIAGNOSIS — K31.5 OBSTRUCTION OF DUODENUM: ICD-10-CM

## 2022-11-05 DIAGNOSIS — K91.89 OTHER POSTPROCEDURAL COMPLICATIONS AND DISORDERS OF DIGESTIVE SYSTEM: ICD-10-CM

## 2022-11-05 PROCEDURE — 85027 COMPLETE CBC AUTOMATED: CPT

## 2022-11-05 PROCEDURE — 36415 COLL VENOUS BLD VENIPUNCTURE: CPT

## 2022-11-05 PROCEDURE — 80053 COMPREHEN METABOLIC PANEL: CPT

## 2022-11-06 LAB
ALBUMIN SERPL-MCNC: 3.7 G/DL (ref 3.4–5)
ALP SERPL-CCNC: 171 IU/L (ref 35–126)
ALT SERPL-CCNC: 26 IU/L (ref 11–54)
ANION GAP SERPL CALC-SCNC: 6 MEQ/L (ref 3–15)
AST SERPL-CCNC: 23 IU/L (ref 15–41)
BILIRUB SERPL-MCNC: 1.2 MG/DL (ref 0.3–1.2)
BUN SERPL-MCNC: <5 MG/DL (ref 8–20)
CALCIUM SERPL-MCNC: 9.8 MG/DL (ref 8.9–10.3)
CHLORIDE SERPL-SCNC: 104 MEQ/L (ref 98–109)
CO2 SERPL-SCNC: 30 MEQ/L (ref 22–32)
CREAT SERPL-MCNC: 0.7 MG/DL (ref 0.6–1.1)
ERYTHROCYTE [DISTWIDTH] IN BLOOD BY AUTOMATED COUNT: 13.8 % (ref 11.7–14.4)
GFR SERPL CREATININE-BSD FRML MDRD: >60 ML/MIN/1.73M*2
GLUCOSE SERPL-MCNC: 93 MG/DL (ref 70–99)
HCT VFR BLDCO AUTO: 43.8 % (ref 35–45)
HGB BLD-MCNC: 12.8 G/DL (ref 11.8–15.7)
MCH RBC QN AUTO: 30.4 PG (ref 28–33.2)
MCHC RBC AUTO-ENTMCNC: 29.2 G/DL (ref 32.2–35.5)
MCV RBC AUTO: 104 FL (ref 83–98)
PDW BLD AUTO: 10.7 FL (ref 9.4–12.3)
PLATELET # BLD AUTO: 310 K/UL (ref 150–369)
POTASSIUM SERPL-SCNC: 4.2 MEQ/L (ref 3.6–5.1)
PROT SERPL-MCNC: 6.1 G/DL (ref 6–8.2)
RBC # BLD AUTO: 4.21 M/UL (ref 3.93–5.22)
SODIUM SERPL-SCNC: 140 MEQ/L (ref 136–144)
WBC # BLD AUTO: 4.73 K/UL (ref 3.8–10.5)

## 2022-11-21 ENCOUNTER — APPOINTMENT (OUTPATIENT)
Dept: LAB | Facility: HOSPITAL | Age: 62
End: 2022-11-21
Attending: INTERNAL MEDICINE
Payer: COMMERCIAL

## 2022-11-21 ENCOUNTER — TRANSCRIBE ORDERS (OUTPATIENT)
Dept: LAB | Facility: HOSPITAL | Age: 62
End: 2022-11-21

## 2022-11-21 DIAGNOSIS — K83.1 OBSTRUCTION OF BILE DUCT: ICD-10-CM

## 2022-11-21 DIAGNOSIS — K83.1 OBSTRUCTION OF BILE DUCT: Primary | ICD-10-CM

## 2022-11-21 LAB
ALBUMIN SERPL-MCNC: 3.8 G/DL (ref 3.4–5)
ALP SERPL-CCNC: 103 IU/L (ref 35–126)
ALT SERPL-CCNC: 16 IU/L (ref 11–54)
ANION GAP SERPL CALC-SCNC: 8 MEQ/L (ref 3–15)
AST SERPL-CCNC: 19 IU/L (ref 15–41)
BILIRUB SERPL-MCNC: 1.8 MG/DL (ref 0.3–1.2)
BUN SERPL-MCNC: <5 MG/DL (ref 8–20)
CALCIUM SERPL-MCNC: 9.7 MG/DL (ref 8.9–10.3)
CHLORIDE SERPL-SCNC: 102 MEQ/L (ref 98–109)
CO2 SERPL-SCNC: 27 MEQ/L (ref 22–32)
CREAT SERPL-MCNC: 0.6 MG/DL (ref 0.6–1.1)
GFR SERPL CREATININE-BSD FRML MDRD: >60 ML/MIN/1.73M*2
GLUCOSE SERPL-MCNC: 95 MG/DL (ref 70–99)
POTASSIUM SERPL-SCNC: 4 MEQ/L (ref 3.6–5.1)
PROT SERPL-MCNC: 6.5 G/DL (ref 6–8.2)
SODIUM SERPL-SCNC: 137 MEQ/L (ref 136–144)

## 2022-11-21 PROCEDURE — 36415 COLL VENOUS BLD VENIPUNCTURE: CPT

## 2022-11-21 PROCEDURE — 80053 COMPREHEN METABOLIC PANEL: CPT

## 2022-11-22 ENCOUNTER — TELEPHONE (OUTPATIENT)
Dept: GASTROENTEROLOGY | Facility: CLINIC | Age: 62
End: 2022-11-22
Payer: COMMERCIAL

## 2022-11-22 PROBLEM — K81.0 ACUTE ACALCULOUS CHOLECYSTITIS: Status: ACTIVE | Noted: 2020-02-08

## 2022-11-22 PROBLEM — K83.8 CHOLANGIECTASIS: Status: ACTIVE | Noted: 2022-11-22

## 2022-11-22 PROBLEM — R19.5 ABNORMAL FECES: Status: ACTIVE | Noted: 2022-11-22

## 2022-11-22 PROBLEM — E04.1 THYROID NODULE: Status: ACTIVE | Noted: 2022-11-22

## 2022-11-22 PROBLEM — K92.2 GASTROINTESTINAL HEMORRHAGE: Status: ACTIVE | Noted: 2022-11-22

## 2022-11-22 PROBLEM — G25.0 BENIGN ESSENTIAL TREMOR: Status: ACTIVE | Noted: 2019-10-11

## 2022-11-22 PROBLEM — R06.02 SHORTNESS OF BREATH: Status: ACTIVE | Noted: 2019-10-11

## 2022-11-22 PROBLEM — Q43.8 TORTUOUS COLON: Status: ACTIVE | Noted: 2022-11-22

## 2022-11-22 PROBLEM — R92.30 DENSE BREAST TISSUE ON MAMMOGRAM: Status: ACTIVE | Noted: 2019-10-11

## 2022-11-22 PROBLEM — K63.89 OTHER SPECIFIED DISEASES OF INTESTINE: Status: ACTIVE | Noted: 2022-11-22

## 2022-11-22 PROBLEM — N64.4 PAIN OF RIGHT BREAST: Status: ACTIVE | Noted: 2019-10-11

## 2022-11-22 PROBLEM — K31.5 DUODENAL PAPILLARY STENOSIS: Status: ACTIVE | Noted: 2022-11-22

## 2022-11-22 PROBLEM — Z85.3 HISTORY OF RIGHT BREAST CANCER: Status: ACTIVE | Noted: 2019-10-11

## 2022-11-22 PROBLEM — M86.9: Status: ACTIVE | Noted: 2022-11-22

## 2022-11-22 PROBLEM — K63.8219 SMALL INTESTINAL BACTERIAL OVERGROWTH (SIBO): Status: ACTIVE | Noted: 2022-11-22

## 2022-11-22 PROBLEM — R17 JAUNDICE: Status: ACTIVE | Noted: 2022-11-22

## 2022-11-22 PROBLEM — G24.9 DYSTONIA: Status: ACTIVE | Noted: 2022-11-22

## 2022-11-22 PROBLEM — K59.04 CHRONIC IDIOPATHIC CONSTIPATION: Status: ACTIVE | Noted: 2022-11-22

## 2022-11-22 PROBLEM — K82.8 BILIARY DYSKINESIA: Status: ACTIVE | Noted: 2020-02-08

## 2022-11-22 PROBLEM — R10.31 RIGHT LOWER QUADRANT PAIN: Status: ACTIVE | Noted: 2022-11-22

## 2022-11-22 PROBLEM — K83.1 BILIARY STRICTURE: Status: ACTIVE | Noted: 2022-11-22

## 2022-11-22 PROBLEM — R10.11 RIGHT UPPER QUADRANT PAIN: Status: ACTIVE | Noted: 2022-11-22

## 2022-11-22 PROBLEM — K59.00 CONSTIPATION: Status: ACTIVE | Noted: 2022-11-22

## 2022-11-22 PROBLEM — Z92.3 PERSONAL HISTORY OF RADIATION THERAPY: Status: ACTIVE | Noted: 2019-10-11

## 2022-11-22 PROBLEM — R07.81 PLEURITIC CHEST PAIN: Status: ACTIVE | Noted: 2019-10-11

## 2022-11-22 PROBLEM — Z87.39 HISTORY OF AVASCULAR NECROSIS OF CAPITAL FEMORAL EPIPHYSIS: Status: ACTIVE | Noted: 2019-10-11

## 2022-11-22 PROBLEM — J98.4 SCARRING OF LUNG: Status: ACTIVE | Noted: 2019-10-11

## 2022-11-22 PROBLEM — A69.20 LYME DISEASE: Status: ACTIVE | Noted: 2022-11-22

## 2022-11-22 NOTE — TELEPHONE ENCOUNTER
Patient's chart is update for her telemedicine appointment with Dr. Tenorio on Wednesday, 11/30/2022 @ 11:00 AM.

## 2022-11-29 ENCOUNTER — APPOINTMENT (OUTPATIENT)
Dept: URBAN - METROPOLITAN AREA CLINIC 203 | Age: 62
Setting detail: DERMATOLOGY
End: 2022-11-30

## 2022-11-29 DIAGNOSIS — L57.8 OTHER SKIN CHANGES DUE TO CHRONIC EXPOSURE TO NONIONIZING RADIATION: ICD-10-CM

## 2022-11-29 DIAGNOSIS — L65.9 NONSCARRING HAIR LOSS, UNSPECIFIED: ICD-10-CM

## 2022-11-29 DIAGNOSIS — Z11.52 ENCOUNTER FOR SCREENING FOR COVID-19: ICD-10-CM

## 2022-11-29 DIAGNOSIS — D17 BENIGN LIPOMATOUS NEOPLASM: ICD-10-CM

## 2022-11-29 DIAGNOSIS — D18.0 HEMANGIOMA: ICD-10-CM

## 2022-11-29 PROBLEM — D17.21 BENIGN LIPOMATOUS NEOPLASM OF SKIN AND SUBCUTANEOUS TISSUE OF RIGHT ARM: Status: ACTIVE | Noted: 2022-11-29

## 2022-11-29 PROBLEM — D18.01 HEMANGIOMA OF SKIN AND SUBCUTANEOUS TISSUE: Status: ACTIVE | Noted: 2022-11-29

## 2022-11-29 PROCEDURE — 99213 OFFICE O/P EST LOW 20 MIN: CPT

## 2022-11-29 PROCEDURE — OTHER SUNSCREEN RECOMMENDATIONS: OTHER

## 2022-11-29 PROCEDURE — OTHER MIPS QUALITY: OTHER

## 2022-11-29 PROCEDURE — OTHER PRESCRIPTION MEDICATION MANAGEMENT: OTHER

## 2022-11-29 PROCEDURE — OTHER SCREENING FOR COVID-19: OTHER

## 2022-11-29 PROCEDURE — OTHER REASSURANCE: OTHER

## 2022-11-29 PROCEDURE — OTHER COUNSELING: OTHER

## 2022-11-29 ASSESSMENT — LOCATION ZONE DERM
LOCATION ZONE: SCALP
LOCATION ZONE: ARM
LOCATION ZONE: TRUNK

## 2022-11-29 ASSESSMENT — LOCATION SIMPLE DESCRIPTION DERM
LOCATION SIMPLE: RIGHT POSTERIOR UPPER ARM
LOCATION SIMPLE: ABDOMEN
LOCATION SIMPLE: LEFT BREAST
LOCATION SIMPLE: RIGHT SCALP

## 2022-11-29 ASSESSMENT — LOCATION DETAILED DESCRIPTION DERM
LOCATION DETAILED: LEFT MEDIAL BREAST 10-11:00 REGION
LOCATION DETAILED: RIGHT RIB CAGE
LOCATION DETAILED: RIGHT DISTAL POSTERIOR UPPER ARM
LOCATION DETAILED: LEFT MEDIAL BREAST 11-12:00 REGION
LOCATION DETAILED: LEFT RIB CAGE
LOCATION DETAILED: EPIGASTRIC SKIN
LOCATION DETAILED: RIGHT MEDIAL FRONTAL SCALP

## 2022-11-29 NOTE — PROCEDURE: PRESCRIPTION MEDICATION MANAGEMENT
Plan: Low level laser therapy - hair max laser comb - brochure given at todays visit
Render In Strict Bullet Format?: No
Detail Level: Zone
Initiate Treatment: Topical otc rogaine
Plan: Patient to schedule for excision

## 2022-11-30 ENCOUNTER — TELEMEDICINE (OUTPATIENT)
Dept: GASTROENTEROLOGY | Facility: CLINIC | Age: 62
End: 2022-11-30
Payer: COMMERCIAL

## 2022-11-30 VITALS — HEIGHT: 71 IN | WEIGHT: 163 LBS | BODY MASS INDEX: 22.82 KG/M2

## 2022-11-30 DIAGNOSIS — R17 ELEVATED BILIRUBIN: ICD-10-CM

## 2022-11-30 DIAGNOSIS — K59.09 OTHER CONSTIPATION: Primary | ICD-10-CM

## 2022-11-30 PROCEDURE — 3008F BODY MASS INDEX DOCD: CPT | Performed by: INTERNAL MEDICINE

## 2022-11-30 PROCEDURE — 99213 OFFICE O/P EST LOW 20 MIN: CPT | Mod: 95 | Performed by: INTERNAL MEDICINE

## 2022-11-30 NOTE — H&P (VIEW-ONLY)
Interventional Gastroenterology  MD Marina Saxena PA-C   [facility], [id number] [place] [address]  [phone]                                              Telemedicine Follow Up    Date: 2022  Patient: Sowmya Clark  : 1960  Age: 62 y.o. female  PCP:Ankita Ho MD  Referring physician:     The patient affirms they are currently located in the following state: [place]      Request for Consent:  Audio and Video Encounter   Hello, my name is Perry Tenorio MD.  Before we proceed, can you please verify your identification by telling me your full name and date of birth?  Can you tell me who is in the room with you?    You and I are about to have a telemedicine check-in or visit because you have requested it.  This is a live video-conference.  I am a real person, speaking to you in real time.  There is no one else with me on the video-conference. I am not recording this conversation and I am asking you not to record it.  This telemedicine visit will be billed to your health insurance or you, if you are self-insured.  You understand you will be responsible for any copayments or coinsurances that apply to your telemedicine visit.  Communication platform used for this encounter:  [employer] Video Visit (Epic Video Client)       Before starting our telemedicine visit, I am required to get your consent for this virtual check-in or visit by telemedicine. Do you consent?     Patient Response to Request for Consent:   Yes    Chief Complaint:   HPI   Sowmya Clark is a 62 y.o. female presenting today in follow-up.  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  She currently has a biliary stent in place to maintain patency of the orifice.    She developed postprocedure pancreatitis and pain.  This is now resolved.  However she has bloating and constipation that appears to  require daily MiraLAX and occasional magnesium to resolve.  She describes a discomfort in the lower abdomen and sometimes in the upper abdomen.    Overall she is scheduled for a procedure on December 15 for the removal of the stent that is currently in place.  She has no other major symptoms at this time.   ASSESSMENT AND PLAN   Other constipation  I recommended the use of daily MiraLAX 1 capful with magnesium as needed to provide soft stools 1-2 times per day.  I am hopeful that this will not be needed long-term but it is reasonable to use in the short-term.  It is likely she would need to use this at least 1 month as we develop regular bowel habits.    Biliary dyskinesia  This patient had papillary stenosis.  She currently has a stent in place that will be removed in December.  We will evaluate the papilla at that time and determine if additional stenting is warranted.  I am hopeful that she will not require a stent and we will watch her.  She had a mildly elevated bilirubin which we will repeat to see if it is persistent.    Elevated bilirubin  She has an elevated bilirubin.  It is isolated with no other liver enzyme abnormalities.  We will repeat this to determine if it is persistent and then work it up if there is persistent elevation.  We can repeat this every few months to be sure there is maintenance of normal liver enzymes.        Procedure Request  Procedure to be scheduled:  Timing:   Diagnosis, diagnosis code:   Cardiac clearance due to anticoagulation/antiplatlet therapy?:  Does the patient have a pacemaker?  Patient's current height/weight:     PAST MEDICAL AND SURGICAL HISTORY      Medical History:  Past Medical History:   Diagnosis Date   • Breast cancer (CMS/HCC) 2007   • Crohn's disease (CMS/HCC)    • Dystonia    • Osteomyelitis (CMS/HCC)    • Thyroid nodule    • Tremor        Surgical History:  Past Surgical History:   Procedure Laterality Date   • BREAST LUMPECTOMY     • BREAST SURGERY     •  CATARACT EXTRACTION     • CYST REMOVAL      throat   • KNEE SURGERY     • TONSILLECTOMY         CURRENT MEDICATIONS        Current Outpatient Medications:   •  ciclopirox (LOPROX) 1 % shampoo, Apply topically 2 (two) times a week (Mon, Thu). gently massage into affected areas and surrounding skin, Disp: , Rfl:   •  hyoscyamine (LEVSIN) 0.125 mg SL tablet, Place 0.125 mg under the tongue 3 (three) times a day as needed for cramping., Disp: , Rfl:   •  multivit-min/ferrous fumarate (MULTI VITAMIN ORAL), Take by mouth., Disp: , Rfl:   •  omeprazole (PriLOSEC) 40 mg capsule, Take 1 capsule by mouth once daily., Disp: , Rfl:   •  polyethylene glycol (MIRALAX) 17 gram packet, Take 17 g by mouth daily as needed (Constipation) for up to 3 days., Disp: 10 each, Rfl: 3    ALLERGIES      Prednisone    FAMILY HISTORY      Family History   Problem Relation Age of Onset   • COPD Biological Mother    • Alzheimer's disease Biological Father    • Spina bifida Biological Brother        SOCIAL HISTORY      Social History     Socioeconomic History   • Marital status:      Spouse name: None   • Number of children: None   • Years of education: None   • Highest education level: None   Tobacco Use   • Smoking status: Never   • Smokeless tobacco: Never   Substance and Sexual Activity   • Alcohol use: Yes   • Drug use: Never   • Sexual activity: Defer     Social Determinants of Health     Food Insecurity: No Food Insecurity   • Worried About Running Out of Food in the Last Year: Never true   • Ran Out of Food in the Last Year: Never true       REVIEW OF SYSTEMS      Review of Systems   Constitutional: Negative for activity change, appetite change, fatigue, fever and unexpected weight change.   HENT: Negative for trouble swallowing and voice change.    Respiratory: Negative for cough and shortness of breath.    Cardiovascular: Negative for chest pain.   Gastrointestinal: Negative for abdominal distention, abdominal pain, anal bleeding,  blood in stool, constipation, diarrhea, nausea and vomiting.   Neurological: Negative for dizziness, weakness and light-headedness.     PHYSICAL EXAMINATION      Because of the Covid-19 pandemic, in order to limit patient contact, I did not see and examine the patient directly.     LABS / IMAGING       Labs:   CBC Results       11/05/22 10/29/22 10/21/22     0827 0738 0659    WBC 4.73 6.11 10.16    RBC 4.21 4.10 4.27    HGB 12.8 12.7 12.9    HCT 43.8 39.3 37.9    .0 95.9 88.8    MCH 30.4 31.0 30.2    MCHC 29.2 32.3 34.0     418 211        CMP Results       11/21/22 11/05/22 10/29/22     1136 0827 0738     140 139    K 4.0 4.2 4.6    Cl 102 104 103    CO2 27 30 27    Glucose 95 93 92    BUN <5 <5 6    Creatinine 0.6 0.7 0.6    Calcium 9.7 9.8 9.8    Anion Gap 8 6 9    AST 19 23 33    ALT 16 26 56    Albumin 3.8 3.7 3.3    EGFR >60.0 >60.0 >60.0        Lab Results   Component Value Date    INR 0.9 10/19/2022      I spent 24 minutes on this date of service performing the following activities: obtaining history, documenting, preparing for visit, obtaining / reviewing records and communicating results.    Perry Tenorio MD  11/30/2022  11:46 AM

## 2022-12-01 ENCOUNTER — APPOINTMENT (OUTPATIENT)
Dept: LAB | Age: 62
End: 2022-12-01
Attending: INTERNAL MEDICINE
Payer: COMMERCIAL

## 2022-12-01 DIAGNOSIS — K59.09 OTHER CONSTIPATION: ICD-10-CM

## 2022-12-01 DIAGNOSIS — R17 ELEVATED BILIRUBIN: ICD-10-CM

## 2022-12-01 LAB
ALBUMIN SERPL-MCNC: 3.7 G/DL (ref 3.4–5)
ALP SERPL-CCNC: 108 IU/L (ref 35–126)
ALT SERPL-CCNC: 45 IU/L (ref 11–54)
ANION GAP SERPL CALC-SCNC: 9 MEQ/L (ref 3–15)
AST SERPL-CCNC: 25 IU/L (ref 15–41)
BILIRUB SERPL-MCNC: 1.5 MG/DL (ref 0.3–1.2)
BUN SERPL-MCNC: 7 MG/DL (ref 8–20)
CALCIUM SERPL-MCNC: 10 MG/DL (ref 8.9–10.3)
CHLORIDE SERPL-SCNC: 103 MEQ/L (ref 98–109)
CO2 SERPL-SCNC: 28 MEQ/L (ref 22–32)
CREAT SERPL-MCNC: 0.7 MG/DL (ref 0.6–1.1)
GFR SERPL CREATININE-BSD FRML MDRD: >60 ML/MIN/1.73M*2
GLUCOSE SERPL-MCNC: 135 MG/DL (ref 70–99)
POTASSIUM SERPL-SCNC: 4.4 MEQ/L (ref 3.6–5.1)
PROT SERPL-MCNC: 6.2 G/DL (ref 6–8.2)
SODIUM SERPL-SCNC: 140 MEQ/L (ref 136–144)

## 2022-12-01 PROCEDURE — 36415 COLL VENOUS BLD VENIPUNCTURE: CPT

## 2022-12-01 PROCEDURE — 80053 COMPREHEN METABOLIC PANEL: CPT

## 2022-12-02 ENCOUNTER — TELEPHONE (OUTPATIENT)
Dept: GASTROENTEROLOGY | Facility: CLINIC | Age: 62
End: 2022-12-02
Payer: COMMERCIAL

## 2022-12-02 NOTE — TELEPHONE ENCOUNTER
Telemedicine visit that was completed on 11/30/22:    Patient would be monitored for her elevated bilirubin. To check liver enzymes every few months. Nadine, please monitor.

## 2022-12-06 ENCOUNTER — APPOINTMENT (OUTPATIENT)
Dept: LAB | Facility: HOSPITAL | Age: 62
End: 2022-12-06
Attending: INTERNAL MEDICINE
Payer: COMMERCIAL

## 2022-12-06 ENCOUNTER — TELEPHONE (OUTPATIENT)
Dept: GASTROENTEROLOGY | Facility: CLINIC | Age: 62
End: 2022-12-06
Payer: COMMERCIAL

## 2022-12-06 ENCOUNTER — TRANSCRIBE ORDERS (OUTPATIENT)
Dept: REGISTRATION | Facility: HOSPITAL | Age: 62
End: 2022-12-06

## 2022-12-06 ENCOUNTER — DOCUMENTATION (OUTPATIENT)
Dept: GASTROENTEROLOGY | Facility: CLINIC | Age: 62
End: 2022-12-06
Payer: COMMERCIAL

## 2022-12-06 DIAGNOSIS — K83.1 OBSTRUCTION OF BILE DUCT: Primary | ICD-10-CM

## 2022-12-06 DIAGNOSIS — R10.11 RIGHT UPPER QUADRANT PAIN: ICD-10-CM

## 2022-12-06 DIAGNOSIS — R10.13 ABDOMINAL PAIN, EPIGASTRIC: Primary | ICD-10-CM

## 2022-12-06 DIAGNOSIS — K83.1 OBSTRUCTION OF BILE DUCT: ICD-10-CM

## 2022-12-06 LAB
ALBUMIN SERPL-MCNC: 3.5 G/DL (ref 3.4–5)
ALP SERPL-CCNC: 134 IU/L (ref 35–126)
ALT SERPL-CCNC: 68 IU/L (ref 11–54)
ANION GAP SERPL CALC-SCNC: 8 MEQ/L (ref 3–15)
AST SERPL-CCNC: 94 IU/L (ref 15–41)
BILIRUB SERPL-MCNC: 1.3 MG/DL (ref 0.3–1.2)
BUN SERPL-MCNC: 9 MG/DL (ref 8–20)
CALCIUM SERPL-MCNC: 9.8 MG/DL (ref 8.9–10.3)
CHLORIDE SERPL-SCNC: 104 MEQ/L (ref 98–109)
CO2 SERPL-SCNC: 27 MEQ/L (ref 22–32)
CREAT SERPL-MCNC: 0.7 MG/DL (ref 0.6–1.1)
ERYTHROCYTE [DISTWIDTH] IN BLOOD BY AUTOMATED COUNT: 12.8 % (ref 11.7–14.4)
GFR SERPL CREATININE-BSD FRML MDRD: >60 ML/MIN/1.73M*2
GLUCOSE SERPL-MCNC: 102 MG/DL (ref 70–99)
HCT VFR BLDCO AUTO: 39.7 % (ref 35–45)
HGB BLD-MCNC: 13.2 G/DL (ref 11.8–15.7)
LIPASE SERPL-CCNC: 42 U/L (ref 20–51)
MCH RBC QN AUTO: 31 PG (ref 28–33.2)
MCHC RBC AUTO-ENTMCNC: 33.2 G/DL (ref 32.2–35.5)
MCV RBC AUTO: 93.2 FL (ref 83–98)
PDW BLD AUTO: 10.7 FL (ref 9.4–12.3)
PLATELET # BLD AUTO: 251 K/UL (ref 150–369)
POTASSIUM SERPL-SCNC: 4.3 MEQ/L (ref 3.6–5.1)
PROT SERPL-MCNC: 5.7 G/DL (ref 6–8.2)
RBC # BLD AUTO: 4.26 M/UL (ref 3.93–5.22)
SODIUM SERPL-SCNC: 139 MEQ/L (ref 136–144)
WBC # BLD AUTO: 4.91 K/UL (ref 3.8–10.5)

## 2022-12-06 PROCEDURE — 36415 COLL VENOUS BLD VENIPUNCTURE: CPT

## 2022-12-06 PROCEDURE — 85027 COMPLETE CBC AUTOMATED: CPT

## 2022-12-06 PROCEDURE — 83690 ASSAY OF LIPASE: CPT

## 2022-12-06 PROCEDURE — 80053 COMPREHEN METABOLIC PANEL: CPT

## 2022-12-06 NOTE — PROGRESS NOTES
S/w pt. She will need to be moved to 1st case for her ERCP scheduled for 12/15 to allow time in step down to recover. Notified pt that LFT's will be monitored every few months due to levels. Pt is c/o stomach discomfort. Her GI will order blood work.

## 2022-12-06 NOTE — TELEPHONE ENCOUNTER
Patient called stating that she is having major abdominal pain. She went to the ER and is wondering if she can have an MRI completed.       Notes from Patient Portal:    Hi,    I went to [facility] and had blood work(Dr Riojas ordered) done around noon today. while I was checking in I got a bad episode of the stomach pain. they took me to ER but it was way too crowded so I went back and had blood work done at outpatient lab. As I was driving home I got another bad episode. Both times lasted about 20 minutes. This has been happening for the past week and getting more frequent.    Is there another out patient radiology test or blood work that you could order as outpatient to see what is going on with this pain? I would rather not go to ER.

## 2022-12-06 NOTE — TELEPHONE ENCOUNTER
Spoke w pt. She is c/o episodes of epigastric abdominal pain that last ~20 minutes prior to dissipating. No associated fever, chills, nausea, emesis, jaundice or dark urine. Had LFTs collected earlier for Dr. Riojas. Results pending. Pt does not want to be seen in the ED. Will f/u on LFTs and try for repeat CT to evaluate for resolution of procedural related foci of extraluminal air and to  r/o stent migration vs recurrent biliary obstruction/recurrent papillary stenosis (though very unlikely w biliary stent intact) vs pancreatitis, etc. Order placed.

## 2022-12-07 ENCOUNTER — TELEPHONE (OUTPATIENT)
Dept: GASTROENTEROLOGY | Facility: CLINIC | Age: 62
End: 2022-12-07
Payer: COMMERCIAL

## 2022-12-07 NOTE — TELEPHONE ENCOUNTER
CT Abdomen With IV Contrast was approved & patient was transferred to radiology to schedule appointment. Approval # [id number]).

## 2022-12-09 ENCOUNTER — HOSPITAL ENCOUNTER (OUTPATIENT)
Dept: RADIOLOGY | Age: 62
Discharge: HOME | End: 2022-12-09
Attending: PHYSICIAN ASSISTANT
Payer: COMMERCIAL

## 2022-12-09 DIAGNOSIS — R10.13 ABDOMINAL PAIN, EPIGASTRIC: ICD-10-CM

## 2022-12-09 PROCEDURE — 63600105 HC IODINE BASED CONTRAST: Performed by: PHYSICIAN ASSISTANT

## 2022-12-09 PROCEDURE — 74160 CT ABDOMEN W/CONTRAST: CPT | Mod: MG

## 2022-12-09 RX ADMIN — IOHEXOL 100 ML: 350 INJECTION, SOLUTION INTRAVENOUS at 14:14

## 2022-12-12 ENCOUNTER — APPOINTMENT (OUTPATIENT)
Dept: LAB | Age: 62
End: 2022-12-12
Attending: PHYSICIAN ASSISTANT
Payer: COMMERCIAL

## 2022-12-12 ENCOUNTER — TRANSCRIBE ORDERS (OUTPATIENT)
Dept: LAB | Age: 62
End: 2022-12-12

## 2022-12-12 DIAGNOSIS — K83.1 OBSTRUCTION OF BILE DUCT: ICD-10-CM

## 2022-12-12 DIAGNOSIS — R10.11 RIGHT UPPER QUADRANT PAIN: Primary | ICD-10-CM

## 2022-12-12 DIAGNOSIS — R10.13 EPIGASTRIC PAIN: ICD-10-CM

## 2022-12-12 DIAGNOSIS — Z01.818 PREPROCEDURAL EXAMINATION: ICD-10-CM

## 2022-12-12 DIAGNOSIS — R10.11 RIGHT UPPER QUADRANT PAIN: ICD-10-CM

## 2022-12-12 DIAGNOSIS — R74.8 ABNORMAL LEVELS OF OTHER SERUM ENZYMES: ICD-10-CM

## 2022-12-12 LAB
ALBUMIN SERPL-MCNC: 3.8 G/DL (ref 3.4–5)
ALP SERPL-CCNC: 171 IU/L (ref 35–126)
ALT SERPL-CCNC: 64 IU/L (ref 11–54)
ANION GAP SERPL CALC-SCNC: 9 MEQ/L (ref 3–15)
AST SERPL-CCNC: 32 IU/L (ref 15–41)
BASOPHILS # BLD: 0.04 K/UL (ref 0.01–0.1)
BASOPHILS NFR BLD: 0.9 %
BILIRUB SERPL-MCNC: 1.4 MG/DL (ref 0.3–1.2)
BUN SERPL-MCNC: 7 MG/DL (ref 8–20)
CALCIUM SERPL-MCNC: 9.7 MG/DL (ref 8.9–10.3)
CHLORIDE SERPL-SCNC: 103 MEQ/L (ref 98–109)
CO2 SERPL-SCNC: 26 MEQ/L (ref 22–32)
CREAT SERPL-MCNC: 0.6 MG/DL (ref 0.6–1.1)
DIFFERENTIAL METHOD BLD: ABNORMAL
EOSINOPHIL # BLD: 0.12 K/UL (ref 0.04–0.36)
EOSINOPHIL NFR BLD: 2.7 %
ERYTHROCYTE [DISTWIDTH] IN BLOOD BY AUTOMATED COUNT: 13 % (ref 11.7–14.4)
GFR SERPL CREATININE-BSD FRML MDRD: >60 ML/MIN/1.73M*2
GLUCOSE SERPL-MCNC: 119 MG/DL (ref 70–99)
HCT VFR BLDCO AUTO: 41.7 % (ref 35–45)
HGB BLD-MCNC: 13.3 G/DL (ref 11.8–15.7)
IMM GRANULOCYTES # BLD AUTO: 0.01 K/UL (ref 0–0.08)
IMM GRANULOCYTES NFR BLD AUTO: 0.2 %
LIPASE SERPL-CCNC: 41 U/L (ref 20–51)
LYMPHOCYTES # BLD: 1.34 K/UL (ref 1.2–3.5)
LYMPHOCYTES NFR BLD: 29.7 %
MCH RBC QN AUTO: 30.2 PG (ref 28–33.2)
MCHC RBC AUTO-ENTMCNC: 31.9 G/DL (ref 32.2–35.5)
MCV RBC AUTO: 94.8 FL (ref 83–98)
MONOCYTES # BLD: 0.29 K/UL (ref 0.28–0.8)
MONOCYTES NFR BLD: 6.4 %
NEUTROPHILS # BLD: 2.71 K/UL (ref 1.7–7)
NEUTS SEG NFR BLD: 60.1 %
NRBC BLD-RTO: 0 %
PDW BLD AUTO: 11.1 FL (ref 9.4–12.3)
PLATELET # BLD AUTO: 234 K/UL (ref 150–369)
POTASSIUM SERPL-SCNC: 3.7 MEQ/L (ref 3.6–5.1)
PROT SERPL-MCNC: 6.5 G/DL (ref 6–8.2)
RBC # BLD AUTO: 4.4 M/UL (ref 3.93–5.22)
SARS-COV-2 RNA RESP QL NAA+PROBE: NEGATIVE
SODIUM SERPL-SCNC: 138 MEQ/L (ref 136–144)
WBC # BLD AUTO: 4.51 K/UL (ref 3.8–10.5)

## 2022-12-12 PROCEDURE — C9803 HOPD COVID-19 SPEC COLLECT: HCPCS

## 2022-12-12 PROCEDURE — U0003 INFECTIOUS AGENT DETECTION BY NUCLEIC ACID (DNA OR RNA); SEVERE ACUTE RESPIRATORY SYNDROME CORONAVIRUS 2 (SARS-COV-2) (CORONAVIRUS DISEASE [COVID-19]), AMPLIFIED PROBE TECHNIQUE, MAKING USE OF HIGH THROUGHPUT TECHNOLOGIES AS DESCRIBED BY CMS-2020-01-R: HCPCS

## 2022-12-12 PROCEDURE — 36415 COLL VENOUS BLD VENIPUNCTURE: CPT

## 2022-12-12 PROCEDURE — 85025 COMPLETE CBC W/AUTO DIFF WBC: CPT

## 2022-12-12 PROCEDURE — 83690 ASSAY OF LIPASE: CPT

## 2022-12-12 PROCEDURE — 80053 COMPREHEN METABOLIC PANEL: CPT

## 2022-12-13 DIAGNOSIS — R10.13 EPIGASTRIC PAIN: Primary | ICD-10-CM

## 2022-12-13 NOTE — PROGRESS NOTES
Pt is still c/o episodes of epigastric abdominal pain that last ~20 minutes prior to dissipating. No associated fever, chills, nausea, emesis, jaundice or dark urine. P states the radiology lab could not view pelvis. Placed order.

## 2022-12-14 ENCOUNTER — ANESTHESIA EVENT (OUTPATIENT)
Dept: ENDOSCOPY | Facility: HOSPITAL | Age: 62
End: 2022-12-14
Payer: COMMERCIAL

## 2022-12-14 NOTE — ANESTHESIA PREPROCEDURE EVALUATION
Relevant Problems   GASTROINTESTINAL   (+) Gastrointestinal hemorrhage      RESPIRATORY SYSTEM   (+) Shortness of breath      URINARY SYSTEM   (+) Hypokalemia   (+) Hyponatremia      Other   (+) COVID-19   (+) Lyme disease   (+) Osteomyelitis of lower leg (CMS/HCC)   (+) Small intestinal bacterial overgrowth (SIBO)       Anesthesia ROS/MED HX      Pulmonary    Shortness of breath  Endo/Other  Body Habitus: Normal  ROS/MED HX Comments:    GI/Hepatic/Renal: Papillary stenosis, preexisting stent in situ       Past Surgical History:   Procedure Laterality Date   • BREAST LUMPECTOMY     • BREAST SURGERY     • CATARACT EXTRACTION     • CYST REMOVAL      throat   • KNEE SURGERY     • TONSILLECTOMY         Physical Exam    Airway   Mallampati: II  Cardiovascular    Rhythm: regular   Rate: normalPulmonary    Decreased breath sounds        Anesthesia Plan    Plan: general     Airway: direct visual laryngoscopy, oral intubation and video laryngoscope   ASA 3  Anesthetic plan and risks discussed with: patient  Induction:    intravenous   Postop Plan:   Patient Disposition: phase II then home   Pain Management: IV analgesics

## 2022-12-15 ENCOUNTER — HOSPITAL ENCOUNTER (OUTPATIENT)
Facility: HOSPITAL | Age: 62
Discharge: HOME | End: 2022-12-15
Attending: INTERNAL MEDICINE | Admitting: INTERNAL MEDICINE
Payer: COMMERCIAL

## 2022-12-15 ENCOUNTER — ANESTHESIA (OUTPATIENT)
Dept: ENDOSCOPY | Facility: HOSPITAL | Age: 62
End: 2022-12-15
Payer: COMMERCIAL

## 2022-12-15 VITALS
RESPIRATION RATE: 16 BRPM | HEIGHT: 71 IN | SYSTOLIC BLOOD PRESSURE: 124 MMHG | OXYGEN SATURATION: 99 % | TEMPERATURE: 97 F | HEART RATE: 57 BPM | BODY MASS INDEX: 22.82 KG/M2 | DIASTOLIC BLOOD PRESSURE: 63 MMHG | WEIGHT: 163 LBS

## 2022-12-15 PROCEDURE — 75000091 HC ERCP DUCT STENT PLACE EACH: Performed by: INTERNAL MEDICINE

## 2022-12-15 PROCEDURE — 25800000 HC PHARMACY IV SOLUTIONS: Performed by: NURSE ANESTHETIST, CERTIFIED REGISTERED

## 2022-12-15 PROCEDURE — 63600000 HC DRUGS/DETAIL CODE: Performed by: NURSE ANESTHETIST, CERTIFIED REGISTERED

## 2022-12-15 PROCEDURE — 43274 ERCP DUCT STENT PLACEMENT: CPT | Performed by: INTERNAL MEDICINE

## 2022-12-15 PROCEDURE — 25000000 HC PHARMACY GENERAL: Performed by: NURSE ANESTHETIST, CERTIFIED REGISTERED

## 2022-12-15 PROCEDURE — 63700000 HC SELF-ADMINISTRABLE DRUG: Performed by: NURSE ANESTHETIST, CERTIFIED REGISTERED

## 2022-12-15 PROCEDURE — 37000001 HC ANESTHESIA GENERAL: Performed by: INTERNAL MEDICINE

## 2022-12-15 PROCEDURE — C1874 STENT, COATED/COV W/DEL SYS: HCPCS | Performed by: INTERNAL MEDICINE

## 2022-12-15 PROCEDURE — 0F7C8DZ DILATION OF AMPULLA OF VATER WITH INTRALUMINAL DEVICE, VIA NATURAL OR ARTIFICIAL OPENING ENDOSCOPIC: ICD-10-PCS | Performed by: INTERNAL MEDICINE

## 2022-12-15 PROCEDURE — C1727 CATH, BAL TIS DIS, NON-VAS: HCPCS | Performed by: INTERNAL MEDICINE

## 2022-12-15 PROCEDURE — 71000001 HC PACU PHASE 1 INITIAL 30MIN: Performed by: INTERNAL MEDICINE

## 2022-12-15 PROCEDURE — 71000011 HC PACU PHASE 1 EA ADDL MIN: Performed by: INTERNAL MEDICINE

## 2022-12-15 DEVICE — STENT SYSTEM RMV
Type: IMPLANTABLE DEVICE | Site: BILE DUCT | Status: NON-FUNCTIONAL
Brand: WALLFLEX BILIARY
Removed: 2023-04-05

## 2022-12-15 RX ORDER — ONDANSETRON HYDROCHLORIDE 2 MG/ML
INJECTION, SOLUTION INTRAVENOUS AS NEEDED
Status: DISCONTINUED | OUTPATIENT
Start: 2022-12-15 | End: 2022-12-15 | Stop reason: SURG

## 2022-12-15 RX ORDER — ETOMIDATE 2 MG/ML
INJECTION INTRAVENOUS AS NEEDED
Status: DISCONTINUED | OUTPATIENT
Start: 2022-12-15 | End: 2022-12-15 | Stop reason: SURG

## 2022-12-15 RX ORDER — ROCURONIUM BROMIDE 10 MG/ML
INJECTION, SOLUTION INTRAVENOUS AS NEEDED
Status: DISCONTINUED | OUTPATIENT
Start: 2022-12-15 | End: 2022-12-15 | Stop reason: SURG

## 2022-12-15 RX ORDER — SODIUM CHLORIDE, SODIUM LACTATE, POTASSIUM CHLORIDE, CALCIUM CHLORIDE 600; 310; 30; 20 MG/100ML; MG/100ML; MG/100ML; MG/100ML
INJECTION, SOLUTION INTRAVENOUS CONTINUOUS PRN
Status: DISCONTINUED | OUTPATIENT
Start: 2022-12-15 | End: 2022-12-15 | Stop reason: SURG

## 2022-12-15 RX ORDER — FENTANYL CITRATE 50 UG/ML
INJECTION, SOLUTION INTRAMUSCULAR; INTRAVENOUS AS NEEDED
Status: DISCONTINUED | OUTPATIENT
Start: 2022-12-15 | End: 2022-12-15 | Stop reason: SURG

## 2022-12-15 RX ORDER — LIDOCAINE HYDROCHLORIDE 10 MG/ML
INJECTION, SOLUTION INFILTRATION; PERINEURAL AS NEEDED
Status: DISCONTINUED | OUTPATIENT
Start: 2022-12-15 | End: 2022-12-15 | Stop reason: SURG

## 2022-12-15 RX ORDER — SCOPOLAMINE 1 MG/3D
PATCH, EXTENDED RELEASE TRANSDERMAL AS NEEDED
Status: DISCONTINUED | OUTPATIENT
Start: 2022-12-15 | End: 2022-12-15 | Stop reason: SURG

## 2022-12-15 RX ORDER — SODIUM CHLORIDE 9 MG/ML
INJECTION, SOLUTION INTRAVENOUS CONTINUOUS
Status: DISCONTINUED | OUTPATIENT
Start: 2022-12-15 | End: 2022-12-15 | Stop reason: HOSPADM

## 2022-12-15 RX ORDER — PROPOFOL 10 MG/ML
INJECTION, EMULSION INTRAVENOUS CONTINUOUS PRN
Status: DISCONTINUED | OUTPATIENT
Start: 2022-12-15 | End: 2022-12-15 | Stop reason: SURG

## 2022-12-15 RX ORDER — SODIUM CHLORIDE 9 MG/ML
INJECTION, SOLUTION INTRAVENOUS CONTINUOUS PRN
Status: DISCONTINUED | OUTPATIENT
Start: 2022-12-15 | End: 2022-12-15 | Stop reason: SURG

## 2022-12-15 RX ADMIN — SODIUM CHLORIDE, POTASSIUM CHLORIDE, SODIUM LACTATE AND CALCIUM CHLORIDE: 600; 310; 30; 20 INJECTION, SOLUTION INTRAVENOUS at 07:35

## 2022-12-15 RX ADMIN — FENTANYL CITRATE 50 MCG: 50 INJECTION, SOLUTION INTRAMUSCULAR; INTRAVENOUS at 07:51

## 2022-12-15 RX ADMIN — FENTANYL CITRATE 50 MCG: 50 INJECTION, SOLUTION INTRAMUSCULAR; INTRAVENOUS at 07:40

## 2022-12-15 RX ADMIN — PROPOFOL 25 MCG/KG/MIN: 10 INJECTION, EMULSION INTRAVENOUS at 07:47

## 2022-12-15 RX ADMIN — LIDOCAINE HYDROCHLORIDE 10 ML: 10 INJECTION, SOLUTION INFILTRATION; PERINEURAL at 07:40

## 2022-12-15 RX ADMIN — ONDANSETRON HYDROCHLORIDE 4 MG: 2 SOLUTION INTRAMUSCULAR; INTRAVENOUS at 07:56

## 2022-12-15 RX ADMIN — SCOPALAMINE 1 PATCH: 1 PATCH, EXTENDED RELEASE TRANSDERMAL at 07:35

## 2022-12-15 RX ADMIN — ETOMIDATE INJECTION 10 MG: 2 SOLUTION INTRAVENOUS at 07:40

## 2022-12-15 RX ADMIN — SUGAMMADEX 200 MG: 100 INJECTION, SOLUTION INTRAVENOUS at 08:10

## 2022-12-15 RX ADMIN — ROCURONIUM BROMIDE 50 MG: 10 INJECTION, SOLUTION INTRAVENOUS at 07:41

## 2022-12-15 ASSESSMENT — ENCOUNTER SYMPTOMS: SHORTNESS OF BREATH: 1

## 2022-12-15 ASSESSMENT — PAIN SCALES - GENERAL: PAIN_LEVEL: 0

## 2022-12-15 NOTE — ANESTHESIA POSTPROCEDURE EVALUATION
Patient: Sowmya Clark    Procedure Summary     Date: 12/15/22 Room / Location: [facility] (E) / [facility]    Anesthesia Start: 0735 Anesthesia Stop: 0820    Procedure: MT ERCP STENT PLACEMENT BILIARY/PANCREATIC DUCT [37478 (CPT®)] (Abdomen) Diagnosis:       Obstruction of bile duct      (Papillary Stenosis K83.1)    Providers: Perry Tenorio MD Responsible Provider: Ayush Barraza MD    Anesthesia Type: general ASA Status: 3          Anesthesia Type: general  PACU Vitals  12/15/2022 0816 - 12/15/2022 0916      12/15/2022  0818 12/15/2022  0820 12/15/2022  0830 12/15/2022  0845    BP: -- 120/65 118/59 107/59    Temp: 36.1 °C (97 °F) -- -- --    Pulse: -- 63 62 61    Resp: -- 22 20 27    SpO2: -- 100 % 100 % 100 %              12/15/2022  0900 12/15/2022  0915          BP: 116/62 118/59      Temp: -- --      Pulse: 62 59      Resp: 22 21      SpO2: 100 % 100 %              Anesthesia Post Evaluation    Pain score: 0  Pain management: satisfactory to patient  Mode of pain management: IV medication  Patient location during evaluation: PACU  Patient participation: complete - patient participated  Level of consciousness: awake and alert  Cardiovascular status: acceptable  Airway Patency: adequate  Respiratory status: acceptable  Hydration status: stable  Anesthetic complications: no

## 2022-12-15 NOTE — ANESTHESIA PROCEDURE NOTES
Airway  Urgency: elective    Start Time: 12/15/2022 7:44 AM  Airway not difficult    General Information and Staff    Patient location during procedure: OR  Anesthesiologist: Ayush Barraza MD  Resident/CRNA: Itz Bueno CRNA  Performed: other anesthesia staff     Indications and Patient Condition  Indications for airway management: anesthesia  Sedation level: general  Preoxygenated: yes  Patient position: sniffing  Mask difficulty assessment: 1 - vent by mask    Final Airway Details  Final airway type: endotracheal airway      Successful airway: ETT  Cuffed: yes   Successful intubation technique: video laryngoscopy (glidescope)  Facilitating devices/methods: intubating stylet  Endotracheal tube insertion site: oral  Blade: Primitivo  Blade size: #3  ETT size (mm): 7.0  Cormack-Lehane Classification: grade I - full view of glottis  Placement verified by: chest auscultation and capnometry   Measured from: teeth  ETT to teeth (cm): 22  Number of attempts at approach: 1  Ventilation between attempts: none  Number of other approaches attempted: 0  Atraumatic airway insertion      Additional Comments  Intubation performed by JESICA under direct supervision of anesthesiologist and CRNA; JAXON Adame

## 2022-12-15 NOTE — ANESTHESIOLOGIST PRE-PROCEDURE ATTESTATION
Pre-Procedure Patient Identification:  I am the Primary Anesthesiologist and have identified the patient on 12/15/22 at 7:20 AM.   I have confirmed the procedure(s) will be performed by the following surgeon/proceduralist Perry Tenorio MD.

## 2022-12-15 NOTE — OP NOTE
_______________________________________________________________________________  Patient Name: Sowmya Clark           Procedure Date: 12/15/2022 7:16 AM  MRN: [id number]                     Account Number: [id number]  YOB: 1960              Age: 62  Gender: Female                        Note Status: Finalized  Attending MD: NATANAEL VILLA MD,  _______________________________________________________________________________  Procedure:             ERCP  Indications:           History of papillary stenosis. Spontaneous stent  migration. Reevaluate for possible evolving papillary  stenosis.  Providers:             NATANAEL VILLA MD (Doctor)  Referring MD:          KASSY CARTER MD~EMMA  Requesting Provider:  Medicines:             See the Anesthesia note for documentation of the  administered medications  Complications:         No immediate complications.  _______________________________________________________________________________  Procedure:             After obtaining informed consent, the scope was passed  under direct vision. Throughout the procedure, the  patient's blood pressure, pulse, and oxygen  saturations were monitored continuously. The was  introduced through the mouth, and advanced to the  duodenum and used to inject contrast into the bile  duct.  Findings:  A  film demonstrated that the previously placed biliary stent was  no longer present. Scouts were obtained in the pelvis and in the  abdomen. The scope was then advanced to the second portion of the  duodenum. The biliary orifice is identified, ever, it is clearly smaller  than the previous sphincterotomy. There appears to be once again a  stenosing process of the ampulla because of the orifice to get tight.  Bile is seen in the duodenum. The pancreatic orifice is patent. There is  no evidence for mass. Using a balloon the bile duct is easily  cannulated. Contrast is injected. The bile duct is approximately 10 mm.  It does  taper rapidly at the ampulla. There is a suggestion of a benign  stricture at the ampullary orifice. No filling defects are identified.  The cystic duct is patent. The bifurcation is normal. The intrahepatics  are mildly dilated as is the left and right main ducts but no other  strictures are identified. Because of the tightness at the orifice a 10  mm x 60 mm fully covered [employer] self-expanding metal stent is  once again placed the ampulla. It is in good position at the end of the  case. The pancreatic orifice is not manipulated in any way  Impression:            Findings consistent with evolving papillary stenosis.  Previously placed stent appears to have migrated. A  new stent, 10 mm x 60 mm is placed against the orifice.  Recommendation:        - Observe clinical course.  - Stent replacement in 6 months  Procedure Code(s):     --- Professional ---  58313, Endoscopic retrograde cholangiopancreatography  (ERCP); with placement of endoscopic stent into  biliary or pancreatic duct, including pre- and  post-dilation and guide wire passage, when performed,  including sphincterotomy, when performed, each stent  Diagnosis Code(s):     --- Professional ---  R93.3, Abnormal findings on diagnostic imaging of  other parts of digestive tract  CPT copyright 2021 [employer]. All rights reserved.  The codes documented in this report are preliminary and upon  review may  be revised to meet current compliance requirements.  Brennen Villa MD  ________________  NATANAEL VILLA MD  12/15/2022 11:33:30 AM  This report has been signed electronically.  Number of Addenda: 0  Note Initiated On: 12/15/2022 7:16 AM

## 2023-01-09 ENCOUNTER — TELEPHONE (OUTPATIENT)
Dept: GASTROENTEROLOGY | Facility: CLINIC | Age: 63
End: 2023-01-09
Payer: COMMERCIAL

## 2023-01-09 NOTE — TELEPHONE ENCOUNTER
Patient is calling stating that her cardiologist is reccomending to place her on Aspirn 81 mg and a statin drug. Her cardiologist is concerned about her ongoing bile duct and liver issues. Patient is asking if getting on those medications would affect them? Any advice?

## 2023-01-17 ENCOUNTER — APPOINTMENT (OUTPATIENT)
Dept: URBAN - METROPOLITAN AREA CLINIC 203 | Age: 63
Setting detail: DERMATOLOGY
End: 2023-01-22

## 2023-01-17 DIAGNOSIS — D17 BENIGN LIPOMATOUS NEOPLASM: ICD-10-CM

## 2023-01-17 DIAGNOSIS — D18.0 HEMANGIOMA: ICD-10-CM

## 2023-01-17 PROBLEM — D17.21 BENIGN LIPOMATOUS NEOPLASM OF SKIN AND SUBCUTANEOUS TISSUE OF RIGHT ARM: Status: ACTIVE | Noted: 2023-01-17

## 2023-01-17 PROBLEM — D18.01 HEMANGIOMA OF SKIN AND SUBCUTANEOUS TISSUE: Status: ACTIVE | Noted: 2023-01-17

## 2023-01-17 PROCEDURE — OTHER EXCISION: OTHER

## 2023-01-17 PROCEDURE — 99212 OFFICE O/P EST SF 10 MIN: CPT | Mod: 25

## 2023-01-17 PROCEDURE — 11402 EXC TR-EXT B9+MARG 1.1-2 CM: CPT

## 2023-01-17 PROCEDURE — OTHER REASSURANCE: OTHER

## 2023-01-17 ASSESSMENT — LOCATION ZONE DERM
LOCATION ZONE: ARM
LOCATION ZONE: LEG

## 2023-01-17 ASSESSMENT — LOCATION SIMPLE DESCRIPTION DERM
LOCATION SIMPLE: RIGHT POSTERIOR UPPER ARM
LOCATION SIMPLE: RIGHT THIGH
LOCATION SIMPLE: LEFT THIGH

## 2023-01-17 ASSESSMENT — LOCATION DETAILED DESCRIPTION DERM
LOCATION DETAILED: RIGHT DISTAL POSTERIOR UPPER ARM
LOCATION DETAILED: RIGHT ANTERIOR DISTAL THIGH
LOCATION DETAILED: LEFT ANTERIOR PROXIMAL THIGH
LOCATION DETAILED: RIGHT ANTERIOR PROXIMAL THIGH

## 2023-01-23 DIAGNOSIS — K82.8 BILIARY DYSKINESIA: Primary | ICD-10-CM

## 2023-01-24 ENCOUNTER — APPOINTMENT (OUTPATIENT)
Dept: LAB | Age: 63
End: 2023-01-24
Attending: PHYSICIAN ASSISTANT
Payer: COMMERCIAL

## 2023-01-24 DIAGNOSIS — K82.8 BILIARY DYSKINESIA: ICD-10-CM

## 2023-01-24 LAB
ALBUMIN SERPL-MCNC: 3.6 G/DL (ref 3.4–5)
ALP SERPL-CCNC: 79 IU/L (ref 35–126)
ALT SERPL-CCNC: 21 IU/L (ref 11–54)
ANION GAP SERPL CALC-SCNC: 8 MEQ/L (ref 3–15)
AST SERPL-CCNC: 28 IU/L (ref 15–41)
BILIRUB SERPL-MCNC: 1.2 MG/DL (ref 0.3–1.2)
BUN SERPL-MCNC: 8 MG/DL (ref 8–20)
CALCIUM SERPL-MCNC: 9.5 MG/DL (ref 8.9–10.3)
CHLORIDE SERPL-SCNC: 103 MEQ/L (ref 98–109)
CO2 SERPL-SCNC: 28 MEQ/L (ref 22–32)
CREAT SERPL-MCNC: 0.6 MG/DL (ref 0.6–1.1)
GFR SERPL CREATININE-BSD FRML MDRD: >60 ML/MIN/1.73M*2
GLUCOSE SERPL-MCNC: 89 MG/DL (ref 70–99)
POTASSIUM SERPL-SCNC: 4 MEQ/L (ref 3.6–5.1)
PROT SERPL-MCNC: 5.9 G/DL (ref 6–8.2)
SODIUM SERPL-SCNC: 139 MEQ/L (ref 136–144)

## 2023-01-24 PROCEDURE — 36415 COLL VENOUS BLD VENIPUNCTURE: CPT

## 2023-01-24 PROCEDURE — 80053 COMPREHEN METABOLIC PANEL: CPT

## 2023-01-26 ENCOUNTER — RX ONLY (RX ONLY)
Age: 63
End: 2023-01-26

## 2023-01-26 ENCOUNTER — HOSPITAL ENCOUNTER (EMERGENCY)
Facility: HOSPITAL | Age: 63
Discharge: HOME | End: 2023-01-26
Attending: EMERGENCY MEDICINE
Payer: COMMERCIAL

## 2023-01-26 ENCOUNTER — TELEPHONE (OUTPATIENT)
Dept: GASTROENTEROLOGY | Facility: CLINIC | Age: 63
End: 2023-01-26
Payer: COMMERCIAL

## 2023-01-26 ENCOUNTER — APPOINTMENT (EMERGENCY)
Dept: RADIOLOGY | Facility: HOSPITAL | Age: 63
End: 2023-01-26
Payer: COMMERCIAL

## 2023-01-26 VITALS
WEIGHT: 170 LBS | OXYGEN SATURATION: 100 % | HEART RATE: 95 BPM | HEIGHT: 71 IN | DIASTOLIC BLOOD PRESSURE: 68 MMHG | RESPIRATION RATE: 14 BRPM | SYSTOLIC BLOOD PRESSURE: 122 MMHG | BODY MASS INDEX: 23.8 KG/M2

## 2023-01-26 DIAGNOSIS — R10.13 EPIGASTRIC PAIN: Primary | ICD-10-CM

## 2023-01-26 LAB
ALBUMIN SERPL-MCNC: 4.2 G/DL (ref 3.4–5)
ALP SERPL-CCNC: 103 IU/L (ref 35–126)
ALT SERPL-CCNC: 55 IU/L (ref 11–54)
ANION GAP SERPL CALC-SCNC: 10 MEQ/L (ref 3–15)
AST SERPL-CCNC: 113 IU/L (ref 15–41)
BASOPHILS # BLD: 0.04 K/UL (ref 0.01–0.1)
BASOPHILS NFR BLD: 0.7 %
BILIRUB SERPL-MCNC: 1.2 MG/DL (ref 0.3–1.2)
BUN SERPL-MCNC: 11 MG/DL (ref 8–20)
CALCIUM SERPL-MCNC: 9.5 MG/DL (ref 8.9–10.3)
CHLORIDE SERPL-SCNC: 105 MEQ/L (ref 98–109)
CO2 SERPL-SCNC: 24 MEQ/L (ref 22–32)
CREAT SERPL-MCNC: 0.5 MG/DL (ref 0.6–1.1)
DIFFERENTIAL METHOD BLD: NORMAL
EOSINOPHIL # BLD: 0.09 K/UL (ref 0.04–0.36)
EOSINOPHIL NFR BLD: 1.5 %
ERYTHROCYTE [DISTWIDTH] IN BLOOD BY AUTOMATED COUNT: 12.1 % (ref 11.7–14.4)
GFR SERPL CREATININE-BSD FRML MDRD: >60 ML/MIN/1.73M*2
GLUCOSE SERPL-MCNC: 102 MG/DL (ref 70–99)
HCT VFR BLDCO AUTO: 42.5 % (ref 35–45)
HGB BLD-MCNC: 14.3 G/DL (ref 11.8–15.7)
IMM GRANULOCYTES # BLD AUTO: 0.01 K/UL (ref 0–0.08)
IMM GRANULOCYTES NFR BLD AUTO: 0.2 %
LIPASE SERPL-CCNC: 39 U/L (ref 20–51)
LYMPHOCYTES # BLD: 1.28 K/UL (ref 1.2–3.5)
LYMPHOCYTES NFR BLD: 20.9 %
MCH RBC QN AUTO: 31.2 PG (ref 28–33.2)
MCHC RBC AUTO-ENTMCNC: 33.6 G/DL (ref 32.2–35.5)
MCV RBC AUTO: 92.6 FL (ref 83–98)
MONOCYTES # BLD: 0.45 K/UL (ref 0.28–0.8)
MONOCYTES NFR BLD: 7.3 %
NEUTROPHILS # BLD: 4.26 K/UL (ref 1.7–7)
NEUTS SEG NFR BLD: 69.4 %
NRBC BLD-RTO: 0 %
PDW BLD AUTO: 10.2 FL (ref 9.4–12.3)
PLATELET # BLD AUTO: 242 K/UL (ref 150–369)
POTASSIUM SERPL-SCNC: 3.8 MEQ/L (ref 3.6–5.1)
PROT SERPL-MCNC: 7.2 G/DL (ref 6–8.2)
RBC # BLD AUTO: 4.59 M/UL (ref 3.93–5.22)
SODIUM SERPL-SCNC: 139 MEQ/L (ref 136–144)
TROPONIN I SERPL HS-MCNC: 3.6 PG/ML
WBC # BLD AUTO: 6.13 K/UL (ref 3.8–10.5)

## 2023-01-26 PROCEDURE — 99284 EMERGENCY DEPT VISIT MOD MDM: CPT | Mod: 25

## 2023-01-26 PROCEDURE — G1004 CDSM NDSC: HCPCS

## 2023-01-26 PROCEDURE — 80053 COMPREHEN METABOLIC PANEL: CPT | Performed by: PHYSICIAN ASSISTANT

## 2023-01-26 PROCEDURE — 63600105 HC IODINE BASED CONTRAST: Performed by: PHYSICIAN ASSISTANT

## 2023-01-26 PROCEDURE — 36415 COLL VENOUS BLD VENIPUNCTURE: CPT | Performed by: PHYSICIAN ASSISTANT

## 2023-01-26 PROCEDURE — 83690 ASSAY OF LIPASE: CPT | Performed by: PHYSICIAN ASSISTANT

## 2023-01-26 PROCEDURE — 85025 COMPLETE CBC W/AUTO DIFF WBC: CPT | Performed by: PHYSICIAN ASSISTANT

## 2023-01-26 PROCEDURE — 93005 ELECTROCARDIOGRAM TRACING: CPT | Performed by: PHYSICIAN ASSISTANT

## 2023-01-26 PROCEDURE — 84484 ASSAY OF TROPONIN QUANT: CPT | Performed by: PHYSICIAN ASSISTANT

## 2023-01-26 RX ORDER — CICLOPIROX 10 MG/.96ML
SHAMPOO TOPICAL
Qty: 120 | Refills: 3 | Status: ERX | COMMUNITY
Start: 2023-01-26

## 2023-01-26 RX ORDER — NAPROXEN SODIUM 220 MG/1
81 TABLET, FILM COATED ORAL DAILY
COMMUNITY

## 2023-01-26 RX ORDER — MORPHINE SULFATE 4 MG/ML
4 INJECTION, SOLUTION INTRAMUSCULAR; INTRAVENOUS ONCE AS NEEDED
Status: DISCONTINUED | OUTPATIENT
Start: 2023-01-26 | End: 2023-01-26 | Stop reason: HOSPADM

## 2023-01-26 RX ADMIN — IOHEXOL 100 ML: 350 INJECTION, SOLUTION INTRAVENOUS at 17:32

## 2023-01-26 NOTE — ED PROVIDER NOTES
Emergency Medicine Note  HPI   HISTORY OF PRESENT ILLNESS     HPI     62-year-old female with past medical history of breast cancer, Crohn's disease, dystonia, osteomyelitis, recurrent pancreatitis, s/p cholecystectomy 2020, ERCP induced pancreatitis, s/p recent biliary stent 12/2022, thyroid nodule, tremor presents emergency department complaining of acute onset epigastric pain today which was severe in nature rated 6 out of 10 and started when patient was driving.  Patient states she had to pull off on the side of the road because the pain was so severe.  She states it feels similar to prior pancreatitis attacks. She did not have any vomiting. Pt states the pain radiated to her chest and back briefly but is now located in the RUQ rated 2/10 and improving. Pt spoke with Dr. Tenorio's office who sent her to ER for further evaluation.       Patient History   PAST HISTORY     Reviewed from Nursing Triage:       Past Medical History:   Diagnosis Date   • Breast cancer (CMS/HCC) 2007   • Crohn's disease (CMS/HCC)    • Dystonia    • Osteomyelitis (CMS/HCC)    • Pancreatitis    • Thyroid nodule    • Tremor        Past Surgical History:   Procedure Laterality Date   • BREAST LUMPECTOMY     • BREAST SURGERY     • CATARACT EXTRACTION     • CYST REMOVAL      throat   • KNEE SURGERY     • TONSILLECTOMY         Family History   Problem Relation Age of Onset   • COPD Biological Mother    • Alzheimer's disease Biological Father    • Spina bifida Biological Brother        Social History     Tobacco Use   • Smoking status: Never   • Smokeless tobacco: Never   Substance Use Topics   • Alcohol use: Yes   • Drug use: Never         Review of Systems   REVIEW OF SYSTEMS     Review of Systems   Constitutional: Negative for chills and fever.   Eyes: Negative for visual disturbance.   Respiratory: Negative for cough, chest tightness and shortness of breath.    Cardiovascular: Positive for chest pain. Negative for palpitations and leg  swelling.   Gastrointestinal: Positive for abdominal pain. Negative for constipation, diarrhea, nausea and vomiting.   Genitourinary: Negative for difficulty urinating, dysuria, flank pain, hematuria and urgency.   Musculoskeletal: Positive for back pain. Negative for arthralgias.   Skin: Negative for color change and rash.   Neurological: Negative for dizziness, syncope, weakness, numbness and headaches.         VITALS     ED Vitals    Date/Time Temp Pulse Resp BP SpO2 [facility]   01/26/23 1843 -- 95 14 122/68 100 % XHC   01/26/23 1619 -- 74 18 130/70 95 % MMM                       Physical Exam   PHYSICAL EXAM     Physical Exam  Vitals and nursing note reviewed.   Constitutional:       General: She is not in acute distress.     Appearance: She is well-developed and well-nourished.   HENT:      Head: Normocephalic and atraumatic.   Eyes:      Conjunctiva/sclera: Conjunctivae normal.   Cardiovascular:      Rate and Rhythm: Normal rate and regular rhythm.      Pulses: Intact distal pulses.   Pulmonary:      Effort: Pulmonary effort is normal.      Breath sounds: Normal breath sounds.   Abdominal:      General: There is no distension.      Palpations: Abdomen is soft.      Tenderness: There is abdominal tenderness in the right upper quadrant. There is no guarding or rebound. Negative signs include Hanson's sign.   Musculoskeletal:         General: No edema.      Cervical back: Neck supple.   Skin:     General: Skin is warm and dry.   Neurological:      Mental Status: She is alert.   Psychiatric:         Mood and Affect: Mood and affect normal.         Behavior: Behavior normal.           PROCEDURES     Procedures     DATA     Results     Procedure Component Value Units Date/Time    [place] Draw Panel [[id number]] Collected: 01/26/23 1641    Specimen: Blood, Venous Updated: 01/27/23 0100    Narrative:      The following orders were created for panel order [place] Draw Panel.  Procedure                                Abnormality         Status                     ---------                               -----------         ------                     RAINBOW RED[[id number]]                                                                 RAINBOW LT BLUE[[id number]]                                                             RAINBOW GOLD[[id number]]                                     Final result                 Please view results for these tests on the individual orders.    RAINBOW GOLD [[id number]] Collected: 01/26/23 1641    Specimen: Blood, Venous Updated: 01/27/23 0100    HS Troponin I (with 2 hour reflex) [[id number]]  (Normal) Collected: 01/26/23 1643    Specimen: Blood, Venous Updated: 01/26/23 1726     High Sens Troponin I 3.6 pg/mL     Comprehensive metabolic panel [[id number]]  (Abnormal) Collected: 01/26/23 1641    Specimen: Blood, Venous Updated: 01/26/23 1719     Sodium 139 mEQ/L      Potassium 3.8 mEQ/L      Comment: Results obtained on plasma. Plasma Potassium values may be up to 0.4 mEQ/L less than serum values. The differences may be greater for patients with high platelet or white cell counts.        Chloride 105 mEQ/L      CO2 24 mEQ/L      BUN 11 mg/dL      Creatinine 0.5 mg/dL      Glucose 102 mg/dL      Calcium 9.5 mg/dL      AST (SGOT) 113 IU/L      ALT (SGPT) 55 IU/L      Alkaline Phosphatase 103 IU/L      Total Protein 7.2 g/dL      Comment: Test performed on plasma which typically contains approximately 0.4 g/dL more protein than serum.        Albumin 4.2 g/dL      Bilirubin, Total 1.2 mg/dL      eGFR >60.0 mL/min/1.73m*2      Anion Gap 10 mEQ/L     Narrative:      Order only if pain is above umbilicus    Lipase, if pain is above umbilicus [[id number]]  (Normal) Collected: 01/26/23 1641    Specimen: Blood, Venous Updated: 01/26/23 1718     Lipase 39 U/L     Narrative:      Order only if pain is above umbilicus    CBC and differential [[id number]] Collected: 01/26/23 1641    Specimen: Blood, Venous Updated:  01/26/23 1700     WBC 6.13 K/uL      RBC 4.59 M/uL      Hemoglobin 14.3 g/dL      Hematocrit 42.5 %      MCV 92.6 fL      MCH 31.2 pg      MCHC 33.6 g/dL      RDW 12.1 %      Platelets 242 K/uL      MPV 10.2 fL      Differential Type Auto     nRBC 0.0 %      Immature Granulocytes 0.2 %      Neutrophils 69.4 %      Lymphocytes 20.9 %      Monocytes 7.3 %      Eosinophils 1.5 %      Basophils 0.7 %      Immature Granulocytes, Absolute 0.01 K/uL      Neutrophils, Absolute 4.26 K/uL      Lymphocytes, Absolute 1.28 K/uL      Monocytes, Absolute 0.45 K/uL      Eosinophils, Absolute 0.09 K/uL      Basophils, Absolute 0.04 K/uL           Imaging Results          CT ABDOMEN PELVIS WITH IV CONTRAST (Final result)  Result time 01/26/23 18:06:43    Final result                 Impression:    IMPRESSION:    1. Interval placement of a biliary stent with a mild interval decrease in the  intra and extrahepatic biliary ductal dilatation compared with the previous  examination.  2. Colonic diverticulosis.  3. Small hiatal hernia.  4. Additional chronic and incidental findings, as above.                     Narrative:      CLINICAL HISTORY: Epigastric pain.    COMMENT:  Comparison: 12/9/2022 and 11/2/2022    Helical CT of the Abdomen and Pelvis was performed following the intravenous  administration of 100 cc Omnipaque 350. Coronal and sagittal reformats were  obtained.      CT DOSE:  One or more dose reduction techniques (e.g. automated exposure  control, adjustment of the mA and/or kV according to patient size, use of  iterative reconstruction technique) utilized for this examination.    GI contrast:  None    FINDINGS:  Lower Chest: Mild dependent hypoventilatory changes are present at the lung  bases and there is mild scarring at the lung bases.      ABDOMEN:  Liver:  No focal hepatic lesion is seen.    Biliary Tract/ GB: The gallbladder is surgically absent. There has been interval  placement of a biliary stent. The CBD is dilated  but appears diminished in size  compared to the prior. The CBD measures up to 11 mm as compared with 15 mm  previously. Foci of air are noted within the biliary tree which is likely  related to prior sphincterotomy and the stent. There is mild intrahepatic  biliary ductal dilatation which has mildly diminished compared with the prior.    Pancreas: No focal pancreatic lesion is appreciated.    Spleen: Within normal limits    Adrenals: Within normal limits    Kidneys:  The kidneys enhance and excrete symmetrically without  hydroureteronephrosis.    GI: A small hiatal hernia is present. There is no evidence of a bowel  obstruction or free intraperitoneal air. Colonic diverticula are present without  evidence of acute diverticulitis.      PELVIS:  Reproductive Organs: No pelvic mass lesion is appreciated.    Bladder: Within normal limits    Bones:  Degenerative changes are present in the regional osseous structures.  There is a levoscoliosis of the lumbar spine. A tiny sclerotic focus in the left  acetabulum likely represents a bone island.    Aorta/Vessels:    The aorta is grossly unremarkable in course and caliber.    Lymph Nodes:  No pathologically enlarged or necrotic abdominal or pelvic  lymphadenopathy is identified.    Abdominal/Pelvic Wall: A small fat-containing umbilical hernia is present.                                  ECG 12 lead   ED Interpretation   Rhythm: Normal Sinus  Rate: 65 bpm  P waves: normal interval  QRS: normal QRS  Axis: normal  ST Segments: no obvious ST elevation or ischemia    Rhythm Strip: NSR 65 bpm    O2 sat: normal 95%            Scoring tools                                  ED Course & MDM   MDM / ED COURSE / CLINICAL IMPRESSION / DISPO     MDM    ED Course as of 01/27/23 0108   u Jan 26, 2023   1650 Pt seen/evaluated, currently having RUQ pain/ epigastric pain 2/10. Plan labs incl troponin and lipase, IV fluids morphine prn  [JL]   1744 Lipase: 39 [JL]   1744 AST (SGOT)(!): 113  [JL]   1744 ALT (SGPT)(!): 55 [JL]   1745 Alkaline Phosphatase: 103 [JL]   1745 Bilirubin, Total: 1.2 [JL]   1843 CT is negative. Shows the stent is in place. No new acute changes on CT. Dr. Elaine spoke with Dr. Wells. Pt can be discharged and f/u with Dr. Riojas or Dr. Tenorio, return for worsening of symptoms  [JL]      ED Course User Index  [] Pau Bain PA C     Clinical Impression      Epigastric pain     _________________     ED Disposition   Discharge                   Pau Bain PA C  01/27/23 0108

## 2023-01-26 NOTE — TELEPHONE ENCOUNTER
January 26, 2023    Patient: Sowmya Clark     Last procedure date: 12/15/2022    Patient has the following:    Symptom Checklist ([x]):    [] Fever - last temp:    How long has the fever lasted:  [] Bleeding - Dark colored or light:    How long:  [] Vomiting - How long:  [] Chills - How long:  [] Nausea How long:  [x] Abd Pain/Discomfort - Pain level: 5   [] Bowel movement - Diarrhea: Constipation: How long since last bm:     How long has all of this been happening?     Past half hour    Any additional Symptoms:      Lab Results   Component Value Date    WBC 4.51 12/12/2022    RBC 4.40 12/12/2022    HGB 13.3 12/12/2022     No results found for: AMYLASE  Lab Results   Component Value Date    LIPASE 41 12/12/2022       Patient called stating that she had a pancreatitis attack. A sharp pain in the center of the stomach, below the ribs. Patient is sitting in her car and is in a state of panic.

## 2023-01-26 NOTE — TELEPHONE ENCOUNTER
Spoke w pt. She had the acute onset of epigastric abdominal pain while driving. No fever or chills. She pulled over on the side of the road and after a few minutes the pain seemed to lessen in severity. I spoke w her and advised that she be seen in the ED for eval if the pain continues or if she is to develop a fever. Blood work obtained shortly after onset of pain is most valuable to help determine etiology, opposed to waiting until the following next few days.     Her labs earlier this wk showed normal LFTs, for the first time in several months.

## 2023-01-26 NOTE — DISCHARGE INSTRUCTIONS
Your AST and ALT are mildly elevated, lipase is normal  Your CT shows the stent is in place and no signs of pancreatitis on CT    Please eat a bland diet and follow up with Dr. Riojas or Dr. Tenorio    Return to ER for any new/worsening symptoms (severe pain, fever >100.4F, vomiting or other concerning symptoms)

## 2023-01-27 ENCOUNTER — APPOINTMENT (OUTPATIENT)
Dept: LAB | Facility: HOSPITAL | Age: 63
End: 2023-01-27
Attending: PHYSICIAN ASSISTANT
Payer: COMMERCIAL

## 2023-01-27 DIAGNOSIS — R10.11 RIGHT UPPER QUADRANT ABDOMINAL PAIN: ICD-10-CM

## 2023-01-27 DIAGNOSIS — R10.11 RIGHT UPPER QUADRANT ABDOMINAL PAIN: Primary | ICD-10-CM

## 2023-01-27 LAB
ALBUMIN SERPL-MCNC: 3.7 G/DL (ref 3.4–5)
ALP SERPL-CCNC: 97 IU/L (ref 35–126)
ALT SERPL-CCNC: 47 IU/L (ref 11–54)
AST SERPL-CCNC: 48 IU/L (ref 15–41)
BILIRUB DIRECT SERPL-MCNC: 0.3 MG/DL
BILIRUB SERPL-MCNC: 1.8 MG/DL (ref 0.3–1.2)
LIPASE SERPL-CCNC: 33 U/L (ref 20–51)
PROT SERPL-MCNC: 6.3 G/DL (ref 6–8.2)

## 2023-01-27 PROCEDURE — 83690 ASSAY OF LIPASE: CPT

## 2023-01-27 PROCEDURE — 80076 HEPATIC FUNCTION PANEL: CPT

## 2023-01-27 PROCEDURE — 36415 COLL VENOUS BLD VENIPUNCTURE: CPT

## 2023-01-27 ASSESSMENT — ENCOUNTER SYMPTOMS
NAUSEA: 0
HEMATURIA: 0
FLANK PAIN: 0
COLOR CHANGE: 0
CONSTIPATION: 0
VOMITING: 0
CHILLS: 0
BACK PAIN: 1
ABDOMINAL PAIN: 1
DYSURIA: 0
ARTHRALGIAS: 0
HEADACHES: 0
FEVER: 0
NUMBNESS: 0
PALPITATIONS: 0
CHEST TIGHTNESS: 0
SHORTNESS OF BREATH: 0
DIZZINESS: 0
WEAKNESS: 0
DIARRHEA: 0
COUGH: 0
DIFFICULTY URINATING: 0

## 2023-01-27 NOTE — ED ATTESTATION NOTE
Procedures  Physical Exam  Review of Systems  [facility]    1/27/202312:30 PM  I have personally seen and examined the patient. I personally performed the key components of the encounter and provided a substantive portion of the care and medical decision making for this patient.     I reviewed and agree with the PA/NP/Resident's assessment and plan of care, with any exceptions as documented below.    My focused history, examination, assessment, and plan of care of Sowmya Clark is as follows:    The patient presents with severe abdominal discomfort of the mid upper abdomen that lasted an hour that she says feels exactly like a pancreatitis she has had before.  Does have a history of Crohn's disease and recurrent pancreatitis.  Is status postcholecystectomy.  Has had a biliary stent placed.  Says that she was driving when the pain got very severe she had to stop stand up against the car.  Did not have any nausea vomiting.  Pain lasted an hour.  Says it was exactly like the pain she has with her pancreatitis.  She is worried that the stent is blocked.  Patient called her gastroenterologist Dr. Tenorio at [facility] who told her to go to the ER.    Exam: Awake alert and oriented.  Currently in mild distress.  Lungs are clear.  Heart S1-S2 without any murmur.  Abdomen is soft and mildly tender in the epigastric region.  No pedal edema.  Neuro exam is nonfocal.    Impression/Plan/Medical Decision Making: Lipase, white count, CT scan are all unremarkable.  The stent is seen.  The common bile duct is down from 15 mm to 11 mm.  Patient continues to be insistent that this is her pancreatitis despite all of these negative tests.  I spoke with Dr. Wells who agreed we have no evidence for pancreatitis.  Recommended that perhaps the patient should just eat lightly nothing too heavy, lots of fluids and her pain returns to go to clear liquids.  She should follow-up with her gastroenterologist.  Agree with PA management and  discharge.    Vital Signs Review: Vital signs have been reviewed. The oxygen saturation is  SpO2: 95 % which is normal.    This document was created using Dragon dictation software.  There might be some typographical errors due to this technology.    We are in a pandemic with increased volumes and decreased capacity.      Dmitry Elaine MD  01/27/23 2943

## 2023-01-28 LAB
ATRIAL RATE: 65
P AXIS: 48
PR INTERVAL: 160
QRS DURATION: 86
QT INTERVAL: 394
QTC CALCULATION(BAZETT): 409
R AXIS: 83
T WAVE AXIS: 61
VENTRICULAR RATE: 65

## 2023-01-30 ENCOUNTER — TRANSCRIBE ORDERS (OUTPATIENT)
Dept: SCHEDULING | Age: 63
End: 2023-01-30

## 2023-01-30 DIAGNOSIS — R13.19 OTHER DYSPHAGIA: Primary | ICD-10-CM

## 2023-01-31 ENCOUNTER — APPOINTMENT (OUTPATIENT)
Dept: URBAN - METROPOLITAN AREA CLINIC 203 | Age: 63
Setting detail: DERMATOLOGY
End: 2023-02-01

## 2023-01-31 DIAGNOSIS — D17 BENIGN LIPOMATOUS NEOPLASM: ICD-10-CM

## 2023-01-31 PROBLEM — D17.21 BENIGN LIPOMATOUS NEOPLASM OF SKIN AND SUBCUTANEOUS TISSUE OF RIGHT ARM: Status: ACTIVE | Noted: 2023-01-31

## 2023-01-31 PROCEDURE — 99024 POSTOP FOLLOW-UP VISIT: CPT

## 2023-01-31 PROCEDURE — OTHER SUTURE REMOVAL (GLOBAL PERIOD): OTHER

## 2023-01-31 ASSESSMENT — LOCATION DETAILED DESCRIPTION DERM: LOCATION DETAILED: RIGHT DISTAL POSTERIOR UPPER ARM

## 2023-01-31 ASSESSMENT — LOCATION ZONE DERM: LOCATION ZONE: ARM

## 2023-01-31 ASSESSMENT — LOCATION SIMPLE DESCRIPTION DERM: LOCATION SIMPLE: RIGHT POSTERIOR UPPER ARM

## 2023-01-31 NOTE — PROCEDURE: SUTURE REMOVAL (GLOBAL PERIOD)
Detail Level: Detailed
Add 41888 Cpt? (Important Note: In 2017 The Use Of 96053 Is Being Tracked By Cms To Determine Future Global Period Reimbursement For Global Periods): no

## 2023-02-01 ENCOUNTER — TRANSCRIBE ORDERS (OUTPATIENT)
Dept: RADIOLOGY | Age: 63
End: 2023-02-01

## 2023-02-01 ENCOUNTER — APPOINTMENT (OUTPATIENT)
Dept: LAB | Age: 63
End: 2023-02-01
Attending: INTERNAL MEDICINE
Payer: COMMERCIAL

## 2023-02-01 ENCOUNTER — TRANSCRIBE ORDERS (OUTPATIENT)
Dept: LAB | Age: 63
End: 2023-02-01

## 2023-02-01 ENCOUNTER — HOSPITAL ENCOUNTER (OUTPATIENT)
Dept: RADIOLOGY | Age: 63
Discharge: HOME | End: 2023-02-01
Attending: INTERNAL MEDICINE
Payer: COMMERCIAL

## 2023-02-01 DIAGNOSIS — M25.551 PAIN IN RIGHT HIP: Primary | ICD-10-CM

## 2023-02-01 DIAGNOSIS — M25.50 PAIN IN UNSPECIFIED JOINT: Primary | ICD-10-CM

## 2023-02-01 DIAGNOSIS — M25.551 PAIN IN RIGHT HIP: ICD-10-CM

## 2023-02-01 DIAGNOSIS — M25.50 PAIN IN UNSPECIFIED JOINT: ICD-10-CM

## 2023-02-01 PROCEDURE — 86431 RHEUMATOID FACTOR QUANT: CPT

## 2023-02-01 PROCEDURE — 86225 DNA ANTIBODY NATIVE: CPT

## 2023-02-01 PROCEDURE — 86015 ACTIN ANTIBODY EACH: CPT

## 2023-02-01 PROCEDURE — 86235 NUCLEAR ANTIGEN ANTIBODY: CPT

## 2023-02-01 PROCEDURE — 73521 X-RAY EXAM HIPS BI 2 VIEWS: CPT

## 2023-02-01 PROCEDURE — 86200 CCP ANTIBODY: CPT

## 2023-02-01 PROCEDURE — 36415 COLL VENOUS BLD VENIPUNCTURE: CPT

## 2023-02-02 LAB — RHEUMATOID FACT SERPL-ACNC: 10 IU/ML

## 2023-02-03 LAB
CENTROMERE B AB SER-ACNC: 11 AU/ML
DSDNA IGG SER-ACNC: 6 IU/ML
ENA JO1 AB SER-ACNC: 12 AU/ML
ENA RNP AB SER-ACNC: 10 AU/ML
ENA SCL70 AB SER-ACNC: 6 AU/ML
ENA SS-A IGG SER-ACNC: 9 AU/ML
ENA SS-B IGG SER-ACNC: 4 AU/ML
HISTONE IGG SER-ACNC: 5 AU/ML
SMOOTH MUSCLE AB SER QL IF: NEGATIVE

## 2023-02-07 LAB — CCP IGA+IGG SERPL IA-ACNC: <8 UNITS

## 2023-02-17 ENCOUNTER — HOSPITAL ENCOUNTER (OUTPATIENT)
Dept: RADIOLOGY | Facility: HOSPITAL | Age: 63
Discharge: HOME | End: 2023-02-17
Attending: INTERNAL MEDICINE
Payer: COMMERCIAL

## 2023-02-17 DIAGNOSIS — R13.19 OTHER DYSPHAGIA: ICD-10-CM

## 2023-02-17 PROCEDURE — 92611 MOTION FLUOROSCOPY/SWALLOW: CPT | Mod: GN

## 2023-02-17 PROCEDURE — 74230 X-RAY XM SWLNG FUNCJ C+: CPT

## 2023-02-17 NOTE — PROCEDURES
02/17/23 Video swallow study was completed. Please refer to the imaging section for full report and recommendations.        Results for orders placed during the hospital encounter of 02/17/23    FLUOROSCOPY VIDEO SWALLOW WITH SPEECH (Preliminary)  This result has not been signed. Information might be incomplete.    Impression  Functional oropharyngeal swallowing. There was transient penetration  with consecutive cup sips of thin liquids. There was no aspiration or  significant pharyngeal retention observed during the study.    Dr. Shirley Wang MD and Ashley Buchanan MS, CCC-SLP/L were present for this  examination. The undersigned radiologist agrees only with the radiographic  findings. Specific swallowing recommendations are solely the purview of the  Speech Pathology Division.    Dr. Shirley Wang MD

## 2023-03-15 ENCOUNTER — APPOINTMENT (OUTPATIENT)
Dept: LAB | Facility: HOSPITAL | Age: 63
End: 2023-03-15
Attending: INTERNAL MEDICINE
Payer: COMMERCIAL

## 2023-03-15 ENCOUNTER — TRANSCRIBE ORDERS (OUTPATIENT)
Dept: REGISTRATION | Facility: HOSPITAL | Age: 63
End: 2023-03-15

## 2023-03-15 DIAGNOSIS — K83.1 OBSTRUCTION OF BILE DUCT: Primary | ICD-10-CM

## 2023-03-15 DIAGNOSIS — R10.11 RIGHT UPPER QUADRANT PAIN: ICD-10-CM

## 2023-03-15 DIAGNOSIS — K83.1 OBSTRUCTION OF BILE DUCT: ICD-10-CM

## 2023-03-15 LAB
ALBUMIN SERPL-MCNC: 3.8 G/DL (ref 3.4–5)
ALP SERPL-CCNC: 83 IU/L (ref 35–126)
ALT SERPL-CCNC: 20 IU/L (ref 11–54)
ANION GAP SERPL CALC-SCNC: 5 MEQ/L (ref 3–15)
AST SERPL-CCNC: 21 IU/L (ref 15–41)
BILIRUB SERPL-MCNC: 1.5 MG/DL (ref 0.3–1.2)
BUN SERPL-MCNC: 13 MG/DL (ref 8–20)
CALCIUM SERPL-MCNC: 9.5 MG/DL (ref 8.9–10.3)
CHLORIDE SERPL-SCNC: 103 MEQ/L (ref 98–109)
CO2 SERPL-SCNC: 28 MEQ/L (ref 22–32)
CREAT SERPL-MCNC: 0.6 MG/DL (ref 0.6–1.1)
GFR SERPL CREATININE-BSD FRML MDRD: >60 ML/MIN/1.73M*2
GLUCOSE SERPL-MCNC: 89 MG/DL (ref 70–99)
POTASSIUM SERPL-SCNC: 4.6 MEQ/L (ref 3.6–5.1)
PROT SERPL-MCNC: 6 G/DL (ref 6–8.2)
SODIUM SERPL-SCNC: 136 MEQ/L (ref 136–144)

## 2023-03-15 PROCEDURE — 36415 COLL VENOUS BLD VENIPUNCTURE: CPT

## 2023-03-15 PROCEDURE — 80053 COMPREHEN METABOLIC PANEL: CPT

## 2023-03-27 ENCOUNTER — TELEPHONE (OUTPATIENT)
Dept: GASTROENTEROLOGY | Facility: CLINIC | Age: 63
End: 2023-03-27
Payer: COMMERCIAL

## 2023-03-27 ENCOUNTER — HOSPITAL ENCOUNTER (OUTPATIENT)
Facility: HOSPITAL | Age: 63
End: 2023-03-27
Attending: INTERNAL MEDICINE | Admitting: INTERNAL MEDICINE
Payer: COMMERCIAL

## 2023-03-27 NOTE — TELEPHONE ENCOUNTER
Pt called in to schedule her ERCP stent replacement recall procedure with .    Pt is due for June, but she stated that she is going away in June so she wanted to be scheduled for May.    I originally offered her May 1st, but pt preferred to be seen later in May.    So pt is now scheduled for:    GI Procedure Checklist & Appt Info    Procedure: ERCP w/stent replacement  Dx: R93.3    Date/Time: May 23, 2023 at 8:30am (arrival time 7:30am)  CPT: 45887, 24006, 41437    Covid-19 (+) within the last three months: No    Cardiac Clearance:  Pt confirmed that she's not taking any blood thinners    Instructions Sent Via: will mail out    Reservation Form: faxed to [facility]

## 2023-03-27 NOTE — TELEPHONE ENCOUNTER
GI Procedure Pre-Cert    Insurance: [facility] [employer] [place]  Ph: [phone]  Fax: n/a  Representative: Sly    Outcome: No pre cert is required.    Call Ref #: [id number].

## 2023-03-28 ENCOUNTER — TELEPHONE (OUTPATIENT)
Dept: GASTROENTEROLOGY | Facility: CLINIC | Age: 63
End: 2023-03-28
Payer: COMMERCIAL

## 2023-04-03 ENCOUNTER — HOSPITAL ENCOUNTER (INPATIENT)
Facility: HOSPITAL | Age: 63
LOS: 3 days | Discharge: HOME | DRG: 919 | End: 2023-04-06
Attending: EMERGENCY MEDICINE | Admitting: INTERNAL MEDICINE
Payer: COMMERCIAL

## 2023-04-03 ENCOUNTER — APPOINTMENT (EMERGENCY)
Dept: RADIOLOGY | Facility: HOSPITAL | Age: 63
DRG: 919 | End: 2023-04-03
Payer: COMMERCIAL

## 2023-04-03 ENCOUNTER — TELEPHONE (OUTPATIENT)
Dept: GASTROENTEROLOGY | Facility: CLINIC | Age: 63
End: 2023-04-03
Payer: COMMERCIAL

## 2023-04-03 DIAGNOSIS — R10.9 ABDOMINAL PAIN, UNSPECIFIED ABDOMINAL LOCATION: Primary | ICD-10-CM

## 2023-04-03 DIAGNOSIS — R74.01 TRANSAMINITIS: ICD-10-CM

## 2023-04-03 LAB
ALBUMIN SERPL-MCNC: 4.3 G/DL (ref 3.4–5)
ALP SERPL-CCNC: 129 IU/L (ref 35–126)
ALT SERPL-CCNC: 118 IU/L (ref 11–54)
ANION GAP SERPL CALC-SCNC: 10 MEQ/L (ref 3–15)
AST SERPL-CCNC: 253 IU/L (ref 15–41)
BASOPHILS # BLD: 0.02 K/UL (ref 0.01–0.1)
BASOPHILS NFR BLD: 0.3 %
BILIRUB SERPL-MCNC: 2 MG/DL (ref 0.3–1.2)
BUN SERPL-MCNC: 13 MG/DL (ref 8–20)
CALCIUM SERPL-MCNC: 9.7 MG/DL (ref 8.9–10.3)
CHLORIDE SERPL-SCNC: 100 MEQ/L (ref 98–109)
CO2 SERPL-SCNC: 23 MEQ/L (ref 22–32)
CREAT SERPL-MCNC: 0.6 MG/DL (ref 0.6–1.1)
DIFFERENTIAL METHOD BLD: ABNORMAL
EOSINOPHIL # BLD: 0.02 K/UL (ref 0.04–0.36)
EOSINOPHIL NFR BLD: 0.3 %
ERYTHROCYTE [DISTWIDTH] IN BLOOD BY AUTOMATED COUNT: 12.3 % (ref 11.7–14.4)
GFR SERPL CREATININE-BSD FRML MDRD: >60 ML/MIN/1.73M*2
GLUCOSE SERPL-MCNC: 119 MG/DL (ref 70–99)
HCT VFR BLDCO AUTO: 43.4 % (ref 35–45)
HGB BLD-MCNC: 14.3 G/DL (ref 11.8–15.7)
IMM GRANULOCYTES # BLD AUTO: 0.02 K/UL (ref 0–0.08)
IMM GRANULOCYTES NFR BLD AUTO: 0.3 %
LIPASE SERPL-CCNC: 38 U/L (ref 20–51)
LYMPHOCYTES # BLD: 1.2 K/UL (ref 1.2–3.5)
LYMPHOCYTES NFR BLD: 16.4 %
MCH RBC QN AUTO: 30.3 PG (ref 28–33.2)
MCHC RBC AUTO-ENTMCNC: 32.9 G/DL (ref 32.2–35.5)
MCV RBC AUTO: 91.9 FL (ref 83–98)
MONOCYTES # BLD: 0.56 K/UL (ref 0.28–0.8)
MONOCYTES NFR BLD: 7.7 %
NEUTROPHILS # BLD: 5.5 K/UL (ref 1.7–7)
NEUTS SEG NFR BLD: 75 %
NRBC BLD-RTO: 0 %
PDW BLD AUTO: 10.4 FL (ref 9.4–12.3)
PLATELET # BLD AUTO: 250 K/UL (ref 150–369)
POTASSIUM SERPL-SCNC: 3.3 MEQ/L (ref 3.6–5.1)
PROT SERPL-MCNC: 7.7 G/DL (ref 6–8.2)
RBC # BLD AUTO: 4.72 M/UL (ref 3.93–5.22)
SODIUM SERPL-SCNC: 133 MEQ/L (ref 136–144)
WBC # BLD AUTO: 7.32 K/UL (ref 3.8–10.5)

## 2023-04-03 PROCEDURE — 83690 ASSAY OF LIPASE: CPT | Performed by: EMERGENCY MEDICINE

## 2023-04-03 PROCEDURE — 25800000 HC PHARMACY IV SOLUTIONS

## 2023-04-03 PROCEDURE — 80053 COMPREHEN METABOLIC PANEL: CPT

## 2023-04-03 PROCEDURE — 80053 COMPREHEN METABOLIC PANEL: CPT | Performed by: EMERGENCY MEDICINE

## 2023-04-03 PROCEDURE — 96365 THER/PROPH/DIAG IV INF INIT: CPT | Mod: 59

## 2023-04-03 PROCEDURE — 96366 THER/PROPH/DIAG IV INF ADDON: CPT | Mod: 59

## 2023-04-03 PROCEDURE — 99223 1ST HOSP IP/OBS HIGH 75: CPT | Performed by: HOSPITALIST

## 2023-04-03 PROCEDURE — 99284 EMERGENCY DEPT VISIT MOD MDM: CPT | Mod: 25

## 2023-04-03 PROCEDURE — 63600000 HC DRUGS/DETAIL CODE

## 2023-04-03 PROCEDURE — 12000000 HC ROOM AND CARE MED/SURG

## 2023-04-03 PROCEDURE — 96375 TX/PRO/DX INJ NEW DRUG ADDON: CPT | Mod: 59

## 2023-04-03 PROCEDURE — 63600105 HC IODINE BASED CONTRAST

## 2023-04-03 PROCEDURE — 36415 COLL VENOUS BLD VENIPUNCTURE: CPT

## 2023-04-03 PROCEDURE — 25800000 HC PHARMACY IV SOLUTIONS: Performed by: HOSPITALIST

## 2023-04-03 PROCEDURE — 63600000 HC DRUGS/DETAIL CODE: Performed by: HOSPITALIST

## 2023-04-03 PROCEDURE — G1004 CDSM NDSC: HCPCS

## 2023-04-03 PROCEDURE — 85025 COMPLETE CBC W/AUTO DIFF WBC: CPT | Performed by: EMERGENCY MEDICINE

## 2023-04-03 PROCEDURE — 93005 ELECTROCARDIOGRAM TRACING: CPT

## 2023-04-03 PROCEDURE — 63700000 HC SELF-ADMINISTRABLE DRUG: Performed by: HOSPITALIST

## 2023-04-03 PROCEDURE — 83690 ASSAY OF LIPASE: CPT

## 2023-04-03 RX ORDER — ONDANSETRON HYDROCHLORIDE 2 MG/ML
4 INJECTION, SOLUTION INTRAVENOUS EVERY 6 HOURS PRN
Status: DISCONTINUED | OUTPATIENT
Start: 2023-04-03 | End: 2023-04-06 | Stop reason: HOSPADM

## 2023-04-03 RX ORDER — SODIUM CHLORIDE 9 MG/ML
INJECTION, SOLUTION INTRAVENOUS CONTINUOUS
Status: DISCONTINUED | OUTPATIENT
Start: 2023-04-04 | End: 2023-04-05

## 2023-04-03 RX ORDER — ACETAMINOPHEN 500 MG
5 TABLET ORAL NIGHTLY PRN
Status: DISCONTINUED | OUTPATIENT
Start: 2023-04-03 | End: 2023-04-06 | Stop reason: HOSPADM

## 2023-04-03 RX ORDER — NAPROXEN SODIUM 220 MG/1
81 TABLET, FILM COATED ORAL DAILY
Status: DISCONTINUED | OUTPATIENT
Start: 2023-04-04 | End: 2023-04-06 | Stop reason: HOSPADM

## 2023-04-03 RX ORDER — POTASSIUM CHLORIDE 14.9 MG/ML
20 INJECTION INTRAVENOUS AS NEEDED
Status: DISCONTINUED | OUTPATIENT
Start: 2023-04-03 | End: 2023-04-06 | Stop reason: HOSPADM

## 2023-04-03 RX ORDER — CICLOPIROX 1 G/100ML
SHAMPOO TOPICAL WEEKLY
COMMUNITY

## 2023-04-03 RX ORDER — DEXTROSE 50 % IN WATER (D50W) INTRAVENOUS SYRINGE
25 AS NEEDED
Status: DISCONTINUED | OUTPATIENT
Start: 2023-04-03 | End: 2023-04-06 | Stop reason: HOSPADM

## 2023-04-03 RX ORDER — HEPARIN SODIUM 5000 [USP'U]/ML
5000 INJECTION, SOLUTION INTRAVENOUS; SUBCUTANEOUS EVERY 8 HOURS
Status: DISCONTINUED | OUTPATIENT
Start: 2023-04-04 | End: 2023-04-06 | Stop reason: HOSPADM

## 2023-04-03 RX ORDER — POTASSIUM CHLORIDE 750 MG/1
40 TABLET, EXTENDED RELEASE ORAL AS NEEDED
Status: DISCONTINUED | OUTPATIENT
Start: 2023-04-03 | End: 2023-04-06 | Stop reason: HOSPADM

## 2023-04-03 RX ORDER — POLYETHYLENE GLYCOL 3350 17 G/17G
17 POWDER, FOR SOLUTION ORAL NIGHTLY
Status: DISCONTINUED | OUTPATIENT
Start: 2023-04-03 | End: 2023-04-05

## 2023-04-03 RX ORDER — DEXTROSE 40 %
15-30 GEL (GRAM) ORAL AS NEEDED
Status: DISCONTINUED | OUTPATIENT
Start: 2023-04-03 | End: 2023-04-06 | Stop reason: HOSPADM

## 2023-04-03 RX ORDER — POTASSIUM CHLORIDE 750 MG/1
20 TABLET, EXTENDED RELEASE ORAL AS NEEDED
Status: DISCONTINUED | OUTPATIENT
Start: 2023-04-03 | End: 2023-04-06 | Stop reason: HOSPADM

## 2023-04-03 RX ORDER — MORPHINE SULFATE 4 MG/ML
4 INJECTION, SOLUTION INTRAMUSCULAR; INTRAVENOUS ONCE
Status: COMPLETED | OUTPATIENT
Start: 2023-04-03 | End: 2023-04-03

## 2023-04-03 RX ORDER — IBUPROFEN 200 MG
16-32 TABLET ORAL AS NEEDED
Status: DISCONTINUED | OUTPATIENT
Start: 2023-04-03 | End: 2023-04-06 | Stop reason: HOSPADM

## 2023-04-03 RX ORDER — ACETAMINOPHEN 325 MG/1
650 TABLET ORAL EVERY 6 HOURS PRN
Status: DISCONTINUED | OUTPATIENT
Start: 2023-04-03 | End: 2023-04-06 | Stop reason: HOSPADM

## 2023-04-03 RX ADMIN — MORPHINE SULFATE 4 MG: 4 INJECTION INTRAVENOUS at 16:15

## 2023-04-03 RX ADMIN — HEPARIN SODIUM 5000 UNITS: 5000 INJECTION, SOLUTION INTRAVENOUS; SUBCUTANEOUS at 23:59

## 2023-04-03 RX ADMIN — IOHEXOL 100 ML: 350 INJECTION, SOLUTION INTRAVENOUS at 18:28

## 2023-04-03 RX ADMIN — AMPICILLIN AND SULBACTAM 3 G: 1; 2 INJECTION, POWDER, FOR SOLUTION INTRAMUSCULAR; INTRAVENOUS at 16:26

## 2023-04-03 RX ADMIN — POLYETHYLENE GLYCOL 3350 17 G: 17 POWDER, FOR SOLUTION ORAL at 23:44

## 2023-04-03 RX ADMIN — SODIUM CHLORIDE: 9 INJECTION, SOLUTION INTRAVENOUS at 23:45

## 2023-04-03 RX ADMIN — SODIUM CHLORIDE 1000 ML: 9 INJECTION, SOLUTION INTRAVENOUS at 16:15

## 2023-04-03 ASSESSMENT — ENCOUNTER SYMPTOMS
LIGHT-HEADEDNESS: 0
SHORTNESS OF BREATH: 0
COUGH: 0
DIARRHEA: 0
SORE THROAT: 0
CONSTIPATION: 1
ABDOMINAL PAIN: 1
FEVER: 0
CHILLS: 0
DIZZINESS: 0
NAUSEA: 1
VOMITING: 0

## 2023-04-03 NOTE — ED PROVIDER NOTES
Emergency Medicine Note  HPI   HISTORY OF PRESENT ILLNESS     62 y.o. female with PMH of breast cancer, Crohn's disease, dystonia, osteomyelitis, recurrent pancreatitis s/p cholecystectomy 2020, ERCP induced pancreatitis, and thyroid nodule presents to ED for evaluation of RUQ abdominal pain onset today. Patient initially underwent ERCP with sphincterectomy in 9/2022 for papillary stenosis. She had 2 subsequent ERCPs in 10/2022 and 12/15/2022 by Dr. Tenorio at [facility]. Patient reports 4-5 episodes of pancreatitis after the stent placement in December that resolve shortly after increased fluid intake. Today her abdominal pain began similar to pancreatitis pain in the epigastric region. Admits to worsening pain in the RUQ, some radiation of pain to the right sided back. Denies fevers, chills, vomiting, diarrhea, chest pain, shortness of breath, cough, congestion, sore throat, dizziness, lightheadedness, syncope.     GI: Dr. Riojas   GI Surgeon: Dr. Tenorio     History provided by:  Patient, medical records and spouse   used: No    Abdominal Pain  Pain location:  RUQ and epigastric  Pain radiates to:  Back  Pain severity:  Severe  Onset quality:  Sudden  Timing:  Constant  Progression:  Worsening  Chronicity:  New  Context: previous surgery    Relieved by:  Nothing  Worsened by:  Nothing  Ineffective treatments:  None tried  Associated symptoms: constipation and nausea    Associated symptoms: no chest pain, no chills, no cough, no diarrhea, no fever, no shortness of breath, no sore throat and no vomiting          Patient History   PAST HISTORY     Reviewed from Nursing Triage:  Tobacco  Allergies  Meds  Problems  Med Hx  Surg Hx  Fam Hx  Soc   Hx      Past Medical History:   Diagnosis Date   • Breast cancer (CMS/HCC) 2007   • Crohn's disease (CMS/HCC)    • Dystonia    • Osteomyelitis (CMS/HCC)    • Pancreatitis    • Thyroid nodule    • Tremor        Past Surgical History:   Procedure Laterality  Date   • BREAST LUMPECTOMY     • BREAST SURGERY     • CATARACT EXTRACTION     • CYST REMOVAL      throat   • KNEE SURGERY     • TONSILLECTOMY         Family History   Problem Relation Age of Onset   • COPD Biological Mother    • Alzheimer's disease Biological Father    • Spina bifida Biological Brother        Social History     Tobacco Use   • Smoking status: Never   • Smokeless tobacco: Never   Substance Use Topics   • Alcohol use: Yes   • Drug use: Never         Review of Systems   REVIEW OF SYSTEMS     Review of Systems   Constitutional: Negative for chills and fever.   HENT: Negative for congestion and sore throat.    Respiratory: Negative for cough and shortness of breath.    Cardiovascular: Negative for chest pain.   Gastrointestinal: Positive for abdominal pain, constipation and nausea. Negative for diarrhea and vomiting.   Neurological: Negative for dizziness, syncope and light-headedness.         VITALS     ED Vitals    Date/Time Temp Pulse Resp BP SpO2 [facility]   04/03/23 2121 -- -- 16 118/59 100 % CH   04/03/23 2021 -- -- 16 122/59 98 % CH   04/03/23 1930 -- -- 18 114/61 99 % CH   04/03/23 1834 -- -- 18 136/66 99 % CH   04/03/23 1639 -- 64 18 116/55 100 % MMM   04/03/23 1401 37.2 °C (99 °F) 74 18 126/68 100 % VNB        Pulse Ox %: 100 % (04/03/23 1724)  Pulse Ox Interpretation: Normal (04/03/23 1724)  Heart Rate: 64 (04/03/23 1724)  Rhythm Strip Interpretation: Normal Sinus Rhythm (04/03/23 1724)     Physical Exam   PHYSICAL EXAM     Physical Exam  Vitals and nursing note reviewed.   Constitutional:       General: She is awake. She is not in acute distress.     Appearance: Normal appearance. She is well-developed, well-groomed and normal weight. She is not ill-appearing, toxic-appearing or diaphoretic.      Comments: Pt appears uncomfortable   HENT:      Head: Normocephalic and atraumatic.   Cardiovascular:      Rate and Rhythm: Normal rate and regular rhythm.      Heart sounds: Normal heart sounds.    Pulmonary:      Effort: Pulmonary effort is normal. No respiratory distress.      Breath sounds: Normal breath sounds and air entry.   Abdominal:      General: Bowel sounds are normal. There is no distension.      Palpations: Abdomen is soft.      Tenderness: There is abdominal tenderness in the right upper quadrant and epigastric area. There is no guarding or rebound.   Skin:     General: Skin is warm and dry.      Capillary Refill: Capillary refill takes less than 2 seconds.   Neurological:      Mental Status: She is alert and oriented to person, place, and time.   Psychiatric:         Behavior: Behavior is cooperative.           PROCEDURES     Procedures     DATA     Results     Procedure Component Value Units Date/Time    CBC and differential [[id number]]  (Abnormal) Collected: 04/03/23 1617    Specimen: Blood, Venous Updated: 04/03/23 1640     WBC 7.32 K/uL      RBC 4.72 M/uL      Hemoglobin 14.3 g/dL      Hematocrit 43.4 %      MCV 91.9 fL      MCH 30.3 pg      MCHC 32.9 g/dL      RDW 12.3 %      Platelets 250 K/uL      MPV 10.4 fL      Differential Type Auto     nRBC 0.0 %      Immature Granulocytes 0.3 %      Neutrophils 75.0 %      Lymphocytes 16.4 %      Monocytes 7.7 %      Eosinophils 0.3 %      Basophils 0.3 %      Immature Granulocytes, Absolute 0.02 K/uL      Neutrophils, Absolute 5.50 K/uL      Lymphocytes, Absolute 1.20 K/uL      Monocytes, Absolute 0.56 K/uL      Eosinophils, Absolute 0.02 K/uL      Basophils, Absolute 0.02 K/uL     Comprehensive metabolic panel [[id number]]  (Abnormal) Collected: 04/03/23 1412    Specimen: Blood, Venous Updated: 04/03/23 1439     Sodium 133 mEQ/L      Potassium 3.3 mEQ/L      Comment: Results obtained on plasma. Plasma Potassium values may be up to 0.4 mEQ/L less than serum values. The differences may be greater for patients with high platelet or white cell counts.        Chloride 100 mEQ/L      CO2 23 mEQ/L      BUN 13 mg/dL      Creatinine 0.6 mg/dL      Glucose  119 mg/dL      Calcium 9.7 mg/dL      AST (SGOT) 253 IU/L      ALT (SGPT) 118 IU/L      Alkaline Phosphatase 129 IU/L      Total Protein 7.7 g/dL      Comment: Test performed on plasma which typically contains approximately 0.4 g/dL more protein than serum.        Albumin 4.3 g/dL      Bilirubin, Total 2.0 mg/dL      eGFR >60.0 mL/min/1.73m*2      Anion Gap 10 mEQ/L     Narrative:      Order only if pain is above umbilicus    Lipase, if pain is above umbilicus [[id number]]  (Normal) Collected: 04/03/23 1412    Specimen: Blood, Venous Updated: 04/03/23 1439     Lipase 38 U/L     Narrative:      Order only if pain is above umbilicus    RAINBOW LT BLUE [[id number]] Collected: 04/03/23 1412    Specimen: Blood, Venous Updated: 04/03/23 1419    RAINBOW GOLD [[id number]] Collected: 04/03/23 1412    Specimen: Blood, Venous Updated: 04/03/23 1419    [place] Draw Panel [[id number]] Collected: 04/03/23 1412    Specimen: Blood, Venous Updated: 04/03/23 1419    Narrative:      The following orders were created for panel order [place] Draw Panel.  Procedure                               Abnormality         Status                     ---------                               -----------         ------                     RAINBOW RED[[id number]]                                      In process                 RAINBOW LT BLUE[[id number]]                                  In process                 RAINBOW GOLD[[id number]]                                     In process                   Please view results for these tests on the individual orders.    RAINBOW RED [[id number]] Collected: 04/03/23 1412    Specimen: Blood, Venous Updated: 04/03/23 1419          Imaging Results          CT ABDOMEN PELVIS WITH IV CONTRAST (Final result)  Result time 04/03/23 19:04:19    Final result                 Impression:    IMPRESSION: Distal CBD stent noted similar in position to prior. Mild  intrahepatic biliary ductal prominence similar to prior. Pneumobilia  likely  related to stent placement. Diverticulosis coli without deuce diverticulitis.  Mild proximal small bowel ileus. No evidence of bowel obstruction             Narrative:      CLINICAL HISTORY:      RUQ Abdominal Pain    COMMENT: Serial axial images of the abdomen and pelvis are obtained after the  uneventful intravenous administration of IV contrast.    100mL of iohexoL (OMNIPAQUE 350) 350 mg iodine/mL solution 100 mL  Sagittal and coronal reformats obtained.    CT DOSE:  One or more dose reduction techniques (e.g. automated exposure  control, adjustment of the mA and/or kV according to patient size, use of  iterative reconstruction technique) utilized for this examination.    Comparison: January 26, 2023.  LUNG BASES/HEART: Mild lingular atelectasis. Mild right middle lobe atelectasis.  No consolidative pneumonia.    LIVER/BILIARY TREE: There is mild intrahepatic ductal dilation. Common bile duct  measures up to 6 mm tapering normally at the ampulla. There is a stents noted in  the distal CBD measuring into the duodenum. Pneumobilia present.    GALL BLADDER:Gallbladder is absent.  SPLEEN:  Normal.  PANCREAS:  Pancreas is grossly unremarkable..  ADRENAL GLANDS:  Normal.  KIDNEYS:  No acute findings.  GI TRACT: There is no focal inflammatory process or mural thickening. The  appendix is normal. Moderate to large amount of stool in the colon.  Diverticulosis coli present without deuce diverticulitis.  VESSELS:No aneurysm.  PELVIC STRUCTURES: No pelvic masses. There some prominent vessels noted in the  left adnexa.  ADENOPATHY: No significant adenopathy present.  ASCITES: None.  OSSEOUS STRUCTURES: No apparent acute findings.                                  ECG 12 lead   ED Interpretation   NSR. HR 62 bpm. No ST elevations or depressions. CK           Scoring tools                                  ED Course & MDM   MDM / ED COURSE / CLINICAL IMPRESSION / DISPO     MDM    ED Course as of 04/03/23 2214 Mon Apr  03, 2023   1541 Impression: RUQ abdominal pain   Plan: cbc, cmp, lipase, ct abd/pelv, ekg, fluids, pain medication  Discussed case with Dr. Renee  [CK]   1700 Pain improved with morphine [CK]   1717 Discussed case with on-call GI (Dr. Packer) who suggests pt likely will need to be transferred to [facility] [CK]   1720 Paged Dr. Tenorio at [facility] [CK]   1908 CT ABDOMEN PELVIS WITH IV CONTRAST  IMPRESSION: Distal CBD stent noted similar in position to prior. Mild  intrahepatic biliary ductal prominence similar to prior. Pneumobilia likely  related to stent placement. Diverticulosis coli without deuce diverticulitis.  Mild proximal small bowel ileus. No evidence of bowel obstruction [CK]   1932 Updated pt and  with results [CK]   2136 Discussed with on-call GI at [facility] (Dr. Boggs). Recommends speaking with [facility] here prior to accepting pt. States pt can be admitted here and sent down and back to [facility] if GI wants ERCP when they see pt in consult [CK]   2151 Case discussed with hospitalist (Dr. Bailon). Reviewed patient's presentation, ED course, and relevant data. Hospitalist accepts patient on their service and will see/admit. Pt is a full code   [CK]      ED Course User Index  [CK] Darby Saenz PA C     Clinical Impression      Abdominal pain, unspecified abdominal location   Transaminitis     _________________     ED Disposition   Admit / Observation                   Darby Saenz PA C  04/03/23 1654

## 2023-04-03 NOTE — ED ATTESTATION NOTE
Procedures  Physical Exam  Review of Systems    4/3/33496:10 PM  I have personally seen and examined the patient.  I personally performed the key   components of the encounter and provided a substantive portion of the care and   medical decision making for this patient.     I have reviewed and agree with the PA/NP/Resident's assessment and ED plan of care, with any exceptions as documented below.  My examination, assessment and plan of care of Sowmya Clark is as follows:    Patient is a 62-year-old female who presents with right-sided abdominal discomfort, patient has a history of recurrent pancreatitis which was thought to be secondary to papillary stenosis, patient reports going through several ERCPs and endoscopic procedures to treat this entity, she currently has a biliary stent in place which was placed in December, patient began having abdominal discomfort over the last several days who presents today for evaluation, patient is status post a cholecystectomy years ago, no nausea or vomiting reported, no fevers    Exam:   Vital signs have been reviewed, pulse ox is 100% on room air, normal  Heart: RRR, normal S1/S2  Lungs: CTA bilaterally  Abdo: soft, non-tender    Plan/Medical Decision Making: Patient is a 62-year-old female who presents with right-sided abdominal discomfort, LFTs and alk phos are elevated consistent with a common duct obstruction, patient's gallbladder has been removed previously and she had a common duct stent placed in December, patient will be given IV fluids and we will cover her with antibiotics at this point, will discuss with GI about imaging going forward (MRCP versus going straight to an ERCP)      This document was created using Dragon Dictation software. There might be some typographical errors due to this technology.          Godshall, Duane K, MD  04/03/23 8798

## 2023-04-03 NOTE — TELEPHONE ENCOUNTER
".    April 3, 2023    Patient: Sowmya Clark     Last procedure date: 12/15/2022    Patient has the following:    Symptom Checklist ([x]):    [] Fever - last temp:    How long has the fever lasted:  [] Bleeding - Dark colored or light:    How long:  [] Vomiting - How long:  [] Chills - How long:  [] Nausea How long:  [x] Abd Pain/Discomfort - Pain level: Where: How long:  [] Bowel movement - Diarrhea: Constipation: How long since last bm:     How long has all of this been happening?    Any additional Symptoms: Patient called in stating that she's currently at [place] ED b/c she's not sure if she's having a Pancreatitis episode or if her stent \"moved\".     I could not next more in-depth questions b/c pt was speaking over me, and she wanted to know if she should come to [facility] ED to be seen faster.    I informed pt that she is more than welcome to come to [facility] but there is not a guarantee that she will be soon \"quicker\". Patient then placed her  on the phone and we both reiterated what was said. Patient's  then said that they will sit there and see what's next.      Lab Results   Component Value Date    WBC 6.13 01/26/2023    RBC 4.59 01/26/2023    HGB 14.3 01/26/2023     No results found for: AMYLASE  Lab Results   Component Value Date    LIPASE 38 04/03/2023         "

## 2023-04-04 ENCOUNTER — ANESTHESIA EVENT (OUTPATIENT)
Dept: ENDOSCOPY | Facility: HOSPITAL | Age: 63
DRG: 919 | End: 2023-04-04
Payer: COMMERCIAL

## 2023-04-04 LAB
ALBUMIN SERPL-MCNC: 3.1 G/DL (ref 3.4–5)
ALP SERPL-CCNC: 128 IU/L (ref 35–126)
ALT SERPL-CCNC: 182 IU/L (ref 11–54)
ANION GAP SERPL CALC-SCNC: 5 MEQ/L (ref 3–15)
AST SERPL-CCNC: 162 IU/L (ref 15–41)
ATRIAL RATE: 62
BILIRUB DIRECT SERPL-MCNC: 0.3 MG/DL
BILIRUB SERPL-MCNC: 1.5 MG/DL (ref 0.3–1.2)
BUN SERPL-MCNC: 9 MG/DL (ref 8–20)
CALCIUM SERPL-MCNC: 8.6 MG/DL (ref 8.9–10.3)
CHLORIDE SERPL-SCNC: 107 MEQ/L (ref 98–109)
CO2 SERPL-SCNC: 24 MEQ/L (ref 22–32)
CREAT SERPL-MCNC: 0.4 MG/DL (ref 0.6–1.1)
ERYTHROCYTE [DISTWIDTH] IN BLOOD BY AUTOMATED COUNT: 12.3 % (ref 11.7–14.4)
GFR SERPL CREATININE-BSD FRML MDRD: >60 ML/MIN/1.73M*2
GLUCOSE SERPL-MCNC: 99 MG/DL (ref 70–99)
HCT VFR BLDCO AUTO: 36.6 % (ref 35–45)
HGB BLD-MCNC: 12.4 G/DL (ref 11.8–15.7)
MAGNESIUM SERPL-MCNC: 2 MG/DL (ref 1.8–2.5)
MCH RBC QN AUTO: 31.1 PG (ref 28–33.2)
MCHC RBC AUTO-ENTMCNC: 33.9 G/DL (ref 32.2–35.5)
MCV RBC AUTO: 91.7 FL (ref 83–98)
PDW BLD AUTO: 10.6 FL (ref 9.4–12.3)
PLATELET # BLD AUTO: 203 K/UL (ref 150–369)
POTASSIUM SERPL-SCNC: 3.8 MEQ/L (ref 3.6–5.1)
PR INTERVAL: 146
PROT SERPL-MCNC: 5.4 G/DL (ref 6–8.2)
QRS DURATION: 74
QT INTERVAL: 426
QTC CALCULATION(BAZETT): 432
R AXIS: 81
RBC # BLD AUTO: 3.99 M/UL (ref 3.93–5.22)
SODIUM SERPL-SCNC: 136 MEQ/L (ref 136–144)
T WAVE AXIS: 43
VENTRICULAR RATE: 62
WBC # BLD AUTO: 5.73 K/UL (ref 3.8–10.5)

## 2023-04-04 PROCEDURE — 25800000 HC PHARMACY IV SOLUTIONS: Performed by: HOSPITALIST

## 2023-04-04 PROCEDURE — 63600000 HC DRUGS/DETAIL CODE: Performed by: HOSPITALIST

## 2023-04-04 PROCEDURE — 85027 COMPLETE CBC AUTOMATED: CPT | Performed by: HOSPITALIST

## 2023-04-04 PROCEDURE — 93010 ELECTROCARDIOGRAM REPORT: CPT | Performed by: INTERNAL MEDICINE

## 2023-04-04 PROCEDURE — 63700000 HC SELF-ADMINISTRABLE DRUG: Performed by: NURSE PRACTITIONER

## 2023-04-04 PROCEDURE — 83735 ASSAY OF MAGNESIUM: CPT | Performed by: HOSPITALIST

## 2023-04-04 PROCEDURE — 80048 BASIC METABOLIC PNL TOTAL CA: CPT | Performed by: HOSPITALIST

## 2023-04-04 PROCEDURE — 200200 PR NO CHARGE: Performed by: INTERNAL MEDICINE

## 2023-04-04 PROCEDURE — 63700000 HC SELF-ADMINISTRABLE DRUG: Performed by: HOSPITALIST

## 2023-04-04 PROCEDURE — 80076 HEPATIC FUNCTION PANEL: CPT | Performed by: INTERNAL MEDICINE

## 2023-04-04 PROCEDURE — 36415 COLL VENOUS BLD VENIPUNCTURE: CPT | Performed by: HOSPITALIST

## 2023-04-04 PROCEDURE — 99233 SBSQ HOSP IP/OBS HIGH 50: CPT | Performed by: INTERNAL MEDICINE

## 2023-04-04 PROCEDURE — 12000000 HC ROOM AND CARE MED/SURG

## 2023-04-04 PROCEDURE — 1123F ACP DISCUSS/DSCN MKR DOCD: CPT | Performed by: INTERNAL MEDICINE

## 2023-04-04 RX ORDER — CYCLOBENZAPRINE HCL 5 MG
5 TABLET ORAL ONCE
Status: COMPLETED | OUTPATIENT
Start: 2023-04-04 | End: 2023-04-04

## 2023-04-04 RX ADMIN — HEPARIN SODIUM 5000 UNITS: 5000 INJECTION, SOLUTION INTRAVENOUS; SUBCUTANEOUS at 16:17

## 2023-04-04 RX ADMIN — CYCLOBENZAPRINE HYDROCHLORIDE 5 MG: 5 TABLET, FILM COATED ORAL at 23:14

## 2023-04-04 RX ADMIN — HEPARIN SODIUM 5000 UNITS: 5000 INJECTION, SOLUTION INTRAVENOUS; SUBCUTANEOUS at 08:56

## 2023-04-04 RX ADMIN — Medication 5 MG: at 00:02

## 2023-04-04 RX ADMIN — SODIUM CHLORIDE: 9 INJECTION, SOLUTION INTRAVENOUS at 12:54

## 2023-04-04 RX ADMIN — AMPICILLIN AND SULBACTAM 3 G: 1; 2 INJECTION, POWDER, FOR SOLUTION INTRAMUSCULAR; INTRAVENOUS at 23:14

## 2023-04-04 RX ADMIN — SODIUM CHLORIDE: 9 INJECTION, SOLUTION INTRAVENOUS at 22:13

## 2023-04-04 RX ADMIN — POLYETHYLENE GLYCOL 3350 17 G: 17 POWDER, FOR SOLUTION ORAL at 12:55

## 2023-04-04 RX ADMIN — AMPICILLIN AND SULBACTAM 3 G: 1; 2 INJECTION, POWDER, FOR SOLUTION INTRAMUSCULAR; INTRAVENOUS at 12:55

## 2023-04-04 RX ADMIN — AMPICILLIN AND SULBACTAM 3 G: 1; 2 INJECTION, POWDER, FOR SOLUTION INTRAMUSCULAR; INTRAVENOUS at 00:05

## 2023-04-04 RX ADMIN — AMPICILLIN AND SULBACTAM 3 G: 1; 2 INJECTION, POWDER, FOR SOLUTION INTRAMUSCULAR; INTRAVENOUS at 06:00

## 2023-04-04 RX ADMIN — AMPICILLIN AND SULBACTAM 3 G: 1; 2 INJECTION, POWDER, FOR SOLUTION INTRAMUSCULAR; INTRAVENOUS at 17:29

## 2023-04-04 RX ADMIN — HEPARIN SODIUM 5000 UNITS: 5000 INJECTION, SOLUTION INTRAVENOUS; SUBCUTANEOUS at 23:14

## 2023-04-04 NOTE — H&P
Hospital Medicine Service -  History & Physical        CHIEF COMPLAINT     Chief Complaint   Patient presents with   • Abdominal Pain        HISTORY OF PRESENT ILLNESS      62 y.o. female with a past medical history of breast CA in remission chronic pancreatitis with papillary stenosis Crohn's.  Patient had been admitted on 12/15/2022 under Dr. Tenorio at [facility] as patient needed frequent ERCPs leading sphincterectomy (9/2022) due to the papillary stenosis.  This led to complications of recurrent pancreatitis.  Patient today started to have right upper quadrant pain radiating to the back.  Patient had a mild bout of nausea resolved.  Here in the ED patient's blood pressure is 114/61 temperature 99 sodium is 133 potassium 3.3 AST is 253 ALT is 118 alk phos is 129 bilirubin is 2 rest of the labs are unremarkable.  CT of the abdomen revealed a distal CBD stent in position with mild intrahepatic biliary ductal prominence within pneumobilia noted with a small bowel ileus.  Patient's pain was relieved with morphine.  ED physician contacted GI on-call recommends patient to be admitted under [facility] for possible transfer to [facility] to determine the need for another ERCP.    PAST MEDICAL AND SURGICAL HISTORY      Past Medical History:   Diagnosis Date   • Breast cancer (CMS/HCC) 2007   • Crohn's disease (CMS/HCC)    • Dystonia    • Osteomyelitis (CMS/HCC)    • Pancreatitis    • Thyroid nodule    • Tremor        Past Surgical History:   Procedure Laterality Date   • BREAST LUMPECTOMY     • BREAST SURGERY     • CATARACT EXTRACTION     • CYST REMOVAL      throat   • KNEE SURGERY     • TONSILLECTOMY         MEDICATIONS      Prior to Admission medications    Medication Sig Start Date End Date Taking? Authorizing Provider   aspirin 81 mg chewable tablet Take 81 mg by mouth daily.    Provider, Josh Garza MD       ALLERGIES      Prednisone    FAMILY HISTORY      Family History   Problem Relation Age of Onset   • COPD  Biological Mother    • Alzheimer's disease Biological Father    • Spina bifida Biological Brother        SOCIAL HISTORY      Social History     Socioeconomic History   • Marital status:      Spouse name: None   • Number of children: None   • Years of education: None   • Highest education level: None   Tobacco Use   • Smoking status: Never   • Smokeless tobacco: Never   Substance and Sexual Activity   • Alcohol use: Yes   • Drug use: Never   • Sexual activity: Defer     Social Determinants of Health     Food Insecurity: No Food Insecurity (4/3/2023)    Hunger Vital Sign    • Worried About Running Out of Food in the Last Year: Never true    • Ran Out of Food in the Last Year: Never true       REVIEW OF SYSTEMS        Review of Systems  Constitutional: Negative for fatigue and unexpected weight change.   Eyes: Negative for visual disturbance. Mouth no sore throat  Respiratory: Negative for cough and shortness of breath.    Cardiovascular: Negative for chest pain and palpitations.   Gastrointestinal: Right upper quadrant abdominal pain, constipation, diarrhea, bouts of nausea and no vomiting.   Genitourinary: Negative for difficulty urinating. no hematuria no frequency no urgency no discharge  Musculoskeletal: Negative for arthralgias.   Skin: Negative for rash.   Neurological: Negative for dizziness and headaches.   Hematological: Does not bruise/bleed easily.   Psychiatric/Behavioral: Negative for confusion and dysphoric mood no suicidal ideations          PHYSICAL EXAMINATION      Temp:  [36.7 °C (98.1 °F)-37.2 °C (99 °F)] 36.7 °C (98.1 °F)  Heart Rate:  [64-74] 64  Resp:  [16-18] 16  BP: (113-136)/(55-68) 113/58  There is no height or weight on file to calculate BMI.    General exam : appears age stated, well nourished, not in distress  Head: atraumatic, normocephalic  Eyes : PERRLA, EOMI, no pallor, no icterus  ENT: no lesions, oropharynx pink, mucous membranes moist   Neck: supple, no Lymph nodes, no  Thyromegaly, no JVD   CVS : normal rate, normal rhythm, S1 and S2 heard, no murmurs, rubs or gallops  Resp:normal accessory muscle usage, clear to auscultation Bilaterally  Abdomen : soft, Nt, BS +, no organomegaly   Extremities : no edema, no cyanosis   MSK: no DJD, no joint swellings, no joint tenderness   Skin: intact, warm, no rash  Neuro: AAO x3, CN 2-12 intact,  motor strength in all extremities, sensations, DTRs, coordination intact.  Psych: normal mood.cooperative      LABS / IMAGING / EKG        Labs  CBC Results       04/03/23 01/26/23 12/12/22     1617 1641 0903    WBC 7.32 6.13 4.51    RBC 4.72 4.59 4.40    HGB 14.3 14.3 13.3    HCT 43.4 42.5 41.7    MCV 91.9 92.6 94.8    MCH 30.3 31.2 30.2    MCHC 32.9 33.6 31.9     242 234        CMP Results       04/03/23 03/15/23 01/27/23     1412 1036 1022     136 --    K 3.3 4.6 --    Cl 100 103 --    CO2 23 28 --    Glucose 119 89 --    BUN 13 13 --    Creatinine 0.6 0.6 --    Calcium 9.7 9.5 --    Anion Gap 10 5 --     21 48     20 47    Albumin 4.3 3.8 3.7    EGFR >60.0 >60.0 --         Comment for K at 1412 on 04/03/23: Results obtained on plasma. Plasma Potassium values may be up to 0.4 mEQ/L less than serum values. The differences may be greater for patients with high platelet or white cell counts.          Troponin I Results       01/26/23 10/19/22 10/19/22     1643 1936 1756    HS Troponin I 3.6 5 6        Microbiology Results     ** No results found for the last 720 hours. **        UA Results       10/19/22     1806    Color Yellow    Clarity Clear    Glucose Negative    Bilirubin Negative    Ketones Negative    Sp Grav 1.005    Blood Trace    Ph 6.5    Protein Negative    Urobilinogen 0.2    Nitrite Negative    Leuk Est Negative    WBC 0 TO 3    RBC 0 TO 4    Bacteria Rare         Comment for Blood at 1806 on 10/19/22: The sensitivity of the occult blood test is equivalent to approximately 4 intact RBC/HPF.    Comment for Leuk  Est at 1806 on 10/19/22: Results can be falsely negative due to high specific gravity, some antibiotics, glucose >3 g/dl, or WBC other than neutrophils.          Imaging  CT ABDOMEN PELVIS WITH IV CONTRAST   Final Result   IMPRESSION: Distal CBD stent noted similar in position to prior. Mild   intrahepatic biliary ductal prominence similar to prior. Pneumobilia likely   related to stent placement. Diverticulosis coli without deuce diverticulitis.   Mild proximal small bowel ileus. No evidence of bowel obstruction      ECG 12 lead   ED Interpretation   NSR. HR 62 bpm. No ST elevations or depressions. CK             ECG/Telemetry  reviewed by me    ASSESSMENT AND PLAN           * Abdominal pain, unspecified abdominal location  Assessment & Plan  [facility]urg under [facility]  N.p.o. and IV fluids  Consult GI  Continue home medications  DVT prophylaxis with heparin  Patient is a full code    Post-ERCP acute pancreatitis  Assessment & Plan  unasyn q8h   N.p.o. and iv fluids  Serial cmp  GI consult and will determine the need to transfer tertiary for EUS ERCP    Elevated liver enzymes  Assessment & Plan  Continue with home meds  bp parameters for hypotension  dvt prophylaxis with heparin  Pt is full code  Spent 55 minutes assessing planning for patient care      VTE Assessment: Pad[place]    VTE Prophylaxis Plan:   Code Status: Prior       Emigdio Bailon MD  4/3/2023

## 2023-04-04 NOTE — ASSESSMENT & PLAN NOTE
Still with symptoms  CT abd reviewed  GI input noted, possible ERCP at [facility] tomorrow  Diet for now  Antiemetics prn.   Pain control.

## 2023-04-04 NOTE — CONSULTS
GASTROENTEROLOGY CONSULTATION   [facility]     PATIENT NAME:  Sowmya Clark        YOB: 1960   AGE:  62 y.o.         MRN:  [id number]              HISTORY   CC: abdominal pain, s/p CBD stent    62 F with PMHx of breast CA, papillary stenosis s/p ERCP x2 with post-ERCP pancreatitis now with SEMS in place (with plans to remove June 2023 followed by Dr. Tenorio) being seen for abdominal pain. Patient reports multiple episodes of epigastric pain in the last few weeks that typically resolves after an hour or so with bowel rest and drinking water. Yesterday, pain was much more severe located in epigastric region although unrelieved with time and water so she presented to the ED. Symptoms improved after Morphine was administered. Denies any associated nausea/vomiting, fevers. Has noted constipation although no BRBPR/melena.    ED workup with elevated LFTs (, ,  Tbili 2.0) previously normal 3/15/23 except Tbili 1.5. Today, LFTs , , , Tbili 1.5. Lipase normal.    CT abdomen with distal CBD stent noted similar in position to prior. Mild intrahepatic ductal prominence similar to prior. Pneumobilia. Mild proximal small bowel ileus. No bowel obstruction.    PAST MEDICAL HISTORY     Past Medical History:   Diagnosis Date   • Breast cancer (CMS/HCC) 2007   • Crohn's disease (CMS/HCC)    • Dystonia    • Osteomyelitis (CMS/HCC)    • Pancreatitis    • Thyroid nodule    • Tremor        PAST SURGICAL HISTORY     Past Surgical History:   Procedure Laterality Date   • BREAST LUMPECTOMY     • BREAST SURGERY     • CATARACT EXTRACTION     • CYST REMOVAL      throat   • KNEE SURGERY     • TONSILLECTOMY          FAMILY HISTORY     Family History   Problem Relation Age of Onset   • COPD Biological Mother    • Alzheimer's disease Biological Father    • Spina bifida Biological Brother      No significant Neurological History    SOCIAL HISTORY     Social History     Tobacco Use   • Smoking  "status: Never   • Smokeless tobacco: Never   Vaping Use   • Vaping status: Not on file   Substance Use Topics   • Alcohol use: Yes       MEDICATIONS   Home Medication:  •  aspirin, Take 81 mg by mouth daily.  •  ciclopirox, Apply topically once a week. gently massage into scalp    MEDICATIONS:  Infusions:    • sodium chloride 0.9 %       • sodium chloride 0.9 %   80 mL/hr at 23 5934          Scheduled:   • ampicillin-sulbactam  3 g intravenous q6h INT   • [Provider Managed Hold] aspirin  81 mg oral Daily   • heparin (porcine)  5,000 Units subcutaneous q8h JIM   • polyethylene glycol  17 g oral Nightly       ALLERGIES     Allergies   Allergen Reactions   • Prednisone      Had osteonecrosis?/mylelitis of hip bone felt to be related to prednisone       REVIEW OF SYSTEMS   13 point review of systems completed. Negative except for above mentioned history.    PHYSICAL EXAMINATION   Temperature: 98.9 Temp (24hrs), Av.1 °C (98.7 °F), Min:36.7 °C (98.1 °F), Max:37.2 °C (99 °F)      BP: 110/57  Pulse: 66 Respirations: 16  Height: 1.803 m (5' 11\") Weight: 81.8 kg (180 lb 4.8 oz) Body mass index is 25.15 kg/m².     General appearance: no distress  Head: normocephalic  Eyes:  Anicteric  ENT:  Oral mucosa moist  Lungs: clear to auscultation anteriorly   Heart: regular rate   Abdomen: soft, mild epigastric/RUQ tenderness to palpation; bowel sounds normal; no masses, no organomegaly  Extremities: no edema  Skin:  No rashes or lesions  Neurologic: awake and alert and oriented  Pscy:  cooperative     Diagnostic Data:     Labs reviewed-  Lab Results   Component Value Date    WBC 5.73 2023    HGB 12.4 2023    HCT 36.6 2023     2023     (H) 2023     (H) 2023     2023    K 3.8 2023     2023    CREATININE 0.4 (L) 2023    BUN 9 2023    CO2 24 2023    TSH 0.31 (L) 10/20/2022    INR 0.9 10/19/2022     Imaging reviewed  CT " ABDOMEN PELVIS WITH IV CONTRAST    Result Date: 4/3/2023  IMPRESSION: Distal CBD stent noted similar in position to prior. Mild intrahepatic biliary ductal prominence similar to prior. Pneumobilia likely related to stent placement. Diverticulosis coli without deuce diverticulitis. Mild proximal small bowel ileus. No evidence of bowel obstruction        ASSESSMENT/PLAN     62 F with PMHx of breast CA, papillary stenosis s/p ERCP x2 with post-ERCP pancreatitis now with SEMS in place (with plans to remove June 2023 followed by Dr. Tenorio) being seen for abdominal pain.    1) Abdominal pain - epigastric/RUQ with elevated LFTs. Patient is s/p CBD stent 12/2022 known to Dr. Tenorio. Hx of post ERCP pancreatitis. Lipase normal.  2) Elevated LFTs - as above  3) Constipation - CT with small bowel ileus, no obstruction    Plan:  -Trend LFTs  -Pain control  -Will reach out to Marina Lockwood/Dr. Tenorio to consider down and back to [facility] for evaluation  -Okay for regular diet, NPO after midnight pending plan  -D/w attending    AUTHOR:  EDWIN Schilling  4/4/2023

## 2023-04-04 NOTE — ASSESSMENT & PLAN NOTE
unasyn q8h   N.p.o. and iv fluids  Serial cmp  GI consult and will determine the need to transfer tertiary for EUS ERCP

## 2023-04-04 NOTE — PROGRESS NOTES
Our team has been consulted given pt's presentation to the  ED for RUQ pain, with a workup showing elevated LFTs. There is concern for possible biliary stent occlusion despite the stent appearing to be in position.     Will plan to bring the pt over to [facility] as a down and back tomorrow for ERCP with stent exchange with Dr. Tenorio.  GI team updated.     Case request placed. Transfer center notified. Pt to arrive to [facility] @ 1200p tomorrow.     Please ensure pt is NPO at [facility] and has early morning labs resulted including CBC, CMP and PT/INR.     Please call or page with questions.

## 2023-04-04 NOTE — PROGRESS NOTES
Hospital Medicine Service -  Daily Progress Note       SUBJECTIVE   Interval History: no events overnight  Still with some RUQ pain  No N/V/D/C  No CP, SoB.        OBJECTIVE      Vital signs in last 24 hours:  Temp:  [36.7 °C (98.1 °F)-37.2 °C (99 °F)] 37.2 °C (98.9 °F)  Heart Rate:  [64-74] 66  Resp:  [16-18] 16  BP: (110-136)/(55-68) 110/57    Intake/Output Summary (Last 24 hours) at 4/4/2023 1042  Last data filed at 4/4/2023 1000  Gross per 24 hour   Intake 620 ml   Output 3350 ml   Net -2730 ml       PHYSICAL EXAMINATION      Physical Exam  Vitals and nursing note reviewed.   Constitutional:       General: She is not in acute distress.  HENT:      Head: Normocephalic and atraumatic.   Cardiovascular:      Rate and Rhythm: Normal rate.      Heart sounds: Normal heart sounds.   Pulmonary:      Breath sounds: Normal breath sounds.   Abdominal:      General: There is no distension.      Palpations: Abdomen is soft.      Tenderness: There is abdominal tenderness. There is no guarding or rebound.   Neurological:      General: No focal deficit present.      Mental Status: She is alert and oriented to person, place, and time. Mental status is at baseline.   Psychiatric:         Mood and Affect: Mood normal.         Behavior: Behavior normal.            LINES, CATHETERS, DRAINS, AIRWAYS, AND WOUNDS   Lines, Drains, and Airways:  Wounds (agree with documentation and present on admission):  Peripheral IV (Adult) 04/03/23 Left Antecubital (Active)   Number of days: 1         Comments:      LABS / IMAGING / TELE      Labs  Lab Results   Component Value Date    WBC 5.73 04/04/2023    HGB 12.4 04/04/2023    HCT 36.6 04/04/2023    MCV 91.7 04/04/2023     04/04/2023     Lab Results   Component Value Date    GLUCOSE 99 04/04/2023    CALCIUM 8.6 (L) 04/04/2023     04/04/2023    K 3.8 04/04/2023    CO2 24 04/04/2023     04/04/2023    BUN 9 04/04/2023    CREATININE 0.4 (L) 04/04/2023     Lab Results    Component Value Date     (H) 04/04/2023     (H) 04/04/2023     (H) 10/24/2022    ALKPHOS 128 (H) 04/04/2023    BILITOT 1.5 (H) 04/04/2023         SARS-CoV-2 (COVID-19) (no units)   Date/Time Value   12/12/2022 0903 Negative   CT abd reviewed.         ASSESSMENT AND PLAN      Elevated liver enzymes  Assessment & Plan  Cont to trend  Possible ERCP at [facility] tomorrow  GI f/u      Post-ERCP acute pancreatitis  Assessment & Plan  unasyn q8h   N.p.o. and iv fluids  Serial cmp  GI consult and will determine the need to transfer tertiary for EUS ERCP    * Abdominal pain, unspecified abdominal location  Assessment & Plan  Still with symptoms  CT abd reviewed  GI input noted, possible ERCP at [facility] tomorrow  Diet for now  Antiemetics prn.   Pain control.            VTE Assessment: Padua    VTE Prophylaxis:  Current anticoagulants:  heparin (porcine) 5,000 unit/mL injection 5,000 Units, subcutaneous, q8h JIM      Code Status: Full Code      Estimated Discharge Date: 4/6/2023     Disposition Planning: DC when cleared by GI, possible ERCP at [facility] tomorrow.        Loco Mota MD  4/4/2023

## 2023-04-04 NOTE — PLAN OF CARE
Problem: Adult Inpatient Plan of Care  Goal: Plan of Care Review  Flowsheets (Taken 4/4/2023 4037)  Progress: no change  Plan of Care Reviewed With: patient  Outcome Summary: pain controlle dwith one dose morphine in ER. to right abd. no n/v. VSS. pt NPO overnight  for pending ERCP ?otherwise, no issues overnight.   Plan of Care Review  Plan of Care Reviewed With: patient  Progress: no change  Outcome Summary: pain controlle dwith one dose morphine in ER. to right abd. no n/v. VSS. pt NPO overnight  for pending ERCP ?otherwise, no issues overnight.

## 2023-04-05 ENCOUNTER — HOSPITAL ENCOUNTER (OUTPATIENT)
Facility: HOSPITAL | Age: 63
Discharge: ANOTHER HOSPITAL | DRG: 919 | End: 2023-04-05
Attending: INTERNAL MEDICINE | Admitting: INTERNAL MEDICINE
Payer: COMMERCIAL

## 2023-04-05 ENCOUNTER — ANESTHESIA (OUTPATIENT)
Dept: ENDOSCOPY | Facility: HOSPITAL | Age: 63
DRG: 919 | End: 2023-04-05
Payer: COMMERCIAL

## 2023-04-05 VITALS
HEART RATE: 65 BPM | RESPIRATION RATE: 23 BRPM | OXYGEN SATURATION: 98 % | TEMPERATURE: 97.1 F | SYSTOLIC BLOOD PRESSURE: 120 MMHG | DIASTOLIC BLOOD PRESSURE: 60 MMHG

## 2023-04-05 LAB
ALBUMIN SERPL-MCNC: 3.2 G/DL (ref 3.4–5)
ALP SERPL-CCNC: 130 IU/L (ref 35–126)
ALT SERPL-CCNC: 132 IU/L (ref 11–54)
ANION GAP SERPL CALC-SCNC: 6 MEQ/L (ref 3–15)
AST SERPL-CCNC: 83 IU/L (ref 15–41)
BILIRUB SERPL-MCNC: 0.9 MG/DL (ref 0.3–1.2)
BUN SERPL-MCNC: 8 MG/DL (ref 8–20)
CALCIUM SERPL-MCNC: 9 MG/DL (ref 8.9–10.3)
CHLORIDE SERPL-SCNC: 108 MEQ/L (ref 98–109)
CO2 SERPL-SCNC: 26 MEQ/L (ref 22–32)
CREAT SERPL-MCNC: 0.5 MG/DL (ref 0.6–1.1)
ERYTHROCYTE [DISTWIDTH] IN BLOOD BY AUTOMATED COUNT: 12.4 % (ref 11.7–14.4)
GFR SERPL CREATININE-BSD FRML MDRD: >60 ML/MIN/1.73M*2
GLUCOSE SERPL-MCNC: 91 MG/DL (ref 70–99)
HCT VFR BLDCO AUTO: 37.2 % (ref 35–45)
HGB BLD-MCNC: 12.3 G/DL (ref 11.8–15.7)
INR PPP: 1.1
MCH RBC QN AUTO: 30.8 PG (ref 28–33.2)
MCHC RBC AUTO-ENTMCNC: 33.1 G/DL (ref 32.2–35.5)
MCV RBC AUTO: 93.2 FL (ref 83–98)
PDW BLD AUTO: 10.3 FL (ref 9.4–12.3)
PLATELET # BLD AUTO: 173 K/UL (ref 150–369)
POTASSIUM SERPL-SCNC: 3.6 MEQ/L (ref 3.6–5.1)
PROT SERPL-MCNC: 5.5 G/DL (ref 6–8.2)
PROTHROMBIN TIME: 14 SEC (ref 12.2–14.5)
RBC # BLD AUTO: 3.99 M/UL (ref 3.93–5.22)
SARS-COV-2 RNA RESP QL NAA+PROBE: NEGATIVE
SODIUM SERPL-SCNC: 140 MEQ/L (ref 136–144)
WBC # BLD AUTO: 3.4 K/UL (ref 3.8–10.5)

## 2023-04-05 PROCEDURE — C1769 GUIDE WIRE: HCPCS | Performed by: INTERNAL MEDICINE

## 2023-04-05 PROCEDURE — 63600000 HC DRUGS/DETAIL CODE: Performed by: HOSPITALIST

## 2023-04-05 PROCEDURE — 25800000 HC PHARMACY IV SOLUTIONS: Performed by: INTERNAL MEDICINE

## 2023-04-05 PROCEDURE — 63600000 HC DRUGS/DETAIL CODE: Performed by: INTERNAL MEDICINE

## 2023-04-05 PROCEDURE — 25000000 HC PHARMACY GENERAL

## 2023-04-05 PROCEDURE — 43276 ERCP STENT EXCHANGE W/DILATE: CPT | Performed by: INTERNAL MEDICINE

## 2023-04-05 PROCEDURE — C1773 RET DEV, INSERTABLE: HCPCS | Performed by: INTERNAL MEDICINE

## 2023-04-05 PROCEDURE — 71000001 HC PACU PHASE 1 INITIAL 30MIN: Performed by: INTERNAL MEDICINE

## 2023-04-05 PROCEDURE — 12000000 HC ROOM AND CARE MED/SURG

## 2023-04-05 PROCEDURE — 0FPB8DZ REMOVAL OF INTRALUMINAL DEVICE FROM HEPATOBILIARY DUCT, VIA NATURAL OR ARTIFICIAL OPENING ENDOSCOPIC: ICD-10-PCS | Performed by: INTERNAL MEDICINE

## 2023-04-05 PROCEDURE — 85610 PROTHROMBIN TIME: CPT | Performed by: NURSE PRACTITIONER

## 2023-04-05 PROCEDURE — 63600000 HC DRUGS/DETAIL CODE

## 2023-04-05 PROCEDURE — 27200000 HC STERILE SUPPLY: Performed by: INTERNAL MEDICINE

## 2023-04-05 PROCEDURE — 25800000 HC PHARMACY IV SOLUTIONS

## 2023-04-05 PROCEDURE — U0003 INFECTIOUS AGENT DETECTION BY NUCLEIC ACID (DNA OR RNA); SEVERE ACUTE RESPIRATORY SYNDROME CORONAVIRUS 2 (SARS-COV-2) (CORONAVIRUS DISEASE [COVID-19]), AMPLIFIED PROBE TECHNIQUE, MAKING USE OF HIGH THROUGHPUT TECHNOLOGIES AS DESCRIBED BY CMS-2020-01-R: HCPCS | Performed by: INTERNAL MEDICINE

## 2023-04-05 PROCEDURE — 80053 COMPREHEN METABOLIC PANEL: CPT | Performed by: NURSE PRACTITIONER

## 2023-04-05 PROCEDURE — C1727 CATH, BAL TIS DIS, NON-VAS: HCPCS | Performed by: INTERNAL MEDICINE

## 2023-04-05 PROCEDURE — 63700000 HC SELF-ADMINISTRABLE DRUG: Performed by: INTERNAL MEDICINE

## 2023-04-05 PROCEDURE — 37000001 HC ANESTHESIA GENERAL: Performed by: INTERNAL MEDICINE

## 2023-04-05 PROCEDURE — 36415 COLL VENOUS BLD VENIPUNCTURE: CPT | Performed by: NURSE PRACTITIONER

## 2023-04-05 PROCEDURE — 99233 SBSQ HOSP IP/OBS HIGH 50: CPT | Performed by: INTERNAL MEDICINE

## 2023-04-05 PROCEDURE — 75000095 HC ERCP STENT EXCHNGE W DILATE: Performed by: INTERNAL MEDICINE

## 2023-04-05 PROCEDURE — C1874 STENT, COATED/COV W/DEL SYS: HCPCS | Performed by: INTERNAL MEDICINE

## 2023-04-05 PROCEDURE — 63700000 HC SELF-ADMINISTRABLE DRUG: Performed by: ANESTHESIOLOGY

## 2023-04-05 PROCEDURE — 71000011 HC PACU PHASE 1 EA ADDL MIN: Performed by: INTERNAL MEDICINE

## 2023-04-05 PROCEDURE — 25800000 HC PHARMACY IV SOLUTIONS: Performed by: HOSPITALIST

## 2023-04-05 PROCEDURE — 0F798DZ DILATION OF COMMON BILE DUCT WITH INTRALUMINAL DEVICE, VIA NATURAL OR ARTIFICIAL OPENING ENDOSCOPIC: ICD-10-PCS | Performed by: INTERNAL MEDICINE

## 2023-04-05 PROCEDURE — 85027 COMPLETE CBC AUTOMATED: CPT | Performed by: NURSE PRACTITIONER

## 2023-04-05 DEVICE — STENT SYSTEM RMV
Type: IMPLANTABLE DEVICE | Site: BILE DUCT | Status: FUNCTIONAL
Brand: WALLFLEX BILIARY

## 2023-04-05 RX ORDER — SCOPOLAMINE 1 MG/3D
1 PATCH, EXTENDED RELEASE TRANSDERMAL
Status: DISCONTINUED | OUTPATIENT
Start: 2023-04-05 | End: 2023-04-05 | Stop reason: HOSPADM

## 2023-04-05 RX ORDER — FENTANYL CITRATE 50 UG/ML
INJECTION, SOLUTION INTRAMUSCULAR; INTRAVENOUS AS NEEDED
Status: DISCONTINUED | OUTPATIENT
Start: 2023-04-05 | End: 2023-04-05 | Stop reason: SURG

## 2023-04-05 RX ORDER — MEPERIDINE HYDROCHLORIDE 25 MG/ML
12.5 INJECTION INTRAMUSCULAR; INTRAVENOUS; SUBCUTANEOUS EVERY 10 MIN PRN
Status: DISCONTINUED | OUTPATIENT
Start: 2023-04-05 | End: 2023-04-05 | Stop reason: HOSPADM

## 2023-04-05 RX ORDER — DEXTROSE 40 %
15-30 GEL (GRAM) ORAL AS NEEDED
Status: DISCONTINUED | OUTPATIENT
Start: 2023-04-05 | End: 2023-04-05 | Stop reason: HOSPADM

## 2023-04-05 RX ORDER — SODIUM CHLORIDE 9 MG/ML
INJECTION, SOLUTION INTRAVENOUS CONTINUOUS PRN
Status: DISCONTINUED | OUTPATIENT
Start: 2023-04-05 | End: 2023-04-05 | Stop reason: SURG

## 2023-04-05 RX ORDER — CYCLOBENZAPRINE HCL 5 MG
5 TABLET ORAL ONCE
Status: COMPLETED | OUTPATIENT
Start: 2023-04-05 | End: 2023-04-05

## 2023-04-05 RX ORDER — EPHEDRINE SULFATE/0.9% NACL/PF 50 MG/5 ML
SYRINGE (ML) INTRAVENOUS AS NEEDED
Status: DISCONTINUED | OUTPATIENT
Start: 2023-04-05 | End: 2023-04-05 | Stop reason: SURG

## 2023-04-05 RX ORDER — ROCURONIUM BROMIDE 10 MG/ML
INJECTION, SOLUTION INTRAVENOUS AS NEEDED
Status: DISCONTINUED | OUTPATIENT
Start: 2023-04-05 | End: 2023-04-05 | Stop reason: SURG

## 2023-04-05 RX ORDER — ONDANSETRON HYDROCHLORIDE 2 MG/ML
4 INJECTION, SOLUTION INTRAVENOUS
Status: DISCONTINUED | OUTPATIENT
Start: 2023-04-05 | End: 2023-04-05 | Stop reason: HOSPADM

## 2023-04-05 RX ORDER — DEXTROSE 50 % IN WATER (D50W) INTRAVENOUS SYRINGE
25 AS NEEDED
Status: DISCONTINUED | OUTPATIENT
Start: 2023-04-05 | End: 2023-04-05 | Stop reason: HOSPADM

## 2023-04-05 RX ORDER — HYDROMORPHONE HYDROCHLORIDE 1 MG/ML
0.5 INJECTION, SOLUTION INTRAMUSCULAR; INTRAVENOUS; SUBCUTANEOUS
Status: DISCONTINUED | OUTPATIENT
Start: 2023-04-05 | End: 2023-04-05 | Stop reason: HOSPADM

## 2023-04-05 RX ORDER — FENTANYL CITRATE 50 UG/ML
50 INJECTION, SOLUTION INTRAMUSCULAR; INTRAVENOUS EVERY 5 MIN PRN
Status: DISCONTINUED | OUTPATIENT
Start: 2023-04-05 | End: 2023-04-05 | Stop reason: HOSPADM

## 2023-04-05 RX ORDER — LIDOCAINE HYDROCHLORIDE 10 MG/ML
INJECTION, SOLUTION INFILTRATION; PERINEURAL AS NEEDED
Status: DISCONTINUED | OUTPATIENT
Start: 2023-04-05 | End: 2023-04-05 | Stop reason: SURG

## 2023-04-05 RX ORDER — POLYETHYLENE GLYCOL 3350 17 G/17G
17 POWDER, FOR SOLUTION ORAL DAILY PRN
Status: DISCONTINUED | OUTPATIENT
Start: 2023-04-05 | End: 2023-04-06 | Stop reason: HOSPADM

## 2023-04-05 RX ORDER — IBUPROFEN 200 MG
16-32 TABLET ORAL AS NEEDED
Status: DISCONTINUED | OUTPATIENT
Start: 2023-04-05 | End: 2023-04-05 | Stop reason: HOSPADM

## 2023-04-05 RX ORDER — ONDANSETRON HYDROCHLORIDE 2 MG/ML
INJECTION, SOLUTION INTRAVENOUS AS NEEDED
Status: DISCONTINUED | OUTPATIENT
Start: 2023-04-05 | End: 2023-04-05 | Stop reason: SURG

## 2023-04-05 RX ORDER — PROPOFOL 10 MG/ML
INJECTION, EMULSION INTRAVENOUS AS NEEDED
Status: DISCONTINUED | OUTPATIENT
Start: 2023-04-05 | End: 2023-04-05 | Stop reason: SURG

## 2023-04-05 RX ADMIN — PROPOFOL 25 MCG/KG/MIN: 10 INJECTION, EMULSION INTRAVENOUS at 13:50

## 2023-04-05 RX ADMIN — SUGAMMADEX 200 MG: 100 INJECTION, SOLUTION INTRAVENOUS at 14:16

## 2023-04-05 RX ADMIN — SODIUM CHLORIDE: 9 INJECTION, SOLUTION INTRAVENOUS at 13:40

## 2023-04-05 RX ADMIN — PROPOFOL 200 MG: 10 INJECTION, EMULSION INTRAVENOUS at 13:45

## 2023-04-05 RX ADMIN — AMPICILLIN AND SULBACTAM 3 G: 1; 2 INJECTION, POWDER, FOR SOLUTION INTRAMUSCULAR; INTRAVENOUS at 11:38

## 2023-04-05 RX ADMIN — AMPICILLIN AND SULBACTAM 3 G: 1; 2 INJECTION, POWDER, FOR SOLUTION INTRAMUSCULAR; INTRAVENOUS at 05:58

## 2023-04-05 RX ADMIN — ROCURONIUM BROMIDE 35 MG: 10 INJECTION, SOLUTION INTRAVENOUS at 13:45

## 2023-04-05 RX ADMIN — SCOPALAMINE 1 PATCH: 1 PATCH, EXTENDED RELEASE TRANSDERMAL at 13:36

## 2023-04-05 RX ADMIN — FENTANYL CITRATE 50 MCG: 50 INJECTION, SOLUTION INTRAMUSCULAR; INTRAVENOUS at 13:45

## 2023-04-05 RX ADMIN — ACETAMINOPHEN 650 MG: 325 TABLET, FILM COATED ORAL at 17:51

## 2023-04-05 RX ADMIN — ONDANSETRON HYDROCHLORIDE 4 MG: 2 SOLUTION INTRAMUSCULAR; INTRAVENOUS at 14:16

## 2023-04-05 RX ADMIN — AMPICILLIN AND SULBACTAM 3 G: 1; 2 INJECTION, POWDER, FOR SOLUTION INTRAMUSCULAR; INTRAVENOUS at 23:59

## 2023-04-05 RX ADMIN — CYCLOBENZAPRINE HYDROCHLORIDE 5 MG: 5 TABLET, FILM COATED ORAL at 21:22

## 2023-04-05 RX ADMIN — LIDOCAINE HYDROCHLORIDE 10 ML: 10 INJECTION, SOLUTION INFILTRATION; PERINEURAL at 13:45

## 2023-04-05 RX ADMIN — FENTANYL CITRATE 50 MCG: 50 INJECTION, SOLUTION INTRAMUSCULAR; INTRAVENOUS at 14:02

## 2023-04-05 RX ADMIN — AMPICILLIN AND SULBACTAM 3 G: 1; 2 INJECTION, POWDER, FOR SOLUTION INTRAMUSCULAR; INTRAVENOUS at 17:51

## 2023-04-05 RX ADMIN — Medication 10 MG: at 14:24

## 2023-04-05 ASSESSMENT — ENCOUNTER SYMPTOMS: SHORTNESS OF BREATH: 1

## 2023-04-05 NOTE — PROGRESS NOTES
Hospital Medicine Service -  Daily Progress Note       SUBJECTIVE   Interval History: no events overnight, still with some abd pain today  No CP, SOB.   NPO for [facility] procedure today       OBJECTIVE      Vital signs in last 24 hours:  Temp:  [36.2 °C (97.1 °F)-37.1 °C (98.8 °F)] 36.2 °C (97.1 °F)  Heart Rate:  [60-75] 68  Resp:  [14-23] 23  BP: ()/(56-65) 126/65    Intake/Output Summary (Last 24 hours) at 4/5/2023 1525  Last data filed at 4/5/2023 1430  Gross per 24 hour   Intake 3122.67 ml   Output 4450 ml   Net -1327.33 ml       PHYSICAL EXAMINATION      Physical Exam  Vitals and nursing note reviewed.   Constitutional:       General: She is not in acute distress.  HENT:      Head: Normocephalic and atraumatic.   Cardiovascular:      Rate and Rhythm: Normal rate.   Abdominal:      General: There is no distension.      Tenderness: There is abdominal tenderness. There is no guarding or rebound.   Neurological:      Mental Status: She is alert and oriented to person, place, and time. Mental status is at baseline.   Psychiatric:         Behavior: Behavior normal.            LINES, CATHETERS, DRAINS, AIRWAYS, AND WOUNDS   Lines, Drains, and Airways:  Wounds (agree with documentation and present on admission):  Peripheral IV (Adult) 04/03/23 Left Antecubital (Active)   Number of days: 2         Comments:      LABS / IMAGING / TELE      Labs  Lab Results   Component Value Date    WBC 3.40 (L) 04/05/2023    HGB 12.3 04/05/2023    HCT 37.2 04/05/2023    MCV 93.2 04/05/2023     04/05/2023     Lab Results   Component Value Date    GLUCOSE 91 04/05/2023    CALCIUM 9.0 04/05/2023     04/05/2023    K 3.6 04/05/2023    CO2 26 04/05/2023     04/05/2023    BUN 8 04/05/2023    CREATININE 0.5 (L) 04/05/2023     Lab Results   Component Value Date     (H) 04/05/2023    AST 83 (H) 04/05/2023     (H) 10/24/2022    ALKPHOS 130 (H) 04/05/2023    BILITOT 0.9 04/05/2023         SARS-CoV-2 (COVID-19)  (no units)   Date/Time Value   04/05/2023 0537 Negative     ERCP at [facility] today      ASSESSMENT AND PLAN      * Abdominal pain, unspecified abdominal location  Assessment & Plan  Patient going to [facility] today for ERCP and possible ductal washout/stenting  Cont with IVF, NPO for now  Pain control  Monitor LFT's  GI f/u             VTE Assessment: Padua    VTE Prophylaxis:  Current anticoagulants:  [MAR Hold - Suspended Admission] heparin (porcine) 5,000 unit/mL injection 5,000 Units, subcutaneous, q8h JIM      Code Status: Full Code      Estimated Discharge Date: 4/6/2023     Disposition Planning: DC to home when cleared by GI and tolerating po diet.        Loco Mota MD  4/5/2023

## 2023-04-05 NOTE — NURSING NOTE
Patient returned to her room after having procedure at [facility], awake and alert, comfortable, VSS,  at bedside, denies any nausea, mild pain to R side of abdomen, tylenol 650mg given as requested, ambulated to bathroom, voided.

## 2023-04-05 NOTE — PLAN OF CARE
Plan of Care Review  Plan of Care Reviewed With: patient  Progress: improving  Outcome Summary: patient resting in bed, comfortable, liquids tolerating well, mild pain to R side of abdomen, patient aware to ask for stronger pain med if needed.

## 2023-04-05 NOTE — PLAN OF CARE
Problem: Adult Inpatient Plan of Care  Goal: Plan of Care Review  Flowsheets (Taken 4/5/2023 0559)  Progress: no change  Plan of Care Reviewed With: patient  Outcome Summary: one epigastric pain incident happened overnightpain was sharp but went away. otherwise, no other issues. NPO overnight for ERCP at [facility].   Plan of Care Review  Plan of Care Reviewed With: patient  Progress: no change  Outcome Summary: one epigastric pain incident happened overnightpain was sharp but went away. otherwise, no other issues. NPO overnight for ERCP at [facility].

## 2023-04-05 NOTE — ASSESSMENT & PLAN NOTE
Patient going to [facility] today for ERCP and possible ductal washout/stenting  Cont with IVF, NPO for now  Pain control  Monitor LFT's  GI f/u

## 2023-04-05 NOTE — ANESTHESIA POSTPROCEDURE EVALUATION
Patient: Sowmya Clark    Procedure Summary     Date: 04/05/23 Room / Location: [facility] 5 (E) / [facility]    Anesthesia Start: 1340 Anesthesia Stop: 1436    Procedure: [place] ERCP BILIARY/PANC DUCT STENT EXCHANGE W/DIL&WIRE [31817 (CPT®)] Diagnosis:       Transaminitis      (Transaminitis [R74.01])    Providers: Perry Tenorio MD Responsible Provider: Pablo Guerrero DO    Anesthesia Type: general ASA Status: 3          Anesthesia Type: general  PACU Vitals  4/5/2023 1430 - 4/5/2023 1444      4/5/2023  1435             BP: 124/58    Temp: 36.2 °C (97.1 °F)    Pulse: 73    Resp: 18    SpO2: 100 %            Anesthesia Post Evaluation    Pain management: adequate  Patient location during evaluation: PACU  Patient participation: complete - patient participated  Level of consciousness: awake and alert  Cardiovascular status: acceptable  Airway Patency: adequate  Respiratory status: acceptable  Hydration status: acceptable  Anesthetic complications: no

## 2023-04-05 NOTE — ANESTHESIA PROCEDURE NOTES
Airway  Urgency: elective    Start Time: 4/5/2023 1:47 PM  Airway not difficult    General Information and Staff    Patient location during procedure: OR  Performed by: Gayathri Mao CRNA  Authorized by: Pablo Guerrero DO      Indications and Patient Condition  Indications for airway management: anesthesia  Sedation level: deep  Preoxygenated: yes  Patient position: sniffing  MILS maintained throughout  Mask difficulty assessment: 1 - vent by mask    Final Airway Details  Final airway type: endotracheal airway      Successful airway: ETT  Cuffed: yes   Successful intubation technique: video laryngoscopy  Facilitating devices/methods: intubating stylet  Endotracheal tube insertion site: oral  Blade: Primitivo  Blade size: #3  ETT size (mm): 7.0  Cormack-Lehane Classification: grade I - full view of glottis  Placement verified by: chest auscultation and capnometry   Measured from: lips  ETT to lips (cm): 22  Number of attempts at approach: 1  Number of other approaches attempted: 0  Atraumatic airway insertion

## 2023-04-05 NOTE — NURSING NOTE
Pt states that she gets leg cramps. And uses magnesium cream  But has used flexeril in past.    [facility] paged.  Spoke to Aracelis (on call [facility])  New order noted for flexeril one time dose

## 2023-04-05 NOTE — OP NOTE
_______________________________________________________________________________  Patient Name: Sowmya Clark           Procedure Date: 4/5/2023 1:06 PM  MRN: [id number]                     Account Number: [id number]  YOB: 1960              Age: 62  Gender: Female                        Note Status: Finalized  Attending MD: NATANAEL VILLA MD,  _______________________________________________________________________________  Procedure:             ERCP  Indications:           Stent replacement  Providers:             NATANAEL VILLA MD (Doctor), DEONNA DOMINGUEZ  (Fellow)  Referring MD:          KASSY CARTER MD~EMMA  Requesting Provider:  Medicines:             See the Anesthesia note for documentation of the  administered medications  Complications:         No immediate complications.  _______________________________________________________________________________  Procedure:             After obtaining informed consent, the scope was passed  under direct vision. Throughout the procedure, the  patient's blood pressure, pulse, and oxygen  saturations were monitored continuously. The was  introduced through the mouth, and advanced to the  duodenum and used to inject contrast into the bile  duct.  Findings:  A  film is normal and demonstrates the stent up in the right upper  quadrant. This group was advanced to the second portion of the duodenum.  The stent previously placed in the bile duct is visualized. It is  completely occluded. Using a snare the stent is grasped and removed from  the patient. Ex vivo inspection of the stent demonstrates a large amount  of debris that is filled stent. The scope was readvanced into the second  portion of the duodenum. The ampullary orifice is normal. It is widely  patent. Using a balloon the bile duct is cannulated. Contrast is  injected. The bile duct is 15 mm. The cystic duct stump is normal. The  bifurcation is normal. The intrahepatics are dilated  with no strictures  or filling defects seen. The duct is swept. No debris is removed. This  is followed by the placement of a 10 mm x 60 mm fully covered [place]  [employer] self-expanding metal stent.  Impression:            Successful stent exchange with previous stent fully  obstructed  Recommendation:        - Observe clinical course  - Stent change in 3-4 months  Procedure Code(s):     --- Professional ---  07572, Endoscopic retrograde cholangiopancreatography  (ERCP); with removal and exchange of stent(s), biliary  or pancreatic duct, including pre- and post-dilation  and guide wire passage, when performed, including  sphincterotomy, when performed, each stent exchanged  Diagnosis Code(s):     --- Professional ---  R93.3, Abnormal findings on diagnostic imaging of  other parts of digestive tract  CPT copyright 2021 American Medical Association. All rights reserved.  The codes documented in this report are preliminary and upon  review may  be revised to meet current compliance requirements.  Brennen Villa MD  ________________  NATANAEL VILLA MD  4/5/2023 5:46:47 PM  This report has been signed electronically.  Number of Addenda: 0  Note Initiated On: 4/5/2023 1:06 PM

## 2023-04-05 NOTE — ANESTHESIA PREPROCEDURE EVALUATION
Relevant Problems   GASTROINTESTINAL   (+) Gastrointestinal hemorrhage      RESPIRATORY SYSTEM   (+) Shortness of breath      URINARY SYSTEM   (+) Hypokalemia   (+) Hyponatremia      Other   (+) COVID-19   (+) Lyme disease   (+) Osteomyelitis of lower leg (CMS/HCC)   (+) Small intestinal bacterial overgrowth (SIBO)       Anesthesia ROS/MED HX      Pulmonary    Shortness of breath       Past Surgical History:   Procedure Laterality Date   • BREAST LUMPECTOMY     • BREAST SURGERY     • CATARACT EXTRACTION     • CYST REMOVAL      throat   • KNEE SURGERY     • TONSILLECTOMY         Physical Exam    Airway   Mallampati: III   TM distance: <3 FB   Neck ROM: full        Anesthesia Plan    Plan: general    Technique: general endotracheal   3 ASA  Anesthetic plan and risks discussed with: patient

## 2023-04-05 NOTE — NURSING NOTE
Patient leaving to [facility] for procedure via ambulance, verbal report given to endo unit nurse Maricarmen

## 2023-04-05 NOTE — ANESTHESIOLOGIST PRE-PROCEDURE ATTESTATION
Pre-Procedure Patient Identification:  I am the Primary Anesthesiologist and have identified the patient on 04/05/23 at 1:17 PM.   I have confirmed the procedure(s) will be performed by the following surgeon/proceduralist Perry Tenorio MD.

## 2023-04-06 ENCOUNTER — TELEPHONE (OUTPATIENT)
Dept: GASTROENTEROLOGY | Facility: CLINIC | Age: 63
End: 2023-04-06
Payer: COMMERCIAL

## 2023-04-06 VITALS
RESPIRATION RATE: 18 BRPM | WEIGHT: 180.3 LBS | HEART RATE: 61 BPM | DIASTOLIC BLOOD PRESSURE: 50 MMHG | HEIGHT: 71 IN | SYSTOLIC BLOOD PRESSURE: 100 MMHG | TEMPERATURE: 98.1 F | OXYGEN SATURATION: 97 % | BODY MASS INDEX: 25.24 KG/M2

## 2023-04-06 LAB
ALBUMIN SERPL-MCNC: 3.5 G/DL (ref 3.4–5)
ALP SERPL-CCNC: 127 IU/L (ref 35–126)
ALT SERPL-CCNC: 98 IU/L (ref 11–54)
ANION GAP SERPL CALC-SCNC: 6 MEQ/L (ref 3–15)
AST SERPL-CCNC: 46 IU/L (ref 15–41)
BILIRUB SERPL-MCNC: 1.6 MG/DL (ref 0.3–1.2)
BUN SERPL-MCNC: 6 MG/DL (ref 8–20)
CALCIUM SERPL-MCNC: 9.1 MG/DL (ref 8.9–10.3)
CHLORIDE SERPL-SCNC: 105 MEQ/L (ref 98–109)
CO2 SERPL-SCNC: 26 MEQ/L (ref 22–32)
CREAT SERPL-MCNC: 0.5 MG/DL (ref 0.6–1.1)
ERYTHROCYTE [DISTWIDTH] IN BLOOD BY AUTOMATED COUNT: 12.2 % (ref 11.7–14.4)
GFR SERPL CREATININE-BSD FRML MDRD: >60 ML/MIN/1.73M*2
GLUCOSE SERPL-MCNC: 84 MG/DL (ref 70–99)
HCT VFR BLDCO AUTO: 40.8 % (ref 35–45)
HGB BLD-MCNC: 13.7 G/DL (ref 11.8–15.7)
MCH RBC QN AUTO: 31.1 PG (ref 28–33.2)
MCHC RBC AUTO-ENTMCNC: 33.6 G/DL (ref 32.2–35.5)
MCV RBC AUTO: 92.5 FL (ref 83–98)
PDW BLD AUTO: 10.3 FL (ref 9.4–12.3)
PLATELET # BLD AUTO: 184 K/UL (ref 150–369)
POTASSIUM SERPL-SCNC: 3.8 MEQ/L (ref 3.6–5.1)
PROT SERPL-MCNC: 6 G/DL (ref 6–8.2)
RBC # BLD AUTO: 4.41 M/UL (ref 3.93–5.22)
SODIUM SERPL-SCNC: 137 MEQ/L (ref 136–144)
WBC # BLD AUTO: 5 K/UL (ref 3.8–10.5)

## 2023-04-06 PROCEDURE — 63700000 HC SELF-ADMINISTRABLE DRUG: Performed by: INTERNAL MEDICINE

## 2023-04-06 PROCEDURE — 25800000 HC PHARMACY IV SOLUTIONS: Performed by: INTERNAL MEDICINE

## 2023-04-06 PROCEDURE — 63600000 HC DRUGS/DETAIL CODE: Performed by: INTERNAL MEDICINE

## 2023-04-06 PROCEDURE — 80053 COMPREHEN METABOLIC PANEL: CPT | Performed by: INTERNAL MEDICINE

## 2023-04-06 PROCEDURE — 36415 COLL VENOUS BLD VENIPUNCTURE: CPT | Performed by: INTERNAL MEDICINE

## 2023-04-06 PROCEDURE — 85027 COMPLETE CBC AUTOMATED: CPT | Performed by: INTERNAL MEDICINE

## 2023-04-06 PROCEDURE — 99238 HOSP IP/OBS DSCHRG MGMT 30/<: CPT | Performed by: INTERNAL MEDICINE

## 2023-04-06 RX ORDER — AMOXICILLIN AND CLAVULANATE POTASSIUM 875; 125 MG/1; MG/1
1 TABLET, FILM COATED ORAL 2 TIMES DAILY
Qty: 6 TABLET | Refills: 0 | Status: SHIPPED | OUTPATIENT
Start: 2023-04-06 | End: 2023-04-09

## 2023-04-06 RX ADMIN — ACETAMINOPHEN 650 MG: 325 TABLET, FILM COATED ORAL at 13:30

## 2023-04-06 RX ADMIN — POLYETHYLENE GLYCOL 3350 17 G: 17 POWDER, FOR SOLUTION ORAL at 13:30

## 2023-04-06 RX ADMIN — AMPICILLIN AND SULBACTAM 3 G: 1; 2 INJECTION, POWDER, FOR SOLUTION INTRAMUSCULAR; INTRAVENOUS at 05:02

## 2023-04-06 RX ADMIN — AMPICILLIN AND SULBACTAM 3 G: 1; 2 INJECTION, POWDER, FOR SOLUTION INTRAMUSCULAR; INTRAVENOUS at 11:52

## 2023-04-06 NOTE — PLAN OF CARE
Plan of Care Review  Plan of Care Reviewed With: patient  Progress: improving  Outcome Summary: pt AAOx4. mild pain to RUQ and muscle spasms resolved with flexeril. independent in room with steady gait. pt refusing bed alarm. pt refusing SCDs. pt resting in bed, call bell within reach, no complaints at this time.

## 2023-04-06 NOTE — PLAN OF CARE
Plan of Care Review  Plan of Care Reviewed With: patient  Progress: improving  Outcome Summary: AAOx4. Independent in the room. RUQ/Epigastric pain. PRN tylenol given for pain with relief. Tolerating meals. ABX continued. Switched to PO for d/c. VSS. Plan for d/c today.

## 2023-04-06 NOTE — TELEPHONE ENCOUNTER
This pt was scheduled for an ERCP w stent exchange on 5/23/23. This was completed as a down and back yesterday. Please cancel procedure scheduled for 5/23/23. She will require a repeat ERCP in 3-4 months from now. Thanks !

## 2023-04-06 NOTE — NURSING NOTE
pt requesting cyclobenzaprine for muscle spasms. states they gave her some last night and it helped.    pt also c/o pain in RUQ, she has only tylenol. can she have soemthign else as well?    Communicated with kari via secure chat.    Per kari, try flexeril first

## 2023-04-06 NOTE — PLAN OF CARE
Problem: Adult Inpatient Plan of Care  Goal: Plan of Care Review  4/6/2023 1455 by Amie Barbosa, RN  Outcome: Met  4/6/2023 1452 by Amie Barbosa, RN  Outcome: Progressing  Flowsheets (Taken 4/6/2023 1452)  Progress: improving  Plan of Care Reviewed With: patient  Outcome Summary: AAOx4. Independent in the room. RUQ/Epigastric pain. PRN tylenol given for pain with relief. Tolerating meals. ABX continued. Switched to PO for d/c. VSS. Plan for d/c today.  Goal: Patient-Specific Goal (Individualized)  Outcome: Met  Goal: Absence of Hospital-Acquired Illness or Injury  Outcome: Met  Goal: Optimal Comfort and Wellbeing  Outcome: Met  Goal: Readiness for Transition of Care  Outcome: Met     Problem: Pain Acute  Goal: Acceptable Pain Control and Functional Ability  Outcome: Met   Plan of Care Review  Plan of Care Reviewed With: patient  Progress: improving  Outcome Summary: AAOx4. Independent in the room. RUQ/Epigastric pain. PRN tylenol given for pain with relief. Tolerating meals. ABX continued. Switched to PO for d/c. VSS. Plan for d/c today.

## 2023-04-06 NOTE — TELEPHONE ENCOUNTER
Cancelled reservation form have been faxed over to [employer] and I've scanned in the fax confirmation as well.    A 3 month recall has been placed as well

## 2023-04-06 NOTE — CONSULTS
Visited with Mrs. Clark while rounding on 4B.  She shared her story and medical journey.  She stated her payal helps her with all of the difficult things in her life.  Prayer was offered and given.    Tasia Adler  Spiritual Care Specialist  #[id number]

## 2023-04-06 NOTE — PROGRESS NOTES
GASTROENTEROLOGY DAILY PROGRESS NOTE  [facility]     PATIENT NAME:  Sowmya Clark          YOB: 1960  AGE:  62 y.o.   MRN: [id number]      SUBJECTIVE   CC: Abdominal pain     Patient had ERCP done by Dr. Tenorio at [facility] yesterday and was found to have clogged biliary metal stent which was removed and a new fully covered [employer] 10 mm into 60 mm self-expanding biliary metal stent was placed.    Patient is complaining of mild right-sided abdominal discomfort.  No post procedure nausea, vomiting, fever.    REVIEW OF SYSTEMS   Systems reviewed and otherwise negative except as documented above.    VITAL SIGNS   Temp:  [36.2 °C (97.1 °F)-37.3 °C (99.1 °F)] 37.3 °C (99.1 °F)  Heart Rate:  [54-75] 64  Resp:  [14-23] 16  BP: (104-131)/(54-65) 107/56      Intake/Output Summary (Last 24 hours) at 4/6/2023 1014  Last data filed at 4/6/2023 0504  Gross per 24 hour   Intake 2605 ml   Output 3140 ml   Net -535 ml     PHYSICAL EXAM   Physical Exam  General appearance: alert, appears stated age and cooperative  Head: normocephalic  Heart: regular rate and rhythm  Lungs: clear to auscultation anteriorly, non-labored respirations  Abdomen: soft, non-tender; bowel sounds normal; no masses, no organomegaly  Extremities: no LE edema  Neurologic: awake and alert  Skin: no jaundice      IMAGING AND LABS REVIEWED   Labs reviewed  Results from last 7 days   Lab Units 04/06/23  0838 04/05/23 0526 04/04/23  0450   WBC K/uL 5.00 3.40* 5.73   HEMOGLOBIN g/dL 13.7 12.3 12.4   HEMATOCRIT % 40.8 37.2 36.6   PLATELETS K/uL 184 173 203   ]  Results from last 7 days   Lab Units 04/06/23  0838 04/05/23  0526 04/04/23  0450   SODIUM mEQ/L 137 140 136   POTASSIUM mEQ/L 3.8 3.6 3.8   CHLORIDE mEQ/L 105 108 107   CO2 mEQ/L 26 26 24   BUN mg/dL 6* 8 9   CREATININE mg/dL 0.5* 0.5* 0.4*   CALCIUM mg/dL 9.1 9.0 8.6*   ALBUMIN g/dL 3.5 3.2* 3.1*   BILIRUBIN TOTAL mg/dL 1.6* 0.9 1.5*   ALK PHOS IU/L 127* 130* 128*    ALT IU/L 98* 132* 182*   AST IU/L 46* 83* 162*   GLUCOSE mg/dL 84 91 99   ]  Results from last 7 days   Lab Units 04/03/23  1412   LIPASE U/L 38   ]  Results from last 7 days   Lab Units 04/05/23  0526   INR  1.1   ]    Imaging reviewed  CT ABDOMEN PELVIS WITH IV CONTRAST    Result Date: 4/3/2023  IMPRESSION: Distal CBD stent noted similar in position to prior. Mild intrahepatic biliary ductal prominence similar to prior. Pneumobilia likely related to stent placement. Diverticulosis coli without deuce diverticulitis. Mild proximal small bowel ileus. No evidence of bowel obstruction      ASSESSMENT/PLAN   No new Assessment & Plan notes have been filed under this hospital service since the last note was generated.  Service: Gastroenterology    Elevated liver enzymes secondary to clogged biliary metal stent which was placed for papillary stenosis.   S/p ERCP on 04/05 with removal of old clogged biliary metal stent and placement of new 10 into 60 mm covered biliary metal stent.  LFTs trending downwards.     -- Advance diet to low-fat low residue.  -- Repeat ERCP with stent exchange in 3 to 4 months with Dr. Tenorio as outpatient.  -- Ok to discharge patient home from GI standpoint.  -- GI signing off, call GI as needed for any further assistance.        AUTHOR:  Saundra Go MD  4/6/2023

## 2023-04-06 NOTE — NURSING NOTE
Reviewed discharge instructions with patient. Verbalized understanding. Patient discharged home.

## 2023-04-07 ENCOUNTER — TELEPHONE (OUTPATIENT)
Dept: GASTROENTEROLOGY | Facility: CLINIC | Age: 63
End: 2023-04-07
Payer: COMMERCIAL

## 2023-04-07 NOTE — DISCHARGE SUMMARY
Hospital Medicine Service -  Inpatient Discharge Summary        BRIEF OVERVIEW   Admitting Provider: Loco Mota MD  Attending Provider: No att. providers found Attending phys phone: N/A    PCP: Ankita Ho [phone]    Admission Date: 4/3/2023  Discharge Date: 4/6/2023     DISCHARGE DIAGNOSES      Primary Discharge Diagnosis  Abdominal pain, unspecified abdominal location    Secondary Discharge Diagnoses  Active Hospital Problems    Diagnosis Date Noted   • Abdominal pain, unspecified abdominal location 04/03/2023   • Elevated liver enzymes 10/21/2022   • Post-ERCP acute pancreatitis 09/09/2022      Resolved Hospital Problems   No resolved problems to display.       Problem List on Day of Discharge  No new Assessment & Plan notes have been filed under this hospital service since the last note was generated.  Service: Hospitalist    SUMMARY OF HOSPITALIZATION      Presenting Problem/History of Present Illness  This is a 62 y.o. year-old female admitted on 4/3/2023 with Transaminitis [R74.01]  Abdominal pain, unspecified abdominal location [R10.9].      Hospital Course  Pt is a 63 yo female with pmhx of post ERCP pancreatitis, biliary ductal obstruction in the past who has a biliary stent, presented to  with abd pain, N/V and decreased po intake. Pt found to have markedly elevated LFT's and concerns for biliary obstruction and /or infection (cholangitis). Pt given IVF, kept NPO and had GI evaluation. Pt given IV abx on admission, and GI eval deemed that she [place] [facility] [place]. Pt transferred to [facility] and had ERCP and had her stent replaced as the old stent was found to be completely occluded. She returned to  and was started on a diet which she tolerated well. Cultures negative, pt given short course of abx. LFT's started to downtrend after the stent switch. Pt discharged to home in stable condition, to advance diet as tolerated at home and f/u with GI as  outpt.       Exam on Day of Discharge  Physical Exam  Vitals and nursing note reviewed.   HENT:      Head: Normocephalic and atraumatic.      Right Ear: External ear normal.      Left Ear: External ear normal.   Eyes:      General: No scleral icterus.        Right eye: No discharge.         Left eye: No discharge.   Cardiovascular:      Rate and Rhythm: Normal rate.      Heart sounds: Normal heart sounds.   Pulmonary:      Breath sounds: Normal breath sounds.   Abdominal:      General: There is no distension.      Tenderness: There is abdominal tenderness. There is no guarding or rebound.   Skin:     Coloration: Skin is not jaundiced.      Findings: No lesion.   Neurological:      General: No focal deficit present.      Mental Status: She is alert and oriented to person, place, and time. Mental status is at baseline.   Psychiatric:         Behavior: Behavior normal.         Consults During Admission  IP CONSULT TO GASTROENTEROLOGY    DISCHARGE MEDICATIONS               Medication List      START taking these medications    amoxicillin-pot clavulanate 875-125 mg per tablet  Commonly known as: AUGMENTIN  Take 1 tablet by mouth 2 (two) times a day for 3 days.  Dose: 1 tablet        CONTINUE taking these medications    aspirin 81 mg chewable tablet  Take 81 mg by mouth daily.  Dose: 81 mg     ciclopirox 1 % shampoo  Commonly known as: LOPROX  Apply topically once a week. gently massage into scalp                    PROCEDURES / LABS / IMAGING      Operative Procedures  None      Other Procedures  ERCP at [facility] and biliary stent swap out /placement.     Pertinent Labs  Lab Results   Component Value Date    WBC 5.00 04/06/2023    HGB 13.7 04/06/2023    HCT 40.8 04/06/2023    MCV 92.5 04/06/2023     04/06/2023     Lab Results   Component Value Date    GLUCOSE 84 04/06/2023    CALCIUM 9.1 04/06/2023     04/06/2023    K 3.8 04/06/2023    CO2 26 04/06/2023     04/06/2023    BUN 6 (L) 04/06/2023    CREATININE  0.5 (L) 04/06/2023     Lab Results   Component Value Date    ALT 98 (H) 04/06/2023    AST 46 (H) 04/06/2023     (H) 10/24/2022    ALKPHOS 127 (H) 04/06/2023    BILITOT 1.6 (H) 04/06/2023         SARS-CoV-2 (COVID-19) (no units)   Date/Time Value   04/05/2023 0537 Negative       Pertinent Imaging  CT ABDOMEN PELVIS WITH IV CONTRAST    Result Date: 4/3/2023  IMPRESSION: Distal CBD stent noted similar in position to prior. Mild intrahepatic biliary ductal prominence similar to prior. Pneumobilia likely related to stent placement. Diverticulosis coli without deuce diverticulitis. Mild proximal small bowel ileus. No evidence of bowel obstruction      OUTPATIENT  FOLLOW-UP / REFERRALS / PENDING TESTS        Outpatient Follow-Up Appointments            In 1 month Perry Tenorio MD [facility] Interventional Gastroenterology          Referrals  No orders of the defined types were placed in this encounter.      Test Results Pending at Discharge  Unresulted Labs (From admission, onward)    None          Important Issues to Address in Follow-Up  GI f/u  PCP f/u      DISCHARGE DISPOSITION AND DESTINATION      Disposition: Home   Destination:                              Code Status At Discharge: Prior    Physician Order for Life-Sustaining Treatment Document Status      No documents found

## 2023-04-07 NOTE — TELEPHONE ENCOUNTER
Patient called in requesting for a telemedicine appointment w/ Dr. Tenorio. Patient was scheduled for an ERCP w/ stent exchange on 5/23/23 but procedure was completed as a down and back on 4/5/2023. Her procedure is going to be canceled for 5/23/23. She will require a repeat ERCP in 3-4 months from now. Patient has questions in regards to her ongoing pain.

## 2023-04-13 ENCOUNTER — APPOINTMENT (OUTPATIENT)
Dept: LAB | Facility: HOSPITAL | Age: 63
End: 2023-04-13
Attending: PHYSICIAN ASSISTANT
Payer: COMMERCIAL

## 2023-04-13 ENCOUNTER — OFFICE VISIT (OUTPATIENT)
Dept: GASTROENTEROLOGY | Facility: CLINIC | Age: 63
End: 2023-04-13
Payer: COMMERCIAL

## 2023-04-13 ENCOUNTER — DOCUMENTATION (OUTPATIENT)
Dept: GASTROENTEROLOGY | Facility: CLINIC | Age: 63
End: 2023-04-13

## 2023-04-13 VITALS
HEIGHT: 71 IN | RESPIRATION RATE: 18 BRPM | BODY MASS INDEX: 25.2 KG/M2 | TEMPERATURE: 98.3 F | DIASTOLIC BLOOD PRESSURE: 70 MMHG | SYSTOLIC BLOOD PRESSURE: 110 MMHG | OXYGEN SATURATION: 99 % | WEIGHT: 180 LBS | HEART RATE: 75 BPM

## 2023-04-13 DIAGNOSIS — R10.13 ABDOMINAL PAIN, EPIGASTRIC: Primary | ICD-10-CM

## 2023-04-13 DIAGNOSIS — R10.11 RUQ PAIN: ICD-10-CM

## 2023-04-13 DIAGNOSIS — R10.13 ABDOMINAL PAIN, EPIGASTRIC: ICD-10-CM

## 2023-04-13 LAB
ALBUMIN SERPL-MCNC: 3.8 G/DL (ref 3.4–5)
ALP SERPL-CCNC: 101 IU/L (ref 35–126)
ALT SERPL-CCNC: 32 IU/L (ref 11–54)
AMYLASE SERPL-CCNC: 81 U/L (ref 28–100)
ANION GAP SERPL CALC-SCNC: 8 MEQ/L (ref 3–15)
AST SERPL-CCNC: 22 IU/L (ref 15–41)
BASOPHILS # BLD: 0.07 K/UL (ref 0.01–0.1)
BASOPHILS NFR BLD: 1.4 %
BILIRUB DIRECT SERPL-MCNC: 0.2 MG/DL
BILIRUB SERPL-MCNC: 1 MG/DL (ref 0.3–1.2)
BUN SERPL-MCNC: 7 MG/DL (ref 8–20)
CALCIUM SERPL-MCNC: 10.1 MG/DL (ref 8.9–10.3)
CHLORIDE SERPL-SCNC: 103 MEQ/L (ref 98–109)
CO2 SERPL-SCNC: 29 MEQ/L (ref 22–32)
CREAT SERPL-MCNC: 0.6 MG/DL (ref 0.6–1.1)
DIFFERENTIAL METHOD BLD: NORMAL
EOSINOPHIL # BLD: 0.1 K/UL (ref 0.04–0.36)
EOSINOPHIL NFR BLD: 2 %
ERYTHROCYTE [DISTWIDTH] IN BLOOD BY AUTOMATED COUNT: 12.6 % (ref 11.7–14.4)
GFR SERPL CREATININE-BSD FRML MDRD: >60 ML/MIN/1.73M*2
GLUCOSE SERPL-MCNC: 84 MG/DL (ref 70–99)
HCT VFR BLDCO AUTO: 44 % (ref 35–45)
HGB BLD-MCNC: 14.5 G/DL (ref 11.8–15.7)
IMM GRANULOCYTES # BLD AUTO: 0.01 K/UL (ref 0–0.08)
IMM GRANULOCYTES NFR BLD AUTO: 0.2 %
LIPASE SERPL-CCNC: 36 U/L (ref 20–51)
LYMPHOCYTES # BLD: 1.46 K/UL (ref 1.2–3.5)
LYMPHOCYTES NFR BLD: 28.9 %
MCH RBC QN AUTO: 30.5 PG (ref 28–33.2)
MCHC RBC AUTO-ENTMCNC: 33 G/DL (ref 32.2–35.5)
MCV RBC AUTO: 92.6 FL (ref 83–98)
MONOCYTES # BLD: 0.4 K/UL (ref 0.28–0.8)
MONOCYTES NFR BLD: 7.9 %
NEUTROPHILS # BLD: 3.02 K/UL (ref 1.7–7)
NEUTS SEG NFR BLD: 59.6 %
NRBC BLD-RTO: 0 %
PDW BLD AUTO: 11 FL (ref 9.4–12.3)
PLATELET # BLD AUTO: 262 K/UL (ref 150–369)
POTASSIUM SERPL-SCNC: 4.6 MEQ/L (ref 3.6–5.1)
PROT SERPL-MCNC: 6.1 G/DL (ref 6–8.2)
RBC # BLD AUTO: 4.75 M/UL (ref 3.93–5.22)
SODIUM SERPL-SCNC: 140 MEQ/L (ref 136–144)
WBC # BLD AUTO: 5.06 K/UL (ref 3.8–10.5)

## 2023-04-13 PROCEDURE — 82150 ASSAY OF AMYLASE: CPT

## 2023-04-13 PROCEDURE — 83690 ASSAY OF LIPASE: CPT

## 2023-04-13 PROCEDURE — 82248 BILIRUBIN DIRECT: CPT

## 2023-04-13 PROCEDURE — 99215 OFFICE O/P EST HI 40 MIN: CPT | Performed by: PHYSICIAN ASSISTANT

## 2023-04-13 PROCEDURE — 85025 COMPLETE CBC W/AUTO DIFF WBC: CPT

## 2023-04-13 PROCEDURE — 80053 COMPREHEN METABOLIC PANEL: CPT

## 2023-04-13 PROCEDURE — 3008F BODY MASS INDEX DOCD: CPT | Performed by: PHYSICIAN ASSISTANT

## 2023-04-13 PROCEDURE — 36415 COLL VENOUS BLD VENIPUNCTURE: CPT

## 2023-04-13 NOTE — PROGRESS NOTES
Called and s/w patient with pmhx of post ERCP pancreatitis, biliary ductal obstruction in the past who has a biliary stent s/p stent placement 4/5/23 after presenting with elevated LFTs. Patient is concerned. RUQ pain 8/10 where the stent is placed. Pain also in the upper abdomen under the breast bone localized. Pt feels her food is not digesting and is worse when she eats. Stent change due July 2023 current concerns for biliary obstruction and/or cholangitis. Pt is asking to be seen in office. Schedule Marina ABDULLAHI who is familiar with the patient for 1pm. Pt is not sure if she is febrile (she has not checked) she has no chills, not tolerating food well.

## 2023-04-13 NOTE — PROGRESS NOTES
Interventional Gastroenterology  MD Marina Saxena PA-C   [facility], [id number]  [address] [place] [address]  [phone]                                                  Outpatient Follow Up    Date: 2023  Patient: Sowmya Clark  : 1960  Age: 62 y.o. female  PCP:Ankita Ho MD    Chief Complaint: RUQ pain  HPI   Sowmya Clark is a 62 y.o. female well-known to our service due to a history of recurrent right upper quadrant abdominal pain in the setting of bile duct obstructions.  I spent over an hour and 15 minutes meeting with the patient and her  today.  Briefly, she was admitted over at [facility] on  with right upper quadrant abdominal pain and elevated liver enzymes.  She was brought over to [facility] on  for an ERCP. This showed complete obstruction of the stent with debris.  This stent was removed and a cholangiogram was performed.  The intrahepatics were dilated with no strictures or filling defects.  The duct was swept with no debris removed. This was followed by the placement of a 10 mm x 60 mm fully covered [employer] self-expanding metal stent.  She was discharged home on .     Subsequently, she developed moderate intermittent right upper quadrant abdominal pain that radiates around to her right back.  She also complains of feeling bloated and constipation.  She is not currently nauseated.  She is afebrile with stable vitals in the office today.    Upon review review of her extensive work-up to date, I do appreciate that a previous autoimmune work-up was completed and normal indicating low likelihood of an autoimmune cholangiopathy.      ASSESSMENT AND PLAN   RUQ pain  I suspect Sowmya's right upper quadrant abdominal pain is multifactorial including a component of IBS-C and potentially some mild irritation from the new biliary stent.  Her abdominal exam is unremarkable.  I have  recommended that she have labs done today including a CBC, CMP, direct bilirubin, amylase, lipase to ensure that she does not have LFT abnormalities.  I encouraged her to follow a low FODMAP diet and to use Colace in addition to MiraLAX, titrating to have at least 1-2 bowel movements daily.  In an effort to prevent recurrent ER evaluations/admissions, we will schedule her next ERCP to be done in early June as she seems to only tolerate biliary stents for 8-10 weeks before becoming occluded.     During our office visit we touched on several different issues for her including her cardiac evaluation which revealed a concerning stress test as well as elevated lipoprotein(a).  We discussed the use of low-dose statins though she prefers to not begin any statin therapy until her bile duct issues are sorted out.  She also is concerned due to a recent newly elevated fibrinogen and associated calf cramps.  She had a negative Homans' sign on exam though I did explain to her that this should be further evaluated with her primary care provider.  PAST MEDICAL AND SURGICAL HISTORY        Medical History:  Past Medical History:   Diagnosis Date   • Breast cancer (CMS/HCC) 2007   • Crohn's disease (CMS/HCC)    • Dystonia    • Osteomyelitis (CMS/HCC)    • Pancreatitis    • Thyroid nodule    • Tremor      Surgical History:  Past Surgical History:   Procedure Laterality Date   • BREAST LUMPECTOMY     • BREAST SURGERY     • CATARACT EXTRACTION     • CYST REMOVAL      throat   • KNEE SURGERY     • TONSILLECTOMY     CHOLECYSTECTOMY 2/10/2020 - for acalcuous cholecystitis    CURRENT MEDICATIONS        Current Outpatient Medications:   •  aspirin 81 mg chewable tablet, Take 81 mg by mouth daily., Disp: , Rfl:   •  ciclopirox (LOPROX) 1 % shampoo, Apply topically once a week. gently massage into scalp, Disp: , Rfl:     ALLERGIES      Prednisone    FAMILY HISTORY      Family History   Problem Relation Age of Onset   • COPD Biological Mother   "  • Alzheimer's disease Biological Father    • Spina bifida Biological Brother        SOCIAL HISTORY      Social History     Socioeconomic History   • Marital status:      Spouse name: None   • Number of children: None   • Years of education: None   • Highest education level: None   Tobacco Use   • Smoking status: Never   • Smokeless tobacco: Never   Substance and Sexual Activity   • Alcohol use: Yes   • Drug use: Never   • Sexual activity: Defer     Social Determinants of Health     Food Insecurity: No Food Insecurity (4/3/2023)    Hunger Vital Sign    • Worried About Running Out of Food in the Last Year: Never true    • Ran Out of Food in the Last Year: Never true       REVIEW OF SYSTEMS      Review of Systems   Constitutional: Negative for activity change, appetite change, fatigue, fever and unexpected weight change.   HENT: Negative for trouble swallowing and voice change.    Respiratory: Negative for cough and shortness of breath.    Cardiovascular: Negative for chest pain.   Gastrointestinal: Negative for abdominal distention, abdominal pain, anal bleeding, blood in stool, constipation, diarrhea, nausea and vomiting.   Neurological: Negative for dizziness, weakness and light-headedness.     PHYSICAL EXAMINATION      Vital Signs:   Visit Vitals  /70 (BP Location: Right upper arm, Patient Position: Sitting)   Pulse 75   Temp 36.8 °C (98.3 °F) (Temporal)   Resp 18   Ht 1.803 m (5' 11\")   Wt 81.6 kg (180 lb)   LMP  (LMP Unknown)   SpO2 99%   BMI 25.10 kg/m²     BMI: Body mass index is 25.1 kg/m².    Physical Exam   Constitutional:  Well-developed and well-nourished. No distress.   Head: Normocephalic and atraumatic.   Eyes: Conjunctivae are normal. No scleral icterus.   Neck: Neck supple without cervical adenopathy   Cardiovascular: Normal rate, regular rhythm with intact distal pulses   Pulmonary: Effort normal without respiratory distress  Abdominal: Soft, non-distended, non-tender without rebound " or voluntary guarding, no hernia or masses appreciated.   Musculoskeletal: Moves all extremities with normal ROM on exam table, no edema or tenderness. -Homans sign bilaterally.   Neurological: AA&O x 3   Skin: Skin is warm and dry. No pallor or jaundice   Psychiatric: Normal mood and affect.     LABS / IMAGING       Labs:   CBC Results       04/06/23 04/05/23 04/04/23     0838 0526 0450    WBC 5.00 3.40 5.73    RBC 4.41 3.99 3.99    HGB 13.7 12.3 12.4    HCT 40.8 37.2 36.6    MCV 92.5 93.2 91.7    MCH 31.1 30.8 31.1    MCHC 33.6 33.1 33.9     173 203        CMP Results       04/06/23 04/05/23 04/04/23     0838 0526 0450     140 136    K 3.8 3.6 3.8    Cl 105 108 107    CO2 26 26 24    Glucose 84 91 99    BUN 6 8 9    Creatinine 0.5 0.5 0.4    Calcium 9.1 9.0 8.6    Anion Gap 6 6 5    AST 46 83 162    ALT 98 132 182    Albumin 3.5 3.2 3.1    EGFR >60.0 >60.0 >60.0        Lab Results   Component Value Date    INR 1.1 04/05/2023    INR 0.9 10/19/2022        JUNIOR Lockwood PA-C  4/17/2023  11:20 AM

## 2023-04-17 NOTE — ASSESSMENT & PLAN NOTE
I suspect Sowmya's right upper quadrant abdominal pain is multifactorial including a component of IBS-C and potentially some mild irritation from the new biliary stent.  Her abdominal exam is unremarkable.  I have recommended that she have labs done today including a CBC, CMP, direct bilirubin, amylase, lipase to ensure that she does not have LFT abnormalities.  I encouraged her to follow a low FODMAP diet and to use Colace in addition to MiraLAX, titrating to have at least 1-2 bowel movements daily.  In an effort to prevent recurrent ER evaluations/admissions, we will schedule her next ERCP to be done in early June as she seems to only tolerate biliary stents for 8-10 weeks before becoming occluded.

## 2023-04-27 ENCOUNTER — DOCUMENTATION (OUTPATIENT)
Dept: GASTROENTEROLOGY | Facility: CLINIC | Age: 63
End: 2023-04-27
Payer: COMMERCIAL

## 2023-04-27 NOTE — PROGRESS NOTES
Pt called in requesting office note for Jury [employer]. Note sent through the [employer] portal. Pt states that she is having constipation issues. Directed pt to her GI doctor Dr. Riojas.

## 2023-05-04 ENCOUNTER — APPOINTMENT (OUTPATIENT)
Dept: LAB | Facility: HOSPITAL | Age: 63
End: 2023-05-04
Attending: INTERNAL MEDICINE
Payer: COMMERCIAL

## 2023-05-04 ENCOUNTER — TRANSCRIBE ORDERS (OUTPATIENT)
Dept: LAB | Facility: HOSPITAL | Age: 63
End: 2023-05-04

## 2023-05-04 DIAGNOSIS — K83.1 OBSTRUCTION OF BILE DUCT: ICD-10-CM

## 2023-05-04 DIAGNOSIS — K83.1 OBSTRUCTION OF BILE DUCT: Primary | ICD-10-CM

## 2023-05-04 DIAGNOSIS — R10.11 RIGHT UPPER QUADRANT PAIN: ICD-10-CM

## 2023-05-04 LAB
ALBUMIN SERPL-MCNC: 3.9 G/DL (ref 3.4–5)
ALP SERPL-CCNC: 90 IU/L (ref 35–126)
ALT SERPL-CCNC: 20 IU/L (ref 11–54)
ANION GAP SERPL CALC-SCNC: 9 MEQ/L (ref 3–15)
AST SERPL-CCNC: 23 IU/L (ref 15–41)
BILIRUB SERPL-MCNC: 1.3 MG/DL (ref 0.3–1.2)
BUN SERPL-MCNC: 6 MG/DL (ref 8–20)
CALCIUM SERPL-MCNC: 10.1 MG/DL (ref 8.9–10.3)
CHLORIDE SERPL-SCNC: 109 MEQ/L (ref 98–109)
CO2 SERPL-SCNC: 27 MEQ/L (ref 22–32)
CREAT SERPL-MCNC: 0.7 MG/DL (ref 0.6–1.1)
GFR SERPL CREATININE-BSD FRML MDRD: >60 ML/MIN/1.73M*2
GLUCOSE SERPL-MCNC: 95 MG/DL (ref 70–99)
POTASSIUM SERPL-SCNC: 4.2 MEQ/L (ref 3.6–5.1)
PROT SERPL-MCNC: 6.2 G/DL (ref 6–8.2)
SODIUM SERPL-SCNC: 145 MEQ/L (ref 136–144)

## 2023-05-04 PROCEDURE — 36415 COLL VENOUS BLD VENIPUNCTURE: CPT

## 2023-05-04 PROCEDURE — 80053 COMPREHEN METABOLIC PANEL: CPT

## 2023-05-15 ENCOUNTER — TRANSCRIBE ORDERS (OUTPATIENT)
Dept: LAB | Age: 63
End: 2023-05-15

## 2023-05-15 ENCOUNTER — APPOINTMENT (OUTPATIENT)
Dept: LAB | Age: 63
End: 2023-05-15
Attending: FAMILY MEDICINE
Payer: COMMERCIAL

## 2023-05-15 DIAGNOSIS — R94.5 ABNORMAL RESULTS OF LIVER FUNCTION STUDIES: ICD-10-CM

## 2023-05-15 DIAGNOSIS — K85.90 ACUTE PANCREATITIS: ICD-10-CM

## 2023-05-15 DIAGNOSIS — E88.9 METABOLIC DISORDER, UNSPECIFIED: ICD-10-CM

## 2023-05-15 DIAGNOSIS — R94.5 ABNORMAL RESULTS OF LIVER FUNCTION STUDIES: Primary | ICD-10-CM

## 2023-05-15 DIAGNOSIS — D68.9 COAGULATION DEFECT, UNSPECIFIED: ICD-10-CM

## 2023-05-15 LAB
ALBUMIN SERPL-MCNC: 3.8 G/DL (ref 3.4–5)
ALP SERPL-CCNC: 84 IU/L (ref 35–126)
ALT SERPL-CCNC: 17 IU/L (ref 11–54)
AMYLASE SERPL-CCNC: 80 U/L (ref 28–100)
ANION GAP SERPL CALC-SCNC: 9 MEQ/L (ref 3–15)
AST SERPL-CCNC: 23 IU/L (ref 15–41)
BASOPHILS # BLD: 0.05 K/UL (ref 0.01–0.1)
BASOPHILS NFR BLD: 1.3 %
BILIRUB SERPL-MCNC: 1.4 MG/DL (ref 0.3–1.2)
BUN SERPL-MCNC: 11 MG/DL (ref 8–20)
CALCIUM SERPL-MCNC: 9.7 MG/DL (ref 8.9–10.3)
CHLORIDE SERPL-SCNC: 105 MEQ/L (ref 98–109)
CO2 SERPL-SCNC: 26 MEQ/L (ref 22–32)
CREAT SERPL-MCNC: 0.7 MG/DL (ref 0.6–1.1)
DIFFERENTIAL METHOD BLD: ABNORMAL
EOSINOPHIL # BLD: 0.12 K/UL (ref 0.04–0.36)
EOSINOPHIL NFR BLD: 3.1 %
ERYTHROCYTE [DISTWIDTH] IN BLOOD BY AUTOMATED COUNT: 12.8 % (ref 11.7–14.4)
FSH SERPL-ACNC: 58.4 IU/L
GFR SERPL CREATININE-BSD FRML MDRD: >60 ML/MIN/1.73M*2
GGT SERPL-CCNC: 21 IU/L (ref 7–50)
GLUCOSE SERPL-MCNC: 90 MG/DL (ref 70–99)
HCT VFR BLDCO AUTO: 43.9 % (ref 35–45)
HGB BLD-MCNC: 14.2 G/DL (ref 11.8–15.7)
IMM GRANULOCYTES # BLD AUTO: 0.01 K/UL (ref 0–0.08)
IMM GRANULOCYTES NFR BLD AUTO: 0.3 %
LIPASE SERPL-CCNC: 36 U/L (ref 20–51)
LYMPHOCYTES # BLD: 1.45 K/UL (ref 1.2–3.5)
LYMPHOCYTES NFR BLD: 36.9 %
MAGNESIUM SERPL-MCNC: 2 MG/DL (ref 1.8–2.5)
MCH RBC QN AUTO: 30.5 PG (ref 28–33.2)
MCHC RBC AUTO-ENTMCNC: 32.3 G/DL (ref 32.2–35.5)
MCV RBC AUTO: 94.4 FL (ref 83–98)
MONOCYTES # BLD: 0.25 K/UL (ref 0.28–0.8)
MONOCYTES NFR BLD: 6.4 %
NEUTROPHILS # BLD: 2.05 K/UL (ref 1.7–7)
NEUTS SEG NFR BLD: 52 %
NRBC BLD-RTO: 0 %
PDW BLD AUTO: 11.2 FL (ref 9.4–12.3)
PLATELET # BLD AUTO: 226 K/UL (ref 150–369)
POTASSIUM SERPL-SCNC: 4.1 MEQ/L (ref 3.6–5.1)
PROGEST SERPL-MCNC: 0.7 NG/ML
PROT SERPL-MCNC: 6 G/DL (ref 6–8.2)
RBC # BLD AUTO: 4.65 M/UL (ref 3.93–5.22)
SODIUM SERPL-SCNC: 140 MEQ/L (ref 136–144)
WBC # BLD AUTO: 3.93 K/UL (ref 3.8–10.5)

## 2023-05-15 PROCEDURE — 83690 ASSAY OF LIPASE: CPT

## 2023-05-15 PROCEDURE — 82977 ASSAY OF GGT: CPT

## 2023-05-15 PROCEDURE — 84144 ASSAY OF PROGESTERONE: CPT

## 2023-05-15 PROCEDURE — 36415 COLL VENOUS BLD VENIPUNCTURE: CPT

## 2023-05-15 PROCEDURE — 85379 FIBRIN DEGRADATION QUANT: CPT

## 2023-05-15 PROCEDURE — 85025 COMPLETE CBC W/AUTO DIFF WBC: CPT

## 2023-05-15 PROCEDURE — 83735 ASSAY OF MAGNESIUM: CPT

## 2023-05-15 PROCEDURE — 83001 ASSAY OF GONADOTROPIN (FSH): CPT

## 2023-05-15 PROCEDURE — 82672 ASSAY OF ESTROGEN: CPT

## 2023-05-15 PROCEDURE — 80053 COMPREHEN METABOLIC PANEL: CPT

## 2023-05-15 PROCEDURE — 85384 FIBRINOGEN ACTIVITY: CPT

## 2023-05-15 PROCEDURE — 82150 ASSAY OF AMYLASE: CPT

## 2023-05-16 LAB
D DIMER PPP IA.FEU-MCNC: <0.27 UG/ML FEU (ref 0–0.5)
FIBRINOGEN PPP-MCNC: 377 MG/DL (ref 220–480)

## 2023-05-19 ENCOUNTER — TELEPHONE (OUTPATIENT)
Dept: GASTROENTEROLOGY | Facility: CLINIC | Age: 63
End: 2023-05-19
Payer: COMMERCIAL

## 2023-05-19 DIAGNOSIS — R10.13 ABDOMINAL PAIN, EPIGASTRIC: Primary | ICD-10-CM

## 2023-05-19 NOTE — TELEPHONE ENCOUNTER
"Patient called in requesting to speak with Eddie in regard to her last portal message she sent.    Pt stated that she's in a little pain and would like to have bloodwork ordered to see if her liver enzymes increased. Pt also mentioned that she rather not go to the ED because last time she had to wait for 11 1/2 hours and was \"never seen\".     Pt would like some advice from Marina on what to do next.    I informed her that I will relay the message to Marina.    I then notified Marina via secure chat, and Marina noted that she will order the labs.    Once ordered, I will then notify pt via [employer] portal      "

## 2023-05-20 ENCOUNTER — APPOINTMENT (OUTPATIENT)
Dept: LAB | Age: 63
End: 2023-05-20
Attending: PHYSICIAN ASSISTANT
Payer: COMMERCIAL

## 2023-05-20 DIAGNOSIS — R10.13 ABDOMINAL PAIN, EPIGASTRIC: ICD-10-CM

## 2023-05-20 LAB
ALBUMIN SERPL-MCNC: 4 G/DL (ref 3.4–5)
ALP SERPL-CCNC: 133 IU/L (ref 35–126)
ALT SERPL-CCNC: 138 IU/L (ref 11–54)
ANION GAP SERPL CALC-SCNC: 8 MEQ/L (ref 3–15)
AST SERPL-CCNC: 106 IU/L (ref 15–41)
BASOPHILS # BLD: 0.05 K/UL (ref 0.01–0.1)
BASOPHILS NFR BLD: 1.2 %
BILIRUB SERPL-MCNC: 1.8 MG/DL (ref 0.3–1.2)
BUN SERPL-MCNC: 9 MG/DL (ref 8–20)
CALCIUM SERPL-MCNC: 10 MG/DL (ref 8.9–10.3)
CHLORIDE SERPL-SCNC: 106 MEQ/L (ref 98–109)
CO2 SERPL-SCNC: 28 MEQ/L (ref 22–32)
CREAT SERPL-MCNC: 0.7 MG/DL (ref 0.6–1.1)
DIFFERENTIAL METHOD BLD: ABNORMAL
EOSINOPHIL # BLD: 0.16 K/UL (ref 0.04–0.36)
EOSINOPHIL NFR BLD: 3.9 %
ERYTHROCYTE [DISTWIDTH] IN BLOOD BY AUTOMATED COUNT: 12.9 % (ref 11.7–14.4)
GFR SERPL CREATININE-BSD FRML MDRD: >60 ML/MIN/1.73M*2
GLUCOSE SERPL-MCNC: 84 MG/DL (ref 70–99)
HCT VFR BLDCO AUTO: 45.1 % (ref 35–45)
HGB BLD-MCNC: 14.1 G/DL (ref 11.8–15.7)
IMM GRANULOCYTES # BLD AUTO: 0.01 K/UL (ref 0–0.08)
IMM GRANULOCYTES NFR BLD AUTO: 0.2 %
LYMPHOCYTES # BLD: 1.57 K/UL (ref 1.2–3.5)
LYMPHOCYTES NFR BLD: 38.7 %
MCH RBC QN AUTO: 30 PG (ref 28–33.2)
MCHC RBC AUTO-ENTMCNC: 31.3 G/DL (ref 32.2–35.5)
MCV RBC AUTO: 96 FL (ref 83–98)
MONOCYTES # BLD: 0.26 K/UL (ref 0.28–0.8)
MONOCYTES NFR BLD: 6.4 %
NEUTROPHILS # BLD: 2.01 K/UL (ref 1.7–7)
NEUTS SEG NFR BLD: 49.6 %
NRBC BLD-RTO: 0 %
PDW BLD AUTO: 11 FL (ref 9.4–12.3)
PLATELET # BLD AUTO: 225 K/UL (ref 150–369)
POTASSIUM SERPL-SCNC: 4.2 MEQ/L (ref 3.6–5.1)
PROT SERPL-MCNC: 6.5 G/DL (ref 6–8.2)
RBC # BLD AUTO: 4.7 M/UL (ref 3.93–5.22)
SODIUM SERPL-SCNC: 142 MEQ/L (ref 136–144)
WBC # BLD AUTO: 4.06 K/UL (ref 3.8–10.5)

## 2023-05-20 PROCEDURE — 36415 COLL VENOUS BLD VENIPUNCTURE: CPT

## 2023-05-20 PROCEDURE — 85025 COMPLETE CBC W/AUTO DIFF WBC: CPT

## 2023-05-20 PROCEDURE — 80053 COMPREHEN METABOLIC PANEL: CPT

## 2023-05-22 ENCOUNTER — TELEPHONE (OUTPATIENT)
Dept: GASTROENTEROLOGY | Facility: CLINIC | Age: 63
End: 2023-05-22
Payer: COMMERCIAL

## 2023-05-22 ENCOUNTER — HOSPITAL ENCOUNTER (OUTPATIENT)
Dept: RADIOLOGY | Facility: HOSPITAL | Age: 63
Discharge: HOME | End: 2023-05-22
Attending: PHYSICIAN ASSISTANT
Payer: COMMERCIAL

## 2023-05-22 DIAGNOSIS — R10.13 ABDOMINAL PAIN, EPIGASTRIC: ICD-10-CM

## 2023-05-22 DIAGNOSIS — R79.89 ELEVATED LFTS: ICD-10-CM

## 2023-05-22 DIAGNOSIS — R10.13 ABDOMINAL PAIN, EPIGASTRIC: Primary | ICD-10-CM

## 2023-05-22 PROCEDURE — 63600105 HC IODINE BASED CONTRAST: Performed by: PHYSICIAN ASSISTANT

## 2023-05-22 PROCEDURE — G1004 CDSM NDSC: HCPCS

## 2023-05-22 RX ADMIN — IOHEXOL 100 ML: 350 INJECTION, SOLUTION INTRAVENOUS at 13:11

## 2023-05-22 NOTE — TELEPHONE ENCOUNTER
Called the patient and informed her that our  will be reaching out to her to schedule her procedure w/ Dr. Jona CHAPARRO.

## 2023-05-22 NOTE — TELEPHONE ENCOUNTER
Patient Education     Conjunctivitis, Allergic    Conjunctivitis is an irritation of a thin membrane in the eye. This membrane is called the conjunctiva. It covers the white of the eye and the inside of the eyelid. The condition is often called pink eye or red eye because the eye looks pink or red. The eye can also be swollen. A thick fluid may leak from the eyelid. The eye may itch and burn, and feel gritty or scratchy.  Allergic conjunctivitis is caused by an allergen. Allergens are substances that cause the body to react with certain symptoms. Allergens that cause eye irritation include things such as house dust or pollen in the air. This can occur seasonally, most often in the spring.  Home care  · Eye drops may be prescribed to reduce itching and redness. Use these as directed. Otherwise, over-the-counter decongestant eye drops may be used.  · Apply a cool compress (towel soaked in cool water) to the affected eye 3 to 4 times a day to reduce swelling and itching.  · It is common to have mucus drainage during the night, causing the eyelids to become crusted by morning. Use a warm, wet cloth to wipe this away. You may also use saline irrigating solution or artificial tears to rinse away mucus in the eye. Don't patch the eye.  · You may use acetaminophen or ibuprofen to control pain, unless another medicine was prescribed. (Note: If you have chronic liver or kidney disease, or if you have ever had a stomach ulcer or gastrointestinal bleeding, talk with your healthcare provider before using these medicines.)  · Don't wear contact lenses until your eyes have healed and all symptoms are gone.  Follow-up care  Follow up with your healthcare provider, or as advised.  When to seek medical advice  Call your healthcare provider right away if any of these occur:  · Increased eyelid swelling  · New or worsening drainage from the eye  · Increasing redness around the eye  · Facial swelling  Date Last Reviewed:  Patient called in requesting to speak with you in regards to her recent portal message she has sent. Patient stated that she's in a lot of pain and her blood-work is in for review.  Patient would like to see if she can move up her procedure w/ Dr. Tenorio to have her stent removed. She also mentioned that she would like to have advice on whether she should go to the ED today or not due to her ongoing pain.         7/1/2017  © 7795-8375 [employer]. [address] [place] [address], [place] [address]. All rights reserved. This information is not intended as a substitute for professional medical care. Always follow your healthcare professional's instructions.

## 2023-05-22 NOTE — PROGRESS NOTES
Labs reviewed. LFTs elevated. Will check CT A/P to assess stent position and patency.     T - please let the pt know that we would like to have this CT scan so that we can determine if she needs to come in sooner for ERCP w stent exchange. Thank you!

## 2023-05-22 NOTE — TELEPHONE ENCOUNTER
Called patient's insurance to pre-authorize CT Abdomen Pelvis W/ IV CONTRAST.. Patient's insurance stated that an [id number] is required to be completed w/ a physician for the exam to be approved ASAP.       Phone # ([phone])     Case # [id number])

## 2023-05-22 NOTE — TELEPHONE ENCOUNTER
[facility] Pre-procedural Note     Patient: Sowmya Clark  : 1960    Referring provider: established pt  Date of last visit: 2023    Diagnosis:  elevated LFTs; abnormal CT scan    Procedure: ERCP, stent removal     Is the patient on anticoagulation?  no    Does the patient require cardiac clearance?  no    Is the patient diabetic? no    Timing: this wk

## 2023-05-23 ENCOUNTER — HOSPITAL ENCOUNTER (INPATIENT)
Facility: HOSPITAL | Age: 63
LOS: 3 days | Discharge: HOME | DRG: 920 | End: 2023-05-27
Attending: EMERGENCY MEDICINE | Admitting: HOSPITALIST
Payer: COMMERCIAL

## 2023-05-23 DIAGNOSIS — R10.9 ABDOMINAL PAIN, UNSPECIFIED ABDOMINAL LOCATION: ICD-10-CM

## 2023-05-23 DIAGNOSIS — Z98.890 HISTORY OF BILIARY DUCT STENT PLACEMENT: ICD-10-CM

## 2023-05-23 DIAGNOSIS — K83.1 BILIARY STRICTURE: ICD-10-CM

## 2023-05-23 DIAGNOSIS — R10.11 RIGHT UPPER QUADRANT ABDOMINAL PAIN: ICD-10-CM

## 2023-05-23 DIAGNOSIS — R79.89 ELEVATED LFTS: Primary | ICD-10-CM

## 2023-05-23 PROBLEM — E83.42 HYPOMAGNESEMIA: Status: ACTIVE | Noted: 2023-05-23

## 2023-05-23 LAB
ALBUMIN SERPL-MCNC: 3.6 G/DL (ref 3.4–5)
ALP SERPL-CCNC: 275 IU/L (ref 35–126)
ALP SERPL-CCNC: 275 IU/L (ref 35–126)
ALP SERPL-CCNC: 376 IU/L (ref 35–126)
ALT SERPL-CCNC: 430 IU/L (ref 11–54)
ALT SERPL-CCNC: 430 IU/L (ref 11–54)
ALT SERPL-CCNC: 742 IU/L (ref 11–54)
ANION GAP SERPL CALC-SCNC: 10 MEQ/L (ref 3–15)
ANION GAP SERPL CALC-SCNC: 9 MEQ/L (ref 3–15)
APTT PPP: 24 SEC (ref 23–35)
AST SERPL-CCNC: 402 IU/L (ref 15–41)
AST SERPL-CCNC: 402 IU/L (ref 15–41)
AST SERPL-CCNC: 656 IU/L (ref 15–41)
ATRIAL RATE: 66
BASOPHILS # BLD: 0.04 K/UL (ref 0.01–0.1)
BASOPHILS NFR BLD: 0.6 %
BILIRUB DIRECT SERPL-MCNC: 0.7 MG/DL
BILIRUB SERPL-MCNC: 2.2 MG/DL (ref 0.3–1.2)
BILIRUB SERPL-MCNC: 2.2 MG/DL (ref 0.3–1.2)
BILIRUB SERPL-MCNC: 4.2 MG/DL (ref 0.3–1.2)
BUN SERPL-MCNC: 10 MG/DL (ref 8–20)
BUN SERPL-MCNC: 6 MG/DL (ref 8–20)
CALCIUM SERPL-MCNC: 10 MG/DL (ref 8.9–10.3)
CALCIUM SERPL-MCNC: 9.6 MG/DL (ref 8.9–10.3)
CHLORIDE SERPL-SCNC: 103 MEQ/L (ref 98–109)
CHLORIDE SERPL-SCNC: 104 MEQ/L (ref 98–109)
CO2 SERPL-SCNC: 25 MEQ/L (ref 22–32)
CO2 SERPL-SCNC: 25 MEQ/L (ref 22–32)
CREAT SERPL-MCNC: 0.7 MG/DL (ref 0.6–1.1)
CREAT SERPL-MCNC: 0.7 MG/DL (ref 0.6–1.1)
DIFFERENTIAL METHOD BLD: ABNORMAL
EOSINOPHIL # BLD: 0.06 K/UL (ref 0.04–0.36)
EOSINOPHIL NFR BLD: 0.9 %
ERYTHROCYTE [DISTWIDTH] IN BLOOD BY AUTOMATED COUNT: 12 % (ref 11.7–14.4)
ESTROGEN SERPL-MCNC: 104.7 PG/ML
GFR SERPL CREATININE-BSD FRML MDRD: >60 ML/MIN/1.73M*2
GFR SERPL CREATININE-BSD FRML MDRD: >60 ML/MIN/1.73M*2
GLUCOSE SERPL-MCNC: 124 MG/DL (ref 70–99)
GLUCOSE SERPL-MCNC: 98 MG/DL (ref 70–99)
HCT VFR BLDCO AUTO: 40.1 % (ref 35–45)
HGB BLD-MCNC: 13.7 G/DL (ref 11.8–15.7)
IMM GRANULOCYTES # BLD AUTO: 0.03 K/UL (ref 0–0.08)
IMM GRANULOCYTES NFR BLD AUTO: 0.4 %
INR PPP: 1
LACTATE SERPL-SCNC: 1.9 MMOL/L (ref 0.4–2)
LIPASE SERPL-CCNC: 40 U/L (ref 20–51)
LYMPHOCYTES # BLD: 1.01 K/UL (ref 1.2–3.5)
LYMPHOCYTES NFR BLD: 14.5 %
MAGNESIUM SERPL-MCNC: 1.5 MG/DL (ref 1.8–2.5)
MCH RBC QN AUTO: 31.1 PG (ref 28–33.2)
MCHC RBC AUTO-ENTMCNC: 34.2 G/DL (ref 32.2–35.5)
MCV RBC AUTO: 90.9 FL (ref 83–98)
MONOCYTES # BLD: 0.39 K/UL (ref 0.28–0.8)
MONOCYTES NFR BLD: 5.6 %
NEUTROPHILS # BLD: 5.44 K/UL (ref 1.7–7)
NEUTS SEG NFR BLD: 78 %
NRBC BLD-RTO: 0 %
P AXIS: 79
PDW BLD AUTO: 10.8 FL (ref 9.4–12.3)
PLATELET # BLD AUTO: 216 K/UL (ref 150–369)
POTASSIUM SERPL-SCNC: 3.4 MEQ/L (ref 3.6–5.1)
POTASSIUM SERPL-SCNC: 3.9 MEQ/L (ref 3.6–5.1)
PR INTERVAL: 156
PROT SERPL-MCNC: 6 G/DL (ref 6–8.2)
PROT SERPL-MCNC: 6.2 G/DL (ref 6–8.2)
PROT SERPL-MCNC: 6.2 G/DL (ref 6–8.2)
PROTHROMBIN TIME: 12.9 SEC (ref 12.2–14.5)
QRS DURATION: 84
QT INTERVAL: 404
QTC CALCULATION(BAZETT): 423
R AXIS: 78
RBC # BLD AUTO: 4.41 M/UL (ref 3.93–5.22)
SODIUM SERPL-SCNC: 137 MEQ/L (ref 136–144)
SODIUM SERPL-SCNC: 139 MEQ/L (ref 136–144)
T WAVE AXIS: 55
TROPONIN I SERPL HS-MCNC: 5 PG/ML
VENTRICULAR RATE: 66
WBC # BLD AUTO: 6.97 K/UL (ref 3.8–10.5)

## 2023-05-23 PROCEDURE — 93010 ELECTROCARDIOGRAM REPORT: CPT | Performed by: INTERNAL MEDICINE

## 2023-05-23 PROCEDURE — 63700000 HC SELF-ADMINISTRABLE DRUG: Performed by: HOSPITALIST

## 2023-05-23 PROCEDURE — 80053 COMPREHEN METABOLIC PANEL: CPT | Performed by: HOSPITALIST

## 2023-05-23 PROCEDURE — 93005 ELECTROCARDIOGRAM TRACING: CPT | Performed by: EMERGENCY MEDICINE

## 2023-05-23 PROCEDURE — G0378 HOSPITAL OBSERVATION PER HR: HCPCS

## 2023-05-23 PROCEDURE — 83735 ASSAY OF MAGNESIUM: CPT | Performed by: EMERGENCY MEDICINE

## 2023-05-23 PROCEDURE — 99285 EMERGENCY DEPT VISIT HI MDM: CPT | Mod: 25

## 2023-05-23 PROCEDURE — 83690 ASSAY OF LIPASE: CPT

## 2023-05-23 PROCEDURE — 84484 ASSAY OF TROPONIN QUANT: CPT | Performed by: EMERGENCY MEDICINE

## 2023-05-23 PROCEDURE — 99222 1ST HOSP IP/OBS MODERATE 55: CPT | Performed by: INTERNAL MEDICINE

## 2023-05-23 PROCEDURE — 83605 ASSAY OF LACTIC ACID: CPT

## 2023-05-23 PROCEDURE — 83605 ASSAY OF LACTIC ACID: CPT | Performed by: EMERGENCY MEDICINE

## 2023-05-23 PROCEDURE — 36415 COLL VENOUS BLD VENIPUNCTURE: CPT

## 2023-05-23 PROCEDURE — 85025 COMPLETE CBC W/AUTO DIFF WBC: CPT | Performed by: EMERGENCY MEDICINE

## 2023-05-23 PROCEDURE — 63600000 HC DRUGS/DETAIL CODE: Performed by: EMERGENCY MEDICINE

## 2023-05-23 PROCEDURE — 80053 COMPREHEN METABOLIC PANEL: CPT

## 2023-05-23 PROCEDURE — 63600000 HC DRUGS/DETAIL CODE: Performed by: HOSPITALIST

## 2023-05-23 PROCEDURE — 85730 THROMBOPLASTIN TIME PARTIAL: CPT | Performed by: EMERGENCY MEDICINE

## 2023-05-23 PROCEDURE — 83735 ASSAY OF MAGNESIUM: CPT

## 2023-05-23 PROCEDURE — 85025 COMPLETE CBC W/AUTO DIFF WBC: CPT

## 2023-05-23 PROCEDURE — 25800000 HC PHARMACY IV SOLUTIONS: Performed by: HOSPITALIST

## 2023-05-23 PROCEDURE — 85730 THROMBOPLASTIN TIME PARTIAL: CPT

## 2023-05-23 PROCEDURE — 99233 SBSQ HOSP IP/OBS HIGH 50: CPT | Performed by: HOSPITALIST

## 2023-05-23 PROCEDURE — 85610 PROTHROMBIN TIME: CPT | Performed by: EMERGENCY MEDICINE

## 2023-05-23 PROCEDURE — 80053 COMPREHEN METABOLIC PANEL: CPT | Performed by: EMERGENCY MEDICINE

## 2023-05-23 PROCEDURE — 25800000 HC PHARMACY IV SOLUTIONS: Performed by: EMERGENCY MEDICINE

## 2023-05-23 PROCEDURE — 96365 THER/PROPH/DIAG IV INF INIT: CPT | Performed by: HOSPITALIST

## 2023-05-23 PROCEDURE — 82784 ASSAY IGA/IGD/IGG/IGM EACH: CPT | Performed by: STUDENT IN AN ORGANIZED HEALTH CARE EDUCATION/TRAINING PROGRAM

## 2023-05-23 PROCEDURE — 83690 ASSAY OF LIPASE: CPT | Performed by: EMERGENCY MEDICINE

## 2023-05-23 PROCEDURE — 63700000 HC SELF-ADMINISTRABLE DRUG: Performed by: NURSE PRACTITIONER

## 2023-05-23 PROCEDURE — 85610 PROTHROMBIN TIME: CPT

## 2023-05-23 PROCEDURE — 93005 ELECTROCARDIOGRAM TRACING: CPT

## 2023-05-23 PROCEDURE — 82784 ASSAY IGA/IGD/IGG/IGM EACH: CPT | Performed by: HOSPITALIST

## 2023-05-23 PROCEDURE — 87040 BLOOD CULTURE FOR BACTERIA: CPT | Mod: 91 | Performed by: EMERGENCY MEDICINE

## 2023-05-23 PROCEDURE — 99999 PR OFFICE/OUTPT VISIT,PROCEDURE ONLY: CPT | Performed by: HOSPITALIST

## 2023-05-23 PROCEDURE — 82248 BILIRUBIN DIRECT: CPT | Performed by: EMERGENCY MEDICINE

## 2023-05-23 PROCEDURE — 82248 BILIRUBIN DIRECT: CPT

## 2023-05-23 PROCEDURE — 84484 ASSAY OF TROPONIN QUANT: CPT

## 2023-05-23 PROCEDURE — 96367 TX/PROPH/DG ADDL SEQ IV INF: CPT | Performed by: HOSPITALIST

## 2023-05-23 RX ORDER — SODIUM CHLORIDE 9 MG/ML
INJECTION, SOLUTION INTRAVENOUS CONTINUOUS
Status: ACTIVE | OUTPATIENT
Start: 2023-05-23 | End: 2023-05-24

## 2023-05-23 RX ORDER — POTASSIUM CHLORIDE 750 MG/1
40 TABLET, EXTENDED RELEASE ORAL ONCE
Status: ACTIVE | OUTPATIENT
Start: 2023-05-23 | End: 2023-05-23

## 2023-05-23 RX ORDER — IBUPROFEN 200 MG
16-32 TABLET ORAL AS NEEDED
Status: DISCONTINUED | OUTPATIENT
Start: 2023-05-23 | End: 2023-05-27 | Stop reason: HOSPADM

## 2023-05-23 RX ORDER — DEXTROSE 40 %
15-30 GEL (GRAM) ORAL AS NEEDED
Status: DISCONTINUED | OUTPATIENT
Start: 2023-05-23 | End: 2023-05-27 | Stop reason: HOSPADM

## 2023-05-23 RX ORDER — CYCLOBENZAPRINE HCL 5 MG
5 TABLET ORAL ONCE
Status: COMPLETED | OUTPATIENT
Start: 2023-05-23 | End: 2023-05-23

## 2023-05-23 RX ORDER — POTASSIUM CHLORIDE 750 MG/1
40 TABLET, EXTENDED RELEASE ORAL ONCE
Status: COMPLETED | OUTPATIENT
Start: 2023-05-23 | End: 2023-05-23

## 2023-05-23 RX ORDER — SUCRALFATE 1 G/1
1 TABLET ORAL 4 TIMES DAILY PRN
COMMUNITY

## 2023-05-23 RX ORDER — PANTOPRAZOLE SODIUM 40 MG/1
40 TABLET, DELAYED RELEASE ORAL
Status: DISCONTINUED | OUTPATIENT
Start: 2023-05-23 | End: 2023-05-27 | Stop reason: HOSPADM

## 2023-05-23 RX ORDER — OXYCODONE HYDROCHLORIDE 5 MG/1
5 TABLET ORAL EVERY 4 HOURS PRN
Status: DISCONTINUED | OUTPATIENT
Start: 2023-05-23 | End: 2023-05-27 | Stop reason: HOSPADM

## 2023-05-23 RX ORDER — DEXTROSE 50 % IN WATER (D50W) INTRAVENOUS SYRINGE
25 AS NEEDED
Status: DISCONTINUED | OUTPATIENT
Start: 2023-05-23 | End: 2023-05-27 | Stop reason: HOSPADM

## 2023-05-23 RX ADMIN — PIPERACILLIN AND TAZOBACTAM 3.38 G: 3; .375 INJECTION, POWDER, LYOPHILIZED, FOR SOLUTION INTRAVENOUS; PARENTERAL at 07:54

## 2023-05-23 RX ADMIN — POTASSIUM CHLORIDE 40 MEQ: 750 TABLET, EXTENDED RELEASE ORAL at 14:42

## 2023-05-23 RX ADMIN — MAGNESIUM SULFATE IN DEXTROSE 1 G: 10 INJECTION, SOLUTION INTRAVENOUS at 11:34

## 2023-05-23 RX ADMIN — PANTOPRAZOLE SODIUM 40 MG: 40 TABLET, DELAYED RELEASE ORAL at 16:31

## 2023-05-23 RX ADMIN — CYCLOBENZAPRINE HYDROCHLORIDE 5 MG: 5 TABLET, FILM COATED ORAL at 21:51

## 2023-05-23 RX ADMIN — SODIUM CHLORIDE: 9 INJECTION, SOLUTION INTRAVENOUS at 10:21

## 2023-05-23 ASSESSMENT — ENCOUNTER SYMPTOMS
ABDOMINAL PAIN: 1
NAUSEA: 1
CHILLS: 1
FEVER: 0
CONSTIPATION: 0
ANOREXIA: 0
VOMITING: 1

## 2023-05-23 NOTE — PLAN OF CARE
Care Coordination Admission Assessment Note    General Information:  Readmission Within the last 30 days: no previous admission in last 30 days  Does patient have a : No  Patient-Specific Goals (include timeframe): Return home    Living Arrangements:  Arrived From: home  Current Living Arrangements: home  People in Home: child(rancho), adult, spouse  Home Accessibility:    Living Arrangement Comments: Pt resides with her  and son in a 2  with 1 LILLIAM.      Functional Status Prior to Admission:   Assistive Device/Animal Currently Used at Home: none  Functional Status Comments: Pt states to be independent at home with ADLs. Pt denies use of DME.  IADL Comments:       Supports and Services:  Current Outpatient/Agency/Support Group: none  Type of Current Home Care Services:    History of home care episode or rehab stay: None    Discharge Needs Assessment:   Concerns to be Addressed: denies needs/concerns at this time, care coordination/care conferences  Current Discharge Risk:    Anticipated Changes Related to Illness: none    Patient/Family Anticipated Discharge Plan:  Patient/Family Anticipates Transition To: home with family  Patient/Family Anticipated Services at Transition: none    Patient Choice:   Offered/Gave Vendor List: no  Patient's Choice of Community Agency(s):         Anticipated Discharge Plan:  Met with patient. Provided education and contact information for Care Coordination services.: yes  Anticipated Discharge Disposition: home without assistance or services     Transportation Needs (SDOH):  Transportation Concerns:    Transportation Anticipated: family or friend will provide  Is Out of Hospital DNR needed at discharge?: no    Concerns - comments: None at this time     Pt states to be fully vaccinated for Covid with 1 booster.

## 2023-05-23 NOTE — ASSESSMENT & PLAN NOTE
Initially with papillary stenosis and September 2022 status post sphincterotomy and biliary stent placement   Last ERCP on 4/5 for stent replacement due to occlusion  MRCP with and without contrast limited due to stent artifact but did not show worsening biliary stenosis   serum immunoglobulins pending to r/o IgG4 sclerosing cholangitis (autoimmune cholangiopathy)  Smooth muscle AB neg on 2/1/23. Additional FELISHA and autoimmune work-up negative at that time

## 2023-05-23 NOTE — CONSULTS
"     Gastroenterology  Consultation Note       REASON FOR CONSULT   Elevated LFTs, biliary stent    Consult requested by: Dr. Manzo   HISTORY OF PRESENT ILLNESS      Ms Clark is a 62 y.o female with pmh of breast cancer (s/p lumpectomy), s/p CCY with biliary ductal dilatation secondary to papillary stenosis, s/p ERCP w/ sphincterotomy (9/2022) c/b post-ERCP pancreatitis and previous serial stent exchanges (last stent exchange with [facility] on 4/5/23) who presents with recurrent RUQ abdominal pain and elevated LFTs. Interventional GI has been consulted for further evaluation and management.    Patient states her symptoms started a few days ago with recurrent, typical RUQ pain with nausea and NBNB vomiting. Denies any sick contact, travel history or new medications. Notes that she has had these symptoms before in the past when she's had \"issues with her stent.\" States her symptoms started last week but have become progressively worse. She recently followed up with Dr. Tenorio's office and saw Marina Freeman in the outpatient setting back on 4/13/23 for her recurrent RUQ abdominal pain and papillary stenosis. Felt to be multifactorial in setting of IBS-C along with some concern for biliary symptoms in the setting of her recent new biliary stent (placed on 4/5/2023). She was advised to get blood work and eventual ERCP with stent exchange.     LFTs on 4/13 were wnl including AST 23, ALT 17, ALP 84, and T Bili 1.4. She eventually had repeat LFTs on 5/20/23 (for recurrent abdominal pain) where they were elevated with , , , and T Bili 1.8. She was ordered for a CT Abd/pelvis which revealed similar position of CBD stent and improved biliary ductal dilation. She was ultimately scheduled for a repeat ERCP on 6/8/2023 given concern for stent malfunction/occlusion given her symptoms and lab abnormalities, however given her progressive worsening abdominal pain she came to the ED.    Otherwise, does endorse " chills, but without any other fevers or night sweats. Notes worsening constipation and taking OTC L-glutamine and SBI (?) as prescribed by her Homeopathic physician as she does not want to take miralax. Having looser stools now without any blood or mucous. Denies having Crohn's disease and is currently not on any biologic, mesalamine or other chronic steroid (budesonide) therapy.    In regards to her GI history, states her symptoms symptoms started after having her GB out and started in September 2022. Underwent eventual EUS/ERCP as below which revealed an idiopathically dilated CBD without any stone, lesion or mass.  ERCP with sphincterotomy was performed for suspected papillary stenosis vs SOD. Since that time, she has had multiple ERCPs given recurrent abdominal pain and elevated LFTs in setting of papillary stenosis of unclear etiology (no prior trauma, radiation, chronic pancreatitis or other autoimmune cholangiopathy) which have also been complicated by post-ERCP pancreatitis. Additionally underwent another EUS w/ FNA was also performed at that time with unrevealing cytology as her previous sphincterotomy was stenotic in which EUS had to be performed in order to obtain biliary duct access via PD..Her last ERCP was back on 4/5/2023 which was significant for stent occlusion s/p [facility] 10 x 60 mm stent with plans for a repeat stent exchange in 3-4 months.    She has never had a prior MRI/MRCP. No family hx of pancreatic or other hepatobiliary malignancy.    Prior Endoscopic Hx:    Upper EUS (RUQ pain, dilated bile duct on MRI) 9/9/2022- Impression: Dilated bile duct, patient s/p cholecystectomy; no evidence of choledocholithiasis, ampullary mass, pancreatic mass, or abnormal lymph nodes    ERCP (abdominal pain in RUQ) 9/9/2022- Impression: Prominent CBD in a patient with RUQ discomfort, intermittently elevated abnormal liver chemistries, RUQ discomfort. The presumptive diagnosis is either papillary stenosis or  SOD. This was treated with biliary sphincterotomy    ERCP/EUS (elevated liver enzymes) 10/20/2022- Impression: Papillary stenosis. It does not appear to be malignant. FNA was performed (no malignant cells, few morphologically bland appearing glandular cell groups noted). EUS guided access to the bile duct was required. The pancreatic stent was initially placed and then removed. A 10 mm x 60 mm fully covered [employer] self-expanding metal stent was placed in the bile duct for drainage.  Rec'd for re-evaluate ampullary region in 6-8 weeks    ERCP (hx of papillary stenosis, spontaneous stent migration, reevaluate for possible evolving papillary stenosis) 12/15/2022- Impression: Findings consistent with evolving papillary stenosis. Previously placed stent appears to have migrated. A new stent, 10 mm x 60 mm is placed against the orifice  Rec'd: Stent replacement in 6 months    ERCP (stent replacement) 4/5/2023- Impression: Successful stent exchange (s/p [facility]) with previous stent fully obstructed  Rec'd: Stent change in 3-4 months    Prior CT Abd/pelvis (for elevated LFTs) 5/22/2023- Impression: Similar position of the common bile stent compared to prior study from 4/3/2023. Mild interval improvement in biliary ductal dilation. Specifically, the CBD measures 6 mm (previously 8 mm) with pneumobilia present without significant intrahepatic biliary ductal dilation, improved    In the ED, patient was afebrile, HD-stable, w/ HR 70s, sating well on room air. Labs notable for , , , T Bili 2.2 w/ D Bili 0.7. Lipase 40. Lactate 1.9. CBC w/ WBC 6.97, Hgb 13.7, and plts 216. INR 1.0. Patient was given IV Zosyn in the ED prior to obtaining cultures and admitted to internal medicine for further evaluation.      PAST MEDICAL AND SURGICAL HISTORY      PMHx:  Past Medical History:   Diagnosis Date   • Breast cancer (CMS/HCC) 2007   • Crohn's disease (CMS/HCC)    • Dystonia    • Osteomyelitis (CMS/HCC)    •  Pancreatitis    • Thyroid nodule    • Tremor        PSHx:  Past Surgical History:   Procedure Laterality Date   • BREAST LUMPECTOMY     • BREAST SURGERY     • CATARACT EXTRACTION     • CYST REMOVAL      throat   • KNEE SURGERY     • TONSILLECTOMY         PCP:   Ankita Ho MD    MEDICATIONS      Prior to Admission medications    Medication Sig Start Date End Date Taking? Authorizing Provider   aspirin 81 mg chewable tablet Take 81 mg by mouth daily.   Yes ProviderJosh MD   ciclopirox (LOPROX) 1 % shampoo Apply topically once a week. gently massage into scalp   Yes Provider, Josh Garza MD   sucralfate (CARAFATE) 1 gram tablet Take 1 g by mouth 4 (four) times a day as needed.   Yes ProviderJosh MD       Home medications were personally reviewed.    ALLERGIES      Prednisone    FAMILY HISTORY      Family History   Problem Relation Age of Onset   • COPD Biological Mother    • Alzheimer's disease Biological Father    • Spina bifida Biological Brother        SOCIAL HISTORY      Social History     Socioeconomic History   • Marital status:    Tobacco Use   • Smoking status: Never   • Smokeless tobacco: Never   Substance and Sexual Activity   • Alcohol use: Yes   • Drug use: Never   • Sexual activity: Defer     Social Determinants of Health     Food Insecurity: No Food Insecurity (5/23/2023)    Hunger Vital Sign    • Worried About Running Out of Food in the Last Year: Never true    • Ran Out of Food in the Last Year: Never true       REVIEW OF SYSTEMS      Review of Systems  12 point ROS was otherwise negative except for those stated above per HPI    PHYSICAL EXAMINATION      Temp:  [36.5 °C (97.7 °F)-37.1 °C (98.8 °F)] 36.5 °C (97.7 °F)  Heart Rate:  [72-82] 72  Resp:  [16-18] 16  BP: (120-128)/(58-68) 120/58  Body mass index is 23.71 kg/m².    Physical Exam  General: Comfortable appearing, middle-aged female, in no acute distress; non-toxic appearing  Eyes: No scleral icterus;  pink conjunctivae  HENT: No oropharyngeal lesions; MMM  Cardiac: RR on tele; no JVD  Lungs: Normal work of breathing and effort on room air  Abdomen: Soft, NTND; mild tenderness on palpation in epigastric region and RUQ; no guarding or rebound tenderness  Extremities: Warm and well-perfused  Skin: No jaundice  Neuro: Grossly non-focal; moves all four extremities spontaneously  Psych: Normal mood and affect; pleasant and cooperative    LABS / IMAGING / EKG        Labs  Results from last 7 days   Lab Units 05/23/23  0500 05/20/23  0818   SODIUM mEQ/L 137 142   POTASSIUM mEQ/L 3.4* 4.2   CHLORIDE mEQ/L 103 106   CO2 mEQ/L 25 28   BUN mg/dL 10 9   CREATININE mg/dL 0.7 0.7   CALCIUM mg/dL 10.0 10.0   ALBUMIN g/dL 3.6  3.6 4.0   BILIRUBIN TOTAL mg/dL 2.2*  2.2* 1.8*   ALK PHOS IU/L 275*  275* 133*   ALT IU/L 430*  430* 138*   AST IU/L 402*  402* 106*   GLUCOSE mg/dL 124* 84     Results from last 7 days   Lab Units 05/23/23  0500   INR  1.0       Results from last 7 days   Lab Units 05/23/23  0500   MAGNESIUM mg/dL 1.5*     Results from last 7 days   Lab Units 05/23/23  0500 05/20/23  0818   WBC K/uL 6.97 4.06   HEMOGLOBIN g/dL 13.7 14.1   HEMATOCRIT % 40.1 45.1*   PLATELETS K/uL 216 225         Imaging  ECG 12 lead   ED Interpretation   NSR @ 66, nml QRS, nml axis, nml St/T          ASSESSMENT AND PLAN      Ms Clark is a 62 y.o female with pmh of breast cancer (s/p lumpectomy), s/p CCY with biliary ductal dilatation secondary to papillary stenosis, s/p ERCP w/ sphincterotomy (9/2022) c/b post-ERCP pancreatitis and previous serial stent exchanges (last stent exchange with [facility] on 4/5/23) who presents with recurrent RUQ abdominal pain and elevated LFTs. Interventional GI has been consulted for further evaluation and management.    #Recurrent RUQ Abdominal Pain  #Nausea/Vomiting  #Mixed Hsyawlmnsbuctm-Saqnxyegvni-ntuchtd liver injury  #Papillary Stenosis (s/p [facility] 4/5/2023) c/b prior stent occlusion  #Hx of  Post-ERCP Pancreatitis without complications  #Chronic Abdominal pain (felt 2/2 functional vs IBS)    Impression: Patient presenting with recurrent RUQ abdominal pain, nausea/vomiting and chills. Marked rise in LFTs on 5/23 with , , , T Bili 2.2 w/ D Bili 0.7. Lipase 40. Prior CT Abd/pelvis 5/22 revealing CBD stent in position with mild interval improvement in biliary ductal dilation with expected pneumobilia. Suspect recurrent RUQ pain and biliary symptoms in setting of known papillary stenosis, atlhough the etiology of her papillary stenosis remains unclear. Concern for stent occlusion given her prior hx of a fully occluded stent (back on 4/2023) resulting in symptoms and markedly elevated LFTs. CT imaging with improved biliary ductal dilatation but somewhat discordant with clinical picture given her symptoms and acute rise in her LFTs. Patient would benefit from an MRI/MRCP as well to r/o other biliary strictures, lesions, and r/o underlying occult mass as cause of her recurrent papillary stricture/stenosis. Otherwise, prior autoimmune w/u was (-) and no other obvious insults (ie trauma, chronic pancreatitis, or other hepatobiliary surgery). She has had prior episodes of pancreatitis, however this has only be in the setting after her ERCPs 2/2 PEP. An MRI would be helpful to obtain further cross-sectional imaging as she has never had one in the past. Otherwise, no other concern for cholangitis or other biliary sepsis in setting of stent occlusion at this time. Would perform an EGD as well given chronicity of her RUQ pain to exclude PUD and ERCP with stent removal.    Recommendations:    - Keep NPO pending MRI, once obtained okay for CLD  - Repeat LFTs with total fractionated bilirubin (both direct and indirect) later this afternoon  - Trend LFTs q 12 hrs  - Monitor blood cultures and observe off IV abx as low suspicion for cholangitis at this time  - Start empiric IV PPI 40 mg BiD  - Obtain  MRI/MRCP w/ and w/out contrast for further evaluation of biliary tree to r/o biliary stricture, irregularity or other cholangiopathy and r/o occult mass/lesion  - Check serum immunoglobulins to r/o IgG4 sclerosing cholangitis (autoimmune cholangiopathy). Prior autoimmune w/u with FELISHA autoantibodies along with ASMA (-)  - Plan for tentative EGD and ERCP w/ stent removal later this week, ultimate timing to be determined  - Monitor for fevers, leukocytosis, hypotension, or other concerns for decompensation concerning for cholangitis. Low threshold to start IV abx  - Rest of care per primary team    Patient seen and examined. Plan discussed with Interventional GI attending, Dr. Tenorio.    Yuri Killian DO  Gastroenterology Fellow, PGY-V  Pager [phone]       VTE Assessment: [place]    Code Status: Full Code  Estimated discharge date: 5/25/2023

## 2023-05-23 NOTE — ASSESSMENT & PLAN NOTE
Mild hypomagnesemia likely due to poor oral intake  Magnesium 1.8  S/p 1 g IV mg on 5/23/23  Replete as needed

## 2023-05-23 NOTE — PROGRESS NOTES
Spoke with patient and confirmed with medication list from [facility] and/or patient's own pharmacy to complete the medication reconciliation.     Prior to admission medication list:  Current Outpatient Medications:   •  aspirin 81 mg chewable tablet, Take 81 mg by mouth daily.  •  ciclopirox (LOPROX) 1 % shampoo, Apply topically once a week. gently massage into scalp  •  sucralfate (CARAFATE) 1 gram tablet, Take 1 g by mouth 4 (four) times a day as needed.    Comments about home medications:  -Patient takes multiple OTC supplements which cannot be found in [employer] but has paused most of them during the past 1-2 weeks.  -Patient held her aspirin the past 1-2 weeks due to her GI discomfort.

## 2023-05-23 NOTE — ASSESSMENT & PLAN NOTE
Epigastric and right upper quadrant pain with radiation to the back over the last week.  Similar to prior episodes of biliary stricture, biliary stent occlusion however worsening nausea vomiting this time.  No jaundice. Mild RUQ/epigastric pain on exam. Remains afebrile. No leukocytosis.   Abdominal pain remains stable  Monitoring off antibiotics  Lipase normal so suggesting against pancreatitis and no evidence of inflammation of the pancreas on CT scan  Oxycodone 5 mg prn pain  Avoid tylenol due to elevated LFTs  Continue Miralax daily, senna 1 mg BID, Add colace 100 mg BID

## 2023-05-23 NOTE — ASSESSMENT & PLAN NOTE
LFTs continue to trend down over the last 48 hours   CT abdomen pelvis with similar position of CBD stent compared to prior study on 4/3.  No evidence of liver disease, biliary dilation.  CBD 6 mm.  No intrahepatic Biliary dilation.  Pancreas within normal limits.  MRCP 5/24 -limited due to artifact from biliary stent, mild biliary dilation without choledocholithiasis.  Possible stricture versus artifact of the left main duct, no associated soft tissue masses.  Noted to have pancreatic subcentimeter cysts without suspicious features.  MRI with MRCP in 1 year suggested  ERCP/EGD was planned on 5/25 but canceled due to to emergent cases  GI planning ERCP/EGD today.  Currently on the schedule for 1815.   Patient notably upset about her surgery being canceled and not being informed.  She also was upset about not being able to initiate a clear liquid diet.  Apology was provided to patient and discussed the reason why her procedure was postponed.  Keep NPO.  Okay for sips  Blood cultures no growth  CMP twice daily  Continue NSS 80 mL/hr while n.p.o.     Spoke to pt, relayed instruction per Dr. Jamia Escalante. Will mail lab slip to pt's home address.

## 2023-05-23 NOTE — H&P
Hospital Medicine Service -  History & Physical        CHIEF COMPLAINT   Abdominal pain, nausea, vomiting, chills     HISTORY OF PRESENT ILLNESS      Sowmya Clark is a 62 y.o. 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  She has not eaten well over the last week and attempted to eat last night and then developed very nauseous 34 hours later with 3 episodes of vomiting.  Since her last ERCP in early April, she has had intermittent epigastric tenderness after meals.  She reports chills over the last week.  She not been able to take her medications over the last few days due to poor oral intake.  On 5/20 her LFTs were obtained with  and , alk phos 133, total bilirubin 1.8.  LFTs worsening in the ED today with AST and ALT in the 400, alk phos 275, bilirubin 2.2.  She was scheduled for ERCP on 6/8.     Denies headache, blurry vision, chest pain, shortness of breath, abdominal distention. + Diarrhea over the last week, nonbloody.  No lower extremity edema, rashes, numbness tingling.  She tries to limit taking prescribed medications and uses homeopathic modalities.  Recently taking over-the-counter L-glutamine for her GI tract.    Her symptoms initially started in September/2022 when she underwent an ERCP with sphincterotomy due to papillary stenosis.  She later had recurrence and symptoms and underwent ERCP in 10/2022 with biliary stent placement in complicated b post ERCP pancreatitis.  In early April she underwent additional ERCP due to occluded prior stent and had it exchanged.    PAST MEDICAL AND SURGICAL HISTORY      Past Medical History:   Diagnosis Date   • Breast cancer (CMS/HCC) 2007   • Crohn's disease (CMS/HCC)    • Dystonia    • Osteomyelitis (CMS/HCC)    • Pancreatitis    •  Thyroid nodule    • Tremor        Past Surgical History:   Procedure Laterality Date   • BREAST LUMPECTOMY     • BREAST SURGERY     • CATARACT EXTRACTION     • CYST REMOVAL      throat   • KNEE SURGERY     • TONSILLECTOMY         PCP: Ankita Ho MD    MEDICATIONS      Prior to Admission medications    Medication Sig Start Date End Date Taking? Authorizing Provider   aspirin 81 mg chewable tablet Take 81 mg by mouth daily.   Yes ProviderJosh MD   ciclopirox (LOPROX) 1 % shampoo Apply topically once a week. gently massage into scalp   Yes Provider, Josh Garza MD   sucralfate (CARAFATE) 1 gram tablet Take 1 g by mouth 4 (four) times a day as needed.   Yes Provider, Josh Garza MD       ALLERGIES      Prednisone    FAMILY HISTORY      Family History   Problem Relation Age of Onset   • COPD Biological Mother    • Alzheimer's disease Biological Father    • Spina bifida Biological Brother        SOCIAL HISTORY      Social History     Socioeconomic History   • Marital status:    Tobacco Use   • Smoking status: Never   • Smokeless tobacco: Never   Substance and Sexual Activity   • Alcohol use: Yes   • Drug use: Never   • Sexual activity: Defer     Social Determinants of Health     Food Insecurity: No Food Insecurity (5/23/2023)    Hunger Vital Sign    • Worried About Running Out of Food in the Last Year: Never true    • Ran Out of Food in the Last Year: Never true       REVIEW OF SYSTEMS      All other systems reviewed and negative except as noted in HPI    PHYSICAL EXAMINATION      Temp:  [36.5 °C (97.7 °F)-37.1 °C (98.8 °F)] 36.8 °C (98.2 °F)  Heart Rate:  [68-82] 69  Resp:  [16-19] 18  BP: (104-128)/(54-68) 110/56  Body mass index is 23.71 kg/m².    General: No acute distress, AAO x3  HEENT: Symmetric, PERRL/EOMI, moist membranes, NCAT  Cardiovascular: Regular rate and rhythm, + S1 +S2, no murmurs, rubs, gallops, no JVD  Pulmonary: Clear to auscultation bilaterally, no wheezing,  rhonchi, rales, good effort  GI: Soft, nontender, nondistended, bowel sounds normal, no organomegaly  Musculoskeletal: Normal bulk and tone, normal ROM  Extremities: Distal pulses intact, No LE edema bilaterally  Neuro: Cranial nerves II through XII grossly intact by nonfocal exam, sensation intact  Psych: Normal mood, affect and perception      LABS / IMAGING / EKG        Labs  CBC Results       05/23/23 05/20/23 05/15/23     0500 0818 1025    WBC 6.97 4.06 3.93    RBC 4.41 4.70 4.65    HGB 13.7 14.1 14.2    HCT 40.1 45.1 43.9    MCV 90.9 96.0 94.4    MCH 31.1 30.0 30.5    MCHC 34.2 31.3 32.3     225 226        CMP Results       05/23/23 05/20/23 05/15/23     0500 0818 1025     142 140    K 3.4 4.2 4.1    Cl 103 106 105    CO2 25 28 26    Glucose 124 84 90    BUN 10 9 11    Creatinine 0.7 0.7 0.7    Calcium 10.0 10.0 9.7    Anion Gap 9 8 9     106 23     402       138 17     430      Albumin 3.6 4.0 3.8     3.6      EGFR >60.0 >60.0 >60.0         Comment for K at 0500 on 05/23/23: Results obtained on plasma. Plasma Potassium values may be up to 0.4 mEQ/L less than serum values. The differences may be greater for patients with high platelet or white cell counts.        Troponin I Results       05/23/23 01/26/23 10/19/22     0500 1643 1936    HS Troponin I 5.0 3.6 5        Imaging  ECG 12 lead   ED Interpretation   NSR @ 66, nml QRS, nml axis, nml St/T      Final Result      MRI ABDOMEN WITH AND WITHOUT CONTRAST MRCP    (Results Pending)         SARS-CoV-2 (COVID-19) (no units)   Date/Time Value   04/05/2023 0537 Negative       CT Abd pelvis  5/22/23:   Findings:  Lower chest: Within normal limits.  Liver:  Within normal limits.  Biliary system:  Distal common bile duct stents again noted, similar in position  compared to the prior study from 4/3/2023.  The common bile duct measures 6 mm  (previously 8 mm).  Pneumobilia is present.  No significant intra-hepatic  biliary ductal dilation,  improved compared to the prior study.  Pancreas:  Within normal limits.  Spleen:      Within normal limits.  Adrenals:  Within normal limits.  Kidneys/ureters/bladder:    Within normal limits.  Reproductive organs:  Within normal limits.     Bowel:  Small hiatal hernia.  Colonic diverticulosis.  Peritoneum:  No ascites, free air, or fluid collection.  Vessels:  The abdominal aorta is not aneurysmal.  Mesentery and lymph nodes:  Within normal limits.  Bones:  Degenerative changes spine, worse at L5/S1.       IMPRESSION:  Similar position of the common bile stent compared to prior study from 4/3/2023.  Mild interval improvement in biliary ductal dilation.    ECG/Telemetry  NSR, no ischemic changes, no T wave inversions    ASSESSMENT AND PLAN           * Elevated LFTs  Assessment & Plan  Increased LFT elevation from 5/20 with a cholestatic pattern  CT abdomen pelvis with similar position of CBD stent compared to prior study on 4/3.  No evidence of liver disease, biliary dilation.  CBD 6 mm.  No intrahepatic Biliary dilation.  Pancreas within normal limits.  Recent ERCP with stent exchange on 4/5 due to occluded stent  Given her abdominal pain, associated chills, nausea vomiting, and elevated LFTs, there is concern for recurrent obstruction of the biliary stent versus biliary disease or stent migration, despite CT findings with unchanged biliary stent  Received Zosyn in the ED x1 dose  will monitor off further antibiotics since no fevers or leukocytosis  Blood cultures obtained  Repeat CMP this afternoon at 4 PM  Trend daily CMP  Keep n.p.o.  Continue IV hydration while n.p.o.        History of biliary duct stent placement  Assessment & Plan  See assessment under biliary stricture    Hypomagnesemia  Assessment & Plan  Mild hypomagnesemia likely due to poor oral intake  will give 1 mg IV mag    Biliary stricture  Assessment & Plan  Initially with papillary stenosis and September/2022 status post sphincterotomy and  biliary stent placement   Last ERCP on 4/5 for stent replacement due to occlusion  Check MRCP with and without contrast to rule out underlying biliary stricture  Check serum immunoglobulins to r/o IgG4 sclerosing cholangitis (autoimmune cholangiopathy  Smooth muscle AB neg on 2/1/23      Abdominal pain  Assessment & Plan  Epigastric and right upper quadrant pain with radiation to the back over the last week.  Similar to prior episodes of biliary stricture, biliary stent occlusion however worsening nausea vomiting this time.  Suspected biliary disease.  Not consistent with cholangitis at this time  Lipase normal so suggesting against pancreatitis and no evidence of inflammation of the pancreas on CT scan  GI recommending EGD with ERCP to rule out peptic ulcer disease, gastritis or other possible causes of abdominal pain        Hypokalemia  Assessment & Plan  Mild hypokalemia to 3.4 on admission  We will give 40 mg and potassium   may dissolve in water if patient able to take orally         VTE Assessment: Padua    VTE Prophylaxis: Current anticoagulants:    •None      Code Status: Full Code      Estimated Discharge Date: 5/25/2023  Disposition Planning: Discharge >2 days, pending ERCP on Thursday or Friday per GI     Will Jovany,   5/23/2023

## 2023-05-23 NOTE — ED PROVIDER NOTES
Emergency Medicine Note  HPI   HISTORY OF PRESENT ILLNESS     62-year-old female past medical history of breast cancer, cholecystectomy, pancreatitis, biliary duct obstruction with stent who presents with upper abdominal pain chills and vomiting.  Reports this started last week and has been coming and going.  She called her GI doctor and they sent her for an outpatient CT scan and blood work.  Her CT scan was unremarkable but it was noted that her LFTs were elevated.  Pain has gotten worse over the past day and she has been having more vomiting with it.  Today she had chills.  Pain radiates to her back      History provided by:  Patient  Abdominal Pain  Pain location:  RUQ and epigastric  Pain quality: aching    Pain radiates to:  Does not radiate  Pain severity:  Moderate  Onset quality:  Sudden  Timing:  Intermittent  Progression:  Waxing and waning  Chronicity:  Recurrent  Context: not alcohol use    Relieved by:  Nothing  Worsened by:  Nothing  Ineffective treatments:  None tried  Associated symptoms: chills, nausea and vomiting    Associated symptoms: no anorexia, no chest pain, no constipation and no fever    Chest Pain  Associated symptoms: abdominal pain, nausea and vomiting    Associated symptoms: no anorexia and no fever          Patient History   PAST HISTORY     Reviewed from Nursing Triage:       Past Medical History:   Diagnosis Date   • Breast cancer (CMS/HCC) 2007   • Crohn's disease (CMS/HCC)    • Dystonia    • Osteomyelitis (CMS/HCC)    • Pancreatitis    • Thyroid nodule    • Tremor        Past Surgical History:   Procedure Laterality Date   • BREAST LUMPECTOMY     • BREAST SURGERY     • CATARACT EXTRACTION     • CYST REMOVAL      throat   • KNEE SURGERY     • TONSILLECTOMY         Family History   Problem Relation Age of Onset   • COPD Biological Mother    • Alzheimer's disease Biological Father    • Spina bifida Biological Brother        Social History     Tobacco Use   • Smoking status: Never    • Smokeless tobacco: Never   Substance Use Topics   • Alcohol use: Yes   • Drug use: Never         Review of Systems   REVIEW OF SYSTEMS     Review of Systems   Constitutional: Positive for chills. Negative for fever.   Cardiovascular: Negative for chest pain.   Gastrointestinal: Positive for abdominal pain, nausea and vomiting. Negative for anorexia and constipation.   All other systems reviewed and are negative.        VITALS     ED Vitals    Date/Time Temp Pulse Resp BP SpO2 [facility]   05/23/23 0545 -- 74 18 128/68 97 % KAD   05/23/23 0422 37.1 °C (98.8 °F) 82 18 122/61 100 % JW        Pulse Ox %: 97 % (05/23/23 0726)  Pulse Ox Interpretation: Normal (05/23/23 0726)           Physical Exam   PHYSICAL EXAM     Physical Exam  Vitals reviewed.   Constitutional:       Appearance: She is well-developed.   HENT:      Head: Normocephalic.   Eyes:      Extraocular Movements: Extraocular movements intact.      Pupils: Pupils are equal, round, and reactive to light.   Cardiovascular:      Rate and Rhythm: Normal rate and regular rhythm.      Heart sounds: Normal heart sounds.   Pulmonary:      Effort: Pulmonary effort is normal.      Breath sounds: Normal breath sounds.   Abdominal:      General: Abdomen is flat. Bowel sounds are normal.      Palpations: Abdomen is soft.      Tenderness: There is no abdominal tenderness.   Skin:     General: Skin is warm.      Capillary Refill: Capillary refill takes less than 2 seconds.   Neurological:      General: No focal deficit present.      Mental Status: She is alert and oriented to person, place, and time.   Psychiatric:         Mood and Affect: Mood normal.           PROCEDURES     Procedures     DATA     Results     Procedure Component Value Units Date/Time    APTT [[id number]]  (Normal) Collected: 05/23/23 0500    Specimen: Blood, Venous Updated: 05/23/23 0603     PTT 24 sec     Protime-INR [[id number]]  (Normal) Collected: 05/23/23 0500    Specimen: Blood, Venous Updated: 05/23/23  0603     PT 12.9 sec      INR 1.0     Comment: INR has no defined significance when PT is within Reference Range.       HS Troponin (with 2 hour reflex) [[id number]]  (Normal) Collected: 05/23/23 0500    Specimen: Blood, Venous Updated: 05/23/23 0601     High Sens Troponin I 5.0 pg/mL     Comprehensive metabolic panel [[id number]]  (Abnormal) Collected: 05/23/23 0500    Specimen: Blood, Venous Updated: 05/23/23 0601     Sodium 137 mEQ/L      Potassium 3.4 mEQ/L      Comment: Results obtained on plasma. Plasma Potassium values may be up to 0.4 mEQ/L less than serum values. The differences may be greater for patients with high platelet or white cell counts.        Chloride 103 mEQ/L      CO2 25 mEQ/L      BUN 10 mg/dL      Creatinine 0.7 mg/dL      Glucose 124 mg/dL      Calcium 10.0 mg/dL      AST (SGOT) 402 IU/L      ALT (SGPT) 430 IU/L      Alkaline Phosphatase 275 IU/L      Total Protein 6.2 g/dL      Comment: Test performed on plasma which typically contains approximately 0.4 g/dL more protein than serum.        Albumin 3.6 g/dL      Bilirubin, Total 2.2 mg/dL      eGFR >60.0 mL/min/1.73m*2      Anion Gap 9 mEQ/L     Magnesium [[id number]]  (Abnormal) Collected: 05/23/23 0500    Specimen: Blood, Venous Updated: 05/23/23 0601     Magnesium 1.5 mg/dL     Hepatic function panel [[id number]]  (Abnormal) Collected: 05/23/23 0500    Specimen: Blood, Venous Updated: 05/23/23 0601     Albumin 3.6 g/dL      Bilirubin, Total 2.2 mg/dL      Bilirubin, Direct 0.7 mg/dL      Alkaline Phosphatase 275 IU/L      AST (SGOT) 402 IU/L      ALT (SGPT) 430 IU/L      Total Protein 6.2 g/dL      Comment: Test performed on plasma which typically contains approximately 0.4 g/dL more protein than serum.       Lipase [[id number]]  (Normal) Collected: 05/23/23 0500    Specimen: Blood, Venous Updated: 05/23/23 0559     Lipase 40 U/L     CBC and differential [[id number]]  (Abnormal) Collected: 05/23/23 0500    Specimen: Blood, Venous Updated:  05/23/23 0526     WBC 6.97 K/uL      RBC 4.41 M/uL      Hemoglobin 13.7 g/dL      Hematocrit 40.1 %      MCV 90.9 fL      MCH 31.1 pg      MCHC 34.2 g/dL      RDW 12.0 %      Platelets 216 K/uL      MPV 10.8 fL      Differential Type Auto     nRBC 0.0 %      Immature Granulocytes 0.4 %      Neutrophils 78.0 %      Lymphocytes 14.5 %      Monocytes 5.6 %      Eosinophils 0.9 %      Basophils 0.6 %      Immature Granulocytes, Absolute 0.03 K/uL      Neutrophils, Absolute 5.44 K/uL      Lymphocytes, Absolute 1.01 K/uL      Monocytes, Absolute 0.39 K/uL      Eosinophils, Absolute 0.06 K/uL      Basophils, Absolute 0.04 K/uL     [place] Draw Panel [[id number]] Collected: 05/23/23 0500    Specimen: Blood, Venous Updated: 05/23/23 0517    Narrative:      The following orders were created for panel order [place] Draw Panel.  Procedure                               Abnormality         Status                     ---------                               -----------         ------                     RAINBOW RED[[id number]]                                      In process                 RAINBOW PINK[[id number]]                                     In process                 RAINBOW LT BLUE[[id number]]                                  In process                 RAINBOW GOLD[[id number]]                                     In process                 [place] Blood Culture[[id number]]                                                         Please view results for these tests on the individual orders.    Lactic acid, Venous [[id number]]  (Normal) Collected: 05/23/23 0500    Specimen: Blood, Venous Updated: 05/23/23 0505     Lactate 1.9 mmol/L     RAINBOW RED [[id number]] Collected: 05/23/23 0500    Specimen: Blood, Venous Updated: 05/23/23 0504    RAINBOW PINK [[id number]] Collected: 05/23/23 0500    Specimen: Blood, Venous Updated: 05/23/23 0504    RAINBOW LT BLUE [[id number]] Collected: 05/23/23 0500    Specimen: Blood, Venous Updated:  05/23/23 0504    RAINBOW GOLD [[id number]] Collected: 05/23/23 0500    Specimen: Blood, Venous Updated: 05/23/23 0504          Imaging Results    None         ECG 12 lead   Independent Interpretation by ED Provider   NSR @ 66, nml QRS, nml axis, nml St/T          Scoring tools                                  ED Course & MDM   MDM / ED COURSE / CLINICAL IMPRESSION / DISPO     Medical Decision Making  61 yo female who presents with RUQ abdominal pain, n/v and chills - differential includes obstructed biliary duct, hepatitis, cholangitis    Amount and/or Complexity of Data Reviewed  Independent Historian: spouse  External Data Reviewed: labs.     Details: 5/20/23  Labs: ordered.     Details: reviewed results, increasing LFTS  ECG/medicine tests: ordered and independent interpretation performed.          ED Course as of 05/23/23 0745   Tue May 23, 2023   0701 Previous note documents PCN allergy - patient denies.   [PATRICIO]   0705 Nicole with GI fellow they will discuss with Dr. Tenorio aand let us know about timing for stent exchange [PATRICIO]   0727 Discussed with hospitalist who accepts patient [PATRICIO]   0744 Discussed with GI - will see patient in consultation, admit to medicine - will try to do procedure in next few days.   [PATRICIO]      ED Course User Index  [PATRICIO] Thelma Post MD     Clinical Impression      Elevated LFTs   Right upper quadrant abdominal pain     _________________     ED Disposition   Admit / Observation                   Thelma Post MD  05/23/23 0776

## 2023-05-23 NOTE — PLAN OF CARE
Problem: Adult Inpatient Plan of Care  Goal: Plan of Care Review  Outcome: Progressing  Flowsheets (Taken 5/23/2023 1904)  Progress: no change  Outcome Evaluation: Patient arrived from ED. Continued on IVF.  Plan of Care Reviewed With: patient   Plan of Care Review  Plan of Care Reviewed With: patient  Progress: no change  Outcome Evaluation: Patient arrived from ED. Continued on IVF.

## 2023-05-24 ENCOUNTER — APPOINTMENT (INPATIENT)
Dept: RADIOLOGY | Facility: HOSPITAL | Age: 63
DRG: 920 | End: 2023-05-24
Attending: HOSPITALIST
Payer: COMMERCIAL

## 2023-05-24 PROBLEM — R10.11 RUQ ABDOMINAL PAIN: Status: ACTIVE | Noted: 2022-10-19

## 2023-05-24 LAB
ALBUMIN SERPL-MCNC: 3 G/DL (ref 3.4–5)
ALBUMIN SERPL-MCNC: 3.2 G/DL (ref 3.4–5)
ALBUMIN SERPL-MCNC: 3.3 G/DL (ref 3.4–5)
ALP SERPL-CCNC: 310 IU/L (ref 35–126)
ALP SERPL-CCNC: 325 IU/L (ref 35–126)
ALP SERPL-CCNC: 326 IU/L (ref 35–126)
ALT SERPL-CCNC: 449 IU/L (ref 11–54)
ALT SERPL-CCNC: 518 IU/L (ref 11–54)
ALT SERPL-CCNC: 524 IU/L (ref 11–54)
ANION GAP SERPL CALC-SCNC: 8 MEQ/L (ref 3–15)
ANION GAP SERPL CALC-SCNC: 9 MEQ/L (ref 3–15)
ANION GAP SERPL CALC-SCNC: 9 MEQ/L (ref 3–15)
AST SERPL-CCNC: 203 IU/L (ref 15–41)
AST SERPL-CCNC: 284 IU/L (ref 15–41)
AST SERPL-CCNC: 306 IU/L (ref 15–41)
BILIRUB DIRECT SERPL-MCNC: 0.9 MG/DL
BILIRUB DIRECT SERPL-MCNC: 1.5 MG/DL
BILIRUB SERPL-MCNC: 3.7 MG/DL (ref 0.3–1.2)
BILIRUB SERPL-MCNC: 4.8 MG/DL (ref 0.3–1.2)
BILIRUB SERPL-MCNC: 5.1 MG/DL (ref 0.3–1.2)
BUN SERPL-MCNC: 6 MG/DL (ref 8–20)
BUN SERPL-MCNC: <5 MG/DL (ref 8–20)
BUN SERPL-MCNC: <5 MG/DL (ref 8–20)
CALCIUM SERPL-MCNC: 8.9 MG/DL (ref 8.9–10.3)
CALCIUM SERPL-MCNC: 9.1 MG/DL (ref 8.9–10.3)
CALCIUM SERPL-MCNC: 9.2 MG/DL (ref 8.9–10.3)
CHLORIDE SERPL-SCNC: 108 MEQ/L (ref 98–109)
CHLORIDE SERPL-SCNC: 108 MEQ/L (ref 98–109)
CHLORIDE SERPL-SCNC: 111 MEQ/L (ref 98–109)
CO2 SERPL-SCNC: 20 MEQ/L (ref 22–32)
CO2 SERPL-SCNC: 21 MEQ/L (ref 22–32)
CO2 SERPL-SCNC: 23 MEQ/L (ref 22–32)
CREAT SERPL-MCNC: 0.6 MG/DL (ref 0.6–1.1)
CREAT SERPL-MCNC: 0.7 MG/DL (ref 0.6–1.1)
CREAT SERPL-MCNC: 0.7 MG/DL (ref 0.6–1.1)
ERYTHROCYTE [DISTWIDTH] IN BLOOD BY AUTOMATED COUNT: 12.5 % (ref 11.7–14.4)
GFR SERPL CREATININE-BSD FRML MDRD: >60 ML/MIN/1.73M*2
GLUCOSE SERPL-MCNC: 69 MG/DL (ref 70–99)
GLUCOSE SERPL-MCNC: 70 MG/DL (ref 70–99)
GLUCOSE SERPL-MCNC: 70 MG/DL (ref 70–99)
HCT VFR BLDCO AUTO: 36.5 % (ref 35–45)
HGB BLD-MCNC: 12.2 G/DL (ref 11.8–15.7)
IGA SERPL-MCNC: 197 MG/DL (ref 84.5–499)
IGG SERPL-MCNC: 834 MG/DL (ref 537–1535)
IGM SERPL-MCNC: 53 MG/DL (ref 35–242)
MAGNESIUM SERPL-MCNC: 1.8 MG/DL (ref 1.8–2.5)
MCH RBC QN AUTO: 31 PG (ref 28–33.2)
MCHC RBC AUTO-ENTMCNC: 33.4 G/DL (ref 32.2–35.5)
MCV RBC AUTO: 92.6 FL (ref 83–98)
PDW BLD AUTO: 11 FL (ref 9.4–12.3)
PLATELET # BLD AUTO: 171 K/UL (ref 150–369)
POTASSIUM SERPL-SCNC: 4 MEQ/L (ref 3.6–5.1)
POTASSIUM SERPL-SCNC: 4 MEQ/L (ref 3.6–5.1)
POTASSIUM SERPL-SCNC: 4.1 MEQ/L (ref 3.6–5.1)
PROT SERPL-MCNC: 4.9 G/DL (ref 6–8.2)
PROT SERPL-MCNC: 5.2 G/DL (ref 6–8.2)
PROT SERPL-MCNC: 5.4 G/DL (ref 6–8.2)
RBC # BLD AUTO: 3.94 M/UL (ref 3.93–5.22)
SODIUM SERPL-SCNC: 137 MEQ/L (ref 136–144)
SODIUM SERPL-SCNC: 140 MEQ/L (ref 136–144)
SODIUM SERPL-SCNC: 140 MEQ/L (ref 136–144)
WBC # BLD AUTO: 4.33 K/UL (ref 3.8–10.5)

## 2023-05-24 PROCEDURE — 63700000 HC SELF-ADMINISTRABLE DRUG: Performed by: NURSE PRACTITIONER

## 2023-05-24 PROCEDURE — 99233 SBSQ HOSP IP/OBS HIGH 50: CPT | Performed by: HOSPITALIST

## 2023-05-24 PROCEDURE — 25800000 HC PHARMACY IV SOLUTIONS: Performed by: HOSPITALIST

## 2023-05-24 PROCEDURE — 85027 COMPLETE CBC AUTOMATED: CPT | Performed by: HOSPITALIST

## 2023-05-24 PROCEDURE — 82248 BILIRUBIN DIRECT: CPT | Performed by: HOSPITALIST

## 2023-05-24 PROCEDURE — 83735 ASSAY OF MAGNESIUM: CPT | Performed by: HOSPITALIST

## 2023-05-24 PROCEDURE — G0378 HOSPITAL OBSERVATION PER HR: HCPCS

## 2023-05-24 PROCEDURE — A9579 GAD-BASE MR CONTRAST NOS,1ML: HCPCS | Performed by: HOSPITALIST

## 2023-05-24 PROCEDURE — 36415 COLL VENOUS BLD VENIPUNCTURE: CPT | Performed by: HOSPITALIST

## 2023-05-24 PROCEDURE — 200200 PR NO CHARGE: Performed by: INTERNAL MEDICINE

## 2023-05-24 PROCEDURE — 80053 COMPREHEN METABOLIC PANEL: CPT | Performed by: HOSPITALIST

## 2023-05-24 PROCEDURE — 80048 BASIC METABOLIC PNL TOTAL CA: CPT | Performed by: HOSPITALIST

## 2023-05-24 PROCEDURE — 63700000 HC SELF-ADMINISTRABLE DRUG: Performed by: HOSPITALIST

## 2023-05-24 PROCEDURE — A9585 GADOBUTROL INJECTION: HCPCS | Performed by: HOSPITALIST

## 2023-05-24 PROCEDURE — G1004 CDSM NDSC: HCPCS

## 2023-05-24 PROCEDURE — 21400000 HC ROOM AND CARE CCU/INTERMEDIATE

## 2023-05-24 RX ORDER — SODIUM CHLORIDE 9 MG/ML
INJECTION, SOLUTION INTRAVENOUS CONTINUOUS
Status: ACTIVE | OUTPATIENT
Start: 2023-05-24 | End: 2023-05-26

## 2023-05-24 RX ORDER — GADOBUTROL 604.72 MG/ML
0.1 INJECTION INTRAVENOUS ONCE
Status: COMPLETED | OUTPATIENT
Start: 2023-05-24 | End: 2023-05-24

## 2023-05-24 RX ORDER — IBUPROFEN 600 MG/1
600 TABLET ORAL ONCE
Status: COMPLETED | OUTPATIENT
Start: 2023-05-24 | End: 2023-05-24

## 2023-05-24 RX ADMIN — SODIUM CHLORIDE 80 ML/HR: 9 INJECTION, SOLUTION INTRAVENOUS at 16:08

## 2023-05-24 RX ADMIN — PANTOPRAZOLE SODIUM 40 MG: 40 TABLET, DELAYED RELEASE ORAL at 08:44

## 2023-05-24 RX ADMIN — SODIUM CHLORIDE: 9 INJECTION, SOLUTION INTRAVENOUS at 00:02

## 2023-05-24 RX ADMIN — IBUPROFEN 600 MG: 600 TABLET, FILM COATED ORAL at 22:11

## 2023-05-24 RX ADMIN — GADOBUTROL 7.7 ML: 604.72 INJECTION INTRAVENOUS at 20:30

## 2023-05-24 RX ADMIN — PANTOPRAZOLE SODIUM 40 MG: 40 TABLET, DELAYED RELEASE ORAL at 15:48

## 2023-05-24 NOTE — PLAN OF CARE
Care Coordination Discharge Plan Note     Discharge Needs Assessment  Concerns to be Addressed: denies needs/concerns at this time, care coordination/care conferences  Current Discharge Risk:      Anticipated Discharge Plan  Anticipated Discharge Disposition: home without assistance or services     Concerns Comments: Per rounds possible dc Friday vs Saturday, pending ERCP/EGD tomorrow vs Friday. Care coordination will continue to follow until discharge.

## 2023-05-24 NOTE — PROGRESS NOTES
Hospital Medicine Service -  Daily Progress Note       SUBJECTIVE   Interval History: She reports intermittent pain that is stabbing in her back and right lower quadrant.  No worsening pain from yesterday.  Currently without nausea or vomiting.  Reports nasal congestion and feels that her lymph nodes are swollen.  Hungary and only taking in small bouts of liquid.  No bowel movement overnight.  Denies fever, chills, headache, dizziness.  Mild abdominal distention bloating.  No dysuria or hematuria but reports darkened urine     OBJECTIVE      Vital signs in last 24 hours:  Temp:  [36.2 °C (97.2 °F)-37.1 °C (98.8 °F)] 36.2 °C (97.2 °F)  Heart Rate:  [61-82] 69  Resp:  [16-19] 18  BP: (104-123)/(53-61) 114/53    Intake/Output Summary (Last 24 hours) at 5/24/2023 1303  Last data filed at 5/24/2023 0002  Gross per 24 hour   Intake 1094.67 ml   Output --   Net 1094.67 ml       PHYSICAL EXAMINATION      Physical Exam    General: No acute distress, AAO x3, sitting in bed  HEENT: Symmetric, PERRL/EOMI, moist membranes, NCAT, no jaundice, no scleral icterus, no adenopathy  Cardiovascular: Regular rate and rhythm, + S1 +S2, no murmurs, rubs, gallops, no JVD   Pulmonary: Clear to auscultation bilaterally, no wheezing, rhonchi, rales, good effort  GI: Soft, nontender, nondistended, bowel sounds normal, no organomegaly, neg hinds, no guarding, no rebound  Musculoskeletal: Normal bulk and tone, normal ROM, mild tenderness over mid back lateral to spin  Extremities: Distal pulses intact, No LE edema bilaterally  Neuro: Cranial nerves II through XII grossly intact by nonfocal exam, sensation intact  Psych: Normal mood, anxious,  congruent affect affect and perception  ,    LINES, CATHETERS, DRAINS, AIRWAYS, AND WOUNDS   Lines, Drains, and Airways:  Wounds (agree with documentation and present on admission):  Peripheral IV (Adult) 05/23/23 Right Antecubital (Active)   Number of days: 1     Comments:      LABS / IMAGING / TELE       Labs  abs  CBC Results       05/24/23 05/23/23 05/20/23     0449 0500 0818    WBC 4.33 6.97 4.06    RBC 3.94 4.41 4.70    HGB 12.2 13.7 14.1    HCT 36.5 40.1 45.1    MCV 92.6 90.9 96.0    MCH 31.0 31.1 30.0    MCHC 33.4 34.2 31.3     216 225        CMP Results       05/24/23 05/24/23 05/23/23     0903 0449 1709     140 139    K 4.0 4.0 3.9    Cl 108 111 104    CO2 23 20 25    Glucose 70 70 98    BUN <5 <5 6    Creatinine 0.6 0.7 0.7    Calcium 9.2 8.9 9.6    Anion Gap 9 9 10     306 656     524 742    Albumin 3.2 3.0 3.6    EGFR >60.0 >60.0 >60.0        Troponin I Results       05/23/23 01/26/23 10/19/22     0500 1643 1936    HS Troponin I 5.0 3.6 5          Imaging  None new  ECG 12 lead   ED Interpretation   NSR @ 66, nml QRS, nml axis, nml St/T      Final Result      MRI ABDOMEN WITH AND WITHOUT CONTRAST MRCP    (Results Pending)       SARS-CoV-2 (COVID-19) (no units)   Date/Time Value   04/05/2023 0537 Negative       ECG/Telemetry  I have independently reviewed the telemetry. No events for the last 24 hours.    ASSESSMENT AND PLAN      * Elevated LFTs  Assessment & Plan  LFTs worsened overnight to  and  but downtrending this morning.   and .   Total bilirubin up to 5.1   CT abdomen pelvis with similar position of CBD stent compared to prior study on 4/3.  No evidence of liver disease, biliary dilation.  CBD 6 mm.  No intrahepatic Biliary dilation.  Pancreas within normal limits.  Recent ERCP with stent exchange on 4/5 due to occluded stent  Given her abdominal pain, associated chills, nausea vomiting, and elevated LFTs, there is concern for recurrent obstruction of the biliary stent versus biliary disease or stent migration, despite CT findings with unchanged biliary stent  Continue to monitor off antibiotics.  No fevers or leukocytosis.  Low threshold to start antibiotics if she develops fever or worsening abdominal pain  Blood cultures no growth  Trend  total and indirect bilirubin twice daily  Trend daily CMP  Keep NPO. May start clears after MRI  Continue NSS 80 mL/hr  Plan for ERCP with EGD tomorrow        History of biliary duct stent placement  Assessment & Plan  See assessment under biliary stricture    Hypomagnesemia  Assessment & Plan  Mild hypomagnesemia likely due to poor oral intake  Now resolved, Mag 1.8  S/p 1 g IV mg on 5/23/23    Biliary stricture  Assessment & Plan  Initially with papillary stenosis and September 2022 status post sphincterotomy and biliary stent placement   Last ERCP on 4/5 for stent replacement due to occlusion  MRCP with and without contrast to rule out underlying biliary stricture pending  Check serum immunoglobulins to r/o IgG4 sclerosing cholangitis (autoimmune cholangiopathy)  Smooth muscle AB neg on 2/1/23. Additional FELISHA and autoimmune work-up negative at that time      Abdominal pain  Assessment & Plan  Epigastric and right upper quadrant pain with radiation to the back over the last week.  Similar to prior episodes of biliary stricture, biliary stent occlusion however worsening nausea vomiting this time.  Abdominal pain stable from yesterday  Suspected biliary disease.  Not consistent with cholangitis at this time  Lipase normal so suggesting against pancreatitis and no evidence of inflammation of the pancreas on CT scan  Oxycodone 5 mg prn pain  Avoid tylenol due to elevated LFTs      Hypokalemia  Assessment & Plan  K normalized  Trend BMP daily and replete as needed       VTE Assessment: Padua    VTE Prophylaxis:  Current anticoagulants:    •None      Code Status: Full Code      Estimated Discharge Date: 5/27/2023     Disposition Planning: Pending ERCP and EGD tomorrow     Will Manzo, DO  5/24/2023

## 2023-05-24 NOTE — CONSULTS
"Brief Nutrition Note    Recommendations      1. When medically able ADAT to Regular diet    Clinical Course: Patient is a 62 y.o. female who was admitted on 5/23/2023 with a diagnosis of Elevated LFTs [R79.89]  Right upper quadrant abdominal pain [R10.11].     Past Medical History:   Diagnosis Date   • Breast cancer (CMS/HCC) 2007   • Crohn's disease (CMS/HCC)    • Dystonia    • Osteomyelitis (CMS/HCC)    • Pancreatitis    • Thyroid nodule    • Tremor      Past Surgical History:   Procedure Laterality Date   • BREAST LUMPECTOMY     • BREAST SURGERY     • CATARACT EXTRACTION     • CYST REMOVAL      throat   • KNEE SURGERY     • TONSILLECTOMY         Reason for Assessment  Reason For Assessment: identified at risk by screening criteria ([facility])     [facility] Nutrition Screen Tool  Has patient lost weight without trying?: 1-->Yes, 2-13 lbs  Has patient been eating poorly due to decreased appetite?: 1-->Yes  [facility] Nutrition Screen Score: 2     Physical Findings  Skin: intact     [id number] Physical Appearance  Skin: intact     Nutrition Order  Nutrition Order: does not meet nutritional requirements  Nutrition Order Comments: NPO     Anthropometrics  Height: 180.3 cm (5' 11\")       Current Weight  Weight Method: Stated  Weight: 77.1 kg (170 lb)     Ideal Body Weight (IBW)  Ideal Body Weight (IBW) (kg): 71.01  % Ideal Body Weight: 108.6     Usual Body Weight (UBW)  Usual Body Weight: 77 kg (169 lb 12.1 oz)      Body Mass Index (BMI)  BMI (Calculated): 23.7     Labs/Procedures/Meds  Lab Results Reviewed: reviewed, pertinent     [email or web address] Lab Results  Lab Results Reviewed: reviewed, pertinent     BMP Results       05/24/23 05/24/23 05/23/23     0903 0449 1709     140 139    K 4.0 4.0 3.9    Cl 108 111 104    CO2 23 20 25    Glucose 70 70 98    BUN <5 <5 6    Creatinine 0.6 0.7 0.7    Calcium 9.2 8.9 9.6    Anion Gap 9 9 10    EGFR >60.0 >60.0 >60.0        LFT:  IU/L,   IU/L, ALP: 325  IU/L, bilirubin 5.1 mg/dL     "   Medications  Pertinent Medications Reviewed: reviewed, pertinent     Protonix     Clinical comments: Pt screened for MST score. Pt reports weight fluctuation from multiple surgical procedures since September. She reports her UBW is 170# and has experienced weight loss from frequent hospitalizations and procedures but is able to gain it back when she is feeling better. When the patient is able to eat, she reports good intake and weight maintenance. Per EMR, pt was 175# in September, weighed 163# in November and was able to gain this weight back when she was feeling better and eating adequately (current weight 170#). Pt does not meet malnutrition criteria at this time. She reports following a low fat diet at home as she can not tolerate high fat foods after her cholecystectomy. She had questions about what diet would be best to support her right now-discussed. No further nutrition questions or concerns.     Goals: Meet >/= 75% of needs via PO intake when diet advances   Monitor: skin, labs, meds, diet advancement, plan of care     Recommendations: See above       Date: 05/24/23  Signature: Prema Layton

## 2023-05-24 NOTE — ASSESSMENT & PLAN NOTE
Ms Clark is a 62 y.o female with pmh of breast cancer (s/p lumpectomy), s/p CCY with biliary ductal dilatation secondary to papillary stenosis, s/p ERCP w/ sphincterotomy (9/2022) c/b post-ERCP pancreatitis and previous serial stent exchanges (last stent exchange with [facility] on 4/5/23) who presents with recurrent RUQ abdominal pain and elevated LFTs. Interventional GI has been consulted for further evaluation and management.     #Recurrent RUQ Abdominal Pain  #Nausea/Vomiting  #Mixed Lhqmgejifeaudo-Rslatyvkdyf-tpkccif liver injury  #Papillary Stenosis (s/p [facility] 4/5/2023) c/b prior stent occlusion  #Hx of Post-ERCP Pancreatitis without complications  #Chronic Abdominal pain (felt 2/2 functional vs IBS)     Impression: Patient presenting with recurrent RUQ abdominal pain, nausea/vomiting and chills. Marked rise in LFTs on 5/23 with , , , T Bili 2.2 w/ D Bili 0.7. Lipase 40. Prior CT Abd/pelvis 5/22 revealing CBD stent in position with mild interval improvement in biliary ductal dilation with expected pneumobilia. Suspect recurrent RUQ pain and biliary symptoms in setting of known papillary stenosis, atlhough the etiology of her papillary stenosis remains unclear. Concern for stent occlusion given her prior hx of a fully occluded stent (back on 4/2023) resulting in symptoms and markedly elevated LFTs. CT imaging with improved biliary ductal dilatation but somewhat discordant with clinical picture given her symptoms and acute rise in her LFTs. Patient would benefit from an MRI/MRCP as well to r/o other biliary strictures, lesions, and r/o underlying occult mass as cause of her recurrent papillary stricture/stenosis. Otherwise, prior autoimmune w/u was (-) and no other obvious insults (ie trauma, chronic pancreatitis, or other hepatobiliary surgery). She has had prior episodes of pancreatitis, however this has only be in the setting after her ERCPs 2/2 PEP. An MRI would be helpful to obtain  further cross-sectional imaging as she has never had one in the past. Otherwise, no other concern for cholangitis or other biliary sepsis in setting of stent occlusion at this time. Would perform an EGD as well given chronicity of her RUQ pain to exclude PUD and ERCP with stent removal.     Recommendations:  - Keep NPO pending MRI, once obtained okay for CLD. NPO at [facility] for EGD/ERCP tomorrow.  - Monitor LFTs with total fractionated bilirubin (both direct and indirect) daily   - Begin Miralax BID  - Monitor blood cultures and observe off IV abx as low suspicion for cholangitis at this time  - Continue empiric IV PPI 40 mg BiD  - F/u on serum immunoglobulins to r/o IgG4 sclerosing cholangitis (autoimmune cholangiopathy). Prior autoimmune w/u with FELISHA autoantibodies along with ASMA (-)  - Plan for EGD and ERCP w/ stent removal tomorrow, 5/25.   - Monitor for fevers, leukocytosis, hypotension, or other concerns for decompensation concerning for cholangitis. Low threshold to start IV abx  - Rest of care per primary team

## 2023-05-24 NOTE — PROGRESS NOTES
Gastroenterology  Daily Progress Note       SUBJECTIVE   Interval History: Pt is AFVSS and without abdominal pain this morning. +constipated. LFTs yesterday rising, with mixed hepatocellular-cholestatic pattern. AM labs pending.      OBJECTIVE      Vital signs in last 24 hours:  Temp:  [36.5 °C (97.7 °F)-37.1 °C (98.8 °F)] 36.6 °C (97.9 °F)  Heart Rate:  [64-82] 67  Resp:  [16-19] 16  BP: (104-123)/(54-61) 121/58    Intake/Output Summary (Last 24 hours) at 5/24/2023 0845  Last data filed at 5/24/2023 0002  Gross per 24 hour   Intake 1194.67 ml   Output --   Net 1194.67 ml       PHYSICAL EXAMINATION      Physical Exam  Constitutional:       General: She is not in acute distress.     Appearance: She is well-developed.   HENT:      Head: Normocephalic and atraumatic.   Eyes:      General: No scleral icterus.     Conjunctiva/sclera: Conjunctivae normal.   Cardiovascular:      Rate and Rhythm: Normal rate and regular rhythm.   Pulmonary:      Effort: Pulmonary effort is normal. No respiratory distress.   Abdominal:      General: There is no distension.      Palpations: Abdomen is soft. There is no mass.      Tenderness: There is no abdominal tenderness. There is no guarding or rebound.      Hernia: No hernia is present.   Lymphadenopathy:      Cervical: No cervical adenopathy.   Skin:     General: Skin is warm and dry.      Findings: No rash.   Neurological:      Mental Status: She is alert and oriented to person, place, and time.   Psychiatric:         Behavior: Behavior normal.         Thought Content: Thought content normal.          LABS / IMAGING / TELE        Labs  Results from last 7 days   Lab Units 05/23/23  1709 05/23/23  0500 05/20/23  0818   SODIUM mEQ/L 139 137 142   POTASSIUM mEQ/L 3.9 3.4* 4.2   CHLORIDE mEQ/L 104 103 106   CO2 mEQ/L 25 25 28   BUN mg/dL 6* 10 9   CREATININE mg/dL 0.7 0.7 0.7   CALCIUM mg/dL 9.6 10.0 10.0   ALBUMIN g/dL 3.6 3.6  3.6 4.0   BILIRUBIN TOTAL mg/dL 4.2* 2.2*  2.2* 1.8*    ALK PHOS IU/L 376* 275*  275* 133*   ALT IU/L 742* 430*  430* 138*   AST IU/L 656* 402*  402* 106*   GLUCOSE mg/dL 98 124* 84     Results from last 7 days   Lab Units 05/23/23  0500   INR  1.0       Results from last 7 days   Lab Units 05/24/23  0449   MAGNESIUM mg/dL 1.8     Results from last 7 days   Lab Units 05/24/23  0449 05/23/23  0500 05/20/23  0818   WBC K/uL 4.33 6.97 4.06   HEMOGLOBIN g/dL 12.2 13.7 14.1   HEMATOCRIT % 36.5 40.1 45.1*   PLATELETS K/uL 171 216 225         Imaging  I have independently reviewed the pertinent imaging from the last 24 hrs.    ASSESSMENT AND PLAN      RUQ abdominal pain  Assessment & Plan  Ms Clark is a 62 y.o female with pmh of breast cancer (s/p lumpectomy), s/p CCY with biliary ductal dilatation secondary to papillary stenosis, s/p ERCP w/ sphincterotomy (9/2022) c/b post-ERCP pancreatitis and previous serial stent exchanges (last stent exchange with [facility] on 4/5/23) who presents with recurrent RUQ abdominal pain and elevated LFTs. Interventional GI has been consulted for further evaluation and management.     #Recurrent RUQ Abdominal Pain  #Nausea/Vomiting  #Mixed Kzarovpmvlhdgo-Tqdrnfbkbvv-nvjmugw liver injury  #Papillary Stenosis (s/p [facility] 4/5/2023) c/b prior stent occlusion  #Hx of Post-ERCP Pancreatitis without complications  #Chronic Abdominal pain (felt 2/2 functional vs IBS)     Impression: Patient presenting with recurrent RUQ abdominal pain, nausea/vomiting and chills. Marked rise in LFTs on 5/23 with , , , T Bili 2.2 w/ D Bili 0.7. Lipase 40. Prior CT Abd/pelvis 5/22 revealing CBD stent in position with mild interval improvement in biliary ductal dilation with expected pneumobilia. Suspect recurrent RUQ pain and biliary symptoms in setting of known papillary stenosis, atlhough the etiology of her papillary stenosis remains unclear. Concern for stent occlusion given her prior hx of a fully occluded stent (back on 4/2023) resulting in  symptoms and markedly elevated LFTs. CT imaging with improved biliary ductal dilatation but somewhat discordant with clinical picture given her symptoms and acute rise in her LFTs. Patient would benefit from an MRI/MRCP as well to r/o other biliary strictures, lesions, and r/o underlying occult mass as cause of her recurrent papillary stricture/stenosis. Otherwise, prior autoimmune w/u was (-) and no other obvious insults (ie trauma, chronic pancreatitis, or other hepatobiliary surgery). She has had prior episodes of pancreatitis, however this has only be in the setting after her ERCPs 2/2 PEP. An MRI would be helpful to obtain further cross-sectional imaging as she has never had one in the past. Otherwise, no other concern for cholangitis or other biliary sepsis in setting of stent occlusion at this time. Would perform an EGD as well given chronicity of her RUQ pain to exclude PUD and ERCP with stent removal.     Recommendations:  - Keep NPO pending MRI, once obtained okay for CLD. NPO at [facility] for EGD/ERCP tomorrow.  - Monitor LFTs with total fractionated bilirubin (both direct and indirect) daily   - Begin Miralax BID  - Monitor blood cultures and observe off IV abx as low suspicion for cholangitis at this time  - Continue empiric IV PPI 40 mg BiD  - F/u on serum immunoglobulins to r/o IgG4 sclerosing cholangitis (autoimmune cholangiopathy). Prior autoimmune w/u with FELISHA autoantibodies along with ASMA (-)  - Plan for EGD and ERCP w/ stent removal tomorrow, 5/25.   - Monitor for fevers, leukocytosis, hypotension, or other concerns for decompensation concerning for cholangitis. Low threshold to start IV abx  - Rest of care per primary team             VTE Assessment: Padua    Code Status: Full Code  Estimated discharge date: 5/25/2023     Marina Lockwood PA-C  Interventional Gastroenterology  [facility], [email or web address]  [address] [place] [address]  Office: [phone]  Pager: [phone]

## 2023-05-25 LAB
ALBUMIN SERPL-MCNC: 3.1 G/DL (ref 3.4–5)
ALP SERPL-CCNC: 294 IU/L (ref 35–126)
ALT SERPL-CCNC: 303 IU/L (ref 11–54)
ANION GAP SERPL CALC-SCNC: 9 MEQ/L (ref 3–15)
AST SERPL-CCNC: 93 IU/L (ref 15–41)
BILIRUB DIRECT SERPL-MCNC: 0.5 MG/DL
BILIRUB SERPL-MCNC: 2.4 MG/DL (ref 0.3–1.2)
BUN SERPL-MCNC: 5 MG/DL (ref 8–20)
CALCIUM SERPL-MCNC: 9.1 MG/DL (ref 8.9–10.3)
CHLORIDE SERPL-SCNC: 105 MEQ/L (ref 98–109)
CO2 SERPL-SCNC: 23 MEQ/L (ref 22–32)
CREAT SERPL-MCNC: 0.7 MG/DL (ref 0.6–1.1)
ERYTHROCYTE [DISTWIDTH] IN BLOOD BY AUTOMATED COUNT: 12.3 % (ref 11.7–14.4)
GFR SERPL CREATININE-BSD FRML MDRD: >60 ML/MIN/1.73M*2
GLUCOSE SERPL-MCNC: 98 MG/DL (ref 70–99)
HCT VFR BLDCO AUTO: 37.8 % (ref 35–45)
HGB BLD-MCNC: 12.6 G/DL (ref 11.8–15.7)
MAGNESIUM SERPL-MCNC: 1.7 MG/DL (ref 1.8–2.5)
MCH RBC QN AUTO: 31.7 PG (ref 28–33.2)
MCHC RBC AUTO-ENTMCNC: 33.3 G/DL (ref 32.2–35.5)
MCV RBC AUTO: 95.2 FL (ref 83–98)
PDW BLD AUTO: 11.1 FL (ref 9.4–12.3)
PLATELET # BLD AUTO: 180 K/UL (ref 150–369)
POTASSIUM SERPL-SCNC: 4.2 MEQ/L (ref 3.6–5.1)
PROT SERPL-MCNC: 5.6 G/DL (ref 6–8.2)
RBC # BLD AUTO: 3.97 M/UL (ref 3.93–5.22)
SODIUM SERPL-SCNC: 137 MEQ/L (ref 136–144)
WBC # BLD AUTO: 4.46 K/UL (ref 3.8–10.5)

## 2023-05-25 PROCEDURE — 75000092 HC ERCP FB REMOVE DUCT: Performed by: INTERNAL MEDICINE

## 2023-05-25 PROCEDURE — 85027 COMPLETE CBC AUTOMATED: CPT | Performed by: HOSPITALIST

## 2023-05-25 PROCEDURE — 83735 ASSAY OF MAGNESIUM: CPT | Performed by: HOSPITALIST

## 2023-05-25 PROCEDURE — 80048 BASIC METABOLIC PNL TOTAL CA: CPT | Performed by: HOSPITALIST

## 2023-05-25 PROCEDURE — 21400000 HC ROOM AND CARE CCU/INTERMEDIATE

## 2023-05-25 PROCEDURE — 80076 HEPATIC FUNCTION PANEL: CPT | Performed by: HOSPITALIST

## 2023-05-25 PROCEDURE — 63700000 HC SELF-ADMINISTRABLE DRUG: Performed by: HOSPITALIST

## 2023-05-25 PROCEDURE — 75000060 HC EGD BIOPSY: Performed by: INTERNAL MEDICINE

## 2023-05-25 PROCEDURE — 36415 COLL VENOUS BLD VENIPUNCTURE: CPT | Performed by: HOSPITALIST

## 2023-05-25 PROCEDURE — 99233 SBSQ HOSP IP/OBS HIGH 50: CPT | Performed by: HOSPITALIST

## 2023-05-25 RX ORDER — LANOLIN ALCOHOL/MO/W.PET/CERES
400 CREAM (GRAM) TOPICAL DAILY
Status: DISCONTINUED | OUTPATIENT
Start: 2023-05-25 | End: 2023-05-27 | Stop reason: HOSPADM

## 2023-05-25 RX ORDER — SENNOSIDES 8.6 MG/1
1 TABLET ORAL 2 TIMES DAILY
Status: DISCONTINUED | OUTPATIENT
Start: 2023-05-25 | End: 2023-05-27 | Stop reason: HOSPADM

## 2023-05-25 RX ADMIN — Medication 400 MG: at 21:13

## 2023-05-25 RX ADMIN — PANTOPRAZOLE SODIUM 40 MG: 40 TABLET, DELAYED RELEASE ORAL at 08:00

## 2023-05-25 RX ADMIN — PANTOPRAZOLE SODIUM 40 MG: 40 TABLET, DELAYED RELEASE ORAL at 21:13

## 2023-05-25 NOTE — PLAN OF CARE
Plan of Care Review  Plan of Care Reviewed With: patient  Progress: improving  Outcome Evaluation: PT went for ERCP and tolerated well. Clear liquid diet tolerated well. Sinus rhythm, sinus sherita on the monitor. All VSS.

## 2023-05-25 NOTE — PROGRESS NOTES
Hospital Medicine Service -  Daily Progress Note       SUBJECTIVE   Interval History: No acute events overnight.  She has occasional sharp stabbing pain over her right lower quadrant that radiates to her back.  This only last for a few seconds.  She has no nausea vomiting.  After MRI yesterday her sinus congestion improved and she had a mild nosebleed over her left nare.  No cough congestion.  Feels swollen glands in her neck.  Denies chest pain, shortness of breath, dizziness, palpitations, lower extremity edema.  No bowel movements while admitted.     OBJECTIVE      Vital signs in last 24 hours:  Temp:  [36.6 °C (97.9 °F)-37 °C (98.6 °F)] 36.7 °C (98 °F)  Heart Rate:  [52-91] 57  Resp:  [16-18] 18  BP: (115-143)/(58-77) 119/58  No intake or output data in the 24 hours ending 05/25/23 1246    PHYSICAL EXAMINATION      Physical Exam    General: No acute distress, AAOx3, sitting in bed, pleasant  HEENT: Symmetric, PERRL/EOMI, moist membranes, NCAT, no jaundice, no scleral icterus, no adenopathy  Cardiovascular: Regular rate and rhythm, + S1 +S2, no murmurs, rubs, gallops, no JVD   Pulmonary: Clear to auscultation bilaterally, no wheezing, rhonchi, rales, good effort  GI: Soft, nontender, nondistended, bowel sounds normal, no organomegaly, neg hinds, no guarding, no rebound  Musculoskeletal: Normal bulk and tone, normal ROM, no tenderness of spine extremities: Distal pulses intact, No LE edema bilaterally  Neuro: Cranial nerves II through XII grossly intact by nonfocal exam, sensation intact  Psych: Normal mood, anxious,  congruent affect affect and perception   LINES, CATHETERS, DRAINS, AIRWAYS, AND WOUNDS   Lines, Drains, and Airways:  Wounds (agree with documentation and present on admission):  Peripheral IV (Adult) 05/23/23 Right Antecubital (Active)   Number of days: 2         Comments:      LABS / IMAGING / TELE      Labs  abs  CBC Results       05/25/23 05/24/23 05/23/23     0427 0449 0500    WBC 4.46 4.33  6.97    RBC 3.97 3.94 4.41    HGB 12.6 12.2 13.7    HCT 37.8 36.5 40.1    MCV 95.2 92.6 90.9    MCH 31.7 31.0 31.1    MCHC 33.3 33.4 34.2     171 216        CMP Results       05/24/23 05/24/23 05/24/23     1833 0903 0449     140 140    K 4.1 4.0 4.0    Cl 108 108 111    CO2 21 23 20    Glucose 69 70 70    BUN 6 <5 <5    Creatinine 0.7 0.6 0.7    Calcium 9.1 9.2 8.9    Anion Gap 8 9 9     284 306     518 524    Albumin 3.3 3.2 3.0    EGFR >60.0 >60.0 >60.0        Troponin I Results       05/23/23 01/26/23 10/19/22     0500 1643 1936    HS Troponin I 5.0 3.6 5          Imaging  MRI ABDOMEN WITH AND WITHOUT CONTRAST MRCP   Final Result   IMPRESSION:   1. Difficult evaluation due to susceptibility artifact from metallic biliary   stent and pneumobilia. Similar mild biliary ductal dilatation without definite   choledocholithiasis. Abrupt cutoff at rob hepatis/confluence right and left   main ducts on MRCP sequences is likely due to beginning of metallic stent,   though stricture may have a similar appearance. No associated soft tissue   biliary mass or obstructing periampullary mass. ERCP/EUS could be obtained for   further evaluation.   2. Scattered subcentimeter pancreatic cysts without suspicious features, either   retention cysts or side branch intraductal papillary mucinous neoplasms.   Recommend contrast enhanced MRI with MRCP in one year.   3. Mild hepatic steatosis.      Actionable Finding: Follow up should be considered.      ECG 12 lead   ED Interpretation   NSR @ 66, nml QRS, nml axis, nml St/T      Final Result          SARS-CoV-2 (COVID-19) (no units)   Date/Time Value   04/05/2023 0537 Negative       ECG/Telemetry  I have independently reviewed the telemetry. No events for the last 24 hours.  Mild sinus bradycardia noted.     ASSESSMENT AND PLAN      * Elevated LFTs  Assessment & Plan  LFTs mildly downtrending over the last 24 hours.  , , bilirubin 3.7 from 5.1,  direct bilirubin 0.9, alk phos 326, Total bilirubin up to 5.1   CT abdomen pelvis with similar position of CBD stent compared to prior study on 4/3.  No evidence of liver disease, biliary dilation.  CBD 6 mm.  No intrahepatic Biliary dilation.  Pancreas within normal limits.  MRCP 5/24 -limited due to artifact from biliary stent, mild biliary dilation without choledocholithiasis.  Possible stricture versus artifact of the left main duct, no associated soft tissue masses.  Noted to have pancreatic subcentimeter cysts without suspicious features.  MRI with MRCP in 1 year suggested  GI planning ERCP with EGD today  Remains afebrile, no leukocytosis, no fevers.  Abdominal pain improved.  No further nausea vomiting  Keep n.p.o. prior to procedure.  Tolerated clear liquid diet yesterday.  Blood cultures no growth  CMP twice daily  Continue NSS 80 mL/hr        History of biliary duct stent placement  Assessment & Plan  See assessment under biliary stricture    Hypomagnesemia  Assessment & Plan  Mild hypomagnesemia likely due to poor oral intake  Magnesium 1.7 today  S/p 1 g IV mg on 5/23/23  We will give p.o. magnesium 400 mg    Biliary stricture  Assessment & Plan  Initially with papillary stenosis and September 2022 status post sphincterotomy and biliary stent placement   Last ERCP on 4/5 for stent replacement due to occlusion  MRCP with and without contrast limited due to stent artifact but did not show worsening biliary stenosis   serum immunoglobulins pending to r/o IgG4 sclerosing cholangitis (autoimmune cholangiopathy)  Smooth muscle AB neg on 2/1/23. Additional FELISHA and autoimmune work-up negative at that time      Abdominal pain  Assessment & Plan  Epigastric and right upper quadrant pain with radiation to the back over the last week.  Similar to prior episodes of biliary stricture, biliary stent occlusion however worsening nausea vomiting this time.   Exam not consistent with cholangitis at this time  Monitoring  off antibiotics  Lipase normal so suggesting against pancreatitis and no evidence of inflammation of the pancreas on CT scan  Oxycodone 5 mg prn pain  Avoid tylenol due to elevated LFTs  Continue Miralax daily, add senna 1 mg BID    Hypokalemia  Assessment & Plan  K stable  Trend BMP daily and replete as needed       VTE Assessment: Padua    VTE Prophylaxis:  Current anticoagulants:    •None      Code Status: Full Code      Estimated Discharge Date: 5/26/2023     Disposition Planning: Possible discharge tomorrow if LFTs continue to improved, pending outcome of ERCP/EGD     Will Manzo, DO  5/25/2023

## 2023-05-26 ENCOUNTER — ANESTHESIA (INPATIENT)
Dept: ENDOSCOPY | Facility: HOSPITAL | Age: 63
DRG: 920 | End: 2023-05-26
Payer: COMMERCIAL

## 2023-05-26 ENCOUNTER — ANESTHESIA EVENT (INPATIENT)
Dept: ENDOSCOPY | Facility: HOSPITAL | Age: 63
DRG: 920 | End: 2023-05-26
Payer: COMMERCIAL

## 2023-05-26 LAB
ALBUMIN SERPL-MCNC: 3.2 G/DL (ref 3.4–5)
ALBUMIN SERPL-MCNC: 3.3 G/DL (ref 3.4–5)
ALP SERPL-CCNC: 258 IU/L (ref 35–126)
ALP SERPL-CCNC: 279 IU/L (ref 35–126)
ALT SERPL-CCNC: 216 IU/L (ref 11–54)
ALT SERPL-CCNC: 255 IU/L (ref 11–54)
ANION GAP SERPL CALC-SCNC: 12 MEQ/L (ref 3–15)
ANION GAP SERPL CALC-SCNC: 8 MEQ/L (ref 3–15)
AST SERPL-CCNC: 52 IU/L (ref 15–41)
AST SERPL-CCNC: 64 IU/L (ref 15–41)
BILIRUB DIRECT SERPL-MCNC: 0.4 MG/DL
BILIRUB DIRECT SERPL-MCNC: 0.4 MG/DL
BILIRUB SERPL-MCNC: 1.8 MG/DL (ref 0.3–1.2)
BILIRUB SERPL-MCNC: 2.5 MG/DL (ref 0.3–1.2)
BUN SERPL-MCNC: <5 MG/DL (ref 8–20)
BUN SERPL-MCNC: <5 MG/DL (ref 8–20)
CALCIUM SERPL-MCNC: 9.2 MG/DL (ref 8.9–10.3)
CALCIUM SERPL-MCNC: 9.4 MG/DL (ref 8.9–10.3)
CHLORIDE SERPL-SCNC: 106 MEQ/L (ref 98–109)
CHLORIDE SERPL-SCNC: 106 MEQ/L (ref 98–109)
CO2 SERPL-SCNC: 22 MEQ/L (ref 22–32)
CO2 SERPL-SCNC: 24 MEQ/L (ref 22–32)
CREAT SERPL-MCNC: 0.6 MG/DL (ref 0.6–1.1)
CREAT SERPL-MCNC: 0.6 MG/DL (ref 0.6–1.1)
ERYTHROCYTE [DISTWIDTH] IN BLOOD BY AUTOMATED COUNT: 12.2 % (ref 11.7–14.4)
GFR SERPL CREATININE-BSD FRML MDRD: >60 ML/MIN/1.73M*2
GFR SERPL CREATININE-BSD FRML MDRD: >60 ML/MIN/1.73M*2
GLUCOSE SERPL-MCNC: 147 MG/DL (ref 70–99)
GLUCOSE SERPL-MCNC: 89 MG/DL (ref 70–99)
HCT VFR BLDCO AUTO: 40.6 % (ref 35–45)
HGB BLD-MCNC: 13.7 G/DL (ref 11.8–15.7)
MAGNESIUM SERPL-MCNC: 1.8 MG/DL (ref 1.8–2.5)
MCH RBC QN AUTO: 31.4 PG (ref 28–33.2)
MCHC RBC AUTO-ENTMCNC: 33.7 G/DL (ref 32.2–35.5)
MCV RBC AUTO: 93.1 FL (ref 83–98)
PDW BLD AUTO: 10.9 FL (ref 9.4–12.3)
PLATELET # BLD AUTO: 217 K/UL (ref 150–369)
POTASSIUM SERPL-SCNC: 3.8 MEQ/L (ref 3.6–5.1)
POTASSIUM SERPL-SCNC: 4.8 MEQ/L (ref 3.6–5.1)
PROT SERPL-MCNC: 5.3 G/DL (ref 6–8.2)
PROT SERPL-MCNC: 6 G/DL (ref 6–8.2)
RBC # BLD AUTO: 4.36 M/UL (ref 3.93–5.22)
SODIUM SERPL-SCNC: 138 MEQ/L (ref 136–144)
SODIUM SERPL-SCNC: 140 MEQ/L (ref 136–144)
WBC # BLD AUTO: 3.38 K/UL (ref 3.8–10.5)

## 2023-05-26 PROCEDURE — BF101ZZ FLUOROSCOPY OF BILE DUCTS USING LOW OSMOLAR CONTRAST: ICD-10-PCS | Performed by: INTERNAL MEDICINE

## 2023-05-26 PROCEDURE — 21400000 HC ROOM AND CARE CCU/INTERMEDIATE

## 2023-05-26 PROCEDURE — 71000011 HC PACU PHASE 1 EA ADDL MIN: Performed by: INTERNAL MEDICINE

## 2023-05-26 PROCEDURE — 80048 BASIC METABOLIC PNL TOTAL CA: CPT | Performed by: HOSPITALIST

## 2023-05-26 PROCEDURE — 43260 ERCP W/SPECIMEN COLLECTION: CPT | Performed by: INTERNAL MEDICINE

## 2023-05-26 PROCEDURE — 25000000 HC PHARMACY GENERAL: Performed by: NURSE ANESTHETIST, CERTIFIED REGISTERED

## 2023-05-26 PROCEDURE — 85027 COMPLETE CBC AUTOMATED: CPT | Performed by: HOSPITALIST

## 2023-05-26 PROCEDURE — 63700000 HC SELF-ADMINISTRABLE DRUG: Performed by: STUDENT IN AN ORGANIZED HEALTH CARE EDUCATION/TRAINING PROGRAM

## 2023-05-26 PROCEDURE — C1727 CATH, BAL TIS DIS, NON-VAS: HCPCS | Performed by: INTERNAL MEDICINE

## 2023-05-26 PROCEDURE — 0FPB8DZ REMOVAL OF INTRALUMINAL DEVICE FROM HEPATOBILIARY DUCT, VIA NATURAL OR ARTIFICIAL OPENING ENDOSCOPIC: ICD-10-PCS | Performed by: INTERNAL MEDICINE

## 2023-05-26 PROCEDURE — 80076 HEPATIC FUNCTION PANEL: CPT | Performed by: HOSPITALIST

## 2023-05-26 PROCEDURE — 99233 SBSQ HOSP IP/OBS HIGH 50: CPT | Performed by: HOSPITALIST

## 2023-05-26 PROCEDURE — 36415 COLL VENOUS BLD VENIPUNCTURE: CPT | Performed by: HOSPITALIST

## 2023-05-26 PROCEDURE — 25800000 HC PHARMACY IV SOLUTIONS: Performed by: NURSE ANESTHETIST, CERTIFIED REGISTERED

## 2023-05-26 PROCEDURE — 63700000 HC SELF-ADMINISTRABLE DRUG: Performed by: HOSPITALIST

## 2023-05-26 PROCEDURE — 63600000 HC DRUGS/DETAIL CODE: Performed by: NURSE ANESTHETIST, CERTIFIED REGISTERED

## 2023-05-26 PROCEDURE — 63600000 HC DRUGS/DETAIL CODE: Mod: JW | Performed by: NURSE ANESTHETIST, CERTIFIED REGISTERED

## 2023-05-26 PROCEDURE — 75000096 HC ERCP W WO BRUSH WASH: Performed by: INTERNAL MEDICINE

## 2023-05-26 PROCEDURE — 37000001 HC ANESTHESIA GENERAL: Performed by: INTERNAL MEDICINE

## 2023-05-26 PROCEDURE — 71000001 HC PACU PHASE 1 INITIAL 30MIN: Performed by: INTERNAL MEDICINE

## 2023-05-26 PROCEDURE — 83735 ASSAY OF MAGNESIUM: CPT | Performed by: HOSPITALIST

## 2023-05-26 RX ORDER — MIDAZOLAM HYDROCHLORIDE 2 MG/2ML
INJECTION, SOLUTION INTRAMUSCULAR; INTRAVENOUS AS NEEDED
Status: DISCONTINUED | OUTPATIENT
Start: 2023-05-26 | End: 2023-05-26 | Stop reason: SURG

## 2023-05-26 RX ORDER — ONDANSETRON HYDROCHLORIDE 2 MG/ML
4 INJECTION, SOLUTION INTRAVENOUS
Status: CANCELLED | OUTPATIENT
Start: 2023-05-26

## 2023-05-26 RX ORDER — FENTANYL CITRATE 50 UG/ML
INJECTION, SOLUTION INTRAMUSCULAR; INTRAVENOUS AS NEEDED
Status: DISCONTINUED | OUTPATIENT
Start: 2023-05-26 | End: 2023-05-26 | Stop reason: SURG

## 2023-05-26 RX ORDER — IBUPROFEN 200 MG
16-32 TABLET ORAL AS NEEDED
Status: CANCELLED | OUTPATIENT
Start: 2023-05-26

## 2023-05-26 RX ORDER — PROPOFOL 10 MG/ML
INJECTION, EMULSION INTRAVENOUS AS NEEDED
Status: DISCONTINUED | OUTPATIENT
Start: 2023-05-26 | End: 2023-05-26 | Stop reason: SURG

## 2023-05-26 RX ORDER — SODIUM CHLORIDE 9 MG/ML
INJECTION, SOLUTION INTRAVENOUS CONTINUOUS PRN
Status: DISCONTINUED | OUTPATIENT
Start: 2023-05-26 | End: 2023-05-26 | Stop reason: SURG

## 2023-05-26 RX ORDER — DEXTROSE 50 % IN WATER (D50W) INTRAVENOUS SYRINGE
25 AS NEEDED
Status: CANCELLED | OUTPATIENT
Start: 2023-05-26

## 2023-05-26 RX ORDER — LIDOCAINE HYDROCHLORIDE 10 MG/ML
INJECTION, SOLUTION INFILTRATION; PERINEURAL AS NEEDED
Status: DISCONTINUED | OUTPATIENT
Start: 2023-05-26 | End: 2023-05-26 | Stop reason: SURG

## 2023-05-26 RX ORDER — ONDANSETRON HYDROCHLORIDE 2 MG/ML
INJECTION, SOLUTION INTRAVENOUS AS NEEDED
Status: DISCONTINUED | OUTPATIENT
Start: 2023-05-26 | End: 2023-05-26 | Stop reason: SURG

## 2023-05-26 RX ORDER — ROCURONIUM BROMIDE 10 MG/ML
INJECTION, SOLUTION INTRAVENOUS AS NEEDED
Status: DISCONTINUED | OUTPATIENT
Start: 2023-05-26 | End: 2023-05-26 | Stop reason: SURG

## 2023-05-26 RX ORDER — SCOPOLAMINE 1 MG/3D
1 PATCH, EXTENDED RELEASE TRANSDERMAL
Status: DISCONTINUED | OUTPATIENT
Start: 2023-05-26 | End: 2023-05-27 | Stop reason: HOSPADM

## 2023-05-26 RX ORDER — DOCUSATE SODIUM 100 MG/1
100 CAPSULE, LIQUID FILLED ORAL 2 TIMES DAILY
Status: DISCONTINUED | OUTPATIENT
Start: 2023-05-26 | End: 2023-05-27 | Stop reason: HOSPADM

## 2023-05-26 RX ORDER — DEXTROSE 40 %
15-30 GEL (GRAM) ORAL AS NEEDED
Status: CANCELLED | OUTPATIENT
Start: 2023-05-26

## 2023-05-26 RX ADMIN — SCOPOLAMINE 1 PATCH: 1.5 PATCH, EXTENDED RELEASE TRANSDERMAL at 16:10

## 2023-05-26 RX ADMIN — SENNOSIDES 1 TABLET: 8.6 TABLET, FILM COATED ORAL at 09:23

## 2023-05-26 RX ADMIN — MIDAZOLAM HYDROCHLORIDE 2 MG: 1 INJECTION, SOLUTION INTRAMUSCULAR; INTRAVENOUS at 17:16

## 2023-05-26 RX ADMIN — OXYCODONE HYDROCHLORIDE 5 MG: 5 TABLET ORAL at 21:22

## 2023-05-26 RX ADMIN — SENNOSIDES 1 TABLET: 8.6 TABLET, FILM COATED ORAL at 21:22

## 2023-05-26 RX ADMIN — Medication 400 MG: at 09:22

## 2023-05-26 RX ADMIN — ROCURONIUM BROMIDE 50 MG: 10 INJECTION, SOLUTION INTRAVENOUS at 17:23

## 2023-05-26 RX ADMIN — SUGAMMADEX 200 MG: 100 INJECTION, SOLUTION INTRAVENOUS at 17:52

## 2023-05-26 RX ADMIN — PROPOFOL 25 MCG/KG/MIN: 10 INJECTION, EMULSION INTRAVENOUS at 17:24

## 2023-05-26 RX ADMIN — PROPOFOL 100 MG: 10 INJECTION, EMULSION INTRAVENOUS at 17:23

## 2023-05-26 RX ADMIN — DOCUSATE SODIUM 100 MG: 100 CAPSULE, LIQUID FILLED ORAL at 21:22

## 2023-05-26 RX ADMIN — PANTOPRAZOLE SODIUM 40 MG: 40 TABLET, DELAYED RELEASE ORAL at 09:22

## 2023-05-26 RX ADMIN — SODIUM CHLORIDE: 9 INJECTION, SOLUTION INTRAVENOUS at 17:16

## 2023-05-26 RX ADMIN — ONDANSETRON HYDROCHLORIDE 4 MG: 2 SOLUTION INTRAMUSCULAR; INTRAVENOUS at 17:23

## 2023-05-26 RX ADMIN — FENTANYL CITRATE 100 MCG: 50 INJECTION, SOLUTION INTRAMUSCULAR; INTRAVENOUS at 17:23

## 2023-05-26 RX ADMIN — LIDOCAINE HYDROCHLORIDE 5 ML: 10 INJECTION, SOLUTION INFILTRATION; PERINEURAL at 17:23

## 2023-05-26 ASSESSMENT — PAIN SCALES - GENERAL: PAIN_LEVEL: 1

## 2023-05-26 ASSESSMENT — ENCOUNTER SYMPTOMS: SHORTNESS OF BREATH: 1

## 2023-05-26 NOTE — PLAN OF CARE
Care Coordination Discharge Plan Note     Discharge Needs Assessment  Concerns to be Addressed: denies needs/concerns at this time, care coordination/care conferences  Current Discharge Risk:      Anticipated Discharge Plan  Anticipated Discharge Disposition: home without assistance or services     Concerns Comments: Per rounds possible discharge tomorrow, pending ERCP and diet tolerance. Care coordination to continue following until discharge.

## 2023-05-26 NOTE — PLAN OF CARE
Plan of Care Review  Plan of Care Reviewed With: patient  Progress: improving  Outcome Evaluation: Pt frsutrated with cancellation of procedure 5/25. Clear liquids given and tolerated well but pt expressing desire to progress diet once procedure is complete. All VSS.

## 2023-05-26 NOTE — ANESTHESIA PREPROCEDURE EVALUATION
Relevant Problems   GASTROINTESTINAL   (+) Gastrointestinal hemorrhage      RESPIRATORY SYSTEM   (+) Shortness of breath      URINARY SYSTEM   (+) Hypokalemia   (+) Hypomagnesemia   (+) Hyponatremia      Other   (+) COVID-19   (+) Lyme disease   (+) Osteomyelitis of lower leg (CMS/HCC)   (+) Small intestinal bacterial overgrowth (SIBO)     62 y.o. female with a past medical history of biliary stenting secondary to papillary stenosis, ERCP with stent exchange 5/23, post APC pancreatitis, breast cancer, Crohn's disease who presented to the ED with 1 week of worsening right upper quadrant and epigastric pain.  She has not eaten well over the last week and attempted to eat last night and then developed very nauseous 34 hours later with 3 episodes of vomiting.  Since her last ERCP in early April, she has had intermittent epigastric tenderness after meals.  She reports chills over the last week.  She not been able to take her medications over the last few days due to poor oral intake.     Anesthesia ROS/MED HX      Pulmonary    Shortness of breath  Cardiovascular   Stress test reviewed and ECG reviewed  Abnormal ECG  GI/Hepatic   inflammatory bowel disease (Crohn's)   GI Bleeding  Musculoskeletal   Arthritis  Endo/Other  History of cancer and breast cancer  ROS/MED HX Comments:    Pulmonary: CT Chest (9/22/2022):  -No evidence of pulmonary embolism.  -Small left pleural effusion.  -Tiny 2 mm right peripheral visual nodule.   Cardiology: Stress Echo (11/25/2022):  •  No ST deviation was noted. Arrhythmias during stress: rare PVCs. There were no arrhythmias during recovery. The stress ECG showed no evidence of ischemia.   •  The resting left ventricular ejection fraction was 60%.   •  At peak stress, the LVEF increased normally and no new wall motion abnormalities were observed.   •  Stress echocardiogram negative for ischemia.     CT Coronary (11/4/2022):  1. Total calcium score is 140.8 which is considered moderately  increased risk for cardiovascular disease.  2. The percentile rank for the patient is between 90th and 95th% as compared to others of the same age, gender and race/ethnicity.    ECG: EKG (5/23/2023):  Normal sinus rhythm   Cannot rule out Septal infarct , age undetermined   Abnormal ECG   When compared with ECG of 03-APR-2023 16:39,   Septal infarct is now Present    GI/Hepatic/Renal: MRI Abdomen (5/24/2023):  1. Difficult evaluation due to susceptibility artifact from metallic biliary stent and pneumobilia. Similar mild biliary ductal dilatation without definite choledocholithiasis. Abrupt cutoff at rob hepatis/confluence right and left main ducts on MRCP sequences is likely due to beginning of metallic stent, though stricture may have a similar appearance. No associated soft tissue biliary mass or obstructing periampullary mass.  2. Scattered subcentimeter pancreatic cysts without suspicious features, either retention cysts or side branch intraductal papillary mucinous neoplasms.  3. Mild hepatic steatosis.   Endo: No results found for: HGBA1C   Hematological: Lab Results       Component                Value               Date                       INR                      1.0                 05/23/2023                 INR                      1.1                 04/05/2023                 INR                      0.9                 10/19/2022            Lab Results       Component                Value               Date                       WBC                      3.38 (L)            05/26/2023                 HGB                      13.7                05/26/2023                 HCT                      40.6                05/26/2023                 MCV                      93.1                05/26/2023                 PLT                      217                 05/26/2023               Renal Disease:   Chemistry           Component                Value               Date/Time                  NA                        140                 05/26/2023 1002            K                        4.8                 05/26/2023 1002            CL                       106                 05/26/2023 1002            CO2                      22                  05/26/2023 1002            BUN                      <5 (L)              05/26/2023 1002            CREATININE               0.6                 05/26/2023 1002              Component                Value               Date/Time                  CALCIUM                  9.4                 05/26/2023 1002            ALKPHOS                  279 (H)             05/26/2023 1002            AST                      64 (H)              05/26/2023 1002            ALT                      255 (H)             05/26/2023 1002            BILITOT                  2.5 (H)             05/26/2023 1002              Past Surgical History:   Procedure Laterality Date   • BREAST LUMPECTOMY     • BREAST SURGERY     • CATARACT EXTRACTION     • CYST REMOVAL      throat   • KNEE SURGERY     • TONSILLECTOMY         Physical Exam    Airway   Mallampati: III   TM distance: >3 FB   Neck ROM: full  Cardiovascular    Rhythm: regular   Rate: normalPulmonary    Decreased breath sounds    Anesthesia  Exam Comments:   Exam Airway: Intubation (4/5/2023):  Mask difficulty assessment: 1 - vent by mask   Final Airway Details   Final airway type: endotracheal airway   Successful airway: ETT   Cuffed: yes   Successful intubation technique: video laryngoscopy   Facilitating devices/methods: intubating stylet   Endotracheal tube insertion site: oral   Blade: Primitivo   Blade size: #3   ETT size (mm): 7.0   Cormack-Lehane Classification: grade I - full view of glottis   Placement verified by: chest auscultation and capnometry   Measured from: lips   ETT to lips (cm): 22   Number of attempts at approach: 1           CBC Results       05/25/23 05/24/23 05/23/23     0427 0449 0500    WBC 4.46 4.33 6.97    RBC 3.97  3.94 4.41    HGB 12.6 12.2 13.7    HCT 37.8 36.5 40.1    MCV 95.2 92.6 90.9    MCH 31.7 31.0 31.1    MCHC 33.3 33.4 34.2     171 216        CMP Results       05/24/23 05/24/23 05/24/23     1833 0903 0449     140 140    K 4.1 4.0 4.0    Cl 108 108 111    CO2 21 23 20    Glucose 69 70 70    BUN 6 <5 <5    Creatinine 0.7 0.6 0.7    Calcium 9.1 9.2 8.9    Anion Gap 8 9 9     284 306     518 524    Albumin 3.3 3.2 3.0    EGFR >60.0 >60.0 >60.0          Anesthesia Plan    Plan: general    Technique: general endotracheal     Lines and Monitors: PIV     Airway: oral intubation and video laryngoscope   3 ASA  Induction:    intravenous   Postop Plan:    Trial extubation   Patient Disposition: inpatient floor planned admission   Pain Management: IV analgesics

## 2023-05-26 NOTE — ANESTHESIA PROCEDURE NOTES
Airway  Urgency: elective    Start Time: 5/26/2023 5:25 PM  Airway not difficult    General Information and Staff    Patient location during procedure: OR  Anesthesiologist: Itz Purvis MD  Resident/CRNA: Thompson Kang CRNA  Performed: resident/CRNA   Performed by: Thompson Kang CRNA  Authorized by: Itz Purvis MD      Indications and Patient Condition  Indications for airway management: anesthesia  Sedation level: general  Preoxygenated: yes  Patient position: sniffing  MILS maintained throughout  Mask difficulty assessment: 1 - vent by mask    Final Airway Details  Final airway type: endotracheal airway      Successful airway: ETT  Cuffed: yes   Successful intubation technique: video laryngoscopy  Facilitating devices/methods: intubating stylet  Endotracheal tube insertion site: oral  Blade: Primitivo  Blade size: #3  ETT size (mm): 7.0  Cormack-Lehane Classification: grade I - full view of glottis  Placement verified by: chest auscultation and capnometry   Measured from: lips  ETT to lips (cm): 23  Number of attempts at approach: 1  Number of other approaches attempted: 0  Atraumatic airway insertion

## 2023-05-26 NOTE — ANESTHESIA POSTPROCEDURE EVALUATION
Patient: Sowmya Clark    Procedure Summary     Date: 05/26/23 Room / Location: [facility] (E) / [facility]    Anesthesia Start: 1716 Anesthesia Stop: 1802    Procedure: TX ERCP DX COLLECTION SPECIMEN BRUSHING/WASHING [17187 (CPT®)] (Esophagus) Diagnosis:       Elevated LFTs      Biliary stricture      History of biliary duct stent placement      (Elevated LFTs [R79.89])      (Biliary stricture [K83.1])      (History of biliary duct stent placement [Z98.890])    Providers: Perry Tenorio MD Responsible Provider: Itz Purvis MD    Anesthesia Type: general ASA Status: 3          Anesthesia Type: general  PACU Vitals  5/26/2023 1758 - 5/26/2023 1858      5/26/2023  1801 5/26/2023  1810 5/26/2023  1820 5/26/2023  1830    BP: 138/65 132/62 133/64 132/63    Temp: 36.6 °C (97.8 °F) -- -- --    Pulse: 73 60 59 57    Resp: 18 18 17 18    SpO2: 100 % 100 % 100 % 100 %              5/26/2023  1840 5/26/2023  1850          BP: 128/64 135/67      Temp: -- --      Pulse: 57 67      Resp: 16 18      SpO2: 100 % 100 %              Anesthesia Post Evaluation    Pain score: 1  Pain management: satisfactory to patient  Patient location during evaluation: PACU  Patient participation: complete - patient participated  Level of consciousness: awake  Cardiovascular status: acceptable  Airway Patency: adequate  Respiratory status: acceptable  Hydration status: stable  Anesthetic complications: no      
Detailed exam

## 2023-05-26 NOTE — OP NOTE
_______________________________________________________________________________  Patient Name: Sowmya Clark           Procedure Date: 5/26/2023 4:56 PM  MRN: [id number]                     Account Number: [id number]  YOB: 1960              Age: 62  Gender: Female                        Note Status: Finalized  Attending MD: NATANAEL VILLA MD,  _______________________________________________________________________________  Procedure:             ERCP  Indications:           Stent removal, hx of papillary stenosis, persistent  abdominal pain, elevated LFTs  Providers:             NATANAEL VILLA MD (Doctor), DEONNA DOMINGUEZ  (Fellow)  Referring MD:          KASSY CARTER MD~[facility]  Requesting Provider:  Medicines:             See the Anesthesia note for documentation of the  administered medications  Complications:         No immediate complications.  _______________________________________________________________________________  Procedure:             After obtaining informed consent, the scope was passed  under direct vision. Throughout the procedure, the  patient's blood pressure, pulse, and oxygen  saturations were monitored continuously. The was  introduced through the mouth, and advanced to the  duodenum and used to inject contrast into the bile  duct. The ERCP was accomplished without difficulty.  The patient tolerated the procedure well.  Findings:  A standard esophagogastroduodenoscopy scope was used for the examination  of the upper gastrointestinal tract. The scope was passed under direct  vision through the upper GI tract. The examined esophagus was normal.  The Z-line was regular and was found 38 cm from the incisors. A small  hiatal hernia was present. The entire examined stomach was normal. The  duodenal bulb, first portion of the duodenum and second portion of the  duodenum were normal. There was no evidence of prior stent within D2.  The upper GI exam was otherwise without  abnormality to account for the  patient's symptoms. A  film of the abdomen was obtained. Surgical  clips, consistent with a previous cholecystectomy, were seen in the area  of the right upper quadrant of the abdomen. The prior fully covered  self-expanding metal stent (10 mm x 60 mm McDonald Varthana) was  visualized within the distal colon on fluoro. The esophagus was  successfully intubated under direct vision. The scope was advanced to  the major papilla in the descending duodenum without detailed  examination of the pharynx, larynx and associated structures, and upper  GI tract. The upper GI tract was grossly normal. Evidence of prior  sphincterotomy of the ampulla. The bile duct was deeply cannulated with  the 15 mm balloon. Contrast was injected. I personally interpreted the  bile duct images. Ductal flow of contrast was adequate. Image quality  was adequate. Contrast extended to the entire biliary tree. The main  bile duct, hepatic duct bifurcation and left and right hepatic ducts  were mildly dilated, secondary to known papillary stenosis. To discover  objects, the biliary tree was swept with a 15 mm balloon starting at the  bifurcation. Nothing was found. No stent was deployed as this was felt  to be the source of the patient's pain and concern for stent obstruction.  Impression:            - Grossly unremarkable upper GI tract during the upper  endoscopy and not felt to account for patient's  symptoms  - Previous stent visualized in the colon during  fluoroscopy, suspect occluded stent that subsequently  dislodged (as seen previously in placed on recent  MRI/MRCP)  - Mild dilation of the main left and right hepatics  and common duct, secondary to previous ampullary  stenosis and stent obstruction  - Evidence of prior sphincterotomy without any  stenosis and widely patent  - The biliary tree was swept without any debris,  sludge or stones removed. Only clear, yellow bile was  visualized  - No stent was  deployed given these findings and  previous concern for stent obstruction resulting in  patient's elevated LFTs and recurrent abdominal pain  Recommendation:        - [facility] patient to [facility] care  - Clear liquid diet today, then advance as tolerated  - Repeat LFTs with fractionated total bilirubin  tomorrow  - Observe clinical course  - Follow up with Gastroenterology to be scheduled as  an outpatient after discharge  Procedure Code(s):     --- Professional ---  22254, Endoscopic retrograde cholangiopancreatography  (ERCP); diagnostic, including collection of  specimen(s) by brushing or washing, when performed  (separate procedure)  Diagnosis Code(s):     --- Professional ---  K44.9, Diaphragmatic hernia without obstruction or  gangrene  K83.1, Obstruction of bile duct  Z46.59, Encounter for fitting and adjustment of other  gastrointestinal appliance and device  CPT copyright 2021 [employer]. All rights reserved.  The codes documented in this report are preliminary and upon  review may  be revised to meet current compliance requirements.  Attending Participation:  I was present and participated during the entire procedure, including  non-key portions.  Brennen Villa MD  ________________  NATANAEL VILLA MD  5/26/2023 6:28:41 PM  This report has been signed electronically.  Number of Addenda: 0  Note Initiated On: 5/26/2023 4:56 PM

## 2023-05-26 NOTE — PROGRESS NOTES
Hospital Medicine Service -  Daily Progress Note       SUBJECTIVE   Interval History: ERCP/EGD canceled yesterday evening due to to emergent cases.  Patient upset that she was not informed of why her surgery was canceled.  And she was unable to order from the cafeteria.  She was provided a meal from nursing but only able to take a few bites of applesauce and yogurt and had severe nausea and abdominal pain.  She has sensation of pressure and bloating in her right upper quadrant which is worse today.  No current nausea.  Has sharp shooting pains that are transient last for a few seconds in her mid back.  Denies fever, chills, headache, cough congestion.  Mild dizziness this morning and felt fatigued this morning.  No jaundice, lower extremity edema.     OBJECTIVE      Vital signs in last 24 hours:  Temp:  [36.5 °C (97.7 °F)-37.1 °C (98.7 °F)] 37.1 °C (98.7 °F)  Heart Rate:  [51-76] 68  Resp:  [17-18] 18  BP: (117-149)/(52-86) 117/52  No intake or output data in the 24 hours ending 05/26/23 1433    PHYSICAL EXAMINATION      Physical Exam    General: No acute distress, AAOx3, sitting in bed,irritated  HEENT: Symmetric, PERRL/EOMI, moist membranes, NCAT, no jaundice, no scleral icterus, no adenopathy  Cardiovascular: bradycardic, no murmurs, rubs, gallops, no JVD   Pulmonary: Clear to auscultation bilaterally, no wheezing, rhonchi, rales, normal effort  GI: Soft, nontender, nondistended, bowel sounds normal, no organomegaly, neg hinds, no guarding, no rebound  Musculoskeletal: Normal bulk and tone, normal ROM, no tenderness of spine extremities: Distal pulses intact, No LE edema bilaterally  Neuro: Cranial nerves II through XII grossly intact by nonfocal exam, sensation intact  Psych: Normal mood, anxious,  congruent affect affect and perception     LINES, CATHETERS, DRAINS, AIRWAYS, AND WOUNDS   Lines, Drains, and Airways:  Wounds (agree with documentation and present on admission):  Peripheral IV (Adult) 05/23/23  Right Antecubital (Active)   Number of days: 3     Comments:      LABS / IMAGING / TELE      Labs  abs  CBC Results       05/26/23 05/25/23 05/24/23     1002 0427 0449    WBC 3.38 4.46 4.33    RBC 4.36 3.97 3.94    HGB 13.7 12.6 12.2    HCT 40.6 37.8 36.5    MCV 93.1 95.2 92.6    MCH 31.4 31.7 31.0    MCHC 33.7 33.3 33.4     180 171        CMP Results       05/26/23 05/25/23 05/24/23     1002 1916 1833     137 137    K 4.8 4.2 4.1    Cl 106 105 108    CO2 22 23 21    Glucose 89 98 69    BUN <5 5 6    Creatinine 0.6 0.7 0.7    Calcium 9.4 9.1 9.1    Anion Gap 12 9 8    AST 64 93 203     303 449    Albumin 3.3 3.1 3.3    EGFR >60.0 >60.0 >60.0        Troponin I Results       05/23/23 01/26/23 10/19/22     0500 1643 1936    HS Troponin I 5.0 3.6 5          Imaging  MRI ABDOMEN WITH AND WITHOUT CONTRAST MRCP   Final Result   IMPRESSION:   1. Difficult evaluation due to susceptibility artifact from metallic biliary   stent and pneumobilia. Similar mild biliary ductal dilatation without definite   choledocholithiasis. Abrupt cutoff at rob hepatis/confluence right and left   main ducts on MRCP sequences is likely due to beginning of metallic stent,   though stricture may have a similar appearance. No associated soft tissue   biliary mass or obstructing periampullary mass. ERCP/EUS could be obtained for   further evaluation.   2. Scattered subcentimeter pancreatic cysts without suspicious features, either   retention cysts or side branch intraductal papillary mucinous neoplasms.   Recommend contrast enhanced MRI with MRCP in one year.   3. Mild hepatic steatosis.      Actionable Finding: Follow up should be considered.      ECG 12 lead   ED Interpretation   NSR @ 66, nml QRS, nml axis, nml St/T      Final Result          SARS-CoV-2 (COVID-19) (no units)   Date/Time Value   04/05/2023 0537 Negative       ECG/Telemetry  I have independently reviewed the telemetry. No events for the last 24  hours.  Currently with sinus bradycardia and this is primary rhythm overnight.   One short run of PACs  ASSESSMENT AND PLAN      * Elevated LFTs  Assessment & Plan  LFTs continue to trend down over the last 48 hours   CT abdomen pelvis with similar position of CBD stent compared to prior study on 4/3.  No evidence of liver disease, biliary dilation.  CBD 6 mm.  No intrahepatic Biliary dilation.  Pancreas within normal limits.  MRCP 5/24 -limited due to artifact from biliary stent, mild biliary dilation without choledocholithiasis.  Possible stricture versus artifact of the left main duct, no associated soft tissue masses.  Noted to have pancreatic subcentimeter cysts without suspicious features.  MRI with MRCP in 1 year suggested  ERCP/EGD was planned on 5/25 but canceled due to to emergent cases  GI planning ERCP/EGD today.  Currently on the schedule for 1815.   Patient notably upset about her surgery being canceled and not being informed.  She also was upset about not being able to initiate a clear liquid diet.  Apology was provided to patient and discussed the reason why her procedure was postponed.  Keep NPO.  Okay for sips  Blood cultures no growth  CMP twice daily  Continue NSS 80 mL/hr while n.p.o.      History of biliary duct stent placement  Assessment & Plan  See assessment under biliary stricture    Hypomagnesemia  Assessment & Plan  Mild hypomagnesemia likely due to poor oral intake  Magnesium 1.8  S/p 1 g IV mg on 5/23/23  Replete as needed    Biliary stricture  Assessment & Plan  Initially with papillary stenosis and September 2022 status post sphincterotomy and biliary stent placement   Last ERCP on 4/5 for stent replacement due to occlusion  MRCP with and without contrast limited due to stent artifact but did not show worsening biliary stenosis   serum immunoglobulins pending to r/o IgG4 sclerosing cholangitis (autoimmune cholangiopathy)  Smooth muscle AB neg on 2/1/23. Additional FELISHA and autoimmune  work-up negative at that time      Abdominal pain  Assessment & Plan  Epigastric and right upper quadrant pain with radiation to the back over the last week.  Similar to prior episodes of biliary stricture, biliary stent occlusion however worsening nausea vomiting this time.  No jaundice. Mild RUQ/epigastric pain on exam. Remains afebrile. No leukocytosis.   Abdominal pain remains stable  Monitoring off antibiotics  Lipase normal so suggesting against pancreatitis and no evidence of inflammation of the pancreas on CT scan  Oxycodone 5 mg prn pain  Avoid tylenol due to elevated LFTs  Continue Miralax daily, senna 1 mg BID, Add colace 100 mg BID    Hypokalemia  Assessment & Plan  K stable  Trend BMP daily and replete as needed       VTE Assessment: Padua    VTE Prophylaxis:  Current anticoagulants:    •None      Code Status: Full Code      Estimated Discharge Date: 5/27/2023     Disposition Planning: Possible discharge in 1-2 days after ERCP pending advancement of diet     Will Jovany,   5/26/2023

## 2023-05-26 NOTE — ANESTHESIOLOGIST PRE-PROCEDURE ATTESTATION
Pre-Procedure Patient Identification:  I am the Primary Anesthesiologist and have identified the patient on 05/26/23 at 4:01 PM.   I have confirmed the procedure(s) will be performed by the following surgeon/proceduralist Perry Tenorio MD.

## 2023-05-26 NOTE — PLAN OF CARE
Problem: Adult Inpatient Plan of Care  Goal: Plan of Care Review  Outcome: Progressing  Flowsheets (Taken 5/26/2023 8501)  Progress: improving  Outcome Evaluation: VSS. Medications adminsitered per MAR. Pt c/o moderate abdominal pain, pain medication offered and refused by pt. Re-positioning and pillow support provided. Pt off to endoscopy for procedure.  Plan of Care Reviewed With: patient

## 2023-05-27 VITALS
DIASTOLIC BLOOD PRESSURE: 56 MMHG | OXYGEN SATURATION: 97 % | HEIGHT: 71 IN | BODY MASS INDEX: 23.8 KG/M2 | TEMPERATURE: 98.2 F | RESPIRATION RATE: 18 BRPM | HEART RATE: 59 BPM | WEIGHT: 170 LBS | SYSTOLIC BLOOD PRESSURE: 107 MMHG

## 2023-05-27 LAB
ALBUMIN SERPL-MCNC: 3.4 G/DL (ref 3.4–5)
ALP SERPL-CCNC: 257 IU/L (ref 35–126)
ALT SERPL-CCNC: 203 IU/L (ref 11–54)
ANION GAP SERPL CALC-SCNC: 9 MEQ/L (ref 3–15)
AST SERPL-CCNC: 45 IU/L (ref 15–41)
BILIRUB DIRECT SERPL-MCNC: 0.4 MG/DL
BILIRUB SERPL-MCNC: 2.2 MG/DL (ref 0.3–1.2)
BUN SERPL-MCNC: <5 MG/DL (ref 8–20)
CALCIUM SERPL-MCNC: 9.5 MG/DL (ref 8.9–10.3)
CHLORIDE SERPL-SCNC: 103 MEQ/L (ref 98–109)
CO2 SERPL-SCNC: 25 MEQ/L (ref 22–32)
CREAT SERPL-MCNC: 0.6 MG/DL (ref 0.6–1.1)
ERYTHROCYTE [DISTWIDTH] IN BLOOD BY AUTOMATED COUNT: 12.3 % (ref 11.7–14.4)
GFR SERPL CREATININE-BSD FRML MDRD: >60 ML/MIN/1.73M*2
GLUCOSE SERPL-MCNC: 92 MG/DL (ref 70–99)
HCT VFR BLDCO AUTO: 41.9 % (ref 35–45)
HGB BLD-MCNC: 14.2 G/DL (ref 11.8–15.7)
IGG SERPL-MCNC: 957 MG/DL (ref 600–1640)
IGG1 SER-MCNC: 430 MG/DL (ref 382–929)
IGG2 SER-MCNC: 345 MG/DL (ref 241–700)
IGG3 SER-MCNC: 46 MG/DL (ref 22–178)
IGG4 SER-MCNC: 55.4 MG/DL (ref 4–86)
MAGNESIUM SERPL-MCNC: 1.9 MG/DL (ref 1.8–2.5)
MCH RBC QN AUTO: 31.5 PG (ref 28–33.2)
MCHC RBC AUTO-ENTMCNC: 33.9 G/DL (ref 32.2–35.5)
MCV RBC AUTO: 92.9 FL (ref 83–98)
PDW BLD AUTO: 10.3 FL (ref 9.4–12.3)
PLATELET # BLD AUTO: 209 K/UL (ref 150–369)
POTASSIUM SERPL-SCNC: 3.9 MEQ/L (ref 3.6–5.1)
PROT SERPL-MCNC: 6.2 G/DL (ref 6–8.2)
RBC # BLD AUTO: 4.51 M/UL (ref 3.93–5.22)
SODIUM SERPL-SCNC: 137 MEQ/L (ref 136–144)
WBC # BLD AUTO: 4.17 K/UL (ref 3.8–10.5)

## 2023-05-27 PROCEDURE — 99239 HOSP IP/OBS DSCHRG MGMT >30: CPT | Performed by: INTERNAL MEDICINE

## 2023-05-27 PROCEDURE — 83735 ASSAY OF MAGNESIUM: CPT | Performed by: HOSPITALIST

## 2023-05-27 PROCEDURE — 80076 HEPATIC FUNCTION PANEL: CPT | Performed by: HOSPITALIST

## 2023-05-27 PROCEDURE — 80048 BASIC METABOLIC PNL TOTAL CA: CPT | Performed by: HOSPITALIST

## 2023-05-27 PROCEDURE — 63700000 HC SELF-ADMINISTRABLE DRUG: Performed by: INTERNAL MEDICINE

## 2023-05-27 PROCEDURE — 85027 COMPLETE CBC AUTOMATED: CPT | Performed by: HOSPITALIST

## 2023-05-27 PROCEDURE — 36415 COLL VENOUS BLD VENIPUNCTURE: CPT | Performed by: HOSPITALIST

## 2023-05-27 PROCEDURE — 63700000 HC SELF-ADMINISTRABLE DRUG: Performed by: HOSPITALIST

## 2023-05-27 RX ORDER — ACETAMINOPHEN 325 MG/1
650 TABLET ORAL EVERY 4 HOURS PRN
Status: DISCONTINUED | OUTPATIENT
Start: 2023-05-27 | End: 2023-05-27 | Stop reason: HOSPADM

## 2023-05-27 RX ORDER — POLYETHYLENE GLYCOL 3350 17 G/17G
17 POWDER, FOR SOLUTION ORAL DAILY PRN
Status: DISCONTINUED | OUTPATIENT
Start: 2023-05-27 | End: 2023-05-27 | Stop reason: HOSPADM

## 2023-05-27 RX ADMIN — SENNOSIDES 1 TABLET: 8.6 TABLET, FILM COATED ORAL at 08:07

## 2023-05-27 RX ADMIN — PANTOPRAZOLE SODIUM 40 MG: 40 TABLET, DELAYED RELEASE ORAL at 08:07

## 2023-05-27 RX ADMIN — DOCUSATE SODIUM 100 MG: 100 CAPSULE, LIQUID FILLED ORAL at 08:07

## 2023-05-27 RX ADMIN — ACETAMINOPHEN 650 MG: 325 TABLET, FILM COATED ORAL at 10:10

## 2023-05-27 RX ADMIN — Medication 400 MG: at 08:07

## 2023-05-27 RX ADMIN — POLYETHYLENE GLYCOL 3350 17 G: 17 POWDER, FOR SOLUTION ORAL at 10:10

## 2023-05-27 NOTE — PROGRESS NOTES
Gastroenterology  Daily Progress Note       SUBJECTIVE   Interval History:   - S/p EGD/ERCP 5/26/23: Grossly unremarkable upper GI tract, previous placed stent not visualized (in colon on fluoro) ; mild dilation of main L and R hepatics and CBD secondary to previous ampullary stenosis and suspected stent obstruction; evidence of previous sphincterotomy without any stenosis and widely patent ampulla; biliary tree was swept without any debris, sludge, stones or other irregularities with yellow, clear bile; no stent was deployed given concern for stent obstruction with stent dislodgement resulting in patient's elevated LFTs and recurrent abdominal pain  - LFTs down-trending, pending AM labs  - Otherwise, no acute events overnight    Feeling well, resting comfortably in bed. Still with some mild RUQ discomfort but without any pain, nausea, vomiting, or radiating symptoms (ie RP / back pain). No fevers, chills, night sweats or other consitutional symptoms. Tolerated breakfast without any difficulty except for some vague RUQ discomfort. Passing gas, but no recent BM in over a few days. Answered and addressed all of patient's questions and concerns at length at bedside this AM.       OBJECTIVE      Vital signs in last 24 hours:  Temp:  [36.4 °C (97.5 °F)-37.1 °C (98.7 °F)] 36.4 °C (97.5 °F)  Heart Rate:  [54-76] 58  Resp:  [16-31] 18  BP: (110-138)/(52-68) 111/68    Intake/Output Summary (Last 24 hours) at 5/27/2023 0619  Last data filed at 5/26/2023 1758  Gross per 24 hour   Intake 800 ml   Output --   Net 800 ml       PHYSICAL EXAMINATION      Physical Exam  General: Comfortable appearing, middle-aged female, in no acute distress; non-toxic appearing  Eyes: No scleral icterus; pink conjunctivae  HENT: No oropharyngeal lesions; MMM  Cardiac: RRR; no JVD  Lungs: Normal work of breathing and effort on room air  Abdomen: Soft, NTND; no tenderness on palpation; no guarding or rebound tenderness  Extremities: Warm and  well-perfused  Skin: No jaundice   Neuro: Grossly non-focal; moves all four extremities spontaneously  Psych: Normal mood and affect; pleasant and cooperative     LABS / IMAGING / TELE        Labs  Results from last 7 days   Lab Units 05/26/23  2116 05/26/23  1002 05/25/23  1916   SODIUM mEQ/L 138 140 137   POTASSIUM mEQ/L 3.8 4.8 4.2   CHLORIDE mEQ/L 106 106 105   CO2 mEQ/L 24 22 23   BUN mg/dL <5* <5* 5*   CREATININE mg/dL 0.6 0.6 0.7   CALCIUM mg/dL 9.2 9.4 9.1   ALBUMIN g/dL 3.2* 3.3* 3.1*   BILIRUBIN TOTAL mg/dL 1.8* 2.5* 2.4*   ALK PHOS IU/L 258* 279* 294*   ALT IU/L 216* 255* 303*   AST IU/L 52* 64* 93*   GLUCOSE mg/dL 147* 89 98     Results from last 7 days   Lab Units 05/23/23  0500   INR  1.0       Results from last 7 days   Lab Units 05/26/23  1002   MAGNESIUM mg/dL 1.8     Results from last 7 days   Lab Units 05/26/23  1002 05/25/23  0427 05/24/23  0449   WBC K/uL 3.38* 4.46 4.33   HEMOGLOBIN g/dL 13.7 12.6 12.2   HEMATOCRIT % 40.6 37.8 36.5   PLATELETS K/uL 217 180 171         Imaging  I have independently reviewed the pertinent imaging from the last 24 hrs.    ASSESSMENT AND PLAN      RUQ abdominal pain  Assessment & Plan  Ms Clark is a 62 y.o female with pmh of breast cancer (s/p lumpectomy), s/p CCY with biliary ductal dilatation secondary to papillary stenosis, s/p ERCP w/ sphincterotomy (9/2022) c/b post-ERCP pancreatitis and previous serial stent exchanges (last stent exchange with [facility] on 4/5/23) who presents with recurrent RUQ abdominal pain and elevated LFTs. Interventional GI has been consulted for further evaluation and management.     #Recurrent RUQ Abdominal Pain #Nausea/Vomiting  #Mixed Arvsxkdxsbyxgd-Dqvbpqkzuek-thqpbqx liver injury- IMPROVING  #2/2 Suspected Stent Occlusion (subsequently dislodged at time of ERCP)  #Papillary Stenosis (s/p [facility] 4/5/2023) c/b prior stent occlusion  #Hx of Post-ERCP Pancreatitis without complications  #Hx of Chronic Abdominal pain (felt 2/2  functional vs IBS)     Impression: Patient presenting with recurrent RUQ abdominal pain, nausea/vomiting and chills. Marked rise in LFTs on 5/23 with , , , T Bili 2.2 w/ D Bili 0.7. Lipase 40. Prior CT Abd/pelvis 5/22 revealing CBD stent in position with mild interval improvement in biliary ductal dilation with expected pneumobilia. Suspect recurrent RUQ pain and biliary symptoms in setting of known papillary stenosis, atlhough the etiology of her papillary stenosis remains unclear. Concern for stent occlusion given her prior hx of a fully occluded stent (back on 4/2023) resulting in symptoms and markedly elevated LFTs. CT imaging with improved biliary ductal dilatation but somewhat discordant with clinical picture given her symptoms and acute rise in her LFTs. Otherwise, prior autoimmune w/u was (-) and no other obvious insults (ie trauma, chronic pancreatitis, or other hepatobiliary surgery). She has had prior episodes of pancreatitis, however this has only be in the setting after her ERCPs 2/2 PEP. An MRI would be helpful to obtain further cross-sectional imaging as she has never had one in the past. Otherwise, no other concern for cholangitis or other biliary sepsis in setting of stent occlusion at this time. Would perform an EGD as well given chronicity of her RUQ pain to exclude PUD and ERCP with stent removal.    MRI/MRCP 5/25/23:   1. Difficult evaluation due to susceptibility artifact from metallic biliary   stent and pneumobilia. Similar mild biliary ductal dilatation without definite   choledocholithiasis. Abrupt cutoff at rob hepatis/confluence right and left   main ducts on MRCP sequences is likely due to beginning of metallic stent,   though stricture may have a similar appearance. No associated soft tissue   biliary mass or obstructing periampullary mass. ERCP/EUS could be obtained for   further evaluation.   2. Scattered subcentimeter pancreatic cysts without suspicious  features, either   retention cysts or side branch intraductal papillary mucinous neoplasms.   Recommend contrast enhanced MRI with MRCP in one year.   3. Mild hepatic steatosis.     S/p EGD/ERCP 5/26/23: Grossly unremarkable upper GI tract, previous placed stent not visualized (in colon on fluoro) ; mild dilation of main L and R hepatics and CBD secondary to previous ampullary stenosis and suspected stent obstruction; evidence of previous sphincterotomy without any stenosis and widely patent ampulla; biliary tree was swept without any debris, sludge, stones or other irregularities with yellow, clear bile; no stent was deployed given concern for stent obstruction with stent dislodgement resulting in patient's elevated LFTs and recurrent abdominal pain       Recommendations:    - Low fat diet as tolerated, encouraged small-bite sized meals as tolerated  - LFTs and T Bili down-trending, will coordinate outpatient LFTs in one week with Dr. Tenorio's office  - Empiric Pantoprazole 40 mg BiD for now and continue after discharge  - ERCP without need for stent placement, outpatient f/u with Dr. Tenorio's office after discharge along with her primary Gastroenterologist due to suspected functional component as well  - Miralax 17 grams BiD for bowel regimen. No concern for obstruction and biliary stent should pass on it's own. No need for repeat imaging as an outpatient  - Avoid all opioids for pain control. Okay for acetaminophen as needed (not to exceed more than 1 gm q 6 hrs, no more than 4 grams per day) as patient is without any hx of cirrhosis along with improving LFTs  - Anti-emetics PRN  - In regards to pancreatic cysts (retention cysts vs side-branch IPMNs), can consider repeat MRI/MRCP WWO contrast in 12 months vs EUS. Ultimately, defer to Dr. Tenorio and can be determined at next follow-up appointment  - Okay to d/c from GI standpoint, rest of care as per primary team    Patient seen and examined. Plan discussed with  Gastroenterology attending, Dr. Saravia.    Yuri Killian, DO  Gastroenterology Fellow, PGY-V  Pager [phone]             VTE Assessment: Padua    Code Status: Full Code  Estimated discharge date: 5/27/2023

## 2023-05-27 NOTE — DISCHARGE SUMMARY
[facility] Medicine Service -  Inpatient Discharge Summary        BRIEF OVERVIEW   Admitting Provider: Michael Manzo DO  Attending Provider: Sushil Santillan MD Attending phys phone: [phone]    PCP: Ankita Ho [phone]    Admission Date: 5/23/2023  Discharge Date: 5/27/2023     DISCHARGE DIAGNOSES      Primary Discharge Diagnosis  Elevated LFTs    Secondary Discharge Diagnoses  Active Hospital Problems    Diagnosis Date Noted   • Elevated LFTs 05/23/2023   • Hypomagnesemia 05/23/2023   • History of biliary duct stent placement 05/23/2023   • Biliary stricture 11/22/2022   • RUQ abdominal pain 10/19/2022   • Hypokalemia 09/13/2022      Resolved Hospital Problems   No resolved problems to display.       Total time of 35 minutes of discharge planning and counseling was done on the day of discharge.    SUMMARY OF HOSPITALIZATION      Presenting Problem/History of Present Illness  Elevated LFTs [R79.89]  Right upper quadrant abdominal pain [R10.11]    This is a 62 y.o. year-old female admitted on 5/23/2023 with Elevated LFTs [R79.89]  Right upper quadrant abdominal pain [R10.11].    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    She presented with a week of abdominal pain. She saw GI.  She had abnormal LFTs associated with this (AST/+).   She had planned ERCP with Dr. Tenorio, performed on 5/26/23.   Note that this was felt due to occluded biliary stent that then dislodged, with improvement in her LFTs. EGD/ERCP is noted as per below.  No new stent was placed.    Note plan is for repeat LFTS in a week and f/u with Dr. Tenorio. She has pending immunoglobulins (evaluating for IgG4 sclerosing cholangitis) at discharge that should be followed up on. Lab for IgG subclasses panel drawn 5/23/23.     MUCH improved RUQ pain and tolerating PO on day  of discharge. I discussed with Dr. Saravia and Dr. Killian of GI face-to-face on rounds on the day of discharge.     Exam on Day of Discharge  Vital signs in last 24 hours:  Temp:  [36.4 °C (97.5 °F)-37.1 °C (98.7 °F)] 36.8 °C (98.2 °F)  Heart Rate:  [54-76] 59  Resp:  [16-31] 18  BP: (107-138)/(52-68) 107/56    Intake/Output Summary (Last 24 hours) at 5/27/2023 1052  Last data filed at 5/26/2023 1758  Gross per 24 hour   Intake 800 ml   Output --   Net 800 ml     Body mass index is 23.71 kg/m².  GEN: well-developed and well-nourished; not in acute distress  HEENT: normocephalic; atraumatic  NECK: no JVD; no bruits  CARDIO: regular rate and rhythm; no murmurs or rubs  RESP: clear to auscultation bilaterally; no rales, rhonchi, or wheezes  ABD: soft, non-distended, very mild RUQ TTP no guarding, normal bowel sounds  EXT: no cyanosis, clubbing, or edema  SKIN: clean, dry, warm, and intact  MUSCULOSKELETAL: no injury or deformity  NEURO: alert and oriented x 3; nonfocal  BEHAVIOR/EMOTIONAL: appropriate; cooperative    Consults During Admission  IP CONSULT TO GASTROENTEROLOGY    Problem List on Day of Discharge  1. elevated LFTs -- improving  2. history of biliary stricture  3. abdominal pain; markedly improved   4. history of Crohn disease    DISCHARGE MEDICATIONS        Medication List      CONTINUE taking these medications    aspirin 81 mg chewable tablet  Take 81 mg by mouth daily.  Dose: 81 mg     ciclopirox 1 % shampoo  Commonly known as: LOPROX  Apply topically once a week. gently massage into scalp     sucralfate 1 gram tablet  Commonly known as: CARAFATE  Take 1 g by mouth 4 (four) times a day as needed.  Dose: 1 g            Instructions for after discharge     Call provider for:      Call provider for:  persistent nausea or vomiting      Call provider for:  temperature >100.4      Discharge diet      Diet Type / Texture: Regular    Post-Discharge Activity: Normal activity as tolerated.      Normal activity as  tolerated.               PROCEDURES / LABS / IMAGING      Operative Procedures  EGD/ERCP as below    Pertinent Labs  Lab Results   Component Value Date     05/27/2023    K 3.9 05/27/2023     05/27/2023    BUN <5 (L) 05/27/2023    CREATININE 0.6 05/27/2023    WBC 4.17 05/27/2023    HGB 14.2 05/27/2023    HCT 41.9 05/27/2023     05/27/2023     (H) 05/27/2023    AST 45 (H) 05/27/2023    INR 1.0 05/23/2023       Pertinent Imaging  MRI ABDOMEN WITH AND WITHOUT CONTRAST MRCP    Result Date: 5/25/2023  IMPRESSION: 1. Difficult evaluation due to susceptibility artifact from metallic biliary stent and pneumobilia. Similar mild biliary ductal dilatation without definite choledocholithiasis. Abrupt cutoff at rob hepatis/confluence right and left main ducts on MRCP sequences is likely due to beginning of metallic stent, though stricture may have a similar appearance. No associated soft tissue biliary mass or obstructing periampullary mass. ERCP/EUS could be obtained for further evaluation. 2. Scattered subcentimeter pancreatic cysts without suspicious features, either retention cysts or side branch intraductal papillary mucinous neoplasms. Recommend contrast enhanced MRI with MRCP in one year. 3. Mild hepatic steatosis. Actionable Finding: Follow up should be considered.    CT ABDOMEN PELVIS WITH IV CONTRAST    Result Date: 5/22/2023  IMPRESSION: Similar position of the common bile stent compared to prior study from 4/3/2023. Mild interval improvement in biliary ductal dilation.     Upper Endoscopy/ERCP  Impression:            - Grossly unremarkable upper GI tract during the upper  endoscopy and not felt to account for patient's  symptoms  - Previous stent visualized in the colon during  fluoroscopy, suspect occluded stent that subsequently  dislodged (as seen previously in placed on recent  MRI/MRCP)  - Mild dilation of the main left and right hepatics  and common duct, secondary to previous  ampullary  stenosis and stent obstruction  - Evidence of prior sphincterotomy without any  stenosis and widely patent  - The biliary tree was swept without any debris,  sludge or stones removed. Only clear, yellow bile was  visualized  - No stent was deployed given these findings and  previous concern for stent obstruction resulting in  patient's elevated LFTs and recurrent abdominal pain  Recommendation:        - [facility]  - Clear liquid diet today, then advance as tolerated  - Repeat LFTs with fractionated total bilirubin  tomorrow  - Observe clinical course  - Follow up with Gastroenterology to be scheduled as  an outpatient after discharge    OUTPATIENT  FOLLOW-UP / REFERRALS / PENDING TESTS        Outpatient Follow-Up Appointments  Encounter Information    This patient does not currently have any appointments scheduled.         Referrals  No orders of the defined types were placed in this encounter.      Test Results Pending at Discharge  Unresulted Labs (From admission, onward)     Start     Ordered    05/25/23 0600  Magnesium  Daily      Question:  Release to patient  Answer:  Immediate   Start Status   05/28/23 0600 Scheduled   05/29/23 0600 Scheduled   05/30/23 0600 Scheduled   05/31/23 0600 Scheduled       05/24/23 1301    05/24/23 1800  Hepatic function panel  2 times daily      Question:  Release to patient  Answer:  Immediate   Order ID Start Status   [id number] 05/25/23 0800 Needs to be Collected   [id number] 05/27/23 1800 Needs to be Collected    05/28/23 0800 Scheduled    05/28/23 1800 Scheduled    05/29/23 0800 Scheduled    05/29/23 1800 Scheduled    05/30/23 0800 Scheduled    05/30/23 1800 Scheduled       05/24/23 1259    05/24/23 1800  Basic metabolic panel  2 times daily      Question:  Release to patient  Answer:  Immediate   Order ID Start Status   [id number] 05/25/23 0800 Needs to be Collected   [id number] 05/27/23 1800 Needs to be Collected    05/28/23 0800  Scheduled    05/28/23 1800 Scheduled    05/29/23 0800 Scheduled    05/29/23 1800 Scheduled    05/30/23 0800 Scheduled    05/30/23 1800 Scheduled       05/24/23 1259    05/24/23 0600  CBC  Daily      Question:  Release to patient  Answer:  Immediate   Start Status   05/28/23 0600 Scheduled   05/29/23 0600 Scheduled   05/30/23 0600 Scheduled       05/23/23 1007    05/23/23 1752  IgG Subclasses Panel  Once        Question:  Release to patient  Answer:  Immediate    05/23/23 1751    05/23/23 0455  [place] Draw Panel  STAT        Question Answer Comment   Red Top 1 Label    Gold Top 1 Label    Light Blue 1 Label    Lavender Top No Labels    Pink Top 1 Label    Yellow - Urine Tall No Labels    Urine Culture Tube No Labels    Blood Culture 1 set        05/23/23 0454                Important Issues to Address in Follow-Up  F/u LFTs and IgG4 with Dr. Tenorio in a week; patient is aware    DISCHARGE DISPOSITION      Disposition: Home     Code Status At Discharge: Full Code    Physician Order for Life-Sustaining Treatment Document Status      No documents found

## 2023-05-27 NOTE — PLAN OF CARE
Plan of Care Review  Plan of Care Reviewed With: patient  Progress: improving  Outcome Evaluation: Pt reports of less pain than begining of shift post endoscopy procedure. VSS . continue to monitor.

## 2023-05-27 NOTE — NURSING NOTE
Pt discharge home. Discharge instruction given and reviewed, pt verbalized understanding. IV and tele dc/d. Pt walked off floor with significant other without event.

## 2023-05-27 NOTE — DISCHARGE INSTRUCTIONS
You were admitted with abdominal pain in the setting of abnormal liver function tests and suspected occluded biliary stent.  Ultimately, procedure by Dr. Tenorio showed stent had dislodged (no obstruction/occlusion) with improving liver function tests.  Planned follow up with Dr. Tenorio in a week with repeat labs at that time. GI office to reach out on Tuesday -- if not called, please contact them.           To whom it concerns -- this patient was hospitalized at [facility] from 5/23 thru 5/27/23 for medical care.  -- Lc Santillan MD

## 2023-05-28 LAB
BACTERIA BLD CULT: NORMAL
BACTERIA BLD CULT: NORMAL

## 2023-05-30 ENCOUNTER — RX ONLY (RX ONLY)
Age: 63
End: 2023-05-30

## 2023-05-30 ENCOUNTER — TELEPHONE (OUTPATIENT)
Dept: GASTROENTEROLOGY | Facility: CLINIC | Age: 63
End: 2023-05-30
Payer: COMMERCIAL

## 2023-05-30 ENCOUNTER — APPOINTMENT (OUTPATIENT)
Dept: URBAN - METROPOLITAN AREA CLINIC 203 | Age: 63
Setting detail: DERMATOLOGY
End: 2023-06-01

## 2023-05-30 DIAGNOSIS — L57.8 OTHER SKIN CHANGES DUE TO CHRONIC EXPOSURE TO NONIONIZING RADIATION: ICD-10-CM

## 2023-05-30 DIAGNOSIS — L65.9 NONSCARRING HAIR LOSS, UNSPECIFIED: ICD-10-CM

## 2023-05-30 DIAGNOSIS — D18.0 HEMANGIOMA: ICD-10-CM

## 2023-05-30 PROBLEM — D18.01 HEMANGIOMA OF SKIN AND SUBCUTANEOUS TISSUE: Status: ACTIVE | Noted: 2023-05-30

## 2023-05-30 PROCEDURE — OTHER SUNSCREEN RECOMMENDATIONS: OTHER

## 2023-05-30 PROCEDURE — OTHER COUNSELING: OTHER

## 2023-05-30 PROCEDURE — OTHER DEFER: OTHER

## 2023-05-30 PROCEDURE — OTHER PRESCRIPTION MEDICATION MANAGEMENT: OTHER

## 2023-05-30 PROCEDURE — 99213 OFFICE O/P EST LOW 20 MIN: CPT

## 2023-05-30 RX ORDER — CICLOPIROX 10 MG/.96ML
SHAMPOO TOPICAL
Qty: 120 | Refills: 3 | Status: ERX

## 2023-05-30 ASSESSMENT — LOCATION DETAILED DESCRIPTION DERM
LOCATION DETAILED: EPIGASTRIC SKIN
LOCATION DETAILED: LEFT MEDIAL BREAST 8-9:00 REGION
LOCATION DETAILED: LEFT LATERAL ABDOMEN
LOCATION DETAILED: SUPERIOR MID FOREHEAD
LOCATION DETAILED: PERIUMBILICAL SKIN
LOCATION DETAILED: RIGHT RIB CAGE
LOCATION DETAILED: LEFT MEDIAL BREAST 10-11:00 REGION
LOCATION DETAILED: LEFT MEDIAL BREAST 11-12:00 REGION
LOCATION DETAILED: RIGHT LATERAL ABDOMEN
LOCATION DETAILED: LEFT SUPRAPUBIC SKIN

## 2023-05-30 ASSESSMENT — LOCATION SIMPLE DESCRIPTION DERM
LOCATION SIMPLE: SUPERIOR FOREHEAD
LOCATION SIMPLE: GROIN
LOCATION SIMPLE: LEFT BREAST
LOCATION SIMPLE: ABDOMEN

## 2023-05-30 ASSESSMENT — LOCATION ZONE DERM
LOCATION ZONE: FACE
LOCATION ZONE: TRUNK

## 2023-05-30 NOTE — PROCEDURE: DEFER
X Size Of Lesion In Cm (Optional): 0
Detail Level: Detailed
Instructions (Optional): Multiple angiomas to bee moved with [place] pt advised $190.40
Introduction Text (Please End With A Colon): The following procedure was deferred:

## 2023-05-30 NOTE — TELEPHONE ENCOUNTER
Pt is scheduled for ERCP on 6/8/23. This can be cancelled as she underwent ERCP w stent removal while inpatient last week. Thx!

## 2023-05-31 ENCOUNTER — APPOINTMENT (OUTPATIENT)
Dept: LAB | Facility: HOSPITAL | Age: 63
End: 2023-05-31
Attending: PHYSICIAN ASSISTANT
Payer: COMMERCIAL

## 2023-05-31 DIAGNOSIS — K31.5 DUODENAL PAPILLARY STENOSIS: ICD-10-CM

## 2023-05-31 DIAGNOSIS — K31.5 DUODENAL PAPILLARY STENOSIS: Primary | ICD-10-CM

## 2023-05-31 LAB
ALBUMIN SERPL-MCNC: 3.8 G/DL (ref 3.4–5)
ALP SERPL-CCNC: 165 IU/L (ref 35–126)
ALT SERPL-CCNC: 74 IU/L (ref 11–54)
ANION GAP SERPL CALC-SCNC: 7 MEQ/L (ref 3–15)
AST SERPL-CCNC: 28 IU/L (ref 15–41)
BILIRUB SERPL-MCNC: 0.8 MG/DL (ref 0.3–1.2)
BUN SERPL-MCNC: 8 MG/DL (ref 8–20)
CALCIUM SERPL-MCNC: 9.4 MG/DL (ref 8.9–10.3)
CHLORIDE SERPL-SCNC: 103 MEQ/L (ref 98–109)
CO2 SERPL-SCNC: 30 MEQ/L (ref 22–32)
CREAT SERPL-MCNC: 0.7 MG/DL (ref 0.6–1.1)
GFR SERPL CREATININE-BSD FRML MDRD: >60 ML/MIN/1.73M*2
GLUCOSE SERPL-MCNC: 92 MG/DL (ref 70–99)
POTASSIUM SERPL-SCNC: 4.4 MEQ/L (ref 3.6–5.1)
PROT SERPL-MCNC: 6.4 G/DL (ref 6–8.2)
SODIUM SERPL-SCNC: 140 MEQ/L (ref 136–144)

## 2023-05-31 PROCEDURE — 36415 COLL VENOUS BLD VENIPUNCTURE: CPT

## 2023-05-31 PROCEDURE — 80053 COMPREHEN METABOLIC PANEL: CPT

## 2023-06-20 ENCOUNTER — TRANSCRIBE ORDERS (OUTPATIENT)
Dept: LAB | Age: 63
End: 2023-06-20

## 2023-06-20 ENCOUNTER — APPOINTMENT (OUTPATIENT)
Dept: LAB | Age: 63
End: 2023-06-20
Attending: INTERNAL MEDICINE
Payer: COMMERCIAL

## 2023-06-20 DIAGNOSIS — K83.1 OBSTRUCTION OF BILE DUCT: ICD-10-CM

## 2023-06-20 DIAGNOSIS — K83.1 OBSTRUCTION OF BILE DUCT: Primary | ICD-10-CM

## 2023-06-20 LAB
ALBUMIN SERPL-MCNC: 3.9 G/DL (ref 3.4–5)
ALP SERPL-CCNC: 101 IU/L (ref 35–126)
ALT SERPL-CCNC: 18 IU/L (ref 11–54)
ANION GAP SERPL CALC-SCNC: 7 MEQ/L (ref 3–15)
AST SERPL-CCNC: 20 IU/L (ref 15–41)
BILIRUB SERPL-MCNC: 1.3 MG/DL (ref 0.3–1.2)
BUN SERPL-MCNC: 7 MG/DL (ref 8–20)
CALCIUM SERPL-MCNC: 9.6 MG/DL (ref 8.9–10.3)
CHLORIDE SERPL-SCNC: 105 MEQ/L (ref 98–109)
CO2 SERPL-SCNC: 28 MEQ/L (ref 22–32)
CREAT SERPL-MCNC: 0.7 MG/DL (ref 0.6–1.1)
GFR SERPL CREATININE-BSD FRML MDRD: >60 ML/MIN/1.73M*2
GLUCOSE SERPL-MCNC: 90 MG/DL (ref 70–99)
POTASSIUM SERPL-SCNC: 4 MEQ/L (ref 3.6–5.1)
PROT SERPL-MCNC: 6.2 G/DL (ref 6–8.2)
SODIUM SERPL-SCNC: 140 MEQ/L (ref 136–144)

## 2023-06-20 PROCEDURE — 36415 COLL VENOUS BLD VENIPUNCTURE: CPT

## 2023-06-20 PROCEDURE — 80053 COMPREHEN METABOLIC PANEL: CPT

## 2023-10-23 ENCOUNTER — APPOINTMENT (OUTPATIENT)
Dept: URBAN - METROPOLITAN AREA CLINIC 203 | Age: 63
Setting detail: DERMATOLOGY
End: 2023-10-27

## 2023-10-23 DIAGNOSIS — D18.0 HEMANGIOMA: ICD-10-CM

## 2023-10-23 PROBLEM — D18.01 HEMANGIOMA OF SKIN AND SUBCUTANEOUS TISSUE: Status: ACTIVE | Noted: 2023-10-23

## 2023-10-23 PROCEDURE — OTHER BENIGN DESTRUCTION COSMETIC: OTHER

## 2023-10-23 ASSESSMENT — LOCATION DETAILED DESCRIPTION DERM
LOCATION DETAILED: RIGHT MEDIAL FOREHEAD
LOCATION DETAILED: PERIUMBILICAL SKIN
LOCATION DETAILED: LEFT LATERAL SUPERIOR CHEST
LOCATION DETAILED: LEFT MEDIAL BREAST 9-10:00 REGION
LOCATION DETAILED: LEFT INFERIOR FOREHEAD
LOCATION DETAILED: LEFT SUPERIOR ANTERIOR NECK

## 2023-10-23 ASSESSMENT — LOCATION SIMPLE DESCRIPTION DERM
LOCATION SIMPLE: LEFT BREAST
LOCATION SIMPLE: RIGHT FOREHEAD
LOCATION SIMPLE: CHEST
LOCATION SIMPLE: ABDOMEN
LOCATION SIMPLE: LEFT FOREHEAD
LOCATION SIMPLE: LEFT ANTERIOR NECK

## 2023-10-23 ASSESSMENT — LOCATION ZONE DERM
LOCATION ZONE: TRUNK
LOCATION ZONE: NECK
LOCATION ZONE: FACE

## 2023-10-23 NOTE — PROCEDURE: BENIGN DESTRUCTION COSMETIC
Post-Care Instructions: I reviewed with the patient in detail post-care instructions. Patient is to wear sunprotection, and avoid picking at any of the treated lesions. Pt may apply Vaseline to crusted or scabbing areas.
Detail Level: Detailed
Consent: The patient's consent was obtained including but not limited to risks of crusting, scabbing, blistering, scarring, darker or lighter pigmentary change, recurrence, incomplete removal and infection.
Price (Use Numbers Only, No Special Characters Or $): 190.40
Anesthesia Volume In Cc: 0.5

## 2023-12-01 ENCOUNTER — APPOINTMENT (OUTPATIENT)
Dept: URBAN - METROPOLITAN AREA CLINIC 203 | Age: 63
Setting detail: DERMATOLOGY
End: 2023-12-12

## 2023-12-01 DIAGNOSIS — D22 MELANOCYTIC NEVI: ICD-10-CM

## 2023-12-01 DIAGNOSIS — L57.8 OTHER SKIN CHANGES DUE TO CHRONIC EXPOSURE TO NONIONIZING RADIATION: ICD-10-CM

## 2023-12-01 PROBLEM — D22.71 MELANOCYTIC NEVI OF RIGHT LOWER LIMB, INCLUDING HIP: Status: ACTIVE | Noted: 2023-12-01

## 2023-12-01 PROCEDURE — 99213 OFFICE O/P EST LOW 20 MIN: CPT

## 2023-12-01 PROCEDURE — OTHER SUNSCREEN RECOMMENDATIONS: OTHER

## 2023-12-01 PROCEDURE — OTHER OBSERVATION: OTHER

## 2023-12-01 PROCEDURE — OTHER COUNSELING: OTHER

## 2023-12-01 PROCEDURE — OTHER OBSERVATION AND MEASURE: OTHER

## 2023-12-01 ASSESSMENT — LOCATION DETAILED DESCRIPTION DERM
LOCATION DETAILED: RIGHT LATERAL KNEE
LOCATION DETAILED: LEFT MEDIAL BREAST 11-12:00 REGION
LOCATION DETAILED: LEFT MEDIAL BREAST 10-11:00 REGION

## 2023-12-01 ASSESSMENT — LOCATION ZONE DERM
LOCATION ZONE: LEG
LOCATION ZONE: TRUNK

## 2023-12-01 ASSESSMENT — LOCATION SIMPLE DESCRIPTION DERM
LOCATION SIMPLE: LEFT BREAST
LOCATION SIMPLE: RIGHT KNEE

## 2023-12-05 ENCOUNTER — TRANSCRIBE ORDERS (OUTPATIENT)
Dept: SCHEDULING | Age: 63
End: 2023-12-05

## 2023-12-05 DIAGNOSIS — M12.811 OTHER SPECIFIC ARTHROPATHIES, NOT ELSEWHERE CLASSIFIED, RIGHT SHOULDER: Primary | ICD-10-CM

## 2023-12-05 DIAGNOSIS — S63.8X1A SPRAIN OF OTHER PART OF RIGHT WRIST AND HAND, INITIAL ENCOUNTER: Primary | ICD-10-CM

## 2023-12-12 ENCOUNTER — TRANSCRIBE ORDERS (OUTPATIENT)
Dept: SCHEDULING | Age: 63
End: 2023-12-12

## 2023-12-12 DIAGNOSIS — R20.2 PARESTHESIA OF SKIN: Primary | ICD-10-CM

## 2023-12-14 ENCOUNTER — TRANSCRIBE ORDERS (OUTPATIENT)
Dept: SCHEDULING | Age: 63
End: 2023-12-14

## 2023-12-14 DIAGNOSIS — M54.16 RADICULOPATHY, LUMBAR REGION: Primary | ICD-10-CM

## 2023-12-14 DIAGNOSIS — M80.00XA AGE-RELATED OSTEOPOROSIS WITH CURRENT PATHOLOGICAL FRACTURE, UNSPECIFIED SITE, INITIAL ENCOUNTER FOR FRACTURE: Primary | ICD-10-CM

## 2023-12-15 ENCOUNTER — APPOINTMENT (OUTPATIENT)
Dept: LAB | Age: 63
End: 2023-12-15
Attending: INTERNAL MEDICINE
Payer: COMMERCIAL

## 2023-12-15 ENCOUNTER — TRANSCRIBE ORDERS (OUTPATIENT)
Dept: LAB | Age: 63
End: 2023-12-15

## 2023-12-15 DIAGNOSIS — K83.1 OBSTRUCTION OF BILE DUCT: Primary | ICD-10-CM

## 2023-12-15 DIAGNOSIS — R74.8 ABNORMAL LEVELS OF OTHER SERUM ENZYMES: ICD-10-CM

## 2023-12-15 DIAGNOSIS — K83.1 OBSTRUCTION OF BILE DUCT: ICD-10-CM

## 2023-12-15 LAB
ALBUMIN SERPL-MCNC: 4.2 G/DL (ref 3.5–5.7)
ALP SERPL-CCNC: 69 IU/L (ref 34–125)
ALT SERPL-CCNC: 12 IU/L (ref 7–52)
ANION GAP SERPL CALC-SCNC: 7 MEQ/L (ref 3–15)
AST SERPL-CCNC: 19 IU/L (ref 13–39)
BILIRUB SERPL-MCNC: 1 MG/DL (ref 0.3–1.2)
BUN SERPL-MCNC: 13 MG/DL (ref 7–25)
CALCIUM SERPL-MCNC: 9.4 MG/DL (ref 8.6–10.3)
CHLORIDE SERPL-SCNC: 103 MEQ/L (ref 98–107)
CO2 SERPL-SCNC: 29 MEQ/L (ref 21–31)
CREAT SERPL-MCNC: 0.6 MG/DL (ref 0.6–1.2)
EGFRCR SERPLBLD CKD-EPI 2021: >60 ML/MIN/1.73M*2
GLUCOSE SERPL-MCNC: 90 MG/DL (ref 70–99)
POTASSIUM SERPL-SCNC: 4.5 MEQ/L (ref 3.5–5.1)
PROT SERPL-MCNC: 6.7 G/DL (ref 6–8.2)
SODIUM SERPL-SCNC: 139 MEQ/L (ref 136–145)

## 2023-12-15 PROCEDURE — 36415 COLL VENOUS BLD VENIPUNCTURE: CPT

## 2023-12-15 PROCEDURE — 80053 COMPREHEN METABOLIC PANEL: CPT

## 2023-12-23 ENCOUNTER — HOSPITAL ENCOUNTER (OUTPATIENT)
Dept: RADIOLOGY | Facility: HOSPITAL | Age: 63
Discharge: HOME | End: 2023-12-23
Attending: ORTHOPAEDIC SURGERY
Payer: COMMERCIAL

## 2023-12-23 ENCOUNTER — HOSPITAL ENCOUNTER (OUTPATIENT)
Dept: RADIOLOGY | Facility: HOSPITAL | Age: 63
Discharge: HOME | End: 2023-12-23
Attending: PHYSICIAN ASSISTANT
Payer: COMMERCIAL

## 2023-12-23 DIAGNOSIS — M12.811 OTHER SPECIFIC ARTHROPATHIES, NOT ELSEWHERE CLASSIFIED, RIGHT SHOULDER: ICD-10-CM

## 2023-12-23 DIAGNOSIS — S63.8X1A SPRAIN OF OTHER PART OF RIGHT WRIST AND HAND, INITIAL ENCOUNTER: ICD-10-CM

## 2024-02-03 ENCOUNTER — HOSPITAL ENCOUNTER (OUTPATIENT)
Dept: RADIOLOGY | Facility: HOSPITAL | Age: 64
Discharge: HOME | End: 2024-02-03
Attending: PHYSICAL MEDICINE & REHABILITATION
Payer: COMMERCIAL

## 2024-02-03 VITALS — BODY MASS INDEX: 25.66 KG/M2 | WEIGHT: 184 LBS

## 2024-02-03 DIAGNOSIS — M54.16 RADICULOPATHY, LUMBAR REGION: ICD-10-CM

## 2024-03-26 ENCOUNTER — TELEPHONE (OUTPATIENT)
Dept: OPHTHALMOLOGY | Facility: CLINIC | Age: 64
End: 2024-03-26

## 2024-03-26 NOTE — TELEPHONE ENCOUNTER
----- Message from Cathi Pina sent at 3/26/2024  9:55 AM CDT -----  Consult/Advisory    Name Of Caller:Tammy       Contact Preference:[phone]    Nature of call: Ptn is having pain behind the right  eye swollen eye and blurry vision p[place] reason I couldn't locate it

## 2024-04-16 ENCOUNTER — TRANSCRIBE ORDERS (OUTPATIENT)
Dept: SCHEDULING | Age: 64
End: 2024-04-16

## 2024-04-16 DIAGNOSIS — S92.812S: Primary | ICD-10-CM

## 2024-04-30 ENCOUNTER — HOSPITAL ENCOUNTER (OUTPATIENT)
Dept: RADIOLOGY | Facility: HOSPITAL | Age: 64
Discharge: HOME | End: 2024-04-30
Attending: PODIATRIST
Payer: COMMERCIAL

## 2024-04-30 DIAGNOSIS — S92.812S: ICD-10-CM

## 2024-05-06 ENCOUNTER — RX ONLY (RX ONLY)
Age: 64
End: 2024-05-06

## 2024-05-06 ENCOUNTER — APPOINTMENT (OUTPATIENT)
Dept: URBAN - METROPOLITAN AREA CLINIC 203 | Age: 64
Setting detail: DERMATOLOGY
End: 2024-05-06

## 2024-05-06 DIAGNOSIS — D22 MELANOCYTIC NEVI: ICD-10-CM

## 2024-05-06 DIAGNOSIS — L57.8 OTHER SKIN CHANGES DUE TO CHRONIC EXPOSURE TO NONIONIZING RADIATION: ICD-10-CM

## 2024-05-06 DIAGNOSIS — L81.4 OTHER MELANIN HYPERPIGMENTATION: ICD-10-CM

## 2024-05-06 PROBLEM — D22.5 MELANOCYTIC NEVI OF TRUNK: Status: ACTIVE | Noted: 2024-05-06

## 2024-05-06 PROCEDURE — OTHER COUNSELING: OTHER

## 2024-05-06 PROCEDURE — 99213 OFFICE O/P EST LOW 20 MIN: CPT

## 2024-05-06 PROCEDURE — OTHER REASSURANCE: OTHER

## 2024-05-06 PROCEDURE — OTHER SUNSCREEN RECOMMENDATIONS: OTHER

## 2024-05-06 RX ORDER — CICLOPIROX 10 MG/.96ML
SHAMPOO TOPICAL
Qty: 120 | Refills: 3 | Status: ERX

## 2024-05-06 ASSESSMENT — LOCATION SIMPLE DESCRIPTION DERM
LOCATION SIMPLE: LEFT BREAST
LOCATION SIMPLE: RIGHT UPPER BACK
LOCATION SIMPLE: ABDOMEN

## 2024-05-06 ASSESSMENT — LOCATION DETAILED DESCRIPTION DERM
LOCATION DETAILED: LEFT MEDIAL BREAST 10-11:00 REGION
LOCATION DETAILED: LEFT MEDIAL BREAST 11-12:00 REGION
LOCATION DETAILED: RIGHT MEDIAL UPPER BACK
LOCATION DETAILED: LEFT LATERAL ABDOMEN

## 2024-05-06 ASSESSMENT — LOCATION ZONE DERM: LOCATION ZONE: TRUNK

## 2024-05-13 ENCOUNTER — HOSPITAL ENCOUNTER (OUTPATIENT)
Dept: RADIOLOGY | Facility: HOSPITAL | Age: 64
Discharge: HOME | End: 2024-05-13
Attending: PODIATRIST
Payer: COMMERCIAL

## 2024-05-13 DIAGNOSIS — M80.00XA AGE-RELATED OSTEOPOROSIS WITH CURRENT PATHOLOGICAL FRACTURE, UNSPECIFIED SITE, INITIAL ENCOUNTER FOR FRACTURE: ICD-10-CM

## 2024-05-13 PROCEDURE — 77080 DXA BONE DENSITY AXIAL: CPT

## 2024-05-18 ENCOUNTER — APPOINTMENT (OUTPATIENT)
Dept: LAB | Age: 64
End: 2024-05-18
Attending: INTERNAL MEDICINE
Payer: COMMERCIAL

## 2024-05-18 ENCOUNTER — TRANSCRIBE ORDERS (OUTPATIENT)
Dept: LAB | Age: 64
End: 2024-05-18

## 2024-05-18 DIAGNOSIS — R10.9 UNSPECIFIED ABDOMINAL PAIN: Primary | ICD-10-CM

## 2024-05-18 DIAGNOSIS — R17 UNSPECIFIED JAUNDICE: ICD-10-CM

## 2024-05-18 DIAGNOSIS — R10.9 UNSPECIFIED ABDOMINAL PAIN: ICD-10-CM

## 2024-05-18 LAB
ALBUMIN SERPL-MCNC: 4.1 G/DL (ref 3.5–5.7)
ALP SERPL-CCNC: 71 IU/L (ref 34–125)
ALT SERPL-CCNC: 16 IU/L (ref 7–52)
ANION GAP SERPL CALC-SCNC: 8 MEQ/L (ref 3–15)
AST SERPL-CCNC: 20 IU/L (ref 13–39)
BILIRUB DIRECT SERPL-MCNC: 0.2 MG/DL
BILIRUB SERPL-MCNC: 1 MG/DL (ref 0.3–1.2)
BUN SERPL-MCNC: 14 MG/DL (ref 7–25)
CALCIUM SERPL-MCNC: 9.1 MG/DL (ref 8.6–10.3)
CHLORIDE SERPL-SCNC: 102 MEQ/L (ref 98–107)
CO2 SERPL-SCNC: 28 MEQ/L (ref 21–31)
CREAT SERPL-MCNC: 0.6 MG/DL (ref 0.6–1.2)
EGFRCR SERPLBLD CKD-EPI 2021: >60 ML/MIN/1.73M*2
GLUCOSE SERPL-MCNC: 82 MG/DL (ref 70–99)
POTASSIUM SERPL-SCNC: 4.3 MEQ/L (ref 3.5–5.1)
PROT SERPL-MCNC: 6.5 G/DL (ref 6–8.2)
SODIUM SERPL-SCNC: 138 MEQ/L (ref 136–145)

## 2024-05-18 PROCEDURE — 82248 BILIRUBIN DIRECT: CPT

## 2024-05-18 PROCEDURE — 36415 COLL VENOUS BLD VENIPUNCTURE: CPT

## 2024-05-18 PROCEDURE — 80053 COMPREHEN METABOLIC PANEL: CPT

## 2024-06-28 ENCOUNTER — APPOINTMENT (OUTPATIENT)
Dept: LAB | Age: 64
End: 2024-06-28
Attending: NURSE PRACTITIONER
Payer: COMMERCIAL

## 2024-06-28 ENCOUNTER — TRANSCRIBE ORDERS (OUTPATIENT)
Dept: LAB | Age: 64
End: 2024-06-28

## 2024-06-28 DIAGNOSIS — E78.41 ELEVATED LIPOPROTEIN(A): ICD-10-CM

## 2024-06-28 DIAGNOSIS — I25.110 ATHEROSCLEROTIC HEART DISEASE OF NATIVE CORONARY ARTERY WITH UNSTABLE ANGINA PECTORIS (CMS/HCC): Primary | ICD-10-CM

## 2024-06-28 DIAGNOSIS — I25.10 ATHEROSCLEROTIC HEART DISEASE OF NATIVE CORONARY ARTERY WITHOUT ANGINA PECTORIS: ICD-10-CM

## 2024-06-28 DIAGNOSIS — R07.2 PRECORDIAL PAIN: ICD-10-CM

## 2024-06-28 DIAGNOSIS — R00.2 PALPITATIONS: ICD-10-CM

## 2024-06-28 DIAGNOSIS — I25.110 ATHEROSCLEROTIC HEART DISEASE OF NATIVE CORONARY ARTERY WITH UNSTABLE ANGINA PECTORIS (CMS/HCC): ICD-10-CM

## 2024-06-28 LAB
ALBUMIN SERPL-MCNC: 4.1 G/DL (ref 3.5–5.7)
ALP SERPL-CCNC: 69 IU/L (ref 34–125)
ALT SERPL-CCNC: 17 IU/L (ref 7–52)
ANION GAP SERPL CALC-SCNC: 8 MEQ/L (ref 3–15)
AST SERPL-CCNC: 22 IU/L (ref 13–39)
BILIRUB SERPL-MCNC: 1.1 MG/DL (ref 0.3–1.2)
BUN SERPL-MCNC: 10 MG/DL (ref 7–25)
CALCIUM SERPL-MCNC: 9.2 MG/DL (ref 8.6–10.3)
CHLORIDE SERPL-SCNC: 104 MEQ/L (ref 98–107)
CHOLEST SERPL-MCNC: 196 MG/DL
CO2 SERPL-SCNC: 26 MEQ/L (ref 21–31)
CREAT SERPL-MCNC: 0.7 MG/DL (ref 0.6–1.2)
EGFRCR SERPLBLD CKD-EPI 2021: >60 ML/MIN/1.73M*2
GLUCOSE SERPL-MCNC: 85 MG/DL (ref 70–99)
HDLC SERPL-MCNC: 81 MG/DL
HDLC SERPL: 2.4 {RATIO}
LDLC SERPL CALC-MCNC: 103 MG/DL
NONHDLC SERPL-MCNC: 115 MG/DL
POTASSIUM SERPL-SCNC: 4.3 MEQ/L (ref 3.5–5.1)
PROT SERPL-MCNC: 6.5 G/DL (ref 6–8.2)
SODIUM SERPL-SCNC: 138 MEQ/L (ref 136–145)
TRIGL SERPL-MCNC: 58 MG/DL

## 2024-06-28 PROCEDURE — 80053 COMPREHEN METABOLIC PANEL: CPT

## 2024-06-28 PROCEDURE — 36415 COLL VENOUS BLD VENIPUNCTURE: CPT

## 2024-06-28 PROCEDURE — 80061 LIPID PANEL: CPT

## 2024-11-07 ENCOUNTER — APPOINTMENT (OUTPATIENT)
Dept: LAB | Age: 64
End: 2024-11-07
Attending: INTERNAL MEDICINE
Payer: COMMERCIAL

## 2024-11-07 ENCOUNTER — TRANSCRIBE ORDERS (OUTPATIENT)
Dept: LAB | Age: 64
End: 2024-11-07

## 2024-11-07 DIAGNOSIS — K83.1 OBSTRUCTION OF BILE DUCT: Primary | ICD-10-CM

## 2024-11-07 DIAGNOSIS — K83.1 OBSTRUCTION OF BILE DUCT: ICD-10-CM

## 2024-11-07 LAB
ALBUMIN SERPL-MCNC: 4.1 G/DL (ref 3.5–5.7)
ALP SERPL-CCNC: 79 IU/L (ref 34–125)
ALT SERPL-CCNC: 15 IU/L (ref 7–52)
ANION GAP SERPL CALC-SCNC: 7 MEQ/L (ref 3–15)
AST SERPL-CCNC: 19 IU/L (ref 13–39)
BILIRUB DIRECT SERPL-MCNC: 0.2 MG/DL
BILIRUB SERPL-MCNC: 1.1 MG/DL (ref 0.3–1.2)
BUN SERPL-MCNC: 15 MG/DL (ref 7–25)
CALCIUM SERPL-MCNC: 9.1 MG/DL (ref 8.6–10.3)
CHLORIDE SERPL-SCNC: 103 MEQ/L (ref 98–107)
CO2 SERPL-SCNC: 29 MEQ/L (ref 21–31)
CREAT SERPL-MCNC: 0.7 MG/DL (ref 0.6–1.2)
EGFRCR SERPLBLD CKD-EPI 2021: >60 ML/MIN/1.73M*2
GLUCOSE SERPL-MCNC: 77 MG/DL (ref 70–99)
POTASSIUM SERPL-SCNC: 4.4 MEQ/L (ref 3.5–5.1)
PROT SERPL-MCNC: 6.4 G/DL (ref 6–8.2)
SODIUM SERPL-SCNC: 139 MEQ/L (ref 136–145)

## 2024-11-07 PROCEDURE — 82248 BILIRUBIN DIRECT: CPT

## 2024-11-07 PROCEDURE — 36415 COLL VENOUS BLD VENIPUNCTURE: CPT

## 2024-11-07 PROCEDURE — 80053 COMPREHEN METABOLIC PANEL: CPT

## 2024-12-19 NOTE — PROCEDURE: EXCISION
Laparoscopic bilateral salpingoophorectomy, Cystoscopy, and Removal of lap band Complex Repair And Rhombic Flap Text: The defect edges were debeveled with a #15 scalpel blade.  The primary defect was closed partially with a complex linear closure.  Given the location of the remaining defect, shape of the defect and the proximity to free margins a rhombic flap was deemed most appropriate for complete closure of the defect.  Using a sterile surgical marker, an appropriate advancement flap was drawn incorporating the defect and placing the expected incisions within the relaxed skin tension lines where possible.    The area thus outlined was incised deep to adipose tissue with a #15 scalpel blade.  The skin margins were undermined to an appropriate distance in all directions utilizing iris scissors.

## 2025-01-10 ENCOUNTER — OFFICE VISIT (OUTPATIENT)
Dept: URGENT CARE | Facility: CLINIC | Age: 65
End: 2025-01-10
Payer: COMMERCIAL

## 2025-01-10 VITALS
OXYGEN SATURATION: 98 % | DIASTOLIC BLOOD PRESSURE: 67 MMHG | HEART RATE: 86 BPM | TEMPERATURE: 100 F | HEIGHT: 71 IN | SYSTOLIC BLOOD PRESSURE: 113 MMHG | RESPIRATION RATE: 16 BRPM | WEIGHT: 180 LBS | BODY MASS INDEX: 25.2 KG/M2

## 2025-01-10 DIAGNOSIS — R05.9 COUGH, UNSPECIFIED TYPE: ICD-10-CM

## 2025-01-10 DIAGNOSIS — J20.9 ACUTE PURULENT BRONCHITIS: Primary | ICD-10-CM

## 2025-01-10 DIAGNOSIS — Z76.0 MEDICATION REFILL: ICD-10-CM

## 2025-01-10 RX ORDER — NAPROXEN 500 MG/1
1 TABLET ORAL DAILY PRN
COMMUNITY

## 2025-01-10 RX ORDER — LEVALBUTEROL TARTRATE 45 UG/1
1-2 AEROSOL, METERED ORAL EVERY 6 HOURS PRN
Qty: 15 G | Refills: 0 | Status: SHIPPED | OUTPATIENT
Start: 2025-01-10

## 2025-01-10 RX ORDER — AZITHROMYCIN 250 MG/1
TABLET, FILM COATED ORAL
Qty: 6 TABLET | Refills: 0 | Status: SHIPPED | OUTPATIENT
Start: 2025-01-10

## 2025-01-10 RX ORDER — NITROGLYCERIN 0.4 MG/1
TABLET SUBLINGUAL
COMMUNITY

## 2025-01-10 RX ORDER — CALCIUM CARB, CITRATE, MALATE 250 MG
CAPSULE ORAL
COMMUNITY

## 2025-01-10 RX ORDER — MELOXICAM 7.5 MG/1
1 TABLET ORAL 2 TIMES DAILY
COMMUNITY
Start: 2024-05-02

## 2025-01-10 RX ORDER — NAPROXEN SODIUM 220 MG/1
81 TABLET, FILM COATED ORAL
COMMUNITY

## 2025-01-10 RX ORDER — CYCLOBENZAPRINE HCL 5 MG
5 TABLET ORAL 3 TIMES DAILY PRN
COMMUNITY
Start: 2024-09-16

## 2025-01-10 RX ORDER — LANOLIN ALCOHOL/MO/W.PET/CERES
CREAM (GRAM) TOPICAL
COMMUNITY

## 2025-01-10 RX ORDER — BENZONATATE 100 MG/1
100 CAPSULE ORAL 3 TIMES DAILY PRN
Qty: 30 CAPSULE | Refills: 0 | Status: SHIPPED | OUTPATIENT
Start: 2025-01-10 | End: 2025-01-20

## 2025-01-10 RX ORDER — SUCRALFATE 1 G/1
1 TABLET ORAL 4 TIMES DAILY PRN
COMMUNITY

## 2025-01-10 NOTE — PROGRESS NOTES
"Subjective:      Patient ID: Tammy Duval is a 64 y.o. female.    Vitals:  height is 5' 11" (1.803 m) and weight is 81.6 kg (180 lb). Her tympanic temperature is 99.5 °F (37.5 °C). Her blood pressure is 113/67 and her pulse is 86. Her respiration is 16 and oxygen saturation is 98%.     Chief Complaint: Cough    64 y.o female c/o cough and ears pain. Patient states that cough started a month ago but after recent flight cough became worse. Patient reports that she is coughing up green-yellow and streak of blood phlegm. Patient states that both ear hurt and feels like that they are throbbing.   Provider note below:  This is a 64 y.o. female who presents today with a chief complaint of cough ongoing for past 1 month, patient reports recently cough becomes worse, patient reports she already have cough when she was in [place] and last week she took couple of days of doxycycline and felt little better, patient reports yellow-green mucus with cough, patient reports streak of blood in sputum today, patient reports bilateral ear pain got worse after flying, denies any drainage or discharge, patient reports she does have history of asthma and also requesting the refill for Xopenex inhaler, patient reports she thought she did pack the inhaler but forgot it and would like to have the refill here, patient reports she can not use the albuterol inhaler due to the side effects,  denies fever, body aches or chills, denies, wheezing or shortness of breath, denies nausea, vomiting, diarrhea or abdominal pain, denies chest pain or dizziness positional lightheadedness, denies trouble swallowing, denies loss of taste or smell, or any other symptoms       Cough  This is a new problem. The current episode started more than 1 month ago. The problem has been unchanged. The problem occurs constantly. Associated symptoms include chills, ear pain, headaches, nasal congestion, postnasal drip, a sore throat and wheezing. " Pertinent negatives include no chest pain, ear congestion, fever, heartburn, rash, shortness of breath, sweats or weight loss. The symptoms are aggravated by lying down. She has tried OTC cough suppressant for the symptoms. The treatment provided no relief. Her past medical history is significant for asthma. There is no history of bronchiectasis, bronchitis, COPD, emphysema, environmental allergies or pneumonia.       Constitution: Positive for chills. Negative for fever.   HENT:  Positive for ear pain, postnasal drip and sore throat.    Cardiovascular:  Negative for chest pain.   Respiratory:  Positive for cough and wheezing. Negative for shortness of breath.    Gastrointestinal:  Negative for heartburn.   Skin:  Negative for rash.   Allergic/Immunologic: Negative for environmental allergies.   Neurological:  Positive for headaches.      Objective:     Physical Exam   Constitutional: She is oriented to person, place, and time. She appears well-developed. She is cooperative.  Non-toxic appearance. She does not appear ill. No distress.   HENT:   Head: Normocephalic and atraumatic.   Ears:   Right Ear: Hearing, tympanic membrane, external ear and ear canal normal. Tympanic membrane is not injected and not erythematous.   Left Ear: Hearing, tympanic membrane, external ear and ear canal normal. Tympanic membrane is not injected and not erythematous.   Nose: Nose normal. No mucosal edema, rhinorrhea or nasal deformity. No epistaxis. Right sinus exhibits no maxillary sinus tenderness and no frontal sinus tenderness. Left sinus exhibits no maxillary sinus tenderness and no frontal sinus tenderness.   Mouth/Throat: Uvula is midline, oropharynx is clear and moist and mucous membranes are normal. No trismus in the jaw. Normal dentition. No uvula swelling. No oropharyngeal exudate, posterior oropharyngeal edema or posterior oropharyngeal erythema.   Bilateral TM clear      Comments: Bilateral TM clear  Eyes: Conjunctivae and  lids are normal. No scleral icterus.   Neck: Trachea normal and phonation normal. Neck supple. No edema present. No erythema present. No neck rigidity present.   Cardiovascular: Normal rate, regular rhythm, normal heart sounds and normal pulses.   Pulmonary/Chest: Effort normal and breath sounds normal. No stridor. No respiratory distress. She has no decreased breath sounds. She has no wheezes. She has no rhonchi. She has no rales.   Lungs CTA         Comments: Lungs CTA    Abdominal: Normal appearance.   Musculoskeletal: Normal range of motion.         General: No deformity. Normal range of motion.   Neurological: She is alert and oriented to person, place, and time. She exhibits normal muscle tone. Coordination normal.   Skin: Skin is warm, dry, intact, not diaphoretic and not pale.   Psychiatric: Her speech is normal and behavior is normal. Judgment and thought content normal.   Nursing note and vitals reviewed.        Patient in no acute distress.  Vitals reassuring.  Discussed results/diagnosis/plan in depth with patient in clinic. Strict precautions given to patient to monitor for worsening signs and symptoms. Advised to follow up with primary.All questions answered. Strict ER precautions given. If your symptoms worsens or fail to improve you should go to the Emergency Room. Discharge and follow-up instructions given verbally/printed. Discharge and follow-up instructions discussed with the patient who expressed understanding and willingness to comply with my recommendations.Patient voiced understanding and in agreement with current treatment plan.     Please be advised this text was dictated with Draftster software and may contain errors due to translation.   Assessment:     1. Acute purulent bronchitis    2. Cough, unspecified type    3. Medication refill        Plan:       Acute purulent bronchitis  -     azithromycin (Z-GEORGETTE) 250 MG tablet; Take 2 tablets by mouth x 1 for day 1 Then take 1 tablet by mouth  daily for day 2 - 5  Dispense: 6 tablet; Refill: 0    Cough, unspecified type  -     benzonatate (TESSALON) 100 MG capsule; Take 1 capsule (100 mg total) by mouth 3 (three) times daily as needed for Cough.  Dispense: 30 capsule; Refill: 0    Medication refill  -     levalbuterol (XOPENEX HFA) 45 mcg/actuation inhaler; Inhale 1-2 puffs into the lungs every 6 (six) hours as needed for Wheezing.  Dispense: 15 g; Refill: 0          Medical Decision Making:   Urgent Care Management:  Patient in no acute distress.  Vitals reassuring.  On exam, patient is nontoxic appearing and afebrile.  Lungs CTA.  Patient visiting from out of town.  Patient requesting refill for Xopenex, patient reports history of asthma.  Patient reports cough ongoing for 1 month.  Did took 2 days of doxycycline prior to leaving from [place].  Patient reports purulent sputum production.  Chest x-ray discussed, patient deferred and declined.  Will treat her with Z-Christian along with cough medication and will give refill for Xopenex inhaler.  Advised patient to follow up with primary when she returns back to home next week.  Re-evaluation recommended if no improvement symptoms or worsening symptoms.  Medication prescribed and over-the-counter medication discussed with patient at length.  Proper hydration advised.  I reiterated the importance of further evaluation if no improvement symptoms and follow-up with primary. Patient voiced understanding and in agreement with current treatment plan.             Patient Instructions   PLEASE READ YOUR DISCHARGE INSTRUCTIONS ENTIRELY AS IT CONTAINS IMPORTANT INFORMATION.        Use the inhaler for wheezing as needed.        Please return or see your primary care doctor if you develop new or worsening symptoms.     Please arrange follow up with your primary medical clinic as soon as possible. You must understand that you've received an Urgent Care treatment only and that you may be released before all of your  medical problems are known or treated. You, the patient, will arrange for follow up as instructed. If your symptoms worsen or fail to improve you should go to the Emergency Room.

## 2025-01-15 ENCOUNTER — OFFICE VISIT (OUTPATIENT)
Dept: URGENT CARE | Facility: CLINIC | Age: 65
End: 2025-01-15
Payer: COMMERCIAL

## 2025-01-15 VITALS
WEIGHT: 180 LBS | RESPIRATION RATE: 18 BRPM | OXYGEN SATURATION: 97 % | HEIGHT: 71 IN | TEMPERATURE: 97 F | DIASTOLIC BLOOD PRESSURE: 68 MMHG | BODY MASS INDEX: 25.2 KG/M2 | HEART RATE: 67 BPM | SYSTOLIC BLOOD PRESSURE: 105 MMHG

## 2025-01-15 DIAGNOSIS — J02.9 SORETHROAT: ICD-10-CM

## 2025-01-15 DIAGNOSIS — B37.0 OROPHARYNGEAL CANDIDIASIS: Primary | ICD-10-CM

## 2025-01-15 LAB
CTP QC/QA: YES
MOLECULAR STREP A: NEGATIVE

## 2025-01-15 PROCEDURE — 87651 STREP A DNA AMP PROBE: CPT | Mod: QW,S$GLB,, | Performed by: NURSE PRACTITIONER

## 2025-01-15 PROCEDURE — 99213 OFFICE O/P EST LOW 20 MIN: CPT | Mod: S$GLB,,, | Performed by: NURSE PRACTITIONER

## 2025-01-15 RX ORDER — CLOTRIMAZOLE 10 MG/1
10 LOZENGE ORAL
Qty: 35 TABLET | Refills: 0 | Status: SHIPPED | OUTPATIENT
Start: 2025-01-15 | End: 2025-01-22

## 2025-01-15 NOTE — PROGRESS NOTES
"Subjective:      Patient ID: Tammy Duval is a 64 y.o. female.    Vitals:  height is 5' 11" (1.803 m) and weight is 81.6 kg (180 lb). Her tympanic temperature is 97.1 °F (36.2 °C). Her blood pressure is 105/68 and her pulse is 67. Her respiration is 18 and oxygen saturation is 97%.     Chief Complaint: Mouth Lesions    65 y/o female c/o for sorethroat and says her throat looks white. Patient states that the white spots doesn't hurt its in her mouth. Patient states that's she was here recently finished the antibiotics     Provider note below:  This is a 64 y.o. female who presents today with a chief complaint of white spots on her mouth and sore throat started yesterday, patient reports no pain, patient reports she recently finished the antibiotics prescribed last week, denies history of diabetes,  denies fever, body aches or chills, denies cough, wheezing or shortness of breath, denies nausea, vomiting, diarrhea or abdominal pain, denies chest pain or dizziness positional lightheadedness, denies  trouble swallowing, denies loss of taste or smell, or any other symptoms       Mouth Lesions   The current episode started 2 days ago. The onset was sudden. The problem has been rapidly improving. The problem is mild. Nothing relieves the symptoms. Nothing aggravates the symptoms. Associated symptoms include mouth sores. Pertinent negatives include no orthopnea, no fever, no decreased vision, no double vision, no eye itching, no photophobia, no abdominal pain, no constipation, no diarrhea, no nausea, no vomiting, no congestion, no ear discharge, no ear pain, no headaches, no hearing loss, no rhinorrhea, no sore throat, no stridor, no swollen glands, no muscle aches, no neck pain, no neck stiffness, no cough, no URI, no wheezing, no rash, no diaper rash, no eye discharge, no eye pain and no eye redness.     Constitution: Negative for fever.   HENT:  Positive for mouth sores. Negative for ear pain, ear " discharge, hearing loss, congestion and sore throat.    Neck: Negative for neck pain.   Eyes:  Negative for eye discharge, eye itching, eye pain, eye redness, photophobia and double vision.   Respiratory:  Negative for cough, stridor and wheezing.    Gastrointestinal:  Negative for abdominal pain, nausea, vomiting, constipation and diarrhea.   Skin:  Negative for rash.   Neurological:  Negative for headaches.      Objective:     Physical Exam   Constitutional: She is oriented to person, place, and time. She appears well-developed. She is cooperative.  Non-toxic appearance. She does not appear ill. No distress.   HENT:   Head: Normocephalic and atraumatic.   Ears:   Right Ear: Hearing, tympanic membrane, external ear and ear canal normal.   Left Ear: Hearing, tympanic membrane, external ear and ear canal normal.   Nose: Nose normal. No mucosal edema, rhinorrhea or nasal deformity. No epistaxis. Right sinus exhibits no maxillary sinus tenderness and no frontal sinus tenderness. Left sinus exhibits no maxillary sinus tenderness and no frontal sinus tenderness.   Mouth/Throat: Uvula is midline, oropharynx is clear and moist and mucous membranes are normal. No trismus in the jaw. Normal dentition. No uvula swelling. No oropharyngeal exudate, posterior oropharyngeal edema, posterior oropharyngeal erythema, tonsillar abscesses or cobblestoning.   Scattered non confluent white small lesions noted to bilateral buccal mucosa/tongue, no oropharyngeal erythema, edema or exudate noted, uvula midline, able to tolerate secretions,       Comments: Scattered non confluent white small lesions noted to bilateral buccal mucosa/tongue, no oropharyngeal erythema, edema or exudate noted, uvula midline, able to tolerate secretions,   Eyes: Conjunctivae and lids are normal. No scleral icterus.   Neck: Trachea normal and phonation normal. Neck supple. No edema present. No erythema present. No neck rigidity present.   Cardiovascular: Normal  rate, regular rhythm, normal heart sounds and normal pulses.   Pulmonary/Chest: Effort normal and breath sounds normal. No respiratory distress. She has no decreased breath sounds. She has no rhonchi.   Abdominal: Normal appearance.   Musculoskeletal: Normal range of motion.         General: No deformity. Normal range of motion.   Neurological: She is alert and oriented to person, place, and time. She exhibits normal muscle tone. Coordination normal.   Skin: Skin is warm, dry, intact, not diaphoretic and not pale.   Psychiatric: Her speech is normal and behavior is normal. Judgment and thought content normal.   Nursing note and vitals reviewed.    Results for orders placed or performed in visit on 01/15/25   POCT Strep A, Molecular    Collection Time: 01/15/25  2:17 PM   Result Value Ref Range    Molecular Strep A, POC Negative Negative     Acceptable Yes          Patient in no acute distress.  Vitals reassuring.  Discussed results/diagnosis/plan in depth with patient in clinic. Strict precautions given to patient to monitor for worsening signs and symptoms. Advised to follow up with primary.All questions answered. Strict ER precautions given. If your symptoms worsens or fail to improve you should go to the Emergency Room. Discharge and follow-up instructions given verbally/printed. Discharge and follow-up instructions discussed with the patient who expressed understanding and willingness to comply with my recommendations.Patient voiced understanding and in agreement with current treatment plan.     Please be advised this text was dictated with [employer] software and may contain errors due to translation.   Assessment:     1. Oropharyngeal candidiasis    2. Sorethroat        Plan:       Oropharyngeal candidiasis  -     clotrimazole (MYCELEX) 10 mg stephan; Take 1 tablet (10 mg total) by mouth 5 (five) times daily. for 7 days  Dispense: 35 tablet; Refill: 0    Sorethroat  -     POCT Strep A,  Molecular          Medical Decision Making:   Urgent Care Management:  Patient in no acute distress.  Vitals reassuring.  On exam, patient is nontoxic appearing and afebrile.  Lungs CTA.  Based on physical examination consistent with oropharyngeal candidiasis.  Patient prescribed.  Advised patient close follow-up.  Patient with no history of diabetes.  Recently took antibiotics.  Medication prescribed and over-the-counter medication discussed with patient at length.  Proper hydration advised.  I reiterated the importance of further evaluation if no improvement symptoms and follow-up with primary. Patient voiced understanding and in agreement with current treatment plan.             Patient Instructions   If your condition worsens or fails to improve we recommend that you receive another evaluation at the ER immediately or contact your PCP to discuss your concerns or return here. You must understand that you've received an urgent care treatment only and that you may be released before all your medical problems are known or treated. You the patient will arrange for followup care as instructed.    If you were prescribed antibiotics, please take them to completion.  If you were prescribed a narcotic medication, do not drive or operate heavy equipment or machinery while taking these medications.  Please follow up with your primary care doctor or specialist as needed.  If you  smoke, please stop smoking.

## 2025-02-05 ENCOUNTER — OFFICE VISIT (OUTPATIENT)
Dept: URGENT CARE | Facility: CLINIC | Age: 65
End: 2025-02-05
Payer: COMMERCIAL

## 2025-02-05 VITALS
BODY MASS INDEX: 25.2 KG/M2 | WEIGHT: 180 LBS | DIASTOLIC BLOOD PRESSURE: 63 MMHG | RESPIRATION RATE: 16 BRPM | TEMPERATURE: 98 F | SYSTOLIC BLOOD PRESSURE: 99 MMHG | HEIGHT: 71 IN | OXYGEN SATURATION: 99 % | HEART RATE: 72 BPM

## 2025-02-05 DIAGNOSIS — B37.0 OROPHARYNGEAL CANDIDIASIS: ICD-10-CM

## 2025-02-05 DIAGNOSIS — H65.193 ACUTE EFFUSION OF BOTH MIDDLE EARS: Primary | ICD-10-CM

## 2025-02-05 PROBLEM — H57.11 OCULAR PAIN, RIGHT EYE: Status: ACTIVE | Noted: 2018-05-18

## 2025-02-05 PROBLEM — M25.712: Status: ACTIVE | Noted: 2025-02-05

## 2025-02-05 PROBLEM — M35.00 SICCA SYNDROME: Status: ACTIVE | Noted: 2025-02-05

## 2025-02-05 PROBLEM — E04.1 THYROID NODULE: Status: ACTIVE | Noted: 2022-11-22

## 2025-02-05 PROBLEM — J98.4 SCARRING OF LUNG: Status: ACTIVE | Noted: 2019-10-11

## 2025-02-05 PROBLEM — N84.0 POLYP OF CORPUS UTERI: Status: ACTIVE | Noted: 2025-02-05

## 2025-02-05 PROBLEM — K31.5 OBSTRUCTION OF DUODENUM: Status: ACTIVE | Noted: 2025-02-05

## 2025-02-05 PROBLEM — K83.8 OTHER SPECIFIED DISEASES OF BILIARY TRACT: Status: ACTIVE | Noted: 2022-10-20

## 2025-02-05 PROBLEM — M54.12 RADICULOPATHY, CERVICAL REGION: Status: ACTIVE | Noted: 2025-02-05

## 2025-02-05 PROBLEM — M86.9 OSTEOMYELITIS OF LOWER LEG: Status: ACTIVE | Noted: 2022-11-22

## 2025-02-05 PROBLEM — S63.399A: Status: ACTIVE | Noted: 2024-02-10

## 2025-02-05 PROBLEM — H43.11 VITREOUS HEMORRHAGE, RIGHT EYE: Status: ACTIVE | Noted: 2018-04-23

## 2025-02-05 PROBLEM — A69.20 LYME DISEASE: Status: ACTIVE | Noted: 2022-11-22

## 2025-02-05 PROBLEM — M80.00XA AGE-RELATED OSTEOPOROSIS WITH CURRENT PATHOLOGICAL FRACTURE, UNSPECIFIED SITE, INITIAL ENCOUNTER FOR FRACTURE: Status: ACTIVE | Noted: 2025-02-05

## 2025-02-05 PROBLEM — F40.240 CLAUSTROPHOBIA: Status: ACTIVE | Noted: 2023-11-29

## 2025-02-05 PROBLEM — K85.90 ACUTE PANCREATITIS WITHOUT NECROSIS OR INFECTION, UNSPECIFIED: Status: ACTIVE | Noted: 2025-02-05

## 2025-02-05 PROBLEM — K82.8 BILIARY DYSKINESIA: Status: ACTIVE | Noted: 2020-02-08

## 2025-02-05 PROBLEM — D68.9 COAGULATION DEFECT, UNSPECIFIED: Status: ACTIVE | Noted: 2025-02-05

## 2025-02-05 PROBLEM — H43.392 VITREOUS OPACITIES OF LEFT EYE: Status: ACTIVE | Noted: 2017-03-21

## 2025-02-05 PROBLEM — N95.0 POSTMENOPAUSAL BLEEDING: Status: ACTIVE | Noted: 2025-02-05

## 2025-02-05 PROBLEM — G25.0 BENIGN ESSENTIAL TREMOR: Status: ACTIVE | Noted: 2019-10-11

## 2025-02-05 PROBLEM — C50.919 MALIGNANT NEOPLASM OF BREAST: Status: ACTIVE | Noted: 2023-06-13

## 2025-02-05 PROBLEM — K31.5 DUODENAL PAPILLARY STENOSIS: Status: ACTIVE | Noted: 2022-11-22

## 2025-02-05 PROBLEM — Z87.39 HISTORY OF AVASCULAR NECROSIS OF CAPITAL FEMORAL EPIPHYSIS: Status: ACTIVE | Noted: 2019-10-11

## 2025-02-05 PROBLEM — G56.01 CARPAL TUNNEL SYNDROME, RIGHT UPPER LIMB: Status: ACTIVE | Noted: 2025-02-05

## 2025-02-05 PROBLEM — I25.110 ATHEROSCLEROTIC HEART DISEASE OF NATIVE CORONARY ARTERY WITH UNSTABLE ANGINA PECTORIS: Status: ACTIVE | Noted: 2023-11-02

## 2025-02-05 PROBLEM — Q43.8 TORTUOUS COLON: Status: ACTIVE | Noted: 2022-11-22

## 2025-02-05 PROBLEM — S63.599A TEAR OF TRIANGULAR FIBROCARTILAGE: Status: ACTIVE | Noted: 2023-12-01

## 2025-02-05 PROBLEM — H16.229 KERATOCONJUNCTIVITIS SICCA: Status: ACTIVE | Noted: 2024-09-19

## 2025-02-05 PROBLEM — H35.372 EPIRETINAL MEMBRANE (ERM) OF LEFT EYE: Status: ACTIVE | Noted: 2017-05-08

## 2025-02-05 PROBLEM — K81.0 ACUTE ACALCULOUS CHOLECYSTITIS: Status: ACTIVE | Noted: 2020-02-08

## 2025-02-05 PROBLEM — K92.2 GASTROINTESTINAL HEMORRHAGE: Status: ACTIVE | Noted: 2022-11-22

## 2025-02-05 PROBLEM — E88.9 METABOLIC DISORDER, UNSPECIFIED: Status: ACTIVE | Noted: 2025-02-05

## 2025-02-05 PROBLEM — M54.16 RADICULOPATHY, LUMBAR REGION: Status: ACTIVE | Noted: 2025-02-05

## 2025-02-05 PROBLEM — E04.2 NONTOXIC MULTINODULAR GOITER: Status: ACTIVE | Noted: 2025-02-05

## 2025-02-05 PROBLEM — Z98.42 CATARACT EXTRACTION STATUS, LEFT EYE: Status: ACTIVE | Noted: 2017-09-07

## 2025-02-05 PROBLEM — R92.30 DENSE BREAST TISSUE ON MAMMOGRAM: Status: ACTIVE | Noted: 2019-10-11

## 2025-02-05 PROBLEM — E80.4 GILBERT SYNDROME: Status: ACTIVE | Noted: 2025-02-05

## 2025-02-05 PROBLEM — M19.011 OSTEOARTHRITIS OF RIGHT ACROMIOCLAVICULAR JOINT: Status: ACTIVE | Noted: 2024-02-10

## 2025-02-05 PROBLEM — N85.02 ENDOMETRIAL INTRAEPITHELIAL NEOPLASIA (EIN): Status: ACTIVE | Noted: 2025-02-05

## 2025-02-05 PROBLEM — H26.9 CATARACT: Status: ACTIVE | Noted: 2017-03-21

## 2025-02-05 PROBLEM — M47.812 SPONDYLOSIS WITHOUT MYELOPATHY OR RADICULOPATHY, CERVICAL REGION: Status: ACTIVE | Noted: 2025-02-05

## 2025-02-05 PROBLEM — H25.12 AGE-RELATED NUCLEAR CATARACT, LEFT EYE: Status: ACTIVE | Noted: 2017-03-21

## 2025-02-05 PROBLEM — K83.1 OBSTRUCTION OF BILE DUCT: Status: ACTIVE | Noted: 2025-02-05

## 2025-02-05 PROBLEM — K59.04 CHRONIC IDIOPATHIC CONSTIPATION: Status: ACTIVE | Noted: 2022-11-22

## 2025-02-05 PROBLEM — M32.9 SYSTEMIC LUPUS ERYTHEMATOSUS, UNSPECIFIED: Status: ACTIVE | Noted: 2025-02-05

## 2025-02-05 PROBLEM — M54.17 RADICULOPATHY, LUMBOSACRAL REGION: Status: ACTIVE | Noted: 2025-02-05

## 2025-02-05 PROBLEM — K63.8219 SMALL INTESTINAL BACTERIAL OVERGROWTH (SIBO): Status: ACTIVE | Noted: 2022-11-22

## 2025-02-05 PROCEDURE — 99213 OFFICE O/P EST LOW 20 MIN: CPT | Mod: S$GLB,,, | Performed by: PHYSICIAN ASSISTANT

## 2025-02-05 RX ORDER — CETIRIZINE HYDROCHLORIDE 10 MG/1
10 TABLET ORAL DAILY
Qty: 7 TABLET | Refills: 0 | Status: SHIPPED | OUTPATIENT
Start: 2025-02-05 | End: 2025-02-12

## 2025-02-05 RX ORDER — CLOTRIMAZOLE 10 MG/1
10 LOZENGE ORAL
Qty: 35 TABLET | Refills: 0 | Status: SHIPPED | OUTPATIENT
Start: 2025-02-05 | End: 2025-02-12

## 2025-02-05 NOTE — PROGRESS NOTES
"Subjective:      Patient ID: Tammy Duval is a 64 y.o. female.    Vitals:  height is 5' 11" (1.803 m) and weight is 81.6 kg (180 lb). Her temporal temperature is 98.2 °F (36.8 °C). Her blood pressure is 99/63 and her pulse is 72. Her respiration is 16 and oxygen saturation is 99%.     Chief Complaint: mouth sore    Patient reports with recurring mouth lesions that presented about a couple of days ago, she reports with having had treatment that she did not complete consequently, she feels like her symptoms haven't fully dissipated.  Patient states that she thinks the use test come back because she did not take the medication as prescribed.    Mouth Lesions   The current episode started 3 to 5 days ago. The onset was gradual. The problem has been gradually worsening. Nothing relieves the symptoms. Nothing aggravates the symptoms. Associated symptoms include ear pain (right ear), swollen glands and neck pain. Pertinent negatives include no orthopnea, no fever, no decreased vision, no congestion, no ear discharge, no headaches, no hearing loss, no mouth sores, no rhinorrhea, no sore throat, no muscle aches, no neck stiffness, no cough, no URI, no rash and no diaper rash. She has been Behaving normally. She has been Eating and drinking normally. Urine output has been normal. There were no sick contacts. Recently, medical care has been given at this facility. Services received include medications given.       Constitution: Negative for chills and fever.   HENT:  Positive for ear pain (right ear). Negative for ear discharge, tinnitus, hearing loss, drooling, mouth sores, tongue pain, tongue lesion, congestion, postnasal drip, sinus pain, sinus pressure, sore throat, trouble swallowing and voice change.    Neck: Positive for neck pain.   Cardiovascular:  Negative for chest pain.   Respiratory:  Negative for cough and shortness of breath.    Skin:  Negative for rash.   Neurological:  Negative for headaches. "      Past Medical History:   Diagnosis Date    Asthma     Cancer        Past Surgical History:   Procedure Laterality Date    BREAST SURGERY      ERCP      KNEE SURGERY         No family history on file.    Social History     Socioeconomic History    Marital status:    Tobacco Use    Smoking status: Never     Passive exposure: Never    Smokeless tobacco: Never   Substance and Sexual Activity    Alcohol use: Yes     Comment: social    Drug use: Never     Social Drivers of Health     Financial Resource Strain: Low Risk  (2/21/2024)    Received from [facility]    Overall Financial Resource Strain (CARDIA)     Difficulty of Paying Living Expenses: Not very hard   Food Insecurity: No Food Insecurity (2/21/2024)    Received from [facility]    Hunger Vital Sign     Worried About Running Out of Food in the Last Year: Never true     Ran Out of Food in the Last Year: Never true   Transportation Needs: Unknown (2/21/2024)    Received from [facility]    PRAPARE - Transportation     Lack of Transportation (Medical): No     Lack of Transportation (Non-Medical): Patient declined   Physical Activity: Insufficiently Active (2/21/2024)    Received from [facility]    Exercise Vital Sign     Days of Exercise per Week: 1 day     Minutes of Exercise per Session: 30 min   Stress: No Stress Concern Present (2/21/2024)    Received from [facility]    [employer] - Occupational Stress Questionnaire     Feeling of Stress : Not at all   Housing Stability: Unknown (2/21/2024)    Received from [facility]    Housing Stability Vital Sign     Unable to Pay for Housing in the Last Year: No     Number of Places Lived in the Last Year: 1     Unstable Housing in the Last Year: Patient declined       Current Outpatient Medications    Medication Sig Dispense Refill    ascorbic Acid (VITAMIN C) 500 mg CpSR VITAMIN C 500 MG CAPSULE SA  completed      aspirin 81 MG Chew Take 81 mg by mouth.      cyclobenzaprine (FLEXERIL) 5 MG tablet Take 5 mg by mouth 3 (three) times daily as needed for Muscle spasms.      levalbuterol (XOPENEX HFA) 45 mcg/actuation inhaler Inhale 1-2 puffs into the lungs every 6 (six) hours as needed for Wheezing. 15 g 0    meloxicam (MOBIC) 7.5 MG tablet Take 1 tablet by mouth 2 (two) times daily.      naproxen (EC NAPROSYN) 500 MG EC tablet Take 1 tablet by mouth daily as needed.      nitroGLYCERIN (NITROSTAT) 0.4 MG SL tablet PLACE 1 TABLET UNDER THE TONGUE AS A SINGLE DOSE AS NEEDED FOR CHEST PAIN AS DIRECTED      ofloxacin (OCUFLOX) 0.3 % ophthalmic solution Instill one drop in both eyes twice daily for 10 days 5 mL 2    ofloxacin (OCUFLOX) 0.3 % ophthalmic solution Instill 1 drop into the right eye three times a day 5 mL 0    potassium citrate 99 mg Cap potassium citrate  completed      sucralfate (CARAFATE) 1 gram tablet Take 1 g by mouth 4 (four) times daily as needed.      cetirizine (ZYRTEC) 10 MG tablet Take 1 tablet (10 mg total) by mouth once daily. for 7 days 7 tablet 0    clotrimazole (MYCELEX) 10 mg stephan Take 1 tablet (10 mg total) by mouth 5 (five) times daily. for 7 days 35 tablet 0     No current facility-administered medications for this visit.       Review of patient's allergies indicates:   Allergen Reactions    Prednisone Other (See Comments)     Had osteonecrosis?/mylelitis of hip bone felt to be related to prednisone   Had osteonecrosis?/mylelitis of hip bone felt to be related to prednisone    Had osteonecrosis?/mylelitis of hip bone felt to be related to prednisone    Amoxicillin-pot clavulanate Diarrhea       Objective:     Physical Exam   Constitutional:  Non-toxic appearance. She does not appear ill. No distress.   HENT:   Head: Normocephalic and atraumatic.   Ears:   Right Ear: External ear and  ear canal normal. Tympanic membrane is not injected, not erythematous, not retracted and not bulging. A middle ear effusion is present. no impacted cerumen  Left Ear: External ear and ear canal normal. Tympanic membrane is not injected, not erythematous, not retracted and not bulging. A middle ear effusion is present. no impacted cerumen  Nose: Nose normal. No rhinorrhea or congestion.   Mouth/Throat: Mucous membranes are moist. Posterior oropharyngeal erythema present. No oropharyngeal exudate.       Eyes: Conjunctivae are normal. No scleral icterus.   Neck:      Comments: Superficial cervical anterior tenderness   Pulmonary/Chest: Effort normal. No respiratory distress.   Abdominal: Normal appearance.   Lymphadenopathy:     She has no cervical adenopathy.   Neurological: She is alert.   Skin: Skin is warm, dry, not diaphoretic and no rash.   Psychiatric: Her behavior is normal. Mood normal.   Nursing note and vitals reviewed.      Assessment:     1. Acute effusion of both middle ears    2. Oropharyngeal candidiasis        Plan:       Acute effusion of both middle ears  -     cetirizine (ZYRTEC) 10 MG tablet; Take 1 tablet (10 mg total) by mouth once daily. for 7 days  Dispense: 7 tablet; Refill: 0    Oropharyngeal candidiasis  -     clotrimazole (MYCELEX) 10 mg stephan; Take 1 tablet (10 mg total) by mouth 5 (five) times daily. for 7 days  Dispense: 35 tablet; Refill: 0    I have reviewed the patient chart and pertinent past imaging/labs.  Patient offered nystatin swish however she declined and is requesting the lozenges again that she will take as directed    Patient Instructions   Make sure to use the clotrimazole lozenges as directed for the full 7 days.  Sometimes fungals infections if not treated correctly can come back as it has this time around.  Advil/Tylenol with food as needed for pain relief.  Take Zyrtec as prescribed for fluid behind the ears.  If symptoms do not improve please return for evaluation or  follow up with primary care provider

## 2025-02-05 NOTE — PATIENT INSTRUCTIONS
Make sure to use the clotrimazole lozenges as directed for the full 7 days.  Sometimes fungals infections if not treated correctly can come back as it has this time around.  Advil/Tylenol with food as needed for pain relief.  Take Zyrtec as prescribed for fluid behind the ears.  If symptoms do not improve please return for evaluation or follow up with primary care provider

## 2025-03-05 ENCOUNTER — TELEPHONE (OUTPATIENT)
Dept: PAIN MEDICINE | Facility: CLINIC | Age: 65
End: 2025-03-05
Payer: COMMERCIAL

## 2025-03-05 NOTE — TELEPHONE ENCOUNTER
Provider is on Maternity Leave. Called patient to cancel appointment with Dr. Larios on 3/10. If patient calls back please [facility] once return from Maternity leave in June.

## 2025-06-12 ENCOUNTER — TELEPHONE (OUTPATIENT)
Facility: HOSPITAL | Age: 65
End: 2025-06-12
Payer: COMMERCIAL

## 2025-07-22 ENCOUNTER — APPOINTMENT (OUTPATIENT)
Dept: LAB | Age: 65
End: 2025-07-22
Attending: INTERNAL MEDICINE
Payer: COMMERCIAL

## 2025-07-22 ENCOUNTER — HOSPITAL ENCOUNTER (OUTPATIENT)
Dept: RADIOLOGY | Age: 65
Discharge: HOME | End: 2025-07-22
Attending: INTERNAL MEDICINE
Payer: COMMERCIAL

## 2025-07-22 ENCOUNTER — RESULTS FOLLOW-UP (OUTPATIENT)
Facility: HOSPITAL | Age: 65
End: 2025-07-22

## 2025-07-22 ENCOUNTER — OFFICE VISIT (OUTPATIENT)
Facility: HOSPITAL | Age: 65
End: 2025-07-22
Payer: COMMERCIAL

## 2025-07-22 VITALS
WEIGHT: 189 LBS | SYSTOLIC BLOOD PRESSURE: 116 MMHG | BODY MASS INDEX: 26.46 KG/M2 | HEIGHT: 71 IN | DIASTOLIC BLOOD PRESSURE: 68 MMHG | OXYGEN SATURATION: 98 % | HEART RATE: 74 BPM

## 2025-07-22 DIAGNOSIS — E04.2 NONTOXIC MULTINODULAR GOITER: Primary | ICD-10-CM

## 2025-07-22 DIAGNOSIS — M79.10 MYALGIA: ICD-10-CM

## 2025-07-22 DIAGNOSIS — M80.00XA AGE-RELATED OSTEOPOROSIS WITH CURRENT PATHOLOGICAL FRACTURE, UNSPECIFIED SITE, INITIAL ENCOUNTER FOR FRACTURE: ICD-10-CM

## 2025-07-22 DIAGNOSIS — E55.9 VITAMIN D DEFICIENCY: ICD-10-CM

## 2025-07-22 DIAGNOSIS — E04.2 NONTOXIC MULTINODULAR GOITER: ICD-10-CM

## 2025-07-22 PROBLEM — E80.4 GILBERT SYNDROME: Status: ACTIVE | Noted: 2025-02-05

## 2025-07-22 PROBLEM — M32.9 SYSTEMIC LUPUS ERYTHEMATOSUS (CMS/HCC): Status: ACTIVE | Noted: 2025-02-05

## 2025-07-22 PROBLEM — I25.10 CORONARY ATHEROSCLEROSIS OF NATIVE CORONARY ARTERY: Status: ACTIVE | Noted: 2022-12-13

## 2025-07-22 PROBLEM — I15.9 SECONDARY HYPERTENSION: Status: ACTIVE | Noted: 2025-07-22

## 2025-07-22 LAB
ALBUMIN SERPL-MCNC: 4.3 G/DL (ref 3.5–5.7)
ALP SERPL-CCNC: 66 IU/L (ref 34–125)
ALT SERPL-CCNC: 16 IU/L (ref 7–52)
ANION GAP SERPL CALC-SCNC: 6 MEQ/L (ref 3–15)
AST SERPL-CCNC: 21 IU/L (ref 13–39)
BILIRUB SERPL-MCNC: 1.2 MG/DL (ref 0.3–1.2)
BUN SERPL-MCNC: 13 MG/DL (ref 7–25)
CALCIUM SERPL-MCNC: 9.9 MG/DL (ref 8.6–10.3)
CHLORIDE SERPL-SCNC: 104 MEQ/L (ref 98–107)
CK SERPL-CCNC: 139 U/L (ref 30–223)
CO2 SERPL-SCNC: 29 MEQ/L (ref 21–31)
CREAT SERPL-MCNC: 0.6 MG/DL (ref 0.6–1.2)
EGFRCR SERPLBLD CKD-EPI 2021: >60 ML/MIN/1.73M*2
ERYTHROCYTE [DISTWIDTH] IN BLOOD BY AUTOMATED COUNT: 12.6 % (ref 11.7–14.4)
ERYTHROCYTE [SEDIMENTATION RATE] IN BLOOD BY WESTERGREN METHOD: 14 MM/HR
GLUCOSE SERPL-MCNC: 86 MG/DL (ref 70–99)
HCT VFR BLD AUTO: 44.3 % (ref 35–45)
HGB BLD-MCNC: 14.3 G/DL (ref 11.8–15.7)
MCH RBC QN AUTO: 30 PG (ref 28–33.2)
MCHC RBC AUTO-ENTMCNC: 32.3 G/DL (ref 32.2–35.5)
MCV RBC AUTO: 92.9 FL (ref 83–98)
PLATELET # BLD AUTO: 267 K/UL (ref 150–369)
PMV BLD AUTO: 10.3 FL (ref 9.4–12.3)
POTASSIUM SERPL-SCNC: 4.6 MEQ/L (ref 3.5–5.1)
PROT SERPL-MCNC: 6.7 G/DL (ref 6–8.2)
RBC # BLD AUTO: 4.77 M/UL (ref 3.93–5.22)
SODIUM SERPL-SCNC: 139 MEQ/L (ref 136–145)
T4 FREE SERPL-MCNC: 0.76 NG/DL (ref 0.58–1.64)
TSH SERPL DL<=0.05 MIU/L-ACNC: 0.75 MIU/L (ref 0.34–5.6)
VIT B12 SERPL-MCNC: 500 PG/ML (ref 180–914)
WBC # BLD AUTO: 3.98 K/UL (ref 3.8–10.5)

## 2025-07-22 PROCEDURE — 86617 LYME DISEASE ANTIBODY: CPT

## 2025-07-22 PROCEDURE — 85652 RBC SED RATE AUTOMATED: CPT

## 2025-07-22 PROCEDURE — 99214 OFFICE O/P EST MOD 30 MIN: CPT | Performed by: INTERNAL MEDICINE

## 2025-07-22 PROCEDURE — 76536 US EXAM OF HEAD AND NECK: CPT

## 2025-07-22 PROCEDURE — 85027 COMPLETE CBC AUTOMATED: CPT

## 2025-07-22 PROCEDURE — 36415 COLL VENOUS BLD VENIPUNCTURE: CPT

## 2025-07-22 PROCEDURE — 80053 COMPREHEN METABOLIC PANEL: CPT

## 2025-07-22 PROCEDURE — 82550 ASSAY OF CK (CPK): CPT

## 2025-07-22 PROCEDURE — 3008F BODY MASS INDEX DOCD: CPT | Performed by: INTERNAL MEDICINE

## 2025-07-22 PROCEDURE — 86618 LYME DISEASE ANTIBODY: CPT

## 2025-07-22 PROCEDURE — 84439 ASSAY OF FREE THYROXINE: CPT

## 2025-07-22 PROCEDURE — 84443 ASSAY THYROID STIM HORMONE: CPT

## 2025-07-22 PROCEDURE — 82607 VITAMIN B-12: CPT

## 2025-07-22 RX ORDER — CETIRIZINE HYDROCHLORIDE 10 MG/1
10 TABLET ORAL SEE ADMIN INSTRUCTIONS
COMMUNITY
Start: 2025-02-05 | End: 2025-07-22 | Stop reason: ALTCHOICE

## 2025-07-22 RX ORDER — CYCLOBENZAPRINE HCL 5 MG
5 TABLET ORAL NIGHTLY PRN
COMMUNITY
Start: 2024-09-16

## 2025-07-22 RX ORDER — METOPROLOL TARTRATE 25 MG/1
12.5-25 TABLET, FILM COATED ORAL AS NEEDED
COMMUNITY
Start: 2025-05-23 | End: 2025-07-22 | Stop reason: ALTCHOICE

## 2025-07-22 RX ORDER — MAGNESIUM 200 MG
TABLET ORAL DAILY
COMMUNITY

## 2025-07-22 RX ORDER — LANOLIN ALCOHOL/MO/W.PET/CERES
500 CREAM (GRAM) TOPICAL DAILY
COMMUNITY

## 2025-07-22 ASSESSMENT — ENCOUNTER SYMPTOMS
FATIGUE: 0
VOMITING: 0
NAUSEA: 0
SHORTNESS OF BREATH: 0
DIARRHEA: 0
TROUBLE SWALLOWING: 0
ARTHRALGIAS: 0
JOINT SWELLING: 0

## 2025-07-22 NOTE — ASSESSMENT & PLAN NOTE
This is a 64 y.o. female with a past medical history of goiter  Had oral surgery in spring at [facility]  Having throat pain around thyroid  Last thyroid Us was  1yr ago  Nodules are too small  Cta showed mild disease - seeing cardio soon  Elevated LpA  Total cholesterol is 205    Seeing cardio at [facility] in [place]  Labs and Us due  Dexa due soon as well  Labs for muscle soreness and r/o lymes    Decision making for this patient was of moderate complexity       Orders:    T4, free; Future    TSH 3rd Generation; Future    ULTRASOUND THYROID; Future

## 2025-07-22 NOTE — PROGRESS NOTES
[facility] Endocrinology  History & Physical        CHIEF COMPLAINT   Patient Name: Sowmya Clark  MR#: [id number]  : 1960  Consultant: Bhaskar Mccurdy DO     HISTORY OF PRESENT ILLNESS      This is a 64 y.o. female with a past medical history of goiter  Had oral surgery in spring at [place]  Having throat pain around thyroid  Last thyroid Us was  1yr ago  Nodules are too small  Cta showed mild disease - seeing cardio soon  Elevated LpA  Total cholesterol is 205              PAST MEDICAL AND SURGICAL HISTORY      PMHx:  Past Medical History:   Diagnosis Date    Breast cancer (CMS/HCC) 2007    Crohn's disease (CMS/HCC)     Dystonia     Osteomyelitis (CMS/HCC)     Pancreatitis     Thyroid nodule     Tremor         PSHx:  Past Surgical History   Procedure Laterality Date    Breast lumpectomy      Breast surgery      Cataract extraction      Cyst removal      throat    ENDOSCOPIC RETROGRADE CHOLANGIOPANCREATOGRAPHY WITH PLACEMENT OF STENT OF PANCREATIC DUCT AND SPHINCTERECTOMY N/A 10/20/2022    Performed by Perry Tenorio MD at [facility]    ENDOSCOPIC RETROGRADE CHOLANGIOPANCREATOGRAPHY WITH PLACEMENT OF STENT OF PANCREATIC DUCT AND SPHINCTERECTOMY N/A 10/20/2022    Performed by Perry Tenorio MD at [facility]    EUS N/A 2022    Performed by Perry Tenorio MD at [facility]    Knee surgery      VT EGD INTRMURAL NEEDLE ASPIR/BIOP ALTERED ANATOMY [16539 (CPT®)] Left 10/20/2022    Performed by Perry Tenorio MD at [facility]    VT ERCP BILIARY/PANC DUCT STENT EXCHANGE W/DIL&WIRE [56185 (CPT®)] N/A 2023    Performed by Perry Tenorio MD at [facility]    VT ERCP DX COLLECTION SPECIMEN BRUSHING/WASHING [03538 (CPT®)] N/A 2023    Performed by Perry Tenorio MD at [facility]    VT ERCP STENT PLACEMENT BILIARY/PANCREATIC DUCT [70871 (CPT®)] N/A 12/15/2022    Performed by Perry Tenorio MD at [facility]    VT ERCP W/SPHINCTEROTOMY/PAPILLOTOMY [34273 (CPT®)] N/A 2022    Performed by Perry Tenorio MD at [facility]    Tonsillectomy       UPPER GASTROINTESTINAL ENDOSCOPY WITH REMOVAL OF FOREIGN BODY N/A 10/20/2022    Performed by Perry Tenorio MD at [facility]       MEDICATIONS          Current Outpatient Medications:     ascorbic acid, vitamin C, (VITAMIN C) 500 mg CR capsule, Take 500 mg by mouth daily., Disp: , Rfl:     aspirin 81 mg chewable tablet, Take 81 mg by mouth daily., Disp: , Rfl:     ciclopirox (LOPROX) 1 % shampoo, Apply topically once a week. gently massage into scalp, Disp: , Rfl:     cyclobenzaprine (FLEXERIL) 5 mg tablet, Take 5 mg by mouth nightly as needed., Disp: , Rfl:     magnesium 200 mg tablet, Take by mouth daily., Disp: , Rfl:     multivitamin tablet, Take 1 tablet by mouth daily., Disp: , Rfl:     POTASSIUM ORAL, Take by mouth daily., Disp: , Rfl:     ALLERGIES        Prednisone    FAMILY HISTORY        Family History   Problem Relation Name Age of Onset    COPD Biological Mother      Alzheimer's disease Biological Father      Spina bifida Biological Brother         SOCIAL HISTORY        Social History     Socioeconomic History    Marital status:      Spouse name: None    Number of children: None    Years of education: None    Highest education level: None   Tobacco Use    Smoking status: Never    Smokeless tobacco: Never   Substance and Sexual Activity    Alcohol use: Yes    Drug use: Never    Sexual activity: Defer     Social Drivers of Health     Food Insecurity: No Food Insecurity (5/23/2023)    Hunger Vital Sign     Worried About Running Out of Food in the Last Year: Never true     Ran Out of Food in the Last Year: Never true       REVIEW OF SYSTEMS        Review of Systems   Constitutional:  Negative for fatigue.   HENT:  Negative for trouble swallowing.    Respiratory:  Negative for shortness of breath.    Cardiovascular:  Negative for chest pain and leg swelling.   Gastrointestinal:  Negative for diarrhea, nausea and vomiting.   Endocrine: Negative for cold intolerance and heat intolerance.  "  Musculoskeletal:  Negative for arthralgias and joint swelling.       PHYSICAL EXAMINATION        Visit Vitals  /68 (BP Location: Left upper arm, Patient Position: Sitting)   Pulse 74   Ht 1.803 m (5' 11\")   Wt 85.7 kg (189 lb)   LMP  (LMP Unknown)   SpO2 98%   BMI 26.36 kg/m²     Body mass index is 26.36 kg/m².    Physical Exam  Vitals and nursing note reviewed.   Constitutional:       Appearance: Normal appearance.   Cardiovascular:      Rate and Rhythm: Normal rate and regular rhythm.   Pulmonary:      Effort: Pulmonary effort is normal.      Breath sounds: Normal breath sounds.   Abdominal:      General: Bowel sounds are normal.      Palpations: Abdomen is soft.   Musculoskeletal:         General: Normal range of motion.      Cervical back: Normal range of motion. No tenderness.   Skin:     General: Skin is warm and dry.   Neurological:      General: No focal deficit present.      Mental Status: She is alert and oriented to person, place, and time.         LABS / IMAGING / STUDIES        Labs:    Lab Results   Component Value Date    GLUCOSE 77 11/07/2024    CREATININE 0.7 11/07/2024    CREATININE 0.7 06/28/2024    CREATININE 0.6 05/18/2024    BUN 15 11/07/2024    K 4.4 11/07/2024     11/07/2024    CALCIUM 9.1 11/07/2024    AST 19 11/07/2024    ALT 15 11/07/2024    ALKPHOS 79 11/07/2024    TRIG 58 06/28/2024    HDL 81 06/28/2024        Lab Results   Component Value Date    TSH 0.31 (L) 10/20/2022       Imaging:      SCREENING                ASSESSMENT AND PLAN     Please bring your insurance information and a copayment if required by your insurance company.       Assessment & Plan  Nontoxic multinodular goiter  This is a 64 y.o. female with a past medical history of goiter  Had oral surgery in spring at [place]  Having throat pain around thyroid  Last thyroid Us was  1yr ago  Nodules are too small  Cta showed mild disease - seeing cardio soon  Elevated LpA  Total cholesterol is 205    Seeing cardio at " [facility] in [place]  Labs and Us due  Dexa due soon as well  Labs for muscle soreness and r/o lymes    Decision making for this patient was of moderate complexity       Orders:    T4, free; Future    TSH 3rd Generation; Future    ULTRASOUND THYROID; Future    Vitamin D deficiency         Age-related osteoporosis with current pathological fracture, unspecified site, initial encounter for fracture         Myalgia    Orders:    CK TOTAL (NO REFLEX); Future    Lyme Abs Total W/Reflex WB; Future    Vitamin B12; Future    Comprehensive metabolic panel; Future    CBC; Future    Sedimentation rate, automated; Future         Bhaskar Mccurdy DO  07/22/25  10:04 AM

## 2025-07-22 NOTE — ASSESSMENT & PLAN NOTE
Orders:    CK TOTAL (NO REFLEX); Future    Lyme Abs Total W/Reflex WB; Future    Vitamin B12; Future    Comprehensive metabolic panel; Future    CBC; Future    Sedimentation rate, automated; Future

## 2025-07-24 ENCOUNTER — TELEPHONE (OUTPATIENT)
Facility: HOSPITAL | Age: 65
End: 2025-07-24
Payer: COMMERCIAL

## 2025-07-24 DIAGNOSIS — R25.1 TREMOR: Primary | ICD-10-CM

## 2025-07-24 DIAGNOSIS — A69.20 LYME DISEASE: ICD-10-CM

## 2025-07-24 DIAGNOSIS — E04.2 NONTOXIC MULTINODULAR GOITER: Primary | ICD-10-CM

## 2025-07-24 LAB — B BURGDOR AB SER IA-ACNC: 0.94 RATIO

## 2025-07-24 RX ORDER — DIAZEPAM 2 MG/1
2 TABLET ORAL EVERY 8 HOURS PRN
Qty: 4 TABLET | Refills: 1 | Status: SHIPPED | OUTPATIENT
Start: 2025-07-24

## 2025-07-24 RX ORDER — DIAZEPAM 2 MG/1
2 TABLET ORAL EVERY 8 HOURS PRN
COMMUNITY
Start: 2025-06-10 | End: 2025-07-24 | Stop reason: SDUPTHER

## 2025-07-24 NOTE — TELEPHONE ENCOUNTER
Provider: Dr. Bhaskar Mccurdy  New or Existing Issue: NEW   Question or Concern: Not feeling well. Had blood work done and is requesting a call back.    Patient has questions regarding ultrasound of thyroid    Request for call back asap [phone]

## 2025-07-25 LAB
B BURGDOR IGG SER QL IB: NEGATIVE
B BURGDOR IGM SER QL IB: NEGATIVE
B BURGDOR18KD IGG SER QL IB: NORMAL
B BURGDOR23KD IGG SER QL IB: NORMAL
B BURGDOR23KD IGM SER QL IB: NORMAL
B BURGDOR28KD IGG SER QL IB: NORMAL
B BURGDOR30KD IGG SER QL IB: NORMAL
B BURGDOR39KD IGG SER QL IB: NORMAL
B BURGDOR39KD IGM SER QL IB: NORMAL
B BURGDOR41KD IGG SER QL IB: NORMAL
B BURGDOR41KD IGM SER QL IB: NORMAL
B BURGDOR45KD IGG SER QL IB: NORMAL
B BURGDOR58KD IGG SER QL IB: NORMAL
B BURGDOR66KD IGG SER QL IB: NORMAL
B BURGDOR93KD IGG SER QL IB: NORMAL

## 2025-08-22 ENCOUNTER — HOSPITAL ENCOUNTER (OUTPATIENT)
Dept: RADIOLOGY | Facility: HOSPITAL | Age: 65
Discharge: HOME | End: 2025-08-22
Attending: INTERNAL MEDICINE | Admitting: RADIOLOGY
Payer: COMMERCIAL

## 2025-08-22 VITALS
HEART RATE: 63 BPM | RESPIRATION RATE: 16 BRPM | DIASTOLIC BLOOD PRESSURE: 58 MMHG | SYSTOLIC BLOOD PRESSURE: 106 MMHG | OXYGEN SATURATION: 99 %

## 2025-08-22 DIAGNOSIS — E04.2 NONTOXIC MULTINODULAR GOITER: ICD-10-CM

## 2025-08-22 PROCEDURE — 88305 TISSUE EXAM BY PATHOLOGIST: CPT | Performed by: INTERNAL MEDICINE

## 2025-08-22 PROCEDURE — 76942 ECHO GUIDE FOR BIOPSY: CPT

## 2025-08-26 ENCOUNTER — TELEPHONE (OUTPATIENT)
Facility: HOSPITAL | Age: 65
End: 2025-08-26
Payer: COMMERCIAL

## 2025-08-26 LAB
CASE RPRT: NORMAL
CLINICAL INFO: NORMAL
PATH REPORT.FINAL DX SPEC: NORMAL
PATH REPORT.GROSS SPEC: NORMAL

## (undated) DEVICE — VALVE BIOPSY SINGLE USE

## (undated) DEVICE — SYSTEM RAPID REFILL RX BILIARY

## (undated) DEVICE — PROBE BIPOLAR COAGULATION

## (undated) DEVICE — SOLN IV 0.9% NSS 1000ML

## (undated) DEVICE — COVER DISTAL SINGLE FOR 190 ERCP SCOPE

## (undated) DEVICE — SPHINCTEROTOME CLEVERCUT3V 7MM X 30MM

## (undated) DEVICE — BLANKET LOWER BODY

## (undated) DEVICE — MOUTHPIECE 60FR ENDO BITEBLOCK

## (undated) DEVICE — BALLOON ENDO ULTRASOUND

## (undated) DEVICE — NEEDLE ECHOTIP HD ULTRASOUND ACCESS

## (undated) DEVICE — PAD GROUND ELECTROSURGICAL W/CORD

## (undated) DEVICE — GUIDEWIRE VISIGLIDE STRAIGHT TIP .035X450CM

## (undated) DEVICE — SNARE POLYPECTOMY 25MM

## (undated) DEVICE — Device

## (undated) DEVICE — OMNIPAQUE 300MG 50ML BTL

## (undated) DEVICE — FORCEP COLD RADIAL JAW

## (undated) DEVICE — BALLOON BILIARY EXTRACTION MULTI-3V ABOVE INJECTION

## (undated) DEVICE — BLOCK BITE DISP

## (undated) DEVICE — NEEDLE 22G WITH BALL TIP STYLET